# Patient Record
Sex: MALE | Race: WHITE | NOT HISPANIC OR LATINO | Employment: OTHER | URBAN - NONMETROPOLITAN AREA
[De-identification: names, ages, dates, MRNs, and addresses within clinical notes are randomized per-mention and may not be internally consistent; named-entity substitution may affect disease eponyms.]

---

## 2017-01-23 ENCOUNTER — OFFICE VISIT (OUTPATIENT)
Dept: NEUROLOGY | Facility: CLINIC | Age: 27
End: 2017-01-23

## 2017-01-23 VITALS
BODY MASS INDEX: 36.7 KG/M2 | WEIGHT: 286 LBS | HEART RATE: 74 BPM | DIASTOLIC BLOOD PRESSURE: 84 MMHG | HEIGHT: 74 IN | SYSTOLIC BLOOD PRESSURE: 130 MMHG

## 2017-01-23 DIAGNOSIS — R25.2 POST-TRAUMATIC SPASTICITY: ICD-10-CM

## 2017-01-23 DIAGNOSIS — Z98.2 S/P VP SHUNT: ICD-10-CM

## 2017-01-23 DIAGNOSIS — Z87.828 H/O TRAUMATIC SUBDURAL HEMATOMA: ICD-10-CM

## 2017-01-23 DIAGNOSIS — G40.419 OTHER GENERALIZED EPILEPSY, INTRACTABLE, WITHOUT STATUS EPILEPTICUS (HCC): ICD-10-CM

## 2017-01-23 DIAGNOSIS — G91.9 HYDROCEPHALUS (HCC): ICD-10-CM

## 2017-01-23 DIAGNOSIS — G47.33 OSA TREATED WITH BIPAP: Primary | ICD-10-CM

## 2017-01-23 DIAGNOSIS — Z97.8 PRESENCE OF INTRATHECAL BACLOFEN PUMP: ICD-10-CM

## 2017-01-23 DIAGNOSIS — S06.9X9S: ICD-10-CM

## 2017-01-23 PROBLEM — G40.919 INTRACTABLE EPILEPSY WITHOUT STATUS EPILEPTICUS: Status: ACTIVE | Noted: 2017-01-23

## 2017-01-23 PROBLEM — S06.9XAA CLOSED TBI (TRAUMATIC BRAIN INJURY): Status: ACTIVE | Noted: 2017-01-23

## 2017-01-23 PROCEDURE — 99213 OFFICE O/P EST LOW 20 MIN: CPT | Performed by: CLINICAL NURSE SPECIALIST

## 2017-01-23 RX ORDER — LEVOCETIRIZINE DIHYDROCHLORIDE 5 MG/1
5 TABLET, FILM COATED ORAL EVERY EVENING
COMMUNITY
End: 2021-01-28

## 2017-01-23 RX ORDER — BACLOFEN 20 MG/1
20 TABLET ORAL 4 TIMES DAILY
COMMUNITY
End: 2023-01-18

## 2017-01-23 RX ORDER — CHLORAL HYDRATE 500 MG
3000 CAPSULE ORAL
COMMUNITY
End: 2023-01-18 | Stop reason: DRUGHIGH

## 2017-01-23 RX ORDER — VITAMIN E 268 MG
400 CAPSULE ORAL 2 TIMES DAILY
COMMUNITY
End: 2023-01-18

## 2017-01-23 RX ORDER — AMANTADINE HYDROCHLORIDE 100 MG/1
100 CAPSULE, GELATIN COATED ORAL 2 TIMES DAILY
COMMUNITY

## 2017-01-23 RX ORDER — LIDOCAINE 50 MG/G
OINTMENT TOPICAL
COMMUNITY
End: 2018-02-09

## 2017-01-23 RX ORDER — OXYCODONE HYDROCHLORIDE AND ACETAMINOPHEN 5; 325 MG/1; MG/1
1 TABLET ORAL EVERY 6 HOURS PRN
COMMUNITY
End: 2023-01-18

## 2017-01-23 RX ORDER — DOCUSATE SODIUM 100 MG/1
100 CAPSULE, LIQUID FILLED ORAL 2 TIMES DAILY
COMMUNITY
End: 2023-01-18

## 2017-01-23 RX ORDER — CHLORPROMAZINE HYDROCHLORIDE 25 MG/1
25 TABLET, FILM COATED ORAL NIGHTLY
COMMUNITY
End: 2023-01-18

## 2017-01-23 RX ORDER — CITALOPRAM 40 MG/1
40 TABLET ORAL DAILY
COMMUNITY
End: 2023-01-18 | Stop reason: DRUGHIGH

## 2017-01-23 RX ORDER — PROPRANOLOL HYDROCHLORIDE 20 MG/1
20 TABLET ORAL 2 TIMES DAILY
COMMUNITY
End: 2018-02-09

## 2017-01-23 RX ORDER — ZOLPIDEM TARTRATE 10 MG/1
10 TABLET ORAL NIGHTLY PRN
COMMUNITY
End: 2018-02-09

## 2017-01-23 RX ORDER — GUAIFENESIN 400 MG/1
400 TABLET ORAL
COMMUNITY
End: 2017-08-09

## 2017-01-23 RX ORDER — MELOXICAM 7.5 MG/1
7.5 TABLET ORAL 2 TIMES DAILY
COMMUNITY
End: 2023-01-18

## 2017-01-23 RX ORDER — ACETAMINOPHEN 500 MG
500 TABLET ORAL EVERY 6 HOURS PRN
COMMUNITY
End: 2023-01-18

## 2017-01-23 RX ORDER — BUSPIRONE HYDROCHLORIDE 10 MG/1
10 TABLET ORAL 2 TIMES DAILY
COMMUNITY
End: 2023-01-18 | Stop reason: DRUGHIGH

## 2017-01-23 RX ORDER — DEXLANSOPRAZOLE 60 MG/1
60 CAPSULE, DELAYED RELEASE ORAL DAILY
COMMUNITY
End: 2023-01-18

## 2017-01-23 RX ORDER — GABAPENTIN 300 MG/1
300 CAPSULE ORAL 3 TIMES DAILY
COMMUNITY
End: 2017-08-09

## 2017-01-23 RX ORDER — DULOXETIN HYDROCHLORIDE 60 MG/1
60 CAPSULE, DELAYED RELEASE ORAL DAILY
COMMUNITY
End: 2023-01-18

## 2017-01-23 RX ORDER — RISPERIDONE 1 MG/1
1 TABLET ORAL 2 TIMES DAILY
COMMUNITY
End: 2018-02-09
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
## 2017-01-23 NOTE — PATIENT INSTRUCTIONS
Epilepsy  Epilepsy is a disorder in which a person has repeated seizures over time. A seizure is a release of abnormal electrical activity in the brain. Seizures can cause a change in attention, behavior, or the ability to remain awake and alert (altered mental status). Seizures often involve uncontrollable shaking (convulsions).   Most people with epilepsy lead normal lives. However, people with epilepsy are at an increased risk of falls, accidents, and injuries. Therefore, it is important to begin treatment right away.  CAUSES   Epilepsy has many possible causes. Anything that disturbs the normal pattern of brain cell activity can lead to seizures. This may include:   · Head injury.  · Birth trauma.  · High fever as a child.  · Stroke.  · Bleeding into or around the brain.  · Certain drugs.  · Prolonged low oxygen, such as what occurs after CPR efforts.  · Abnormal brain development.  · Certain illnesses, such as meningitis, encephalitis (brain infection), malaria, and other infections.  · An imbalance of nerve signaling chemicals (neurotransmitters).    SIGNS AND SYMPTOMS   The symptoms of a seizure can vary greatly from one person to another. Right before a seizure, you may have a warning (aura) that a seizure is about to occur. An aura may include the following symptoms:  · Fear or anxiety.  · Nausea.  · Feeling like the room is spinning (vertigo).  · Vision changes, such as seeing flashing lights or spots.  Common symptoms during a seizure include:  · Abnormal sensations, such as an abnormal smell or a bitter taste in the mouth.    · Sudden, general body stiffness.    · Convulsions that involve rhythmic jerking of the face, arm, or leg on one or both sides.    · Sudden change in consciousness.      Appearing to be awake but not responding.      Appearing to be asleep but cannot be awakened.    · Grimacing, chewing, lip smacking, drooling, tongue biting, or loss of bowel or bladder control.  After a seizure,  you may feel sleepy for a while.   DIAGNOSIS   Your health care provider will ask about your symptoms and take a medical history. Descriptions from any witnesses to your seizures will be very helpful in the diagnosis. A physical exam, including a detailed neurological exam, is necessary. Various tests may be done, such as:   · An electroencephalogram (EEG). This is a painless test of your brain waves. In this test, a diagram is created of your brain waves. These diagrams can be interpreted by a specialist.  · An MRI of the brain.    · A CT scan of the brain.    · A spinal tap (lumbar puncture, LP).  · Blood tests to check for signs of infection or abnormal blood chemistry.  TREATMENT   There is no cure for epilepsy, but it is generally treatable. Once epilepsy is diagnosed, it is important to begin treatment as soon as possible. For most people with epilepsy, seizures can be controlled with medicines. The following may also be used:  · A pacemaker for the brain (vagus nerve stimulator) can be used for people with seizures that are not well controlled by medicine.  · Surgery on the brain.  For some people, epilepsy eventually goes away.  HOME CARE INSTRUCTIONS   · Follow your health care provider's recommendations on driving and safety in normal activities.  · Get enough rest. Lack of sleep can cause seizures.  · Only take over-the-counter or prescription medicines as directed by your health care provider. Take any prescribed medicine exactly as directed.  · Avoid any known triggers of your seizures.  · Keep a seizure diary. Record what you recall about any seizure, especially any possible trigger.    · Make sure the people you live and work with know that you are prone to seizures. They should receive instructions on how to help you. In general, a witness to a seizure should:      Cushion your head and body.      Turn you on your side.      Avoid unnecessarily restraining you.      Not place anything inside your  mouth.      Call for emergency medical help if there is any question about what has occurred.    · Follow up with your health care provider as directed. You may need regular blood tests to monitor the levels of your medicine.    SEEK MEDICAL CARE IF:   · You develop signs of infection or other illness. This might increase the risk of a seizure.    · You seem to be having more frequent seizures.    · Your seizure pattern is changing.    SEEK IMMEDIATE MEDICAL CARE IF:   · You have a seizure that does not stop after a few moments.    · You have a seizure that causes any difficulty in breathing.    · You have a seizure that results in a very severe headache.    · You have a seizure that leaves you with the inability to speak or use a part of your body.       This information is not intended to replace advice given to you by your health care provider. Make sure you discuss any questions you have with your health care provider.     Document Released: 12/18/2006 Document Revised: 10/08/2014 Document Reviewed: 07/30/2014  "[employer] Interactive Patient Education ©2016 "[employer] Inc[employer]  Sleep Apnea  Sleep apnea is disorder that affects a person's sleep. A person with sleep apnea has abnormal pauses in their breathing when they sleep. It is hard for them to get a good sleep. This makes a person tired during the day. It also can lead to other physical problems. There are three types of sleep apnea. One type is when breathing stops for a short time because your airway is blocked (obstructive sleep apnea). Another type is when the brain sometimes fails to give the normal signal to breathe to the muscles that control your breathing (central sleep apnea). The third type is a combination of the other two types.  HOME CARE  · Do not sleep on your back. Try to sleep on your side.  · Take all medicine as told by your doctor.  · Avoid alcohol, calming medicines (sedatives), and depressant drugs.  · Try to lose weight if you are overweight.  Talk to your doctor about a healthy weight goal.  Your doctor may have you use a device that helps to open your airway. It can help you get the air that you need. It is called a positive airway pressure (PAP) device. There are three types of PAP devices:  · Continuous positive airway pressure (CPAP) device.  · Nasal expiratory positive airway pressure (EPAP) device.  · Bilevel positive airway pressure (BPAP) device.  MAKE SURE YOU:  · Understand these instructions.  · Will watch your condition.  · Will get help right away if you are not doing well or get worse.     This information is not intended to replace advice given to you by your health care provider. Make sure you discuss any questions you have with your health care provider.     Document Released: 09/26/2009 Document Revised: 01/08/2016 Document Reviewed: 04/20/2013  [employer] Interactive Patient Education ©2016 [employer]

## 2017-01-23 NOTE — MR AVS SNAPSHOT
Octavio Werner   1/23/2017 9:40 AM   Office Visit    Dept Phone:  [phone]   Encounter #:  [id number]    Provider:  GREG Bhatia   Department:  [facility] NEUROLOGY                Your Full Care Plan              Your Updated Medication List          This list is accurate as of: 1/23/17 10:40 AM.  Always use your most recent med list.                acetaminophen 500 MG tablet   Commonly known as:  TYLENOL       amantadine 100 MG capsule   Commonly known as:  SYMMETREL       AMBIEN 10 MG tablet   Generic drug:  zolpidem       ARTIFICIAL TEAR SOLUTION OP       baclofen 20 MG tablet   Commonly known as:  LIORESAL       busPIRone 10 MG tablet   Commonly known as:  BUSPAR       chlorproMAZINE 25 MG tablet   Commonly known as:  THORAZINE       citalopram 40 MG tablet   Commonly known as:  CeleXA       DEXILANT 60 MG capsule   Generic drug:  dexlansoprazole       docusate sodium 100 MG capsule   Commonly known as:  COLACE       DULoxetine 60 MG capsule   Commonly known as:  CYMBALTA       fish oil 1000 MG capsule capsule       gabapentin 300 MG capsule   Commonly known as:  NEURONTIN       levocetirizine 5 MG tablet   Commonly known as:  XYZAL       lidocaine 5 % ointment   Commonly known as:  XYLOCAINE       meloxicam 7.5 MG tablet   Commonly known as:  MOBIC       MUCUS RELIEF 400 MG tablet   Generic drug:  guaiFENesin       MULTI-VITAMIN PO       oxyCODONE-acetaminophen 5-325 MG per tablet   Commonly known as:  PERCOCET       propranolol 20 MG tablet   Commonly known as:  INDERAL       risperiDONE 1 MG tablet   Commonly known as:  risperDAL       vitamin E 400 UNIT capsule               You Were Diagnosed With        Codes Comments    AMARI treated with BiPAP    -  Primary ICD-10-CM: G47.33  ICD-9-CM: 327.23       Instructions    Epilepsy  Epilepsy is a disorder in which a person has repeated seizures over time. A seizure is a release of abnormal electrical activity  in the brain. Seizures can cause a change in attention, behavior, or the ability to remain awake and alert (altered mental status). Seizures often involve uncontrollable shaking (convulsions).   Most people with epilepsy lead normal lives. However, people with epilepsy are at an increased risk of falls, accidents, and injuries. Therefore, it is important to begin treatment right away.  CAUSES   Epilepsy has many possible causes. Anything that disturbs the normal pattern of brain cell activity can lead to seizures. This may include:   · Head injury.  · Birth trauma.  · High fever as a child.  · Stroke.  · Bleeding into or around the brain.  · Certain drugs.  · Prolonged low oxygen, such as what occurs after CPR efforts.  · Abnormal brain development.  · Certain illnesses, such as meningitis, encephalitis (brain infection), malaria, and other infections.  · An imbalance of nerve signaling chemicals (neurotransmitters).    SIGNS AND SYMPTOMS   The symptoms of a seizure can vary greatly from one person to another. Right before a seizure, you may have a warning (aura) that a seizure is about to occur. An aura may include the following symptoms:  · Fear or anxiety.  · Nausea.  · Feeling like the room is spinning (vertigo).  · Vision changes, such as seeing flashing lights or spots.  Common symptoms during a seizure include:  · Abnormal sensations, such as an abnormal smell or a bitter taste in the mouth.    · Sudden, general body stiffness.    · Convulsions that involve rhythmic jerking of the face, arm, or leg on one or both sides.    · Sudden change in consciousness.      Appearing to be awake but not responding.      Appearing to be asleep but cannot be awakened.    · Grimacing, chewing, lip smacking, drooling, tongue biting, or loss of bowel or bladder control.  After a seizure, you may feel sleepy for a while.   DIAGNOSIS   Your health care provider will ask about your symptoms and take a medical history.  Descriptions from any witnesses to your seizures will be very helpful in the diagnosis. A physical exam, including a detailed neurological exam, is necessary. Various tests may be done, such as:   · An electroencephalogram (EEG). This is a painless test of your brain waves. In this test, a diagram is created of your brain waves. These diagrams can be interpreted by a specialist.  · An MRI of the brain.    · A CT scan of the brain.    · A spinal tap (lumbar puncture, LP).  · Blood tests to check for signs of infection or abnormal blood chemistry.  TREATMENT   There is no cure for epilepsy, but it is generally treatable. Once epilepsy is diagnosed, it is important to begin treatment as soon as possible. For most people with epilepsy, seizures can be controlled with medicines. The following may also be used:  · A pacemaker for the brain (vagus nerve stimulator) can be used for people with seizures that are not well controlled by medicine.  · Surgery on the brain.  For some people, epilepsy eventually goes away.  HOME CARE INSTRUCTIONS   · Follow your health care provider's recommendations on driving and safety in normal activities.  · Get enough rest. Lack of sleep can cause seizures.  · Only take over-the-counter or prescription medicines as directed by your health care provider. Take any prescribed medicine exactly as directed.  · Avoid any known triggers of your seizures.  · Keep a seizure diary. Record what you recall about any seizure, especially any possible trigger.    · Make sure the people you live and work with know that you are prone to seizures. They should receive instructions on how to help you. In general, a witness to a seizure should:      Cushion your head and body.      Turn you on your side.      Avoid unnecessarily restraining you.      Not place anything inside your mouth.      Call for emergency medical help if there is any question about what has occurred.    · Follow up with your health care  provider as directed. You may need regular blood tests to monitor the levels of your medicine.    SEEK MEDICAL CARE IF:   · You develop signs of infection or other illness. This might increase the risk of a seizure.    · You seem to be having more frequent seizures.    · Your seizure pattern is changing.    SEEK IMMEDIATE MEDICAL CARE IF:   · You have a seizure that does not stop after a few moments.    · You have a seizure that causes any difficulty in breathing.    · You have a seizure that results in a very severe headache.    · You have a seizure that leaves you with the inability to speak or use a part of your body.       This information is not intended to replace advice given to you by your health care provider. Make sure you discuss any questions you have with your health care provider.     Document Released: 12/18/2006 Document Revised: 10/08/2014 Document Reviewed: 07/30/2014  [employer] Interactive Patient Education ©2016 [employer]  Sleep Apnea  Sleep apnea is disorder that affects a person's sleep. A person with sleep apnea has abnormal pauses in their breathing when they sleep. It is hard for them to get a good sleep. This makes a person tired during the day. It also can lead to other physical problems. There are three types of sleep apnea. One type is when breathing stops for a short time because your airway is blocked (obstructive sleep apnea). Another type is when the brain sometimes fails to give the normal signal to breathe to the muscles that control your breathing (central sleep apnea). The third type is a combination of the other two types.  HOME CARE  · Do not sleep on your back. Try to sleep on your side.  · Take all medicine as told by your doctor.  · Avoid alcohol, calming medicines (sedatives), and depressant drugs.  · Try to lose weight if you are overweight. Talk to your doctor about a healthy weight goal.  Your doctor may have you use a device that helps to open your airway. It can  help you get the air that you need. It is called a positive airway pressure (PAP) device. There are three types of PAP devices:  · Continuous positive airway pressure (CPAP) device.  · Nasal expiratory positive airway pressure (EPAP) device.  · Bilevel positive airway pressure (BPAP) device.  MAKE SURE YOU:  · Understand these instructions.  · Will watch your condition.  · Will get help right away if you are not doing well or get worse.     This information is not intended to replace advice given to you by your health care provider. Make sure you discuss any questions you have with your health care provider.     Document Released: 2009 Document Revised: 2016 Document Reviewed: 2013  [employer] Interactive Patient Education © [employer]       Patient Instructions History      Upcoming Appointments     Visit Type Date Time Department    FOLLOW UP 2017  9:40 AM [facility] NEUROLOGY SPE PAD    FOLLOW UP 3/8/2017 10:40 AM [facility] NEUROLOGY SPE PAD      MyChart Signup     [facility] [employer] allows you to send messages to your doctor, view your test results, renew your prescriptions, schedule appointments, and more. To sign up, go to [employer] and click on the Sign Up Now link in the New User? box. Enter your [employer] Activation Code exactly as it appears below along with the last four digits of your Social Security Number and your Date of Birth () to complete the sign-up process. If you do not sign up before the expiration date, you must request a new code.    [employer] Activation Code: [email or web address]  Expires: 2017 10:39 AM    If you have questions, you can email [email or web address] or call [phone] to talk to our [employer] staff. Remember, [employer] is NOT to be used for urgent needs. For medical emergencies, dial 911.               Other Info from Your Visit           Your Appointments     Mar 08, 2017 10:40 AM CST   Follow Up with GREG Bhatia   [place]  "[facility] MEDICAL [facility] NEUROLOGY (--)    [address]  [place] [address] [phone]           Arrive 15 minutes prior to appointment.              Other Notes About Your Plan     [facility]- [facility]        Allergies     Dilantin [Phenytoin]      Latex      Penicillins      Sulfa Antibiotics        Reason for Visit     Neurologic Problem Last sleep study 2003. Per .       Vital Signs     Blood Pressure Pulse Height Weight Body Mass Index Smoking Status    130/84 74 74\" (188 cm) 286 lb (130 kg) 36.72 kg/m2 Never Smoker      Problems and Diagnoses Noted     AMARI treated with BiPAP    -  Primary        "

## 2017-01-24 ENCOUNTER — TELEPHONE (OUTPATIENT)
Dept: NEUROLOGY | Facility: CLINIC | Age: 27
End: 2017-01-24

## 2017-01-24 NOTE — TELEPHONE ENCOUNTER
Please fax an order for the Overnight Oximetry for Pennyrile to complete the test. Fax [phone]

## 2017-01-25 ENCOUNTER — TELEPHONE (OUTPATIENT)
Dept: UROLOGY | Age: 27
End: 2017-01-25

## 2017-01-31 ENCOUNTER — TELEPHONE (OUTPATIENT)
Dept: NEUROLOGY | Facility: CLINIC | Age: 27
End: 2017-01-31

## 2017-01-31 ENCOUNTER — HOSPITAL ENCOUNTER (OUTPATIENT)
Dept: GENERAL RADIOLOGY | Age: 27
Discharge: HOME | End: 2017-01-31
Payer: COMMERCIAL

## 2017-01-31 ENCOUNTER — OFFICE VISIT (OUTPATIENT)
Dept: UROLOGY | Age: 27
End: 2017-01-31
Payer: COMMERCIAL

## 2017-01-31 VITALS
SYSTOLIC BLOOD PRESSURE: 128 MMHG | HEIGHT: 74 IN | OXYGEN SATURATION: 96 % | HEART RATE: 84 BPM | BODY MASS INDEX: 36.7 KG/M2 | DIASTOLIC BLOOD PRESSURE: 70 MMHG | TEMPERATURE: 96.7 F | WEIGHT: 286 LBS

## 2017-01-31 DIAGNOSIS — N20.0 BILATERAL KIDNEY STONES: Primary | ICD-10-CM

## 2017-01-31 DIAGNOSIS — M54.5 ACUTE BILATERAL LOW BACK PAIN, WITH SCIATICA PRESENCE UNSPECIFIED: ICD-10-CM

## 2017-01-31 DIAGNOSIS — N20.0 BILATERAL KIDNEY STONES: ICD-10-CM

## 2017-01-31 PROCEDURE — G8427 DOCREV CUR MEDS BY ELIG CLIN: HCPCS | Performed by: PHYSICIAN ASSISTANT

## 2017-01-31 PROCEDURE — 1036F TOBACCO NON-USER: CPT | Performed by: PHYSICIAN ASSISTANT

## 2017-01-31 PROCEDURE — G8484 FLU IMMUNIZE NO ADMIN: HCPCS | Performed by: PHYSICIAN ASSISTANT

## 2017-01-31 PROCEDURE — 74000 XR ABDOMEN LIMITED (KUB): CPT

## 2017-01-31 PROCEDURE — 99214 OFFICE O/P EST MOD 30 MIN: CPT | Performed by: PHYSICIAN ASSISTANT

## 2017-01-31 PROCEDURE — G8419 CALC BMI OUT NRM PARAM NOF/U: HCPCS | Performed by: PHYSICIAN ASSISTANT

## 2017-01-31 RX ORDER — POLYETHYLENE GLYCOL 3350 17 G/17G
17 POWDER, FOR SOLUTION ORAL 2 TIMES DAILY
COMMUNITY
Start: 2017-01-26 | End: 2022-09-29

## 2017-01-31 RX ORDER — PIMECROLIMUS 10 MG/G
CREAM TOPICAL 2 TIMES DAILY
COMMUNITY
End: 2018-09-24 | Stop reason: ALTCHOICE

## 2017-01-31 RX ORDER — OXYCODONE AND ACETAMINOPHEN 10; 325 MG/1; MG/1
TABLET ORAL
COMMUNITY
Start: 2016-12-30 | End: 2017-01-31 | Stop reason: ALTCHOICE

## 2017-01-31 RX ORDER — AMANTADINE HYDROCHLORIDE 100 MG/1
TABLET ORAL PRN
COMMUNITY
Start: 2017-01-20 | End: 2019-03-28

## 2017-01-31 ASSESSMENT — ENCOUNTER SYMPTOMS
ABDOMINAL PAIN: 1
SPUTUM PRODUCTION: 0
BACK PAIN: 1
PHOTOPHOBIA: 0
DOUBLE VISION: 0
ORTHOPNEA: 0
NAUSEA: 1
HEMOPTYSIS: 0

## 2017-01-31 NOTE — TELEPHONE ENCOUNTER
I did call and leave a voice mail for Lorie with [facility] with my name and telephone number.

## 2017-01-31 NOTE — TELEPHONE ENCOUNTER
----- Message from GREG Bhatia sent at 1/30/2017  8:30 AM CST -----  Pulse ox with PAP does not require oxygen and no further testing required at this time. Please let [facility] know. thanks

## 2017-02-02 LAB — URINE CULTURE, ROUTINE: NORMAL

## 2017-03-08 ENCOUNTER — OFFICE VISIT (OUTPATIENT)
Dept: NEUROLOGY | Facility: CLINIC | Age: 27
End: 2017-03-08

## 2017-03-08 VITALS
HEIGHT: 74 IN | SYSTOLIC BLOOD PRESSURE: 128 MMHG | HEART RATE: 76 BPM | BODY MASS INDEX: 36.7 KG/M2 | DIASTOLIC BLOOD PRESSURE: 88 MMHG | WEIGHT: 286 LBS

## 2017-03-08 DIAGNOSIS — Z98.2 S/P VP SHUNT: ICD-10-CM

## 2017-03-08 DIAGNOSIS — G40.419 OTHER GENERALIZED EPILEPSY, INTRACTABLE, WITHOUT STATUS EPILEPTICUS (HCC): ICD-10-CM

## 2017-03-08 DIAGNOSIS — S06.9X9S: ICD-10-CM

## 2017-03-08 DIAGNOSIS — Z97.8 PRESENCE OF INTRATHECAL BACLOFEN PUMP: ICD-10-CM

## 2017-03-08 DIAGNOSIS — G47.33 OSA TREATED WITH BIPAP: Primary | ICD-10-CM

## 2017-03-08 DIAGNOSIS — R25.2 POST-TRAUMATIC SPASTICITY: ICD-10-CM

## 2017-03-08 DIAGNOSIS — Z87.828 H/O TRAUMATIC SUBDURAL HEMATOMA: ICD-10-CM

## 2017-03-08 DIAGNOSIS — G91.9 HYDROCEPHALUS (HCC): ICD-10-CM

## 2017-03-08 PROCEDURE — 99213 OFFICE O/P EST LOW 20 MIN: CPT | Performed by: CLINICAL NURSE SPECIALIST

## 2017-03-08 NOTE — PROGRESS NOTES
Subjective     Chief Complaint   Patient presents with   • Sleep Apnea     Not using CPAP.        Octavio Werner is a 26 y.o. male right handed on disability and lives at [facility].  He is here today for follow up for AMARI.  He did have overnight pulse ox that didshow desaturation  Less than 4 minutes with 20 desaturation event. .the patient has not been wearing his CPAP stating the air is too forceful and keeps him from sleeping. As you recall he has history of TBI, hydrocephalus s/p  shunt and post TBI seizures. He denies seizures. He is accompanied by caregiver from the facility that confirms this.       HPI Comments: Last sleep study was 1/2014.    Does not notice leaks . Wears nose piece. Could not tolerate full face mask.        Sleep Apnea   This is a chronic problem. The current episode started more than 1 year ago. Associated symptoms include a fever, numbness and weakness. Pertinent negatives include no arthralgias, chest pain, headaches, sore throat or vomiting. Associated symptoms comments: Sob, hx sleep apnea, patient has had reported increased body weight since 2014. Not wearing PAP as patient states air is too forceful. Exacerbated by: TBI, left sided paralysis, hydrocephalus s/p  shunt.        Current Outpatient Prescriptions   Medication Sig Dispense Refill   • acetaminophen (TYLENOL) 500 MG tablet Take 500 mg by mouth Every 6 (Six) Hours As Needed for mild pain (1-3).     • amantadine (SYMMETREL) 100 MG capsule Take 100 mg by mouth 2 (Two) Times a Day.     • ARTIFICIAL TEAR SOLUTION OP Apply  to eye.     • baclofen (LIORESAL) 20 MG tablet Take 20 mg by mouth 4 (Four) Times a Day.     • busPIRone (BUSPAR) 10 MG tablet Take 10 mg by mouth 2 (Two) Times a Day.     • chlorproMAZINE (THORAZINE) 25 MG tablet Take 25 mg by mouth Every Night.     • citalopram (CeleXA) 40 MG tablet Take 40 mg by mouth Daily.     • dexlansoprazole (DEXILANT) 60 MG capsule Take 60 mg by mouth Daily.     • docusate  sodium (COLACE) 100 MG capsule Take 100 mg by mouth 2 (Two) Times a Day.     • DULoxetine (CYMBALTA) 60 MG capsule Take 60 mg by mouth Daily.     • gabapentin (NEURONTIN) 300 MG capsule Take 1,200 mg by mouth Daily. 300 MG BID, AND 3 CAPS AT BEDTIME     • guaiFENesin (MUCUS RELIEF) 400 MG tablet Take 400 mg by mouth Every 4 (Four) Hours.     • levocetirizine (XYZAL) 5 MG tablet Take 5 mg by mouth Every Evening.     • lidocaine (XYLOCAINE) 5 % ointment Apply  topically Every 2 (Two) Hours As Needed for mild pain (1-3).     • meloxicam (MOBIC) 7.5 MG tablet Take 7.5 mg by mouth 2 (Two) Times a Day.     • Multiple Vitamin (MULTI-VITAMIN PO) Take  by mouth.     • Omega-3 Fatty Acids (FISH OIL) 1000 MG capsule capsule Take 3,000 mg by mouth 3 (Three) Times a Day With Meals.     • oxyCODONE-acetaminophen (PERCOCET) 5-325 MG per tablet Take 1 tablet by mouth Every 6 (Six) Hours As Needed.     • propranolol (INDERAL) 20 MG tablet Take 20 mg by mouth 2 (Two) Times a Day.     • risperiDONE (risperDAL) 1 MG tablet Take 1 mg by mouth 2 (Two) Times a Day.     • vitamin E 400 UNIT capsule Take 400 Units by mouth 2 (Two) Times a Day.     • zolpidem (AMBIEN) 10 MG tablet Take 10 mg by mouth At Night As Needed for sleep.       No current facility-administered medications for this visit.        Past Medical History   Diagnosis Date   • CVA (cerebral vascular accident)    • Depression    • Hemiparesis    • Hydrocephalus    • Kidney stones    • Neurogenic bladder    • Neurogenic bowel    • Seizure    • Sinusitis    • Sleep apnea    • Subdural hematoma    • TBI (traumatic brain injury)    • UTI (urinary tract infection)        Past Surgical History   Procedure Laterality Date   • Craniotomy       FOR EVACUATION OF SUBDURAL HEMATOMA & VENTRICULOSTOMY   • Peg tube insertion       REMOVED   • Tracheostomy       REMOVED   • Chest tube insertion       REMOVED   • Hamstring release     •  shunt insertion     • Baclofen pump implantation    "  • Kidney stone surgery         family history includes No Known Problems in his father and mother.    Social History   Substance Use Topics   • Smoking status: Never Smoker   • Smokeless tobacco: Never Used   • Alcohol use No       Review of Systems   Constitutional: Positive for fever.   HENT: Negative.  Negative for hearing loss, sinus pressure, sore throat and trouble swallowing.    Eyes: Negative.  Negative for visual disturbance.   Respiratory: Positive for shortness of breath. Negative for chest tightness and stridor.    Cardiovascular: Negative.  Negative for chest pain and leg swelling.   Gastrointestinal: Negative.  Negative for constipation, diarrhea and vomiting.   Endocrine: Negative.    Genitourinary: Negative.  Negative for dysuria and frequency.   Musculoskeletal: Positive for gait problem (left sided paralysis). Negative for arthralgias and back pain.   Skin: Negative.    Allergic/Immunologic: Negative.    Neurological: Positive for speech difficulty, weakness and numbness. Negative for dizziness and headaches.        Left sided paralysis and LUE spasticity   Hematological: Negative.  Negative for adenopathy.   Psychiatric/Behavioral: Positive for behavioral problems (history of touching [facility] inappropriately). Negative for agitation and confusion.   All other systems reviewed and are negative.      Objective     Visit Vitals   • /88   • Pulse 76   • Ht 74\" (188 cm)   • Wt 286 lb (130 kg)   • BMI 36.72 kg/m2   , Body mass index is 36.72 kg/(m^2).    Physical Exam   Constitutional: Vital signs are normal. He appears well-developed and well-nourished.   HENT:   Head: Normocephalic and atraumatic.   Right Ear: Hearing and external ear normal.   Left Ear: Hearing and external ear normal.   Nose: Nose normal.   Mouth/Throat: Mucous membranes are normal.   Hx right craniotomy  Excessive cerumen bilateral   Eyes: EOM and lids are normal. Pupils are equal, round, and reactive to light. "   Neck: Trachea normal and normal range of motion. Neck supple. Carotid bruit is not present.   Cardiovascular: Normal rate, regular rhythm, S1 normal, S2 normal, normal heart sounds and normal pulses.    No murmur heard.  Pulmonary/Chest: Effort normal and breath sounds normal.   Abdominal: Soft. Bowel sounds are normal.   Musculoskeletal: Normal range of motion.   Neurological: He is alert. He has normal strength and normal reflexes. He is disoriented. He displays no tremor. A cranial nerve deficit (intermittent right eye deviation. EOM intact bilateral. visual fields intact) and sensory deficit (left sided) is present. Abnormal muscle tone: increase tone LUE/LLE. He displays a negative Romberg sign. Gait (no tested. in wheelchair and has gait/lift belt) abnormal. Coordination (mild pass pointing on right) normal.   Skin: Skin is warm and dry.   Psychiatric: He has a normal mood and affect. His speech is normal and behavior is normal. Cognition and memory are normal.   Nursing note and vitals reviewed.           ASSESSMENT/PLAN    Diagnoses and all orders for this visit:    AMARI treated with BiPAP  -     Polysomnography 4 or More Parameters With CPAP; Future    Closed TBI (traumatic brain injury), with loss of consciousness of unspecified duration, sequela  -     Polysomnography 4 or More Parameters With CPAP; Future    Hydrocephalus    Other generalized epilepsy, intractable, without status epilepticus    Post-traumatic spasticity    S/P  shunt  -     Polysomnography 4 or More Parameters With CPAP; Future    Presence of intrathecal baclofen pump    H/O traumatic subdural hematoma      MEDICAL DECISION MAKIN. Will order sleep study as patient did have 20 desat events on overnight pulse ox.  2. No reported seizures.  3. Seizure precautions were discussed to include no tub baths, no swimming, avoiding lack of sleep, and avoiding known triggers. Education given of things that may contribute to a seizure to  include, but not limited to: stressful situations, fever, fatigue, lack of sleep, low blood sugar, hyperventilation, flashing lights, and caffeine. Instructions given to take seizure medications as prescribed. Education given to family member on what to do during a seizure and care following the seizure. Education given to contact this office prior to stopping or changing any medications.  4.  shunt managed by Dr. Baltazar.  5. Patient has baclofen pump for spasticity and had received BOTOX in E in past by Dr. Adamson.     allergies and all known medications/prescriptions have been reviewed using resources available on this encounter.    Return in about 4 weeks (around 4/5/2017).        GREG Fishman

## 2017-03-08 NOTE — PATIENT INSTRUCTIONS
Epilepsy  Epilepsy is a disorder in which a person has repeated seizures over time. A seizure is a release of abnormal electrical activity in the brain. Seizures can cause a change in attention, behavior, or the ability to remain awake and alert (altered mental status). Seizures often involve uncontrollable shaking (convulsions).   Most people with epilepsy lead normal lives. However, people with epilepsy are at an increased risk of falls, accidents, and injuries. Therefore, it is important to begin treatment right away.  CAUSES   Epilepsy has many possible causes. Anything that disturbs the normal pattern of brain cell activity can lead to seizures. This may include:   · Head injury.  · Birth trauma.  · High fever as a child.  · Stroke.  · Bleeding into or around the brain.  · Certain drugs.  · Prolonged low oxygen, such as what occurs after CPR efforts.  · Abnormal brain development.  · Certain illnesses, such as meningitis, encephalitis (brain infection), malaria, and other infections.  · An imbalance of nerve signaling chemicals (neurotransmitters).    SIGNS AND SYMPTOMS   The symptoms of a seizure can vary greatly from one person to another. Right before a seizure, you may have a warning (aura) that a seizure is about to occur. An aura may include the following symptoms:  · Fear or anxiety.  · Nausea.  · Feeling like the room is spinning (vertigo).  · Vision changes, such as seeing flashing lights or spots.  Common symptoms during a seizure include:  · Abnormal sensations, such as an abnormal smell or a bitter taste in the mouth.    · Sudden, general body stiffness.    · Convulsions that involve rhythmic jerking of the face, arm, or leg on one or both sides.    · Sudden change in consciousness.      Appearing to be awake but not responding.      Appearing to be asleep but cannot be awakened.    · Grimacing, chewing, lip smacking, drooling, tongue biting, or loss of bowel or bladder control.  After a seizure,  you may feel sleepy for a while.   DIAGNOSIS   Your health care provider will ask about your symptoms and take a medical history. Descriptions from any witnesses to your seizures will be very helpful in the diagnosis. A physical exam, including a detailed neurological exam, is necessary. Various tests may be done, such as:   · An electroencephalogram (EEG). This is a painless test of your brain waves. In this test, a diagram is created of your brain waves. These diagrams can be interpreted by a specialist.  · An MRI of the brain.    · A CT scan of the brain.    · A spinal tap (lumbar puncture, LP).  · Blood tests to check for signs of infection or abnormal blood chemistry.  TREATMENT   There is no cure for epilepsy, but it is generally treatable. Once epilepsy is diagnosed, it is important to begin treatment as soon as possible. For most people with epilepsy, seizures can be controlled with medicines. The following may also be used:  · A pacemaker for the brain (vagus nerve stimulator) can be used for people with seizures that are not well controlled by medicine.  · Surgery on the brain.  For some people, epilepsy eventually goes away.  HOME CARE INSTRUCTIONS   · Follow your health care provider's recommendations on driving and safety in normal activities.  · Get enough rest. Lack of sleep can cause seizures.  · Only take over-the-counter or prescription medicines as directed by your health care provider. Take any prescribed medicine exactly as directed.  · Avoid any known triggers of your seizures.  · Keep a seizure diary. Record what you recall about any seizure, especially any possible trigger.    · Make sure the people you live and work with know that you are prone to seizures. They should receive instructions on how to help you. In general, a witness to a seizure should:      Cushion your head and body.      Turn you on your side.      Avoid unnecessarily restraining you.      Not place anything inside your  mouth.      Call for emergency medical help if there is any question about what has occurred.    · Follow up with your health care provider as directed. You may need regular blood tests to monitor the levels of your medicine.    SEEK MEDICAL CARE IF:   · You develop signs of infection or other illness. This might increase the risk of a seizure.    · You seem to be having more frequent seizures.    · Your seizure pattern is changing.    SEEK IMMEDIATE MEDICAL CARE IF:   · You have a seizure that does not stop after a few moments.    · You have a seizure that causes any difficulty in breathing.    · You have a seizure that results in a very severe headache.    · You have a seizure that leaves you with the inability to speak or use a part of your body.       This information is not intended to replace advice given to you by your health care provider. Make sure you discuss any questions you have with your health care provider.     Document Released: 12/18/2006 Document Revised: 10/08/2014 Document Reviewed: 07/30/2014  [employer] Patient Education ©2016 [employer]  Sleep Apnea  Sleep apnea is disorder that affects a person's sleep. A person with sleep apnea has abnormal pauses in their breathing when they sleep. It is hard for them to get a good sleep. This makes a person tired during the day. It also can lead to other physical problems. There are three types of sleep apnea. One type is when breathing stops for a short time because your airway is blocked (obstructive sleep apnea). Another type is when the brain sometimes fails to give the normal signal to breathe to the muscles that control your breathing (central sleep apnea). The third type is a combination of the other two types.  HOME CARE  · Do not sleep on your back. Try to sleep on your side.  · Take all medicine as told by your doctor.  · Avoid alcohol, calming medicines (sedatives), and depressant drugs.  · Try to lose weight if you are overweight.  Talk to your doctor about a healthy weight goal.  Your doctor may have you use a device that helps to open your airway. It can help you get the air that you need. It is called a positive airway pressure (PAP) device. There are three types of PAP devices:  · Continuous positive airway pressure (CPAP) device.  · Nasal expiratory positive airway pressure (EPAP) device.  · Bilevel positive airway pressure (BPAP) device.  MAKE SURE YOU:  · Understand these instructions.  · Will watch your condition.  · Will get help right away if you are not doing well or get worse.     This information is not intended to replace advice given to you by your health care provider. Make sure you discuss any questions you have with your health care provider.     Document Released: 09/26/2009 Document Revised: 01/08/2016 Document Reviewed: 09/26/2016  [employer] Interactive Patient Education ©2016 [employer]

## 2017-04-05 ENCOUNTER — HOSPITAL ENCOUNTER (OUTPATIENT)
Dept: PAIN MANAGEMENT | Age: 27
Discharge: HOME | End: 2017-04-05
Payer: COMMERCIAL

## 2017-04-05 DIAGNOSIS — R25.2 POST-TRAUMATIC SPASTICITY: ICD-10-CM

## 2017-04-05 PROCEDURE — 2500000003 HC RX 250 WO HCPCS

## 2017-04-05 PROCEDURE — 62369 ANAL SP INF PMP W/REPRG&FILL: CPT

## 2017-04-10 ENCOUNTER — HOSPITAL ENCOUNTER (OUTPATIENT)
Dept: SLEEP MEDICINE | Facility: HOSPITAL | Age: 27
Discharge: HOME | End: 2017-04-10
Admitting: CLINICAL NURSE SPECIALIST

## 2017-04-10 DIAGNOSIS — Z98.2 S/P VP SHUNT: ICD-10-CM

## 2017-04-10 DIAGNOSIS — G47.33 OSA TREATED WITH BIPAP: ICD-10-CM

## 2017-04-10 DIAGNOSIS — S06.9X9S: ICD-10-CM

## 2017-04-10 PROCEDURE — 95811 POLYSOM 6/>YRS CPAP 4/> PARM: CPT

## 2017-04-10 PROCEDURE — 95811 POLYSOM 6/>YRS CPAP 4/> PARM: CPT | Performed by: PSYCHIATRY & NEUROLOGY

## 2017-04-25 ENCOUNTER — OFFICE VISIT (OUTPATIENT)
Dept: NEUROLOGY | Facility: CLINIC | Age: 27
End: 2017-04-25

## 2017-04-25 VITALS
SYSTOLIC BLOOD PRESSURE: 126 MMHG | DIASTOLIC BLOOD PRESSURE: 78 MMHG | HEIGHT: 74 IN | WEIGHT: 286 LBS | BODY MASS INDEX: 36.7 KG/M2 | HEART RATE: 74 BPM

## 2017-04-25 DIAGNOSIS — Z99.89 OSA ON CPAP: ICD-10-CM

## 2017-04-25 DIAGNOSIS — Z98.2 S/P VP SHUNT: ICD-10-CM

## 2017-04-25 DIAGNOSIS — G40.419 OTHER GENERALIZED EPILEPSY, INTRACTABLE, WITHOUT STATUS EPILEPTICUS (HCC): Primary | ICD-10-CM

## 2017-04-25 DIAGNOSIS — Z97.8 PRESENCE OF INTRATHECAL BACLOFEN PUMP: ICD-10-CM

## 2017-04-25 DIAGNOSIS — S06.9X9S: ICD-10-CM

## 2017-04-25 DIAGNOSIS — G91.9 HYDROCEPHALUS (HCC): ICD-10-CM

## 2017-04-25 DIAGNOSIS — G47.33 OSA ON CPAP: ICD-10-CM

## 2017-04-25 DIAGNOSIS — Z87.828 H/O TRAUMATIC SUBDURAL HEMATOMA: ICD-10-CM

## 2017-04-25 PROCEDURE — 99213 OFFICE O/P EST LOW 20 MIN: CPT | Performed by: CLINICAL NURSE SPECIALIST

## 2017-04-25 NOTE — PROGRESS NOTES
Subjective     Chief Complaint   Patient presents with   • Sleep Apnea     No using CPAP         Octavio Werner is a 27 y.o. male right handed on disability and lives at [facility]. He is here today for follow up for AMARI. He did have overnight pulse ox that didshow desaturation Less than 4 minutes with 20 desaturation event. .He had a titration study 4/11/17 but has not had the CPAP set up since then and still is not using.  As you recall, the patient has not been wearing his CPAP stating the air is too forceful and keeps him from sleeping. As you recall he has history of TBI, hydrocephalus s/p  shunt and post TBI seizures. He denies seizures. He is accompanied by caregiver from the facility that confirms this.    He tells me his last seizure was many years ago. He has no complaints today.       Sleep titration study:FINDINGS ON STUDY:  Patient's total study time 417.3 minutes.  Total sleep time 374 minutes.  Patient titrated to CPAP of 10 cm water pressure with heated humidification.  AHI is 2.7.  PLM index 1.8.  Sleep efficiency 90%.  Low SpO2 is 82%.  Sleep latency is 0.3 minutes.  REM is 11.2%.  Stage I 0.8%.  Stage II 88%.  Latency to .5 minutes.  Patient spent 1.6 minutes during sleep of less than 90% oxygen saturation and 1.1 minute during sleep of less than 85% oxygen saturation.  Patient was supine the entire time.  Patient's mean pulse rate 71.2 bpm.  Highest pulse rate 100 bpm.     IMPRESSION:    Axis A: Obstructive sleep apnea G 47.33  Axis B: CPAP at 10 cm water pressure with heated humidification.  Axis C: underlying medical problems or medication effects could be contributory.  Close follow-up with patient's neurologist and family physician indicated with emphasis on safety.  Teofilo Alarcon MD  04/12/17       HPI Comments: Last sleep study was 1/2014.    Does not notice leaks . Wears nose piece. Could not tolerate full face mask.        Sleep Apnea   This is a chronic problem. The  current episode started more than 1 year ago. Associated symptoms include a fever, numbness and weakness. Pertinent negatives include no arthralgias, chest pain, headaches, sore throat or vomiting. Associated symptoms comments: Sob, hx sleep apnea, patient has had reported increased body weight since 2014. Not wearing PAP as patient states air is too forceful. Exacerbated by: TBI, left sided paralysis, hydrocephalus s/p  shunt.        Current Outpatient Prescriptions   Medication Sig Dispense Refill   • acetaminophen (TYLENOL) 500 MG tablet Take 500 mg by mouth Every 6 (Six) Hours As Needed for mild pain (1-3).     • amantadine (SYMMETREL) 100 MG capsule Take 100 mg by mouth 2 (Two) Times a Day.     • ARTIFICIAL TEAR SOLUTION OP Apply  to eye.     • baclofen (LIORESAL) 20 MG tablet Take 20 mg by mouth 4 (Four) Times a Day.     • busPIRone (BUSPAR) 10 MG tablet Take 10 mg by mouth 2 (Two) Times a Day.     • chlorproMAZINE (THORAZINE) 25 MG tablet Take 25 mg by mouth Every Night.     • citalopram (CeleXA) 40 MG tablet Take 40 mg by mouth Daily.     • dexlansoprazole (DEXILANT) 60 MG capsule Take 60 mg by mouth Daily.     • docusate sodium (COLACE) 100 MG capsule Take 100 mg by mouth 2 (Two) Times a Day.     • DULoxetine (CYMBALTA) 60 MG capsule Take 60 mg by mouth Daily.     • gabapentin (NEURONTIN) 300 MG capsule Take 300 mg by mouth 3 (Three) Times a Day. 300 MG TID     • guaiFENesin (MUCUS RELIEF) 400 MG tablet Take 400 mg by mouth Every 4 (Four) Hours.     • levocetirizine (XYZAL) 5 MG tablet Take 5 mg by mouth Every Evening.     • lidocaine (XYLOCAINE) 5 % ointment Apply  topically Every 2 (Two) Hours As Needed for mild pain (1-3).     • meloxicam (MOBIC) 7.5 MG tablet Take 7.5 mg by mouth 2 (Two) Times a Day.     • Multiple Vitamin (MULTI-VITAMIN PO) Take  by mouth.     • Omega-3 Fatty Acids (FISH OIL) 1000 MG capsule capsule Take 3,000 mg by mouth 3 (Three) Times a Day With Meals.     •  oxyCODONE-acetaminophen (PERCOCET) 5-325 MG per tablet Take 1 tablet by mouth Every 6 (Six) Hours As Needed.     • propranolol (INDERAL) 20 MG tablet Take 20 mg by mouth 2 (Two) Times a Day.     • risperiDONE (risperDAL) 1 MG tablet Take 1 mg by mouth 2 (Two) Times a Day.     • vitamin E 400 UNIT capsule Take 400 Units by mouth 2 (Two) Times a Day.     • zolpidem (AMBIEN) 10 MG tablet Take 10 mg by mouth At Night As Needed for sleep.       No current facility-administered medications for this visit.        Past Medical History:   Diagnosis Date   • CVA (cerebral vascular accident)    • Depression    • Hemiparesis    • Hydrocephalus    • Kidney stones    • Neurogenic bladder    • Neurogenic bowel    • Seizure    • Sinusitis    • Sleep apnea    • Subdural hematoma    • TBI (traumatic brain injury)    • UTI (urinary tract infection)        Past Surgical History:   Procedure Laterality Date   • BACLOFEN PUMP IMPLANTATION     • CHEST TUBE INSERTION      REMOVED   • CRANIOTOMY      FOR EVACUATION OF SUBDURAL HEMATOMA & VENTRICULOSTOMY   • HAMSTRING RELEASE     • KIDNEY STONE SURGERY     • PEG TUBE INSERTION      REMOVED   • TRACHEOSTOMY      REMOVED   •  SHUNT INSERTION         family history includes No Known Problems in his father and mother.    Social History   Substance Use Topics   • Smoking status: Never Smoker   • Smokeless tobacco: Never Used   • Alcohol use No       Review of Systems   Constitutional: Positive for fever.   HENT: Negative.  Negative for hearing loss, sinus pressure, sore throat and trouble swallowing.    Eyes: Negative.  Negative for visual disturbance.   Respiratory: Positive for shortness of breath. Negative for chest tightness and stridor.    Cardiovascular: Negative.  Negative for chest pain and leg swelling.   Gastrointestinal: Negative.  Negative for constipation, diarrhea and vomiting.   Endocrine: Negative.    Genitourinary: Negative.  Negative for dysuria and frequency.  "  Musculoskeletal: Positive for gait problem (left sided paralysis). Negative for arthralgias and back pain.   Skin: Negative.    Allergic/Immunologic: Negative.    Neurological: Positive for speech difficulty, weakness and numbness. Negative for dizziness and headaches.        Left sided paralysis and LUE spasticity   Hematological: Negative.  Negative for adenopathy.   Psychiatric/Behavioral: Positive for behavioral problems (history of touching [facility] inappropriately). Negative for agitation and confusion.   All other systems reviewed and are negative.      Objective     /78  Pulse 74  Ht 74\" (188 cm)  Wt 286 lb (130 kg)  BMI 36.72 kg/m2, Body mass index is 36.72 kg/(m^2).    Physical Exam   Constitutional: Vital signs are normal. He appears well-developed and well-nourished.   HENT:   Head: Normocephalic and atraumatic.   Right Ear: Hearing and external ear normal.   Left Ear: Hearing and external ear normal.   Nose: Nose normal.   Mouth/Throat: Mucous membranes are normal.   Hx right craniotomy  Excessive cerumen bilateral   Eyes: EOM and lids are normal. Pupils are equal, round, and reactive to light.   Neck: Trachea normal and normal range of motion. Neck supple. Carotid bruit is not present.   Cardiovascular: Normal rate, regular rhythm, S1 normal, S2 normal, normal heart sounds and normal pulses.    No murmur heard.  Pulmonary/Chest: Effort normal and breath sounds normal.   Abdominal: Soft. Bowel sounds are normal.   Musculoskeletal: Normal range of motion.   Neurological: He is alert. He has normal strength and normal reflexes. He is disoriented. He displays no tremor. A cranial nerve deficit (intermittent right eye deviation. EOM intact bilateral. visual fields intact) and sensory deficit (left sided) is present. Abnormal muscle tone: increase tone LUE/LLE. He displays a negative Romberg sign. Gait (no tested. in wheelchair and has gait/lift belt) abnormal. Coordination (mild pass " pointing on right) normal.   Skin: Skin is warm and dry.   Psychiatric: He has a normal mood and affect. His speech is normal and behavior is normal. Cognition and memory are normal.   Nursing note and vitals reviewed.           ASSESSMENT/PLAN    Diagnoses and all orders for this visit:    Other generalized epilepsy, intractable, without status epilepticus    Closed TBI (traumatic brain injury), with loss of consciousness of unspecified duration, sequela    S/P  shunt    H/O traumatic subdural hematoma    Presence of intrathecal baclofen pump    Hydrocephalus    AMARI on CPAP    MEDICAL DECISION MAKIN. Will arrange for CPAP to be set up with Penny[employer] equipment.   2. No reported seizures.  3. Seizure precautions were discussed to include no tub baths, no swimming, avoiding lack of sleep, and avoiding known triggers. Education given of things that may contribute to a seizure to include, but not limited to: stressful situations, fever, fatigue, lack of sleep, low blood sugar, hyperventilation, flashing lights, and caffeine. Instructions given to take seizure medications as prescribed. Education given to family member on what to do during a seizure and care following the seizure. Education given to contact this office prior to stopping or changing any medications.  4.  shunt managed by Dr. Baltazar.  5. Patient has baclofen pump for spasticity and had received BOTOX in E in past by Dr. Adamson.     allergies and all known medications/prescriptions have been reviewed using resources available on this encounter.    Return in about 6 weeks (around 2017).        GREG Fishman

## 2017-06-08 ENCOUNTER — OFFICE VISIT (OUTPATIENT)
Dept: NEUROLOGY | Facility: CLINIC | Age: 27
End: 2017-06-08

## 2017-06-08 VITALS
WEIGHT: 286 LBS | HEART RATE: 76 BPM | SYSTOLIC BLOOD PRESSURE: 130 MMHG | DIASTOLIC BLOOD PRESSURE: 70 MMHG | BODY MASS INDEX: 36.7 KG/M2 | HEIGHT: 74 IN

## 2017-06-08 DIAGNOSIS — Z97.8 PRESENCE OF INTRATHECAL BACLOFEN PUMP: ICD-10-CM

## 2017-06-08 DIAGNOSIS — Z99.89 OSA ON CPAP: Primary | ICD-10-CM

## 2017-06-08 DIAGNOSIS — G47.33 OSA ON CPAP: Primary | ICD-10-CM

## 2017-06-08 DIAGNOSIS — R25.2 POST-TRAUMATIC SPASTICITY: ICD-10-CM

## 2017-06-08 DIAGNOSIS — Z98.2 S/P VP SHUNT: ICD-10-CM

## 2017-06-08 DIAGNOSIS — Z87.828 H/O TRAUMATIC SUBDURAL HEMATOMA: ICD-10-CM

## 2017-06-08 DIAGNOSIS — G40.419 OTHER GENERALIZED EPILEPSY, INTRACTABLE, WITHOUT STATUS EPILEPTICUS (HCC): ICD-10-CM

## 2017-06-08 DIAGNOSIS — S06.9X9S: ICD-10-CM

## 2017-06-08 DIAGNOSIS — G91.9 HYDROCEPHALUS (HCC): ICD-10-CM

## 2017-06-08 PROCEDURE — 99213 OFFICE O/P EST LOW 20 MIN: CPT | Performed by: CLINICAL NURSE SPECIALIST

## 2017-06-08 NOTE — PROGRESS NOTES
Subjective     Chief Complaint   Patient presents with   • Sleep Apnea   • Seizures       Octavio Werner is a 27 y.o. male right handed on disability and lives at [facility]. Patient goes by Brody.  He is here today for follow up for AMARI. He did have overnight pulse ox that did show desaturation Less than 4 minutes with 20 desaturation event. .He had a titration study 4/11/17 but has not had the CPAP set up since then and still is not using.  As you recall, the patient has not been wearing his CPAP stating the air is too forceful and keeps him from sleeping. As you recall he has history of TBI, hydrocephalus s/p  shunt and post TBI seizures. He denies seizures. He is accompanied by caregiver from the facility that confirms this.     He tells me his last seizure was many years ago. He has no complaints today.     HPI Comments: Last sleep study was 1/2014.    Does not notice leaks . Wears nose piece. Could not tolerate full face mask.        Sleep Apnea   This is a chronic problem. The current episode started more than 1 year ago. Associated symptoms include a fever, numbness and weakness. Pertinent negatives include no arthralgias, chest pain, headaches, sore throat or vomiting. Associated symptoms comments: Sob, hx sleep apnea, patient has had reported increased body weight since 2014. Not wearing PAP as patient states air is too forceful. Exacerbated by: TBI, left sided paralysis, hydrocephalus s/p  shunt.        Current Outpatient Prescriptions   Medication Sig Dispense Refill   • acetaminophen (TYLENOL) 500 MG tablet Take 500 mg by mouth Every 6 (Six) Hours As Needed for mild pain (1-3).     • amantadine (SYMMETREL) 100 MG capsule Take 100 mg by mouth 2 (Two) Times a Day.     • ARTIFICIAL TEAR SOLUTION OP Apply  to eye.     • baclofen (LIORESAL) 20 MG tablet Take 20 mg by mouth 4 (Four) Times a Day.     • busPIRone (BUSPAR) 10 MG tablet Take 10 mg by mouth 2 (Two) Times a Day.     • chlorproMAZINE  (THORAZINE) 25 MG tablet Take 25 mg by mouth Every Night.     • citalopram (CeleXA) 40 MG tablet Take 40 mg by mouth Daily.     • dexlansoprazole (DEXILANT) 60 MG capsule Take 60 mg by mouth Daily.     • docusate sodium (COLACE) 100 MG capsule Take 100 mg by mouth 2 (Two) Times a Day.     • DULoxetine (CYMBALTA) 60 MG capsule Take 60 mg by mouth Daily.     • gabapentin (NEURONTIN) 300 MG capsule Take 300 mg by mouth 3 (Three) Times a Day. 300 MG TID     • guaiFENesin (MUCUS RELIEF) 400 MG tablet Take 400 mg by mouth Every 4 (Four) Hours.     • levocetirizine (XYZAL) 5 MG tablet Take 5 mg by mouth Every Evening.     • lidocaine (XYLOCAINE) 5 % ointment Apply  topically Every 2 (Two) Hours As Needed for mild pain (1-3).     • meloxicam (MOBIC) 7.5 MG tablet Take 7.5 mg by mouth 2 (Two) Times a Day.     • Multiple Vitamin (MULTI-VITAMIN PO) Take  by mouth.     • Omega-3 Fatty Acids (FISH OIL) 1000 MG capsule capsule Take 3,000 mg by mouth 3 (Three) Times a Day With Meals.     • oxyCODONE-acetaminophen (PERCOCET) 5-325 MG per tablet Take 1 tablet by mouth Every 6 (Six) Hours As Needed.     • propranolol (INDERAL) 20 MG tablet Take 20 mg by mouth 2 (Two) Times a Day.     • risperiDONE (risperDAL) 1 MG tablet Take 1 mg by mouth 2 (Two) Times a Day.     • vitamin E 400 UNIT capsule Take 400 Units by mouth 2 (Two) Times a Day.     • zolpidem (AMBIEN) 10 MG tablet Take 10 mg by mouth At Night As Needed for sleep.       No current facility-administered medications for this visit.        Past Medical History:   Diagnosis Date   • CVA (cerebral vascular accident)    • Depression    • Hemiparesis    • Hydrocephalus    • Kidney stones    • Neurogenic bladder    • Neurogenic bowel    • Seizure    • Sinusitis    • Sleep apnea    • Subdural hematoma    • TBI (traumatic brain injury)    • UTI (urinary tract infection)        Past Surgical History:   Procedure Laterality Date   • BACLOFEN PUMP IMPLANTATION     • CHEST TUBE INSERTION    "   REMOVED   • CRANIOTOMY      FOR EVACUATION OF SUBDURAL HEMATOMA & VENTRICULOSTOMY   • HAMSTRING RELEASE     • KIDNEY STONE SURGERY     • PEG TUBE INSERTION      REMOVED   • TRACHEOSTOMY      REMOVED   •  SHUNT INSERTION         family history includes No Known Problems in his father and mother.    Social History   Substance Use Topics   • Smoking status: Never Smoker   • Smokeless tobacco: Never Used   • Alcohol use No       Review of Systems   Constitutional: Positive for fever.   HENT: Negative.  Negative for hearing loss, sinus pressure, sore throat and trouble swallowing.    Eyes: Negative.  Negative for visual disturbance.   Respiratory: Positive for shortness of breath. Negative for chest tightness and stridor.    Cardiovascular: Negative.  Negative for chest pain and leg swelling.   Gastrointestinal: Negative.  Negative for constipation, diarrhea and vomiting.   Endocrine: Negative.    Genitourinary: Negative.  Negative for dysuria and frequency.   Musculoskeletal: Positive for gait problem (left sided paralysis). Negative for arthralgias and back pain.   Skin: Negative.    Allergic/Immunologic: Negative.    Neurological: Positive for speech difficulty, weakness and numbness. Negative for dizziness and headaches.        Left sided paralysis and LUE spasticity   Hematological: Negative.  Negative for adenopathy.   Psychiatric/Behavioral: Positive for behavioral problems (history of touching [facility] inappropriately). Negative for agitation and confusion.   All other systems reviewed and are negative.      Objective     /70  Pulse 76  Ht 74\" (188 cm)  Wt 286 lb (130 kg)  BMI 36.72 kg/m2, Body mass index is 36.72 kg/(m^2).    Physical Exam   Constitutional: Vital signs are normal. He appears well-developed and well-nourished.   HENT:   Head: Normocephalic and atraumatic.   Right Ear: Hearing and external ear normal.   Left Ear: Hearing and external ear normal.   Nose: Nose normal. "   Mouth/Throat: Mucous membranes are normal.   Hx right craniotomy  Excessive cerumen bilateral   Eyes: EOM and lids are normal. Pupils are equal, round, and reactive to light.   Neck: Trachea normal and normal range of motion. Neck supple. Carotid bruit is not present.   Cardiovascular: Normal rate, regular rhythm, S1 normal, S2 normal, normal heart sounds and normal pulses.    No murmur heard.  Pulmonary/Chest: Effort normal and breath sounds normal.   Abdominal: Soft. Bowel sounds are normal.   Musculoskeletal: Normal range of motion.   Neurological: He is alert. He has normal strength and normal reflexes. He is disoriented. He displays no tremor. A cranial nerve deficit (intermittent right eye deviation. EOM intact bilateral. visual fields intact) and sensory deficit (left sided) is present. Abnormal muscle tone: increase tone LUE/LLE. He displays a negative Romberg sign. Gait (no tested. in wheelchair and has gait/lift belt) abnormal. Coordination (mild pass pointing on right) normal.   Skin: Skin is warm and dry.   Psychiatric: He has a normal mood and affect. His speech is normal and behavior is normal. Cognition and memory are normal.   Nursing note and vitals reviewed.      Results for orders placed or performed during the hospital encounter of 05/19/16   Urinalysis   Result Value Ref Range    Color Yellow     Appearance, UA Clear Clear    pH, UA 8.0 5.0 - 8.0    Specific Gravity, UA 1.013 1.005 - 1.030    Glucose, UA Negative Negative mg/dL    Ketones, UA Negative Negative mg/dL    Bilirubin, UA Negative Negative    Blood, UA Negative Negative    Protein, UA Negative Negative mg/dL    Nitrite, UA Negative Negative    Leukocytes, UA Trace (A) Negative    Urobilinogen, UA 0.2 0.2,1.0 E.U./dL   Urinalysis, Microscopic only   Result Value Ref Range    WBC, UA 0-2 (A) None Seen /hpf    RBC, UA 0-2 (A) None Seen /hpf    Epithelial Cells, UA None Seen None Seen /hpf    Bacteria, UA Negative (A) None Seen /hpf     Casts None Seen /lpf   APTT   Result Value Ref Range    PTT 27.0 24.1 - 34.8 Seconds   Protime-INR   Result Value Ref Range    Protime 13.7 11.9 - 14.6 Seconds    INR 1.02 0.91 - 1.09   Comprehensive metabolic panel   Result Value Ref Range    Sodium 144 135 - 145 mmol/L    Potassium 4.4 3.5 - 5.3 mmol/L    Chloride 104 98 - 110 mmol/L    CO2 27 24 - 31 mmol/L    Glucose 86 70 - 100 mg/dL    BUN 13 5 - 21 mg/dL    Creatinine 0.70 0.5 - 1.4 mg/dL    Calcium 9.4 8.4 - 10.4 mg/dL    Total Protein 8.5 6.3 - 8.7 g/dL    Albumin 4.6 3.5 - 5.0 g/dL    Total Bilirubin 0.6 0.1 - 1.0 mg/dL    Alkaline Phosphatase 104 24 - 120 Units/L    AST (SGOT) 25 7 - 45 Units/L    ALT (SGPT) 47 0 - 54 Units/L    Anion Gap 13 4 - 13 mmol/L    Est GFR by Clearance 134 ml/min/1.732   Type and screen   Result Value Ref Range    ABORH A Rh Positive     Antibody Screen Negative     HBB Previous History Records Reviewed    CBC and Differential   Result Value Ref Range    WBC 5.53 4.80 - 10.80 K/mcL    RBC 4.95 4.80 - 5.90 M/mcL    Hemoglobin 14.9 14.0 - 18.0 g/dL    Hematocrit 42.9 40.0 - 52.0 %    MCV 86.7 82.0 - 95.0 fL    MCH 30.1 28.0 - 32.0 pg    MCHC 34.7 33.0 - 36.0 gm/dL    RDW-SD 42.0 40.0 - 54.0 fL    RDW-CV 13.5 12.0 - 15.0 %    RDW 13.5 12 - 15 %    Platelets 183 130 - 400 K/mcL    Neutrophil Rel % 54.30 39.00 - 78.00 %    Lymphocyte Rel % 32.50 15.00 - 45.00 %    Monocyte Rel % 9.60 4.00 - 12.00 %    Eosinophil Rel % 2.70 0.00 - 4.00 %    Basophil Rel % 0.70 0.00 - 2.00 %    Neutrophils Absolute 3.00 1.87 - 8.40 K/mcL    Lymphocytes Absolute 1.80 0.72 - 4.86 K/mcL    Monocytes Absolute 0.53 0.19 - 1.30 K/mcL    Eosinophils Absolute 0.15 0.00 - 0.70 K/mcL    Basophils Absolute 0.04 0.00 - 0.20 K/mcL    Differential Type AUTO     Platelet Morphology Normal         ASSESSMENT/PLAN    Diagnoses and all orders for this visit:    AMARI on CPAP    Closed TBI (traumatic brain injury), with loss of consciousness of unspecified duration,  sequela    Hydrocephalus    Other generalized epilepsy, intractable, without status epilepticus    Post-traumatic spasticity    S/P  shunt    Presence of intrathecal baclofen pump    H/O traumatic subdural hematoma     MEDICAL DECISION MAKIN. [facility] arrange for CPAP to be set up with Pennyrile equipment.   2. No reported seizures.  3. Seizure precautions were discussed to include no tub baths, no swimming, avoiding lack of sleep, and avoiding known triggers. Education given of things that may contribute to a seizure to include, but not limited to: stressful situations, fever, fatigue, lack of sleep, low blood sugar, hyperventilation, flashing lights, and caffeine. Instructions given to take seizure medications as prescribed. Education given to family member on what to do during a seizure and care following the seizure. Education given to contact this office prior to stopping or changing any medications.  4.  shunt managed by Dr. Baltazar.  5. Patient has baclofen pump for spasticity and had received BOTOX in E in past by Dr. Adamson.          allergies and all known medications/prescriptions have been reviewed using resources available on this encounter.    No Follow-up on file.        Stacy Perera, APRN

## 2017-06-12 ENCOUNTER — TELEPHONE (OUTPATIENT)
Dept: NEUROLOGY | Facility: CLINIC | Age: 27
End: 2017-06-12

## 2017-06-12 NOTE — TELEPHONE ENCOUNTER
I did attempt to call Nicole back regarding Mr Werner's CPAP. She is out of the office for the rest of the day and Brenda will have her call me back tomorrow.

## 2017-07-05 ENCOUNTER — HOSPITAL ENCOUNTER (OUTPATIENT)
Dept: PAIN MANAGEMENT | Age: 27
Discharge: HOME | End: 2017-07-05
Payer: COMMERCIAL

## 2017-07-05 DIAGNOSIS — R25.2 POST-TRAUMATIC SPASTICITY: ICD-10-CM

## 2017-07-05 PROCEDURE — 2500000003 HC RX 250 WO HCPCS

## 2017-07-05 PROCEDURE — 62369 ANAL SP INF PMP W/REPRG&FILL: CPT

## 2017-08-09 ENCOUNTER — OFFICE VISIT (OUTPATIENT)
Dept: NEUROLOGY | Facility: CLINIC | Age: 27
End: 2017-08-09

## 2017-08-09 VITALS
WEIGHT: 286 LBS | SYSTOLIC BLOOD PRESSURE: 132 MMHG | HEIGHT: 74 IN | BODY MASS INDEX: 36.7 KG/M2 | DIASTOLIC BLOOD PRESSURE: 82 MMHG | HEART RATE: 70 BPM

## 2017-08-09 DIAGNOSIS — Z87.828 H/O TRAUMATIC SUBDURAL HEMATOMA: ICD-10-CM

## 2017-08-09 DIAGNOSIS — R25.2 POST-TRAUMATIC SPASTICITY: ICD-10-CM

## 2017-08-09 DIAGNOSIS — Z99.89 OSA ON CPAP: ICD-10-CM

## 2017-08-09 DIAGNOSIS — S06.9X9S: Primary | ICD-10-CM

## 2017-08-09 DIAGNOSIS — G40.419 OTHER GENERALIZED EPILEPSY, INTRACTABLE, WITHOUT STATUS EPILEPTICUS (HCC): ICD-10-CM

## 2017-08-09 DIAGNOSIS — Z97.8 PRESENCE OF INTRATHECAL BACLOFEN PUMP: ICD-10-CM

## 2017-08-09 DIAGNOSIS — G91.9 HYDROCEPHALUS (HCC): ICD-10-CM

## 2017-08-09 DIAGNOSIS — G47.33 OSA ON CPAP: ICD-10-CM

## 2017-08-09 DIAGNOSIS — Z98.2 S/P VP SHUNT: ICD-10-CM

## 2017-08-09 PROCEDURE — 99213 OFFICE O/P EST LOW 20 MIN: CPT | Performed by: CLINICAL NURSE SPECIALIST

## 2017-08-09 RX ORDER — GABAPENTIN 300 MG/1
300 CAPSULE ORAL 3 TIMES DAILY
COMMUNITY
End: 2018-02-09

## 2017-08-09 RX ORDER — ASCORBIC ACID 500 MG
500 TABLET ORAL 2 TIMES DAILY
COMMUNITY

## 2017-08-09 RX ORDER — LISINOPRIL 10 MG/1
10 TABLET ORAL DAILY
COMMUNITY
Start: 2017-07-20

## 2017-08-09 NOTE — PATIENT INSTRUCTIONS
Sleep Apnea  Sleep apnea is a condition that affects breathing. People with sleep apnea have moments during sleep when their breathing pauses briefly or gets shallow. Sleep apnea can cause these symptoms:  · Trouble staying asleep.  · Sleepiness or tiredness during the day.  · Irritability.  · Loud snoring.  · Morning headaches.  · Trouble concentrating.  · Forgetting things.  · Less interest in sex.  · Being sleepy for no reason.  · Mood swings.  · Personality changes.  · Depression.  · Waking up a lot during the night to pee (urinate).  · Dry mouth.  · Sore throat.  HOME CARE  · Make any changes in your routine that your doctor recommends.  · Eat a healthy, well-balanced diet.  · Take over-the-counter and prescription medicines only as told by your doctor.  · Avoid using alcohol, calming medicines (sedatives), and narcotic medicines.  · Take steps to lose weight if you are overweight.  · If you were given a machine (device) to use while you sleep, use it only as told by your doctor.  · Do not use any tobacco products, such as cigarettes, chewing tobacco, and e-cigarettes. If you need help quitting, ask your doctor.  · Keep all follow-up visits as told by your doctor. This is important.  GET HELF IF:  · The machine that you were given to use during sleep is uncomfortable or does not seem to be working.  · Your symptoms do not get better.  · Your symptoms get worse.  GET HELP RIGHT AWAY IF:  · Your chest hurts.  · You have trouble breathing in enough air (shortness of breath).  · You have an uncomfortable feeling in your back, arms, or stomach.  · You have trouble talking.  · One side of your body feels weak.  · A part of your face is hanging down (drooping).  These symptoms may be an emergency. Do not wait to see if the symptoms will go away. Get medical help right away. Call your local emergency services (911 in the [place] Do not drive yourself to the hospital.     This information is not intended to replace  advice given to you by your health care provider. Make sure you discuss any questions you have with your health care provider.     Document Released: 09/26/2009 Document Revised: 04/10/2017 Document Reviewed: 09/26/2016  [employer] Patient Education ©2017 [employer]  BMI for Adults  Body mass index (BMI) is a number that is calculated from a person's weight and height. In most adults, the number is used to find how much of an adult's weight is made up of fat. BMI is not as accurate as a direct measure of body fat.  HOW IS BMI CALCULATED?  BMI is calculated by dividing weight in kilograms by height in meters squared. It can also be calculated by dividing weight in pounds by height in inches squared, then multiplying the resulting number by 703. Charts are available to help you find your BMI quickly and easily without doing this calculation.   HOW IS BMI INTERPRETED?  Health care professionals use BMI charts to identify whether an adult is underweight, at a normal weight, or overweight based on the following guidelines:  · Underweight: BMI less than 18.5.  · Normal weight: BMI between 18.5 and 24.9.  · Overweight: BMI between 25 and 29.9.  · Obese: BMI of 30 and above.  BMI is usually interpreted the same for males and females.  Weight includes both fat and muscle, so someone with a muscular build, such as an athlete, may have a BMI that is higher than 24.9. In cases like these, BMI may not accurately depict body fat. To determine if excess body fat is the cause of a BMI of 25 or higher, further assessments may need to be done by a health care provider.  WHY IS BMI A USEFUL TOOL?  BMI is used to identify a possible weight problem that may be related to a medical problem or may increase the risk for medical problems. BMI can also be used to promote changes to reach a healthy weight.     This information is not intended to replace advice given to you by your health care provider. Make sure you discuss any  questions you have with your health care provider.     Document Released: 08/29/2005 Document Revised: 01/08/2016 Document Reviewed: 05/15/2015  [employer] Interactive Patient Education ©2017 [employer]

## 2017-08-09 NOTE — PROGRESS NOTES
Subjective     Chief Complaint   Patient presents with   • Sleep Apnea     2 month f/u of AMARI and seizures.  Pt states that he is unable to sleep due to the CPAP machine.  He doesn't feel like he is sleeping at all.  Pt states that he hasn't had any seizures in years.   • Seizures         Octavio Werner is a 27 y.o. male right handed on disability and lives at [facility]. Patient goes by Brody.  He is here today for follow up for AMARI. He did have overnight pulse ox that did show desaturation Less than 4 minutes with 20 desaturation event. He was seen 6/8/17 but did not have PAP set up yet. He is now using. I do not have download to review compliance. He states he is using and is doing well. He states he has problems sleeping because of body pain and he does have pain/baclofen pump.      As you recall he has history of TBI, hydrocephalus s/p  shunt and post TBI seizures. He denies seizures. He is accompanied by caregiver from the facility that confirms this.     He tells me his last seizure was many years ago. He has no complaints today.     HPI Comments: Last sleep study was 1/2014.    Does not notice leaks . Wears nose piece. Could not tolerate full face mask.        Sleep Apnea   This is a chronic problem. The current episode started more than 1 year ago. Associated symptoms include a fever, numbness and weakness. Pertinent negatives include no arthralgias, chest pain, headaches, sore throat or vomiting. Associated symptoms comments: Sob, hx sleep apnea, patient has had reported increased body weight since 2014. Not wearing PAP as patient states air is too forceful. Exacerbated by: TBI, left sided paralysis, hydrocephalus s/p  shunt.        Current Outpatient Prescriptions   Medication Sig Dispense Refill   • acetaminophen (TYLENOL) 500 MG tablet Take 500 mg by mouth Every 6 (Six) Hours As Needed for mild pain (1-3).     • amantadine (SYMMETREL) 100 MG capsule Take 100 mg by mouth 2 (Two) Times a Day.      • ARTIFICIAL TEAR SOLUTION OP Apply  to eye.     • baclofen (LIORESAL) 20 MG tablet Take 20 mg by mouth 4 (Four) Times a Day.     • busPIRone (BUSPAR) 10 MG tablet Take 10 mg by mouth 2 (Two) Times a Day.     • chlorproMAZINE (THORAZINE) 25 MG tablet Take 25 mg by mouth Every Night.     • citalopram (CeleXA) 40 MG tablet Take 40 mg by mouth Daily.     • dexlansoprazole (DEXILANT) 60 MG capsule Take 60 mg by mouth Daily.     • docusate sodium (COLACE) 100 MG capsule Take 100 mg by mouth 2 (Two) Times a Day.     • DULoxetine (CYMBALTA) 60 MG capsule Take 60 mg by mouth Daily.     • gabapentin (NEURONTIN) 300 MG capsule Take 300 mg by mouth 3 (Three) Times a Day.     • levocetirizine (XYZAL) 5 MG tablet Take 5 mg by mouth Every Evening.     • lidocaine (XYLOCAINE) 5 % ointment Apply  topically Every 2 (Two) Hours As Needed for mild pain (1-3).     • lisinopril (PRINIVIL,ZESTRIL) 10 MG tablet      • meloxicam (MOBIC) 7.5 MG tablet Take 7.5 mg by mouth 2 (Two) Times a Day.     • Multiple Vitamin (MULTI-VITAMIN PO) Take  by mouth.     • Omega-3 Fatty Acids (FISH OIL) 1000 MG capsule capsule Take 3,000 mg by mouth 3 (Three) Times a Day With Meals.     • oxyCODONE-acetaminophen (PERCOCET) 5-325 MG per tablet Take 1 tablet by mouth Every 6 (Six) Hours As Needed.     • propranolol (INDERAL) 20 MG tablet Take 20 mg by mouth 2 (Two) Times a Day.     • risperiDONE (risperDAL) 1 MG tablet Take 1 mg by mouth 2 (Two) Times a Day.     • vitamin C (ASCORBIC ACID) 500 MG tablet Take 500 mg by mouth 2 (Two) Times a Day.     • vitamin E 400 UNIT capsule Take 400 Units by mouth 2 (Two) Times a Day.     • zolpidem (AMBIEN) 10 MG tablet Take 10 mg by mouth At Night As Needed for sleep.       No current facility-administered medications for this visit.        Past Medical History:   Diagnosis Date   • CVA (cerebral vascular accident)    • Depression    • Hemiparesis    • Hydrocephalus    • Kidney stones    • Neurogenic bladder    •  "Neurogenic bowel    • Seizure    • Sinusitis    • Sleep apnea    • Subdural hematoma    • TBI (traumatic brain injury)    • UTI (urinary tract infection)        Past Surgical History:   Procedure Laterality Date   • BACLOFEN PUMP IMPLANTATION     • CHEST TUBE INSERTION      REMOVED   • CRANIOTOMY      FOR EVACUATION OF SUBDURAL HEMATOMA & VENTRICULOSTOMY   • HAMSTRING RELEASE     • KIDNEY STONE SURGERY     • PEG TUBE INSERTION      REMOVED   • TRACHEOSTOMY      REMOVED   •  SHUNT INSERTION         family history includes No Known Problems in his father and mother.    Social History   Substance Use Topics   • Smoking status: Never Smoker   • Smokeless tobacco: Never Used   • Alcohol use No       Review of Systems   Constitutional: Positive for fever.   HENT: Negative.  Negative for hearing loss, sinus pressure, sore throat and trouble swallowing.    Eyes: Negative.  Negative for visual disturbance.   Respiratory: Positive for shortness of breath. Negative for chest tightness and stridor.    Cardiovascular: Negative.  Negative for chest pain and leg swelling.   Gastrointestinal: Negative.  Negative for constipation, diarrhea and vomiting.   Endocrine: Negative.    Genitourinary: Negative.  Negative for dysuria and frequency.   Musculoskeletal: Positive for gait problem (left sided paralysis). Negative for arthralgias and back pain.   Skin: Negative.    Allergic/Immunologic: Negative.    Neurological: Positive for speech difficulty, weakness and numbness. Negative for dizziness and headaches.        Left sided paralysis and LUE spasticity   Hematological: Negative.  Negative for adenopathy.   Psychiatric/Behavioral: Positive for behavioral problems (history of touching [facility] inappropriately). Negative for agitation and confusion.   All other systems reviewed and are negative.      Objective     /82  Pulse 70  Ht 74\" (188 cm)  Wt 286 lb (130 kg)  BMI 36.72 kg/m2, Body mass index is 36.72 " kg/(m^2).    Physical Exam   Constitutional: Vital signs are normal. He appears well-developed and well-nourished.   HENT:   Head: Normocephalic and atraumatic.   Right Ear: Hearing and external ear normal.   Left Ear: Hearing and external ear normal.   Nose: Nose normal.   Mouth/Throat: Oropharynx is clear and moist and mucous membranes are normal.   Hx right craniotomy  Excessive cerumen bilateral   Eyes: Conjunctivae, EOM and lids are normal. Pupils are equal, round, and reactive to light.   Neck: Trachea normal and normal range of motion. Neck supple. Carotid bruit is not present.   Cardiovascular: Normal rate, regular rhythm, S1 normal, S2 normal, normal heart sounds and normal pulses.    No murmur heard.  Pulmonary/Chest: Effort normal and breath sounds normal.   Abdominal: Soft. Bowel sounds are normal.   Musculoskeletal: Normal range of motion.   Neurological: He is alert. He has normal strength and normal reflexes. He is disoriented. He displays no tremor. A cranial nerve deficit (intermittent right eye deviation. EOM intact bilateral. visual fields intact) and sensory deficit (left sided) is present. Abnormal muscle tone: increase tone LUE/LLE. He displays a negative Romberg sign. Gait (no tested. in wheelchair and has gait/lift belt) abnormal. Coordination (mild pass pointing on right) normal.   Skin: Skin is warm and dry.   Psychiatric: He has a normal mood and affect. His speech is normal and behavior is normal. Cognition and memory are normal.   Nursing note and vitals reviewed.      Results for orders placed or performed during the hospital encounter of 05/19/16   Urinalysis   Result Value Ref Range    Color Yellow     Appearance, UA Clear Clear    pH, UA 8.0 5.0 - 8.0    Specific Gravity, UA 1.013 1.005 - 1.030    Glucose, UA Negative Negative mg/dL    Ketones, UA Negative Negative mg/dL    Bilirubin, UA Negative Negative    Blood, UA Negative Negative    Protein, UA Negative Negative mg/dL     Nitrite, UA Negative Negative    Leukocytes, UA Trace (A) Negative    Urobilinogen, UA 0.2 0.2,1.0 E.U./dL   Urinalysis, Microscopic only   Result Value Ref Range    WBC, UA 0-2 (A) None Seen /hpf    RBC, UA 0-2 (A) None Seen /hpf    Epithelial Cells, UA None Seen None Seen /hpf    Bacteria, UA Negative (A) None Seen /hpf    Casts None Seen /lpf   APTT   Result Value Ref Range    PTT 27.0 24.1 - 34.8 Seconds   Protime-INR   Result Value Ref Range    Protime 13.7 11.9 - 14.6 Seconds    INR 1.02 0.91 - 1.09   Comprehensive metabolic panel   Result Value Ref Range    Sodium 144 135 - 145 mmol/L    Potassium 4.4 3.5 - 5.3 mmol/L    Chloride 104 98 - 110 mmol/L    CO2 27 24 - 31 mmol/L    Glucose 86 70 - 100 mg/dL    BUN 13 5 - 21 mg/dL    Creatinine 0.70 0.5 - 1.4 mg/dL    Calcium 9.4 8.4 - 10.4 mg/dL    Total Protein 8.5 6.3 - 8.7 g/dL    Albumin 4.6 3.5 - 5.0 g/dL    Total Bilirubin 0.6 0.1 - 1.0 mg/dL    Alkaline Phosphatase 104 24 - 120 Units/L    AST (SGOT) 25 7 - 45 Units/L    ALT (SGPT) 47 0 - 54 Units/L    Anion Gap 13 4 - 13 mmol/L    Est GFR by Clearance 134 ml/min/1.732   Type and screen   Result Value Ref Range    ABORH A Rh Positive     Antibody Screen Negative     HBB Previous History Records Reviewed    CBC and Differential   Result Value Ref Range    WBC 5.53 4.80 - 10.80 K/mcL    RBC 4.95 4.80 - 5.90 M/mcL    Hemoglobin 14.9 14.0 - 18.0 g/dL    Hematocrit 42.9 40.0 - 52.0 %    MCV 86.7 82.0 - 95.0 fL    MCH 30.1 28.0 - 32.0 pg    MCHC 34.7 33.0 - 36.0 gm/dL    RDW-SD 42.0 40.0 - 54.0 fL    RDW-CV 13.5 12.0 - 15.0 %    RDW 13.5 12 - 15 %    Platelets 183 130 - 400 K/mcL    Neutrophil Rel % 54.30 39.00 - 78.00 %    Lymphocyte Rel % 32.50 15.00 - 45.00 %    Monocyte Rel % 9.60 4.00 - 12.00 %    Eosinophil Rel % 2.70 0.00 - 4.00 %    Basophil Rel % 0.70 0.00 - 2.00 %    Neutrophils Absolute 3.00 1.87 - 8.40 K/mcL    Lymphocytes Absolute 1.80 0.72 - 4.86 K/mcL    Monocytes Absolute 0.53 0.19 - 1.30 K/mcL     Eosinophils Absolute 0.15 0.00 - 0.70 K/mcL    Basophils Absolute 0.04 0.00 - 0.20 K/mcL    Differential Type AUTO     Platelet Morphology Normal         ASSESSMENT/PLAN    Diagnoses and all orders for this visit:    Closed TBI (traumatic brain injury), with loss of consciousness of unspecified duration, sequela    Hydrocephalus    Other generalized epilepsy, intractable, without status epilepticus    Post-traumatic spasticity    S/P  shunt    Presence of intrathecal baclofen pump    H/O traumatic subdural hematoma    AMARI on CPAP    Other orders  -     gabapentin (NEURONTIN) 300 MG capsule; Take 300 mg by mouth 3 (Three) Times a Day.  -     lisinopril (PRINIVIL,ZESTRIL) 10 MG tablet;   -     vitamin C (ASCORBIC ACID) 500 MG tablet; Take 500 mg by mouth 2 (Two) Times a Day.    MEDICAL DECISION MAKIN. Obtain compliance down load from [employer].   2. No reported seizures.  3. Seizure precautions were discussed to include no tub baths, no swimming, avoiding lack of sleep, and avoiding known triggers. Education given of things that may contribute to a seizure to include, but not limited to: stressful situations, fever, fatigue, lack of sleep, low blood sugar, hyperventilation, flashing lights, and caffeine. Instructions given to take seizure medications as prescribed. Education given to family member on what to do during a seizure and care following the seizure. Education given to contact this office prior to stopping or changing any medications.  4.  shunt managed by Dr. Baltazar.  5. Patient has baclofen pump for spasticity and had received BOTOX in [facility] in past by Dr. Adamson.        allergies and all known medications/prescriptions have been reviewed using resources available on this encounter.    Return in about 6 months (around 2018).        Stacy Perera, GREG

## 2017-10-03 ENCOUNTER — HOSPITAL ENCOUNTER (OUTPATIENT)
Dept: PAIN MANAGEMENT | Age: 27
Discharge: HOME | End: 2017-10-03
Payer: COMMERCIAL

## 2017-10-03 PROCEDURE — 62369 ANAL SP INF PMP W/REPRG&FILL: CPT

## 2017-10-03 PROCEDURE — 2500000003 HC RX 250 WO HCPCS

## 2017-10-03 NOTE — PROGRESS NOTES
[facility]  Intrathecal Pump Refill      Reason for visit today:  Pump Refill       Performed by: [x] RN   [] APRN   [] DOCTOR     B/P - 114/67       Temp - 97.1       Pulse - 79       Respirations - 18        SpO2 - 98% RA         Pupils - 3  mm     Oxygen Therapy: [] Yes  [x] No  Device/Liters -    EDER report reviewed:    [x] Appropriate  [] Inappropriate, reviewed with physician    Diagnosis: Lumbar DDD                                                                                  Pain Ratin/10    Last Oral Pain Medication taken: NA    Brief History & Current Issues: Patient  denies having any spasms lately. Patient  denies any falls, injuries or x-ray since last visit. Has been considering harming self to escape stress, pain, problems?   no       Has a sucide plan? no    Has attempted sucide in the past? no    Has a close friend or family member who committed sucide? no    Pump medication:   [x]  Baclofen: 1000 mcg/ml   []  Bupivacaine:    []  Clonidine:      []  Dilaudid:    []  Fentanyl:    []  Morphine:   []  Prialt:    []  Sufentanil:     Current medication daily rate:     [x]  Baclofen:237.3 mcg/day   []  Bupivacaine:    []  Clonidine:      []  Dilaudid:    []  Fentanyl:    []  Morphine:   []  Prialt:    []  Sufentanil:     Patient Therapy Manager (PTM) [] Yes [] No    Pump Refill Procedure Note:  Implanted pump interrogated and pump area prepped according to protocol. Template placed over pump. Using sterile technique, pump accessed with #22 gauge [x] 1.5 inch, [] 2 inch non-coring needle securely attached to extension tubing and an empty 20 ml syringe. Accessed X 1 stick/s. Needle inserted into pump at a 90 degree angle. Pump emptied between syringe changes. Pump filled with sterile solution using a 0.22 micron bacterial-retentive filter at rate of 1ml/3seconds. Small amount of solution withdrawn to confirm proper needle placement.  Tubing clamped and needle removed from

## 2017-10-03 NOTE — IP AVS SNAPSHOT
therapeutic multivitamin-minerals tablet   Take 1 tablet by mouth daily                                         tucks 50 % Pads   Apply 1 each topically as needed for Hemorrhoids                                         Vitamin C 500 MG Caps   Take 500 mg by mouth 2 times daily                                         vitamin E 400 UNIT capsule   Take 400 Units by mouth 2 times daily                                         zolpidem 10 MG tablet   Commonly known as:  AMBIEN   Take by mouth nightly as needed for Sleep                                                 Allergies as of 10/3/2017        Reactions    Latex     Dilantin [Phenytoin]     Pcn [Penicillins]     Sulfa Antibiotics       Immunizations as of 10/3/2017     No immunizations on file. After Visit Summary    This summary was created for you. Thank you for entrusting your care to us. The following information includes details about your hospital/visit stay along with steps you should take to help with your recovery once you leave the hospital.  In this packet, you will find information about the topics listed below:    · Instructions about your medications including a list of your home medications  · A summary of your hospital visit  · Follow-up appointments once you have left the hospital  · Your care plan at home      You may receive a survey regarding the care you received during your stay. Your input is valuable to us. We encourage you to complete and return your survey in the envelope provided. We hope you will choose us in the future for your healthcare needs. Patient Information     Patient Name LUBA Cabello 1990      Care Provided at:     Name Address Phone       [address] [place] [phone]            Your Visit    Here you will find information about your visit, including the reason for your visit.   Please take this sheet with you when you visit your doctor or other health care provider in the future. It will help determine the best possible medical care for you at that time. If you have any questions once you leave the hospital, please call the department phone number listed below. Why you were here     Your primary diagnosis was:  Not on File      Visit Information     Date & Time Department Dept. Phone    10/3/2017 Arlet Tijerina So. [facility]       Follow-up Appointments    Below is a list of your follow-up and future appointments. This may not be a complete list as you may have made appointments directly with providers that we are not aware of or your providers may have made some for you. Please call your providers to confirm appointments. It is important to keep your appointments. Please bring your current insurance card, photo ID, co-pay, and all medication bottles to your appointment. If self-pay, payment is expected at the time of service. Future Appointments     1/3/2018 11:00 AM     Appointment with [facility] at [facility] ([phone])   [facility] [address] [place] [address]        Date Due    HIV screening is recommended for all people regardless of risk factors  aged 15-65 years at least once (lifetime) who have never been HIV tested. 3/18/2003    Tetanus Combination Vaccine (1 - Tdap) 3/18/2007    Yearly Flu Vaccine (1) 9/1/2017                 Care Plan Once You Return Home    This section includes instructions you will need to follow once you leave the hospital.  Your care team will discuss these with you, so you and those caring for you know how to best care for your health needs at home. This section may also include educational information about certain health topics that may be of help to you.           Discharge Instructions       MEDICATION INFORMATION SHEET    Your Doctor Prescribed:     Dilaudid (Hydromorphone)   Morphine  Fentanyl  Sufentanil Possible Side Effects: (these may go away during treatment) dizziness, drowsiness, constipation, blurred vision, nausea and vomiting, fatigue, sweating. Signs and Symptoms of WITHDRAWAL:  Return of pain, nausea and vomiting, aches and flu like symptoms, abdominal pain. Signs and Symptoms of OVERDOSE:  Unusual drowsiness or dizziness, very slow or weak pulse, slow shallow breathing, muscle rigidity, loss of consciousness. Clonidine    Possible Side Effects: (these may go away during treatment) dry mouth, drowsiness or dizziness, nausea, confusion, general weakness, flushing, sleep problems. Signs and Symptoms of WITHDRAWAL: Severe high blood pressure, nervousness, agitation, headache, tremors. Signs and Symptoms of OVERDOSE: Lightheadedness, dizziness or fainting, drowsiness, weakness, slow shallow breathing, slow or irregular heart rate, pinpoint pupils, seizures or loss of consciousness. Prialt    Possible Side Effects  (need to be reported): Unusual thoughts or behavior, depression, new or worsening muscle pain or cramps, dizziness, confusion, sedation, memory impairment, nausea and vomiting, abnormal gait, headache, visual disturbances. Baclofen    Signs and Symptoms of WITHDRAWAL: Altered mental status, delirium, fever, itching without rash, exaggerated rebound spasticity and muscle rigidity. Signs and Symptoms of OVERDOSE: Drowsiness, lightheadedness, dizziness, loss of muscle tone, slow or shallow breathing, seizures, headache, blurred vision. Bupivacaine (Marcaine)    Possible Side Effects: (these may go away during treatment) Dizziness, drowsiness or excitement, tremors, restlessness, change in hearing, blurred vision, hives, rash or itching. Signs and Symptoms of OVERDOSE: seizures, shortness of breath, slow heart rate. Things to be aware of and notify your doctor if they occur:  1.  Pay special attention for signs and symptoms of OVERDOSE or lack of pain control for 1-3 days following a pump refill or adjustment. 2. Inspect pump site and catheter site (on patient's back) daily and report any redness,      swelling, warmth to touch or drainage to your nurse. 3. Call the office for a fever over 101.5 or a low grade fever (99.5-101.4) that lasts for      more than 3 days. 4. Call the office for any alarms heard. 5. Barometric pressure and heat (i.e. Hot tubs, sauna and fever) may affect the speed at      which your pump will deliver medication. 6. If you are going to have an MRI done, please notify the office as soon as it is      scheduled. You will need to follow up with the office before 3pm (Monday thru       Thursday) so your pump can be checked afterwards.  (if possible, don't schedule MRI      on Fridays as the pump room is closed). 7. If you should have a fall or traumatic injury to your back or neck, be aware of signs      and symptoms of overdose or lack of pain control. 8. If you have any type of cardioversion or have to have your heart shocked for any             reason involving electrical activity, you must have your pump checked to make sure it     is still working. Please have checked the day of procedure before 3 pm.  The Pump       Room is open Monday-Thursday, closed on Friday. I have been informed of the above possible complications from my pump refill and/or adjustment from today's visit. By signing my after visit summary, I confirm understanding and compliance of all of the above. I will notify the doctor/pump nurses by calling [phone] or [phone] for any changes. [employer] Signup     [employer] allows you to send messages to your doctor, view your test results, renew your prescriptions, schedule appointments, view visit notes, and more. How Do I Sign Up? 1. In your Internet browser, go to [email or web address] The information on all pages of the After Visit Summary has been reviewed with me, the patient and/or responsible adult, by my health care provider(s). I had the opportunity to ask questions regarding this information. I understand I should dispose of my armband safely at home to protect my health information. A complete copy of the After Visit Summary has been given to me, the patient and/or responsible adult.            Patient Signature/Responsible Adult:____________________    Clinician Signature:_____________________    Date:_____________________    Time:_____________________

## 2017-11-08 ENCOUNTER — OFFICE VISIT (OUTPATIENT)
Dept: UROLOGY | Age: 27
End: 2017-11-08
Payer: COMMERCIAL

## 2017-11-08 ENCOUNTER — HOSPITAL ENCOUNTER (OUTPATIENT)
Dept: GENERAL RADIOLOGY | Age: 27
Discharge: HOME | End: 2017-11-08
Payer: COMMERCIAL

## 2017-11-08 VITALS
BODY MASS INDEX: 44.94 KG/M2 | HEART RATE: 77 BPM | TEMPERATURE: 96.9 F | DIASTOLIC BLOOD PRESSURE: 80 MMHG | WEIGHT: 315 LBS | SYSTOLIC BLOOD PRESSURE: 120 MMHG

## 2017-11-08 DIAGNOSIS — M54.50 ACUTE RIGHT-SIDED LOW BACK PAIN WITHOUT SCIATICA: ICD-10-CM

## 2017-11-08 DIAGNOSIS — N20.0 KIDNEY STONES: Primary | ICD-10-CM

## 2017-11-08 DIAGNOSIS — N20.0 KIDNEY STONES: ICD-10-CM

## 2017-11-08 LAB
APPEARANCE FLUID: CLEAR
BILIRUBIN, POC: NORMAL
BLOOD URINE, POC: NORMAL
CLARITY, POC: NORMAL
COLOR, POC: NORMAL
GLUCOSE URINE, POC: NORMAL
KETONES, POC: NORMAL
LEUKOCYTE EST, POC: NORMAL
NITRITE, POC: NORMAL
PH, POC: 5
PROTEIN, POC: NORMAL
SPECIFIC GRAVITY, POC: 1.02
UROBILINOGEN, POC: 0.2

## 2017-11-08 PROCEDURE — 1036F TOBACCO NON-USER: CPT | Performed by: PHYSICIAN ASSISTANT

## 2017-11-08 PROCEDURE — G8427 DOCREV CUR MEDS BY ELIG CLIN: HCPCS | Performed by: PHYSICIAN ASSISTANT

## 2017-11-08 PROCEDURE — G8417 CALC BMI ABV UP PARAM F/U: HCPCS | Performed by: PHYSICIAN ASSISTANT

## 2017-11-08 PROCEDURE — G8484 FLU IMMUNIZE NO ADMIN: HCPCS | Performed by: PHYSICIAN ASSISTANT

## 2017-11-08 PROCEDURE — 99214 OFFICE O/P EST MOD 30 MIN: CPT | Performed by: PHYSICIAN ASSISTANT

## 2017-11-08 PROCEDURE — 51798 US URINE CAPACITY MEASURE: CPT | Performed by: PHYSICIAN ASSISTANT

## 2017-11-08 PROCEDURE — 81003 URINALYSIS AUTO W/O SCOPE: CPT | Performed by: PHYSICIAN ASSISTANT

## 2017-11-08 PROCEDURE — 74000 XR ABDOMEN LIMITED (KUB): CPT

## 2017-11-08 ASSESSMENT — ENCOUNTER SYMPTOMS
NAUSEA: 0
BACK PAIN: 1
EYE DISCHARGE: 0
HEARTBURN: 0
SHORTNESS OF BREATH: 0
WHEEZING: 0
EYE REDNESS: 0

## 2017-11-08 NOTE — PROGRESS NOTES
Allergen Reactions    Latex     Dilantin [Phenytoin]     Pcn [Penicillins]     Sulfa Antibiotics        Social History     Social History    Marital status: Single     Spouse name: N/A    Number of children: N/A    Years of education: N/A     Social History Main Topics    Smoking status: Never Smoker    Smokeless tobacco: Never Used    Alcohol use No    Drug use: No    Sexual activity: Not Asked     Other Topics Concern    None     Social History Narrative    None       History reviewed. No pertinent family history. REVIEW OF SYSTEMS:  Review of Systems   Constitutional: Negative for chills and fever. HENT: Negative for hearing loss. Eyes: Negative for discharge and redness. Respiratory: Negative for shortness of breath and wheezing. Cardiovascular: Negative for leg swelling and PND. Gastrointestinal: Negative for heartburn and nausea. Genitourinary: Negative for dysuria, flank pain, frequency, hematuria and urgency. Musculoskeletal: Positive for back pain. Negative for myalgias and neck pain. Skin: Negative for itching and rash. Neurological: Positive for seizures. Negative for loss of consciousness and headaches. Endo/Heme/Allergies: Negative for environmental allergies and polydipsia. Psychiatric/Behavioral: Negative for depression and suicidal ideas. PHYSICAL EXAM:  /80   Pulse 77   Temp 96.9 °F (36.1 °C) (Temporal)   Wt (!) 350 lb (158.8 kg)   BMI 44.94 kg/m²   Physical Exam   Constitutional: No distress. HENT:   Mouth/Throat: No oropharyngeal exudate. Eyes: Left eye exhibits no discharge. No scleral icterus. Neck: No JVD present. No tracheal deviation present. No thyromegaly present. Cardiovascular: Exam reveals no gallop and no friction rub. Pulmonary/Chest: No stridor. He has no rales. Abdominal: He exhibits no distension and no mass. There is no rebound and no guarding. Obese   Musculoskeletal: He exhibits no edema.    Neurological: No cranial nerve deficit. He exhibits normal muscle tone. Coordination normal.   Wheelchair bound. Partial quadraplegic. Skin: He is not diaphoretic. No pallor. DATA:  U/A:    Lab Results   Component Value Date    NITRITE neg 11/08/2017    COLORU DK YELLOW 12/28/2016    PROTEINU neg 11/08/2017    PROTEINU 30 12/28/2016    PHUR 5 11/08/2017    PHUR 7.0 12/28/2016    WBCUA 2-4 12/28/2016    MUCUS 2+ 12/28/2016    BACTERIA 1+ 12/28/2016    CLARITYU CLOUDY 12/28/2016    SPECGRAV 1.020 11/08/2017    SPECGRAV 1.024 12/28/2016    LEUKOCYTESUR neg 11/08/2017    LEUKOCYTESUR TRACE 12/28/2016    UROBILINOGEN 4.0 12/28/2016    BILIRUBINUR neg 11/08/2017    BLOODU neg 11/08/2017    BLOODU Negative 12/28/2016    GLUCOSEU neg 11/08/2017    GLUCOSEU Negative 12/28/2016    AMORPHOUS 1+ 12/28/2016          Imaging:  Bladder scan revealed 23 mL postvoid residual.    1. Kidney stones  Get KUB today he will terminate with CT scan. His urine is completely negative his vitals are excellent is been no fever or chills. His bladder was 23's emptying his bladder well. - XR ABDOMEN (KUB) (SINGLE AP VIEW); Future  - CT ABDOMEN PELVIS WO IV CONTRAST Additional Contrast? None; Future    2. Acute right-sided low back pain without sciatica  KUB today CT Monday rule out stone in ureter. This may be musculoskeletal pain but will rule out pain related to the stone.  - CT ABDOMEN PELVIS WO IV CONTRAST Additional Contrast? None; Future      Orders Placed This Encounter   Procedures    XR ABDOMEN (KUB) (SINGLE AP VIEW)     Standing Status:   Future     Standing Expiration Date:   11/8/2018     Order Specific Question:   Reason for exam:     Answer:   kidney stones and right lower back pain.     CT ABDOMEN PELVIS WO IV CONTRAST Additional Contrast? None     Standing Status:   Future     Standing Expiration Date:   11/8/2018     Order Specific Question:   Additional Contrast?     Answer:   None     Order Specific Question:   Reason for exam: Answer:   kidney stones low back pain.  POCT Urinalysis no Micro    WI MEASUREMENT,POST-VOID RESIDUAL VOLUME BY US,NON-IMAGING     Urine volume  23 ml     No orders of the defined types were placed in this encounter. Plan:  KUB today will get CT scan Monday and visit with Wanda Steele on Monday.

## 2017-11-13 ENCOUNTER — HOSPITAL ENCOUNTER (OUTPATIENT)
Dept: CT IMAGING | Age: 27
Discharge: HOME | End: 2017-11-13
Payer: COMMERCIAL

## 2017-11-13 ENCOUNTER — OFFICE VISIT (OUTPATIENT)
Dept: UROLOGY | Age: 27
End: 2017-11-13
Payer: COMMERCIAL

## 2017-11-13 VITALS — TEMPERATURE: 98.1 F

## 2017-11-13 DIAGNOSIS — N20.0 KIDNEY STONES: Primary | ICD-10-CM

## 2017-11-13 DIAGNOSIS — N20.0 KIDNEY STONES: ICD-10-CM

## 2017-11-13 DIAGNOSIS — N20.0 URIC ACID NEPHROLITHIASIS: ICD-10-CM

## 2017-11-13 DIAGNOSIS — M54.50 ACUTE RIGHT-SIDED LOW BACK PAIN WITHOUT SCIATICA: ICD-10-CM

## 2017-11-13 PROCEDURE — G8417 CALC BMI ABV UP PARAM F/U: HCPCS | Performed by: PHYSICIAN ASSISTANT

## 2017-11-13 PROCEDURE — 99213 OFFICE O/P EST LOW 20 MIN: CPT | Performed by: PHYSICIAN ASSISTANT

## 2017-11-13 PROCEDURE — 1036F TOBACCO NON-USER: CPT | Performed by: PHYSICIAN ASSISTANT

## 2017-11-13 PROCEDURE — 74176 CT ABD & PELVIS W/O CONTRAST: CPT

## 2017-11-13 PROCEDURE — G8427 DOCREV CUR MEDS BY ELIG CLIN: HCPCS | Performed by: PHYSICIAN ASSISTANT

## 2017-11-13 PROCEDURE — G8484 FLU IMMUNIZE NO ADMIN: HCPCS | Performed by: PHYSICIAN ASSISTANT

## 2017-11-13 ASSESSMENT — ENCOUNTER SYMPTOMS
EYE PAIN: 0
VOMITING: 0
SHORTNESS OF BREATH: 0
ABDOMINAL PAIN: 0
SINUS PAIN: 0
BACK PAIN: 0
BLURRED VISION: 0
WHEEZING: 0

## 2017-11-13 NOTE — PROGRESS NOTES
Marital status: Single     Spouse name: N/A    Number of children: N/A    Years of education: N/A     Social History Main Topics    Smoking status: Never Smoker    Smokeless tobacco: Never Used    Alcohol use No    Drug use: No    Sexual activity: Not Asked     Other Topics Concern    None     Social History Narrative    None       History reviewed. No pertinent family history. REVIEW OF SYSTEMS:  Review of Systems   Constitutional: Negative for malaise/fatigue and weight loss. HENT: Negative for nosebleeds and sinus pain. Eyes: Negative for blurred vision and pain. Respiratory: Negative for shortness of breath and wheezing. Cardiovascular: Negative for palpitations and leg swelling. Gastrointestinal: Negative for abdominal pain and vomiting. Genitourinary: Positive for flank pain. Negative for dysuria, frequency, hematuria and urgency. Musculoskeletal: Negative for back pain and myalgias. Skin: Negative for itching and rash. Neurological: Negative for tremors and sensory change. Endo/Heme/Allergies: Negative for environmental allergies and polydipsia. Psychiatric/Behavioral: Negative for hallucinations. The patient is not nervous/anxious. PHYSICAL EXAM:  Temp 98.1 °F (36.7 °C) (Temporal)   Physical Exam   Constitutional: No distress. HENT:   Mouth/Throat: No oropharyngeal exudate. Eyes: Left eye exhibits no discharge. No scleral icterus. Neck: No JVD present. No tracheal deviation present. No thyromegaly present. Cardiovascular: Exam reveals no gallop and no friction rub. Pulmonary/Chest: No stridor. He has no rales. Abdominal: He exhibits no distension and no mass. There is no rebound and no guarding. Musculoskeletal: He exhibits no edema. Patient is wheelchair-bound he is a partial quadriplegic   Neurological: No cranial nerve deficit. He exhibits normal muscle tone. Coordination normal.   Skin: He is not diaphoretic. No pallor.                  1. Kidney stones  Patient returns today after getting CT scan without contrast of the abdomen and pelvis. I saw him last week 11/08/17 KUB revealed bilateral stones but no obvious ureteral stone. I looked at his CT scan today there is no obstruction in either kidney there is more stone burden on the right and left the stones do show up on KUB it would be amenable to ESWL although the right side most likely would need per. He is reluctant to do anything at this time I would like him to follow up in 3 months with a KUB. - XR ABDOMEN (KUB) (SINGLE AP VIEW); Future      Orders Placed This Encounter   Procedures    XR ABDOMEN (KUB) (SINGLE AP VIEW)     Standing Status:   Future     Standing Expiration Date:   11/13/2018     Order Specific Question:   Reason for exam:     Answer:   bilateral stones     No orders of the defined types were placed in this encounter. Plan:  Patient will follow-up in 3 months with KUB.

## 2018-01-03 ENCOUNTER — HOSPITAL ENCOUNTER (OUTPATIENT)
Dept: PAIN MANAGEMENT | Age: 28
Discharge: HOME | End: 2018-01-03
Payer: COMMERCIAL

## 2018-01-03 PROCEDURE — 62369 ANAL SP INF PMP W/REPRG&FILL: CPT

## 2018-01-03 PROCEDURE — 2500000003 HC RX 250 WO HCPCS

## 2018-01-26 ENCOUNTER — HOSPITAL ENCOUNTER (EMERGENCY)
Age: 28
Discharge: HOME | End: 2018-01-26
Attending: EMERGENCY MEDICINE
Payer: COMMERCIAL

## 2018-01-26 VITALS
BODY MASS INDEX: 39.68 KG/M2 | RESPIRATION RATE: 20 BRPM | HEART RATE: 82 BPM | DIASTOLIC BLOOD PRESSURE: 77 MMHG | HEIGHT: 72 IN | OXYGEN SATURATION: 93 % | SYSTOLIC BLOOD PRESSURE: 113 MMHG | TEMPERATURE: 97.4 F | WEIGHT: 293 LBS

## 2018-01-26 DIAGNOSIS — I80.01 THROMBOPHLEBITIS OF SUPERFICIAL VEINS OF RIGHT LOWER EXTREMITY: Primary | ICD-10-CM

## 2018-01-26 PROCEDURE — 99283 EMERGENCY DEPT VISIT LOW MDM: CPT

## 2018-01-26 PROCEDURE — 93971 EXTREMITY STUDY: CPT

## 2018-01-26 PROCEDURE — 99284 EMERGENCY DEPT VISIT MOD MDM: CPT | Performed by: EMERGENCY MEDICINE

## 2018-01-26 RX ORDER — LISINOPRIL 10 MG/1
10 TABLET ORAL DAILY
COMMUNITY

## 2018-01-26 RX ORDER — BACITRACIN 500 [USP'U]/G
OINTMENT TOPICAL 2 TIMES DAILY
Status: ON HOLD | COMMUNITY
End: 2022-04-12 | Stop reason: HOSPADM

## 2018-01-26 RX ORDER — KETOCONAZOLE 20 MG/G
CREAM TOPICAL
Status: ON HOLD | COMMUNITY
End: 2022-04-12 | Stop reason: HOSPADM

## 2018-01-26 RX ORDER — OXYCODONE AND ACETAMINOPHEN 10; 325 MG/1; MG/1
1 TABLET ORAL EVERY 6 HOURS PRN
COMMUNITY
End: 2022-09-29 | Stop reason: ALTCHOICE

## 2018-01-26 RX ORDER — TIZANIDINE 2 MG/1
2 TABLET ORAL 2 TIMES DAILY
COMMUNITY

## 2018-01-26 RX ORDER — GABAPENTIN 400 MG/1
400 CAPSULE ORAL 3 TIMES DAILY
COMMUNITY

## 2018-01-26 ASSESSMENT — ENCOUNTER SYMPTOMS
SHORTNESS OF BREATH: 0
VOMITING: 0
ABDOMINAL PAIN: 0
DIARRHEA: 0
EYE PAIN: 0

## 2018-01-26 ASSESSMENT — PAIN SCALES - GENERAL: PAINLEVEL_OUTOF10: 6

## 2018-01-27 NOTE — ED PROVIDER NOTES
vein. I spoke with Dr. Pat Kilgore who said there is no indication for anticoagulation. Patient will be discharged and instructed to follow-up with Dr. Asim Foreman and to return to the ER for change or worsening symptoms or new concerns. CONSULTS:  None    PROCEDURES:  Unless otherwise noted below, none     Procedures    FINAL IMPRESSION      1.  Thrombophlebitis of superficial veins of right lower extremity          DISPOSITION/PLAN   DISPOSITION Decision To Discharge 01/26/2018 08:08:04 PM      PATIENT REFERRED TO:  [address] [place] [address] EMERGENCY DEPT  [facility]  [phone]    As needed, If symptoms worsen    Williams Whalen MD  [facility]  [phone]    Schedule an appointment as soon as possible for a visit         DISCHARGE MEDICATIONS:  New Prescriptions    No medications on file          (Please note that portions of this note were completed with a voice recognition program.  Efforts were made to edit the dictations but occasionally words are mis-transcribed.)    Reynaldo Zazueta MD (electronically signed)  Attending Emergency Physician        Reynaldo Zazueta MD  01/26/18 2010

## 2018-02-09 ENCOUNTER — OFFICE VISIT (OUTPATIENT)
Dept: NEUROLOGY | Facility: CLINIC | Age: 28
End: 2018-02-09

## 2018-02-09 VITALS
RESPIRATION RATE: 18 BRPM | DIASTOLIC BLOOD PRESSURE: 80 MMHG | BODY MASS INDEX: 44.94 KG/M2 | OXYGEN SATURATION: 98 % | HEART RATE: 76 BPM | WEIGHT: 315 LBS | SYSTOLIC BLOOD PRESSURE: 122 MMHG

## 2018-02-09 DIAGNOSIS — Z99.89 OSA ON CPAP: Primary | ICD-10-CM

## 2018-02-09 DIAGNOSIS — G40.419 OTHER GENERALIZED EPILEPSY, INTRACTABLE, WITHOUT STATUS EPILEPTICUS (HCC): ICD-10-CM

## 2018-02-09 DIAGNOSIS — G47.33 OSA ON CPAP: Primary | ICD-10-CM

## 2018-02-09 DIAGNOSIS — Z98.2 S/P VP SHUNT: ICD-10-CM

## 2018-02-09 DIAGNOSIS — S06.9X9S CLOSED TRAUMATIC BRAIN INJURY, WITH LOSS OF CONSCIOUSNESS OF UNSPECIFIED DURATION, SEQUELA: ICD-10-CM

## 2018-02-09 DIAGNOSIS — G91.9 HYDROCEPHALUS (HCC): ICD-10-CM

## 2018-02-09 PROCEDURE — 99213 OFFICE O/P EST LOW 20 MIN: CPT | Performed by: CLINICAL NURSE SPECIALIST

## 2018-02-09 RX ORDER — POLYETHYLENE GLYCOL 3350 17 G/17G
POWDER, FOR SOLUTION ORAL
COMMUNITY
Start: 2018-01-22 | End: 2023-01-18

## 2018-02-09 RX ORDER — GABAPENTIN 400 MG/1
CAPSULE ORAL
COMMUNITY
Start: 2018-01-22 | End: 2023-01-18 | Stop reason: DRUGHIGH

## 2018-02-09 RX ORDER — AMANTADINE HYDROCHLORIDE 100 MG/1
TABLET ORAL
COMMUNITY
Start: 2018-01-22 | End: 2018-02-09

## 2018-02-09 RX ORDER — KETOCONAZOLE 20 MG/G
CREAM TOPICAL
COMMUNITY
Start: 2018-01-31 | End: 2023-01-18

## 2018-02-09 RX ORDER — KETOCONAZOLE 20 MG/ML
SHAMPOO TOPICAL
COMMUNITY
Start: 2018-01-17 | End: 2023-01-18

## 2018-02-09 RX ORDER — NYSTATIN 100000 U/G
CREAM TOPICAL
COMMUNITY
Start: 2017-12-04 | End: 2023-01-18

## 2018-02-09 RX ORDER — TIZANIDINE 4 MG/1
TABLET ORAL
COMMUNITY
Start: 2018-01-22 | End: 2018-02-09

## 2018-02-09 NOTE — PROGRESS NOTES
Subjective     Chief Complaint   Patient presents with   • Seizures     patient states of having no activity of another seizure    • Sleeping Problem     .  Octavio Werner is a 27 y.o. male right handed on disability [facility] Patient goes by Bro[facility].  He is here today for follow up for AMARI. He did have overnight pulse ox that did show desaturation Less than 4 minutes with 20 desaturation event. He was seen 8/8/17. He does have CPAP but cannot tolerate. The caregiver with him tells me he has tried to have Brody use CPAP but patient refuses.  He states he is using and is doing well. He states he has problems sleeping because of body pain and he does have pain/baclofen pump.       As you recall he has history of TBI, hydrocephalus s/p  shunt and post TBI seizures. He denies seizures. He is accompanied by caregiver from the facility that confirms this.     He tells me his last seizure was many years ago. He has no complaints today    HPI Comments: Last sleep study was 1/2014.    Does not notice leaks . Wears nose piece. Could not tolerate full face mask.        Sleep Apnea   This is a chronic problem. The current episode started more than 1 year ago. Associated symptoms include a fever, numbness and weakness. Pertinent negatives include no arthralgias, chest pain, headaches, sore throat or vomiting. Associated symptoms comments: Sob, hx sleep apnea, patient has had reported increased body weight since 2014. Not wearing PAP as patient states air is too forceful. Exacerbated by: TBI, left sided paralysis, hydrocephalus s/p  shunt.        Current Outpatient Prescriptions   Medication Sig Dispense Refill   • acetaminophen (TYLENOL) 500 MG tablet Take 500 mg by mouth Every 6 (Six) Hours As Needed for mild pain (1-3).     • amantadine (SYMMETREL) 100 MG capsule Take 100 mg by mouth 2 (Two) Times a Day.     • ARTIFICIAL TEAR SOLUTION OP Apply  to eye.     • baclofen (LIORESAL) 20 MG tablet Take 20 mg by  mouth 4 (Four) Times a Day.     • busPIRone (BUSPAR) 10 MG tablet Take 10 mg by mouth 2 (Two) Times a Day.     • chlorproMAZINE (THORAZINE) 25 MG tablet Take 25 mg by mouth Every Night.     • citalopram (CeleXA) 40 MG tablet Take 40 mg by mouth Daily.     • dexlansoprazole (DEXILANT) 60 MG capsule Take 60 mg by mouth Daily.     • docusate sodium (COLACE) 100 MG capsule Take 100 mg by mouth 2 (Two) Times a Day.     • DULoxetine (CYMBALTA) 60 MG capsule Take 60 mg by mouth Daily.     • gabapentin (NEURONTIN) 400 MG capsule      • ketoconazole (NIZORAL) 2 % cream      • ketoconazole (NIZORAL) 2 % shampoo      • levocetirizine (XYZAL) 5 MG tablet Take 5 mg by mouth Every Evening.     • lisinopril (PRINIVIL,ZESTRIL) 10 MG tablet      • meloxicam (MOBIC) 7.5 MG tablet Take 7.5 mg by mouth 2 (Two) Times a Day.     • Multiple Vitamin (MULTI-VITAMIN PO) Take  by mouth.     • nystatin (MYCOSTATIN) 093941 UNIT/GM cream      • Omega-3 Fatty Acids (FISH OIL) 1000 MG capsule capsule Take 3,000 mg by mouth 3 (Three) Times a Day With Meals.     • oxyCODONE-acetaminophen (PERCOCET) 5-325 MG per tablet Take 1 tablet by mouth Every 6 (Six) Hours As Needed.     • polyethylene glycol (MIRALAX) powder      • vitamin C (ASCORBIC ACID) 500 MG tablet Take 500 mg by mouth 2 (Two) Times a Day.     • vitamin E 400 UNIT capsule Take 400 Units by mouth 2 (Two) Times a Day.       No current facility-administered medications for this visit.        Past Medical History:   Diagnosis Date   • CVA (cerebral vascular accident)    • Depression    • Hemiparesis    • Hydrocephalus    • Kidney stones    • Neurogenic bladder    • Neurogenic bowel    • Seizure    • Sinusitis    • Sleep apnea    • Subdural hematoma    • TBI (traumatic brain injury)    • UTI (urinary tract infection)        Past Surgical History:   Procedure Laterality Date   • BACLOFEN PUMP IMPLANTATION     • CHEST TUBE INSERTION      REMOVED   • CRANIOTOMY      FOR EVACUATION OF SUBDURAL  HEMATOMA & VENTRICULOSTOMY   • HAMSTRING RELEASE     • KIDNEY STONE SURGERY     • PEG TUBE INSERTION      REMOVED   • TRACHEOSTOMY      REMOVED   •  SHUNT INSERTION         family history includes No Known Problems in his father and mother.    Social History   Substance Use Topics   • Smoking status: Never Smoker   • Smokeless tobacco: Never Used   • Alcohol use No       Review of Systems   Constitutional: Positive for fever.   HENT: Negative.  Negative for hearing loss, sinus pressure, sore throat and trouble swallowing.    Eyes: Negative.  Negative for visual disturbance.   Respiratory: Positive for shortness of breath. Negative for chest tightness and stridor.    Cardiovascular: Negative.  Negative for chest pain and leg swelling.   Gastrointestinal: Negative.  Negative for constipation, diarrhea and vomiting.   Endocrine: Negative.    Genitourinary: Negative.  Negative for dysuria and frequency.   Musculoskeletal: Positive for gait problem (left sided paralysis). Negative for arthralgias and back pain.   Skin: Negative.    Allergic/Immunologic: Negative.    Neurological: Positive for speech difficulty, weakness and numbness. Negative for dizziness and headaches.        Left sided paralysis and LUE spasticity   Hematological: Negative.  Negative for adenopathy.   Psychiatric/Behavioral: Positive for behavioral problems (history of touching neurorestorative inappropriately). Negative for agitation and confusion.   All other systems reviewed and are negative.      Objective     /80 (BP Location: Right arm, Patient Position: Sitting, Cuff Size: Adult)  Pulse 76  Resp 18  Wt (!) 159 kg (350 lb)  SpO2 98%  BMI 44.94 kg/m2, Body mass index is 44.94 kg/(m^2).    Physical Exam   Constitutional: Vital signs are normal. He appears well-developed and well-nourished.   HENT:   Head: Normocephalic and atraumatic.   Right Ear: Hearing and external ear normal.   Left Ear: Hearing and external ear normal.   Nose:  Nose normal.   Mouth/Throat: Oropharynx is clear and moist and mucous membranes are normal.   Hx right craniotomy  Excessive cerumen bilateral   Eyes: Conjunctivae, EOM and lids are normal. Pupils are equal, round, and reactive to light.   Neck: Trachea normal and normal range of motion. Neck supple. Carotid bruit is not present.   Cardiovascular: Normal rate, regular rhythm, S1 normal, S2 normal, normal heart sounds and normal pulses.    No murmur heard.  Pulmonary/Chest: Effort normal and breath sounds normal.   Abdominal: Soft. Bowel sounds are normal.   Musculoskeletal: Normal range of motion.   Neurological: He is alert. He has normal strength. He is not disoriented. He displays no tremor. A cranial nerve deficit (intermittent right eye deviation. EOM intact bilateral. visual fields intact) and sensory deficit (left sided) is present. He exhibits abnormal muscle tone (increase tone LUE/LLE). He displays a negative Romberg sign. Gait (no tested for safety reasons. in wheelchair and has gait/lift belt) abnormal. Coordination (mild pass pointing on right) normal.   Reflex Scores:       Tricep reflexes are 1+ on the right side and 0 on the left side.       Bicep reflexes are 1+ on the right side and 0 on the left side.       Brachioradialis reflexes are 1+ on the right side and 0 on the left side.       Patellar reflexes are 1+ on the right side and 0 on the left side.       Achilles reflexes are 1+ on the right side and 0 on the left side.  Awake, alert, dysarthria, no aphasia    CN II:  Visual fields full.  Pupils equally reactive to light. dysonjugate gaze  CN III, IV, VI:  Extraocular Muscles full with no signs of nystagmus  CN V:  Facial sensory is symmetric with no asymetries.  CN VII:  Facial motor symmetric  CN VIII:  Gross hearing intact bilaterally  CN IX:  Palate elevates symmetrically  CN X:  Palate elevates symmetrically  CN XI:  Shoulder shrug symmetric  CN XII:  Tongue is midline on  protrusion    RUE/RLE strength 4/5 proximal and distal  LUE with spasticity, LLE strength 3/5/ proximal and distal   Skin: Skin is warm and dry.   Psychiatric: He has a normal mood and affect. His speech is normal and behavior is normal. Cognition and memory are normal.   Nursing note and vitals reviewed.           ASSESSMENT/PLAN    Diagnoses and all orders for this visit:    AMARI on CPAP    Closed traumatic brain injury, with loss of consciousness of unspecified duration, sequela    Hydrocephalus    Other generalized epilepsy, intractable, without status epilepticus    S/P  shunt    Other orders  -     Discontinue: amantadine (SYMMETREL) 100 MG tablet;   -     gabapentin (NEURONTIN) 400 MG capsule;   -     ketoconazole (NIZORAL) 2 % cream;   -     ketoconazole (NIZORAL) 2 % shampoo;   -     nystatin (MYCOSTATIN) 852140 UNIT/GM cream;   -     polyethylene glycol (MIRALAX) powder;   -     Discontinue: tiZANidine (ZANAFLEX) 4 MG tablet;     MEDICAL DECISION MAKIN. Patient not using CPAP as states cannot tolerate and does not wish to continue CPAP.  2. No reported seizures.  3. Seizure precautions were discussed to include no tub baths, no swimming, avoiding lack of sleep, and avoiding known triggers. Education given of things that may contribute to a seizure to include, but not limited to: stressful situations, fever, fatigue, lack of sleep, low blood sugar, hyperventilation, flashing lights, and caffeine. Instructions given to take seizure medications as prescribed. Education given to family member on what to do during a seizure and care following the seizure. Education given to contact this office prior to stopping or changing any medications.  4.  shunt managed by Dr. Baltazar.  5. Patient has baclofen pump for spasticity and had received BOTOX in LUE in past by Dr. Adamson.  6. Patient's BMI is above normal parameters. Follow-up plan includes:  no follow-up required.    allergies and all known  medications/prescriptions have been reviewed using resources available on this encounter.    Return if symptoms worsen or fail to improve.        Stacy Perera, GREG

## 2018-02-14 ENCOUNTER — OFFICE VISIT (OUTPATIENT)
Dept: UROLOGY | Age: 28
End: 2018-02-14
Payer: COMMERCIAL

## 2018-02-14 ENCOUNTER — HOSPITAL ENCOUNTER (OUTPATIENT)
Dept: GENERAL RADIOLOGY | Age: 28
Discharge: HOME | End: 2018-02-14
Payer: COMMERCIAL

## 2018-02-14 VITALS
TEMPERATURE: 96.9 F | HEART RATE: 82 BPM | BODY MASS INDEX: 42.66 KG/M2 | DIASTOLIC BLOOD PRESSURE: 80 MMHG | HEIGHT: 72 IN | WEIGHT: 315 LBS | SYSTOLIC BLOOD PRESSURE: 132 MMHG

## 2018-02-14 DIAGNOSIS — N20.0 BILATERAL KIDNEY STONES: Primary | ICD-10-CM

## 2018-02-14 DIAGNOSIS — N20.0 KIDNEY STONES: ICD-10-CM

## 2018-02-14 PROCEDURE — G8484 FLU IMMUNIZE NO ADMIN: HCPCS | Performed by: PHYSICIAN ASSISTANT

## 2018-02-14 PROCEDURE — G8417 CALC BMI ABV UP PARAM F/U: HCPCS | Performed by: PHYSICIAN ASSISTANT

## 2018-02-14 PROCEDURE — G8427 DOCREV CUR MEDS BY ELIG CLIN: HCPCS | Performed by: PHYSICIAN ASSISTANT

## 2018-02-14 PROCEDURE — 99213 OFFICE O/P EST LOW 20 MIN: CPT | Performed by: PHYSICIAN ASSISTANT

## 2018-02-14 PROCEDURE — 74018 RADEX ABDOMEN 1 VIEW: CPT

## 2018-02-14 PROCEDURE — 1036F TOBACCO NON-USER: CPT | Performed by: PHYSICIAN ASSISTANT

## 2018-02-14 ASSESSMENT — ENCOUNTER SYMPTOMS
HEARTBURN: 0
WHEEZING: 0
SHORTNESS OF BREATH: 0
NAUSEA: 0
EYE REDNESS: 0
EYE DISCHARGE: 0

## 2018-02-14 NOTE — PROGRESS NOTES
education: N/A     Social History Main Topics    Smoking status: Never Smoker    Smokeless tobacco: Never Used    Alcohol use No    Drug use: No    Sexual activity: Not Asked     Other Topics Concern    None     Social History Narrative    None       History reviewed. No pertinent family history. REVIEW OF SYSTEMS:  Review of Systems   Constitutional: Negative for chills and fever. HENT: Negative for hearing loss. Eyes: Negative for discharge and redness. Respiratory: Negative for shortness of breath and wheezing. Cardiovascular: Negative for leg swelling and PND. Gastrointestinal: Negative for heartburn and nausea. Genitourinary: Negative for dysuria, flank pain, frequency, hematuria and urgency. Musculoskeletal: Negative for myalgias and neck pain. Skin: Negative for itching and rash. Neurological: Negative for seizures, loss of consciousness and headaches. Endo/Heme/Allergies: Negative for environmental allergies and polydipsia. Psychiatric/Behavioral: Negative for depression and suicidal ideas. PHYSICAL EXAM:  /80   Pulse 82   Temp 96.9 °F (36.1 °C) (Temporal)   Ht 6' (1.829 m)   Wt (!) 315 lb (142.9 kg)   BMI 42.72 kg/m²   Physical Exam   Constitutional: No distress. HENT:   Mouth/Throat: No oropharyngeal exudate. Eyes: Left eye exhibits no discharge. No scleral icterus. Neck: No JVD present. No tracheal deviation present. No thyromegaly present. Cardiovascular: Exam reveals no gallop and no friction rub. Pulmonary/Chest: No stridor. He has no rales. Abdominal: He exhibits no distension and no mass. There is no rebound and no guarding. Musculoskeletal: He exhibits no edema. Neurological: No cranial nerve deficit. He exhibits normal muscle tone. Coordination normal.   Skin: He is not diaphoretic. No pallor.            DATA:  U/A:    Lab Results   Component Value Date    NITRITE neg 11/08/2017    COLORU DK YELLOW 12/28/2016    PROTEINU neg

## 2018-04-11 ENCOUNTER — HOSPITAL ENCOUNTER (OUTPATIENT)
Dept: PAIN MANAGEMENT | Age: 28
Discharge: HOME | End: 2018-04-11
Payer: COMMERCIAL

## 2018-04-11 PROCEDURE — 62369 ANAL SP INF PMP W/REPRG&FILL: CPT

## 2018-04-11 PROCEDURE — 2500000003 HC RX 250 WO HCPCS

## 2018-08-13 ENCOUNTER — HOSPITAL ENCOUNTER (OUTPATIENT)
Dept: WOUND CARE | Age: 28
Discharge: HOME | End: 2018-08-13
Payer: COMMERCIAL

## 2018-08-13 VITALS
TEMPERATURE: 97.5 F | HEART RATE: 78 BPM | RESPIRATION RATE: 18 BRPM | WEIGHT: 260 LBS | DIASTOLIC BLOOD PRESSURE: 78 MMHG | BODY MASS INDEX: 35.21 KG/M2 | HEIGHT: 72 IN | SYSTOLIC BLOOD PRESSURE: 131 MMHG

## 2018-08-13 DIAGNOSIS — I69.30 HISTORY OF CEREBROVASCULAR ACCIDENT (CVA) WITH RESIDUAL DEFICIT: ICD-10-CM

## 2018-08-13 DIAGNOSIS — L30.8 DERMATITIS ASSOCIATED WITH MOISTURE: ICD-10-CM

## 2018-08-13 DIAGNOSIS — S31.809A OPEN WOUND OF BUTTOCK, UNSPECIFIED LATERALITY, INITIAL ENCOUNTER: ICD-10-CM

## 2018-08-13 PROCEDURE — 99214 OFFICE O/P EST MOD 30 MIN: CPT | Performed by: NURSE PRACTITIONER

## 2018-08-13 PROCEDURE — 99214 OFFICE O/P EST MOD 30 MIN: CPT

## 2018-08-13 RX ORDER — CHOLECALCIFEROL (VITAMIN D3) 125 MCG
5 CAPSULE ORAL NIGHTLY
Status: ON HOLD | COMMUNITY
End: 2022-10-04 | Stop reason: HOSPADM

## 2018-08-13 NOTE — PROGRESS NOTES
Patient Care Team:  La Benjamin MD as PCP - General (Family Medicine)    TODAY'S DATE:  8/13/2018     HISTORY of PRESENT ILLNESS HPI   Mai Cutler is a 25 y.o. male who presents today for wound evaluation. Patient is currently residing at [facility] in [facility]. He has a history of CVA and residual motor deficits. He does consistent for his own treatment and is alert to person place and time. He is assisted today by two aides from [facility], they state patient constantly picks and digs at buttock area. He complains of itching and dryness to buttock area, but states he is continent of stool. He needs assist with transfer and spends a large amount of time in his wheelchair. Wound Type:pressure  Wound Location:buttock   Modifying factors:chronic pressure and scratching    Patient Active Problem List   Diagnosis Code    Muscle spasticity M62.838    Post-traumatic spasticity R25.2    Post-traumatic spasticity R25.2    Open wound of buttock S31.809A    Dermatitis associated with moisture L30.8    History of cerebrovascular accident (CVA) with residual deficit I69.30     He believes this is not healing. He has been applying nothing. He has not had  fever or chills. He has a history of CVA. Mai Cutler is a 25 y.o. male with the following history reviewed and recorded in [facility]:  Patient Active Problem List    Diagnosis Date Noted    Open wound of buttock 08/13/2018    Dermatitis associated with moisture 08/13/2018    History of cerebrovascular accident (CVA) with residual deficit 08/13/2018    Post-traumatic spasticity 03/23/2016    Post-traumatic spasticity 03/23/2016    Muscle spasticity      Current Outpatient Prescriptions   Medication Sig Dispense Refill    melatonin 5 MG TABS tablet Take 5 mg by mouth daily      gabapentin (NEURONTIN) 400 MG capsule Take 400 mg by mouth 3 times daily.       ketoconazole (NIZORAL) 2 % cream Apply topically 3 times daily Apply  baclofen (LIORESAL) 20 MG tablet Take 20 mg by mouth 4 times daily       ketoconazole (NIZORAL) 2 % shampoo Apply topically daily as needed for Itching Apply topically daily as needed.  acetaminophen (TYLENOL) 500 MG tablet Take 1,000 mg by mouth every 6 hours as needed for Pain or Fever      chlorproMAZINE (THORAZINE) 25 MG tablet Take 25 mg by mouth nightly as needed (for mood)       No current facility-administered medications for this encounter. Allergies: Latex; Dilantin [phenytoin]; Pcn [penicillins]; and Sulfa antibiotics    Past Medical History:   Diagnosis Date    Arthritis     Cerebral artery occlusion with cerebral infarction ([facility] [address]     Depression     Difficult intubation     Hemorrhoids     History of blood transfusion     Hypertension     Muscle spasticity     Prolonged emergence from general anesthesia     Retention of urine      Past Surgical History:   Procedure Laterality Date    BACK SURGERY      BACLOFEN PUMP IMPLANTATION      BRAIN SURGERY      KIDNEY STONE SURGERY       History reviewed. No pertinent family history. Social History   Substance Use Topics    Smoking status: Never Smoker    Smokeless tobacco: Never Used    Alcohol use No       Review of Systems    Constitutional - no significant activity change, appetite change, or unexpected weight change. No fever or chills. HENT - no significant rhinorrhea or epistaxis. No tinnitus or significant hearing loss. Eyes - no sudden vision change or amaurosis. Respiratory - no significant shortness of breath, wheezing, or stridor. Cardiovascular - no chest pain, syncope, or significant dizziness. Gastrointestinal - no abdominal swelling or pain. No severe constipation or diarrhea  Genitourinary - No difficulty urinating, dysuria, frequency, or urgency. Musculoskeletal - no back pain,  Skin - no color change, rash, pallor, buttock  wound. Neurologic - No seizures.     Hematologic - no easy bruising or

## 2018-08-14 ENCOUNTER — HOSPITAL ENCOUNTER (OUTPATIENT)
Dept: GENERAL RADIOLOGY | Age: 28
Discharge: HOME | End: 2018-08-14
Payer: COMMERCIAL

## 2018-08-14 ENCOUNTER — OFFICE VISIT (OUTPATIENT)
Dept: UROLOGY | Age: 28
End: 2018-08-14
Payer: COMMERCIAL

## 2018-08-14 ENCOUNTER — PREP FOR PROCEDURE (OUTPATIENT)
Dept: UROLOGY | Age: 28
End: 2018-08-14

## 2018-08-14 VITALS
HEIGHT: 68 IN | HEART RATE: 74 BPM | WEIGHT: 260 LBS | BODY MASS INDEX: 39.4 KG/M2 | TEMPERATURE: 96.9 F | SYSTOLIC BLOOD PRESSURE: 126 MMHG | DIASTOLIC BLOOD PRESSURE: 86 MMHG | OXYGEN SATURATION: 99 %

## 2018-08-14 DIAGNOSIS — N20.0 BILATERAL KIDNEY STONES: ICD-10-CM

## 2018-08-14 DIAGNOSIS — N20.0 BILATERAL KIDNEY STONES: Primary | ICD-10-CM

## 2018-08-14 LAB
BILIRUBIN, POC: NORMAL
BLOOD URINE, POC: NORMAL
CLARITY, POC: NORMAL
COLOR, POC: NORMAL
GLUCOSE URINE, POC: NORMAL
KETONES, POC: NORMAL
LEUKOCYTE EST, POC: NORMAL
NITRITE, POC: NORMAL
PH, POC: 7
PROTEIN, POC: NORMAL
SPECIFIC GRAVITY, POC: 1.01
UROBILINOGEN, POC: 1

## 2018-08-14 PROCEDURE — G8417 CALC BMI ABV UP PARAM F/U: HCPCS | Performed by: PHYSICIAN ASSISTANT

## 2018-08-14 PROCEDURE — 81003 URINALYSIS AUTO W/O SCOPE: CPT | Performed by: PHYSICIAN ASSISTANT

## 2018-08-14 PROCEDURE — 99214 OFFICE O/P EST MOD 30 MIN: CPT | Performed by: PHYSICIAN ASSISTANT

## 2018-08-14 PROCEDURE — 74018 RADEX ABDOMEN 1 VIEW: CPT

## 2018-08-14 PROCEDURE — G8427 DOCREV CUR MEDS BY ELIG CLIN: HCPCS | Performed by: PHYSICIAN ASSISTANT

## 2018-08-14 PROCEDURE — 1036F TOBACCO NON-USER: CPT | Performed by: PHYSICIAN ASSISTANT

## 2018-08-14 RX ORDER — SODIUM CHLORIDE 0.9 % (FLUSH) 0.9 %
10 SYRINGE (ML) INJECTION EVERY 12 HOURS SCHEDULED
Status: CANCELLED | OUTPATIENT
Start: 2018-08-14 | End: 2019-08-14

## 2018-08-14 RX ORDER — CIPROFLOXACIN 2 MG/ML
400 INJECTION, SOLUTION INTRAVENOUS
Status: CANCELLED | OUTPATIENT
Start: 2018-08-14 | End: 2018-08-14

## 2018-08-14 RX ORDER — SODIUM CHLORIDE 0.9 % (FLUSH) 0.9 %
10 SYRINGE (ML) INJECTION PRN
Status: CANCELLED | OUTPATIENT
Start: 2018-08-14 | End: 2019-08-14

## 2018-08-14 ASSESSMENT — ENCOUNTER SYMPTOMS
EYE REDNESS: 0
NAUSEA: 0
HEARTBURN: 0
WHEEZING: 0
SHORTNESS OF BREATH: 0
EYE DISCHARGE: 0

## 2018-08-14 NOTE — PROGRESS NOTES
Effie Coto is a 25 y.o. male who presents today   Chief Complaint   Patient presents with    Follow-up     I am here today for follow up on bilateral kidney stones.  Nephrolithiasis     Follow-up bilateral kidney stones:  Patient is a 68-year-old gentleman with long history of kidney stones. He does have history of previous ureteroscopy. He has known bilateral stones per previous [facility]. He has back pain but this has been a chronic complaint his urine is clear his never seen any gross hematuria denies any fever chills nausea vomiting or flank pain. Past Medical History:   Diagnosis Date    Arthritis     Cerebral artery occlusion with cerebral infarction ([address]     Depression     Difficult intubation     Hemorrhoids     History of blood transfusion     Hypertension     Muscle spasticity     Prolonged emergence from general anesthesia     Retention of urine        Past Surgical History:   Procedure Laterality Date    BACK SURGERY      BACLOFEN PUMP IMPLANTATION      BRAIN SURGERY      KIDNEY STONE SURGERY         Current Outpatient Prescriptions   Medication Sig Dispense Refill    melatonin 5 MG TABS tablet Take 5 mg by mouth daily      gabapentin (NEURONTIN) 400 MG capsule Take 400 mg by mouth 3 times daily.  ketoconazole (NIZORAL) 2 % cream Apply topically 3 times daily Apply topically daily.  lisinopril (PRINIVIL;ZESTRIL) 10 MG tablet Take 10 mg by mouth daily      oxyCODONE-acetaminophen (PERCOCET)  MG per tablet Take 1 tablet by mouth every 6 hours as needed for Pain.  tiZANidine (ZANAFLEX) 4 MG tablet Take 4 mg by mouth 2 times daily      zinc oxide 20 % ointment Apply topically 2 times daily Apply topically as needed.  Amantadine (SYMMETREL) 100 MG TABS tablet       polyethylene glycol (GLYCOLAX) powder       pimecrolimus (ELIDEL) 1 % cream Apply topically 2 times daily Apply topically 2 times daily.       zolpidem (AMBIEN) 10 MG tablet Take by mouth nightly

## 2018-08-14 NOTE — H&P
hours as needed for Pain or Fever        chlorproMAZINE (THORAZINE) 25 MG tablet Take 25 mg by mouth nightly as needed (for mood)          No current facility-administered medications for this visit.                  Allergies   Allergen Reactions    Latex      Dilantin [Phenytoin]      Pcn [Penicillins]      Sulfa Antibiotics           Social History   Social History            Social History    Marital status: Single       Spouse name: N/A    Number of children: N/A    Years of education: N/A           Social History Main Topics    Smoking status: Never Smoker    Smokeless tobacco: Never Used    Alcohol use No    Drug use: No    Sexual activity: Not Asked           Other Topics Concern    None          Social History Narrative    None            Family History   History reviewed. No pertinent family history.        REVIEW OF SYSTEMS:  Review of Systems   Constitutional: Negative for chills and fever. HENT: Negative for hearing loss. Eyes: Negative for discharge and redness. Respiratory: Negative for shortness of breath and wheezing. Cardiovascular: Negative for leg swelling and PND. Gastrointestinal: Negative for heartburn and nausea. Genitourinary: Negative for dysuria, flank pain, frequency, hematuria and urgency. Musculoskeletal: Negative for myalgias and neck pain. Skin: Negative for itching and rash. Neurological: Negative for seizures, loss of consciousness and headaches. Endo/Heme/Allergies: Negative for environmental allergies and polydipsia. Psychiatric/Behavioral: Negative for depression and suicidal ideas.         PHYSICAL EXAM:  /86 (Site: Left Arm, Position: Sitting, Cuff Size: Medium Adult)   Pulse 74   Temp 96.9 °F (36.1 °C) (Temporal)   Ht 5' 8\" (1.727 m)   Wt 260 lb (117.9 kg)   SpO2 99%   BMI 39.53 kg/m²   Physical Exam   Constitutional: No distress. HENT:   Mouth/Throat: No oropharyngeal exudate. Eyes: Left eye exhibits no discharge.  No

## 2018-08-20 ENCOUNTER — HOSPITAL ENCOUNTER (OUTPATIENT)
Dept: WOUND CARE | Age: 28
Discharge: HOME | End: 2018-08-20
Payer: COMMERCIAL

## 2018-08-20 VITALS
SYSTOLIC BLOOD PRESSURE: 124 MMHG | WEIGHT: 260 LBS | DIASTOLIC BLOOD PRESSURE: 80 MMHG | BODY MASS INDEX: 39.4 KG/M2 | HEART RATE: 72 BPM | TEMPERATURE: 97 F | HEIGHT: 68 IN | RESPIRATION RATE: 18 BRPM

## 2018-08-20 DIAGNOSIS — L30.8 DERMATITIS ASSOCIATED WITH MOISTURE: ICD-10-CM

## 2018-08-20 DIAGNOSIS — S31.809A OPEN WOUND OF BUTTOCK, UNSPECIFIED LATERALITY, INITIAL ENCOUNTER: ICD-10-CM

## 2018-08-20 DIAGNOSIS — I69.30 HISTORY OF CEREBROVASCULAR ACCIDENT (CVA) WITH RESIDUAL DEFICIT: ICD-10-CM

## 2018-08-20 PROCEDURE — 99213 OFFICE O/P EST LOW 20 MIN: CPT | Performed by: SURGERY

## 2018-08-20 PROCEDURE — 99212 OFFICE O/P EST SF 10 MIN: CPT

## 2018-08-20 ASSESSMENT — PAIN DESCRIPTION - PAIN TYPE: TYPE: ACUTE PAIN

## 2018-08-20 NOTE — PROGRESS NOTES
Wound Care Center  Progress Note       Minh Izquierdo  AGE: 25 y.o. GENDER: male  : 1990  TODAY'S DATE:  2018    Subjective:        HISTORY of PRESENT ILLNESS HPI   Minh Izquierdo is a 25 y.o. male who presents today for wound evaluation. Wound Type:pressure  Wound Location:both buttocks  Modifying factors:chronic pressure, shear force and obesity    Patient Active Problem List   Diagnosis Code    Muscle spasticity M62.838    Post-traumatic spasticity R25.2    Post-traumatic spasticity R25.2    Open wound of buttock S31.809A    Dermatitis associated with moisture L30.8    History of cerebrovascular accident (CVA) with residual deficit I69.30    Bilateral kidney stones N20.0       Mr. Po Rosen has a past medical history of Arthritis; Cerebral artery occlusion with cerebral infarction [facility]); Depression; Difficult intubation; Hemorrhoids; History of blood transfusion; Hypertension; Muscle spasticity; Prolonged emergence from general anesthesia; and Retention of urine. He has a past surgical history that includes brain surgery; back surgery; baclofen pump implantation; and Kidney stone surgery. His family history is not on file. Mr. Po Rosen reports that he has never smoked. He has never used smokeless tobacco. He reports that he does not drink alcohol or use drugs. His current medication list consists of     Current Outpatient Prescriptions on File Prior to Encounter   Medication Sig Dispense Refill    melatonin 5 MG TABS tablet Take 5 mg by mouth daily      gabapentin (NEURONTIN) 400 MG capsule Take 400 mg by mouth 3 times daily.  ketoconazole (NIZORAL) 2 % cream Apply topically 3 times daily Apply topically daily.  lisinopril (PRINIVIL;ZESTRIL) 10 MG tablet Take 10 mg by mouth daily      oxyCODONE-acetaminophen (PERCOCET)  MG per tablet Take 1 tablet by mouth every 6 hours as needed for Pain.       tiZANidine (ZANAFLEX) 4 MG tablet Take 4 mg by mouth 2 times daily mood)      guaiFENesin 400 MG tablet Take 400 mg by mouth 3 times daily as needed for Cough 2 Tablets TID PO PRN       No current facility-administered medications on file prior to encounter. ALLERGIES    Latex; Dilantin [phenytoin]; Pcn [penicillins]; and Sulfa antibiotics        Objective:         Vitals:    08/20/18 1125   BP: 124/80   Pulse: 72   Resp: 18   Temp: 97 °F (36.1 °C)        /80   Pulse 72   Temp 97 °F (36.1 °C) (Temporal)   Resp 18   Ht 5' 8\" (1.727 m)   Wt 260 lb (117.9 kg)   BMI 39.53 kg/m²     ROS:  Constitutional - Alert and oriented x 3, no complaints, pleasant co-operative  Integumentary - edema improved, no erythema, no cellulitis, no new wounds   The patients pain is  Pain Type: Acute pain.     Wound Measurements and Assessment:  Wound 08/13/18 Other (Comment) Buttocks Posterior Wound 2 - Buttock - Pressure Stage 2  (Active)   Wound Image   8/20/2018 11:33 AM   Wound Type Wound 8/20/2018 11:33 AM   Wound Pressure Stage  2 8/20/2018 11:33 AM   Wound Cleansed Rinsed/Irrigated with saline 8/20/2018 11:33 AM   Wound Length (cm) 0.1 cm 8/20/2018 11:33 AM   Wound Width (cm) 0.1 cm 8/20/2018 11:33 AM   Wound Depth (cm)  0.1 8/20/2018 11:33 AM   Calculated Wound Size (cm^2) (l*w) 0.01 cm^2 8/20/2018 11:33 AM   Change in Wound Size % (l*w) 99 8/20/2018 11:33 AM   Distance Tunneling (cm) 0 cm 8/20/2018 11:33 AM   Tunneling Position ___ O'Clock 0 8/20/2018 11:33 AM   Undermining Starts ___ O'Clock 0 8/20/2018 11:33 AM   Undermining Ends___ O'Clock 0 8/20/2018 11:33 AM   Undermining Maxium Distance (cm) 0 8/20/2018 11:33 AM   Wound Assessment Pink 8/20/2018 11:33 AM   Drainage Amount Small 8/20/2018 11:33 AM   Drainage Description Serous 8/20/2018 11:33 AM   Odor None 8/20/2018 11:33 AM   Rachana-wound Assessment Pink 8/20/2018 11:33 AM   Non-staged Wound Description Full thickness 8/20/2018 11:33 AM   [place] Extension%Wound Bed 80 8/20/2018 11:33 AM   Red%Wound Bed 20 8/20/2018 11:33 AM Yellow%Wound Bed 0 8/20/2018 11:33 AM   Black%Wound Bed 0 8/20/2018 11:33 AM   Purple%Wound Bed 0 8/20/2018 11:33 AM   Other%Wound Bed 0 8/13/2018 11:57 AM   Culture Taken No 8/13/2018 11:57 AM   Debridement per physician None 8/20/2018 11:45 AM   Time out N/A 8/20/2018 11:45 AM   Number of days: 6        Wound is has significantly improved. Please refer to nursing documentation for wound measurements. Assessment:      Patient Active Problem List   Diagnosis    Muscle spasticity    Post-traumatic spasticity    Post-traumatic spasticity    Open wound of buttock    Dermatitis associated with moisture    History of cerebrovascular accident (CVA) with residual deficit    Bilateral kidney stones      Plan:     Compliance issues: No    Plan for wound - Dress per physician order  Treatment:     Compression : No   Offloading : Yes   Dressing : See AVS   Additional Therapy : 0     1. Discussed appropriate home care of this wound. Wound redressed. 2. Patient instructions were given. 3. Follow up: 1 week(s). Discharge Instructions       Visit Discharge/Physician Orders    Discharge condition: Stable    Discharge to: Home    Left via:Private automobile     [facility]F/[facility]:  [facility]      Dressing Orders:  Buttock Wound:  Wash with soap and water. Apply JENNY cream to excoriated area. Cover with mepilex foam gauze. Change every 2-3 days. [address] [place] [address],[address] followup visit ____________1 week_________________  (Please note your next appointment above and if you are unable to keep, kindly give a 24 hour notice. Thank you.)    If you experience any of the following, please call the [address] [place] [address] Road during business hours:    * Increase in Pain  * Temperature over 101  * Increase in drainage from your wound  * Drainage with a foul odor  * Bleeding  * Increase in swelling  * Need for compression bandage changes due to slippage, breakthrough drainage.     If you need medical attention outside of the business hours of the Wound

## 2018-08-27 ENCOUNTER — HOSPITAL ENCOUNTER (OUTPATIENT)
Dept: WOUND CARE | Age: 28
Discharge: HOME | End: 2018-08-27
Payer: COMMERCIAL

## 2018-08-27 VITALS
BODY MASS INDEX: 39.4 KG/M2 | HEIGHT: 68 IN | RESPIRATION RATE: 20 BRPM | HEART RATE: 89 BPM | SYSTOLIC BLOOD PRESSURE: 125 MMHG | WEIGHT: 260 LBS | DIASTOLIC BLOOD PRESSURE: 80 MMHG | TEMPERATURE: 97.6 F

## 2018-08-27 DIAGNOSIS — I69.30 HISTORY OF CEREBROVASCULAR ACCIDENT (CVA) WITH RESIDUAL DEFICIT: ICD-10-CM

## 2018-08-27 DIAGNOSIS — L30.8 DERMATITIS ASSOCIATED WITH MOISTURE: ICD-10-CM

## 2018-08-27 DIAGNOSIS — S31.809A OPEN WOUND OF BUTTOCK, UNSPECIFIED LATERALITY, INITIAL ENCOUNTER: ICD-10-CM

## 2018-08-27 PROCEDURE — 99213 OFFICE O/P EST LOW 20 MIN: CPT | Performed by: SURGERY

## 2018-08-27 PROCEDURE — 99212 OFFICE O/P EST SF 10 MIN: CPT

## 2018-08-27 ASSESSMENT — PAIN SCALES - GENERAL: PAINLEVEL_OUTOF10: 0

## 2018-08-27 ASSESSMENT — PAIN DESCRIPTION - ONSET: ONSET: ON-GOING

## 2018-08-27 ASSESSMENT — PAIN DESCRIPTION - FREQUENCY: FREQUENCY: CONTINUOUS

## 2018-08-27 ASSESSMENT — PAIN DESCRIPTION - PROGRESSION: CLINICAL_PROGRESSION: NOT CHANGED

## 2018-08-27 ASSESSMENT — PAIN DESCRIPTION - LOCATION: LOCATION: SACRUM

## 2018-08-27 NOTE — PROGRESS NOTES
Wound Care Center  Progress Note       Lc Lobato  AGE: 25 y.o. GENDER: male  : 1990  TODAY'S DATE:  2018    Subjective:        HISTORY of PRESENT ILLNESS HPI   Lc Lobato is a 25 y.o. male who presents today for wound evaluation. Wound Type:pressure  Wound Location:right buttocks  Modifying factors:chronic pressure    Patient Active Problem List   Diagnosis Code    Muscle spasticity M62.838    Post-traumatic spasticity R25.2    Post-traumatic spasticity R25.2    Open wound of buttock S35.200A    Dermatitis associated with moisture L30.8    History of cerebrovascular accident (CVA) with residual deficit I69.30    Bilateral kidney stones N20.0       Mr. Vanesa Ambriz has a past medical history of Arthritis; Cerebral artery occlusion with cerebral infarction [facility]); Depression; Difficult intubation; Hemorrhoids; History of blood transfusion; Hypertension; Muscle spasticity; Prolonged emergence from general anesthesia; and Retention of urine. He has a past surgical history that includes brain surgery; back surgery; baclofen pump implantation; and Kidney stone surgery. His family history is not on file. Mr. Vanesa Ambriz reports that he has never smoked. He has never used smokeless tobacco. He reports that he does not drink alcohol or use drugs. His current medication list consists of     Current Outpatient Prescriptions on File Prior to Encounter   Medication Sig Dispense Refill    melatonin 5 MG TABS tablet Take 5 mg by mouth daily      gabapentin (NEURONTIN) 400 MG capsule Take 400 mg by mouth 3 times daily.  ketoconazole (NIZORAL) 2 % cream Apply topically 3 times daily Apply topically daily.  lisinopril (PRINIVIL;ZESTRIL) 10 MG tablet Take 10 mg by mouth daily      oxyCODONE-acetaminophen (PERCOCET)  MG per tablet Take 1 tablet by mouth every 6 hours as needed for Pain.       tiZANidine (ZANAFLEX) 4 MG tablet Take 4 mg by mouth 2 times daily      zinc oxide 20 % ointment Apply topically 2 times daily Apply topically as needed.  Amantadine (SYMMETREL) 100 MG TABS tablet       polyethylene glycol (GLYCOLAX) powder       pimecrolimus (ELIDEL) 1 % cream Apply topically 2 times daily Apply topically 2 times daily.  zolpidem (AMBIEN) 10 MG tablet Take by mouth nightly as needed for Sleep      dexlansoprazole (DEXILANT) 60 MG CPDR delayed release capsule Take 60 mg by mouth daily      guaiFENesin 400 MG tablet Take 400 mg by mouth 3 times daily as needed for Cough 2 Tablets TID PO PRN      nystatin (MYCOSTATIN) 095269 UNIT/GM cream Apply 1 Dose topically as needed for Dry Skin Apply topically 2 times daily.  citalopram (CELEXA) 40 MG tablet Take 40 mg by mouth daily      DULoxetine (CYMBALTA) 60 MG capsule Take 60 mg by mouth daily      levocetirizine (XYZAL) 5 MG tablet Take 5 mg by mouth daily      Multiple Vitamins-Minerals (THERAPEUTIC MULTIVITAMIN-MINERALS) tablet Take 1 tablet by mouth daily      busPIRone (BUSPAR) 10 MG tablet Take 10 mg by mouth 2 times daily      Calcium Carbonate-Vitamin D (CALCIUM-VITAMIN D) 500-200 MG-UNIT per tablet Take 1 tablet by mouth 2 times daily (with meals)      docusate sodium (COLACE) 100 MG capsule Take 200 mg by mouth 2 times daily      Omega-3 Fatty Acids (FISH OIL) 1000 MG CAPS Take 3,000 mg by mouth 2 times daily      meloxicam (MOBIC) 7.5 MG tablet Take 7.5 mg by mouth 2 times daily      risperiDONE (RISPERDAL) 1 MG tablet Take 1 mg by mouth 2 times daily      Ascorbic Acid (VITAMIN C) 500 MG CAPS Take 500 mg by mouth 2 times daily      propranolol (INDERAL) 20 MG tablet Take 20 mg by mouth 2 times daily       baclofen (LIORESAL) 20 MG tablet Take 20 mg by mouth 4 times daily       ketoconazole (NIZORAL) 2 % shampoo Apply topically daily as needed for Itching Apply topically daily as needed.       acetaminophen (TYLENOL) 500 MG tablet Take 1,000 mg by mouth every 6 hours as needed for Pain or Fever

## 2018-09-10 ENCOUNTER — HOSPITAL ENCOUNTER (OUTPATIENT)
Dept: WOUND CARE | Age: 28
Discharge: HOME | End: 2018-09-10
Payer: COMMERCIAL

## 2018-09-10 VITALS
DIASTOLIC BLOOD PRESSURE: 84 MMHG | RESPIRATION RATE: 20 BRPM | BODY MASS INDEX: 39.4 KG/M2 | SYSTOLIC BLOOD PRESSURE: 122 MMHG | HEIGHT: 68 IN | TEMPERATURE: 97.2 F | WEIGHT: 260 LBS | HEART RATE: 82 BPM

## 2018-09-10 DIAGNOSIS — S31.809A OPEN WOUND OF BUTTOCK, UNSPECIFIED LATERALITY, INITIAL ENCOUNTER: ICD-10-CM

## 2018-09-10 DIAGNOSIS — L30.8 DERMATITIS ASSOCIATED WITH MOISTURE: ICD-10-CM

## 2018-09-10 DIAGNOSIS — I69.30 HISTORY OF CEREBROVASCULAR ACCIDENT (CVA) WITH RESIDUAL DEFICIT: ICD-10-CM

## 2018-09-10 PROCEDURE — 97597 DBRDMT OPN WND 1ST 20 CM/<: CPT

## 2018-09-10 PROCEDURE — 97597 DBRDMT OPN WND 1ST 20 CM/<: CPT | Performed by: SURGERY

## 2018-09-10 ASSESSMENT — PAIN DESCRIPTION - PROGRESSION: CLINICAL_PROGRESSION: NOT CHANGED

## 2018-09-10 ASSESSMENT — PAIN DESCRIPTION - FREQUENCY: FREQUENCY: CONTINUOUS

## 2018-09-10 ASSESSMENT — PAIN DESCRIPTION - ONSET: ONSET: ON-GOING

## 2018-09-10 ASSESSMENT — PAIN DESCRIPTION - PAIN TYPE: TYPE: ACUTE PAIN

## 2018-09-10 ASSESSMENT — PAIN DESCRIPTION - LOCATION: LOCATION: SACRUM

## 2018-09-10 NOTE — PLAN OF CARE
Problem: Wound:  Goal: Will show signs of wound healing; wound closure and no evidence of infection  Will show signs of wound healing; wound closure and no evidence of infection   Outcome: Ongoing      Problem: Pressure Ulcer:  Goal: Signs of wound healing will improve  Signs of wound healing will improve   Outcome: Ongoing    Goal: Absence of new pressure ulcer  Absence of new pressure ulcer   Outcome: Ongoing    Goal: Will show no infection signs and symptoms  Will show no infection signs and symptoms   Outcome: Ongoing      Problem: Falls - Risk of:  Goal: Will remain free from falls  Will remain free from falls   Outcome: Ongoing

## 2018-09-10 NOTE — PROGRESS NOTES
topically daily.  lisinopril (PRINIVIL;ZESTRIL) 10 MG tablet Take 10 mg by mouth daily      oxyCODONE-acetaminophen (PERCOCET)  MG per tablet Take 1 tablet by mouth every 6 hours as needed for Pain.  tiZANidine (ZANAFLEX) 4 MG tablet Take 4 mg by mouth 2 times daily      zinc oxide 20 % ointment Apply topically 2 times daily Apply topically as needed.  Amantadine (SYMMETREL) 100 MG TABS tablet       polyethylene glycol (GLYCOLAX) powder       pimecrolimus (ELIDEL) 1 % cream Apply topically 2 times daily Apply topically 2 times daily.  zolpidem (AMBIEN) 10 MG tablet Take by mouth nightly as needed for Sleep      dexlansoprazole (DEXILANT) 60 MG CPDR delayed release capsule Take 60 mg by mouth daily      guaiFENesin 400 MG tablet Take 400 mg by mouth 3 times daily as needed for Cough 2 Tablets TID PO PRN      nystatin (MYCOSTATIN) 985034 UNIT/GM cream Apply 1 Dose topically as needed for Dry Skin Apply topically 2 times daily.       citalopram (CELEXA) 40 MG tablet Take 40 mg by mouth daily      DULoxetine (CYMBALTA) 60 MG capsule Take 60 mg by mouth daily      levocetirizine (XYZAL) 5 MG tablet Take 5 mg by mouth daily      Multiple Vitamins-Minerals (THERAPEUTIC MULTIVITAMIN-MINERALS) tablet Take 1 tablet by mouth daily      busPIRone (BUSPAR) 10 MG tablet Take 10 mg by mouth 2 times daily      Calcium Carbonate-Vitamin D (CALCIUM-VITAMIN D) 500-200 MG-UNIT per tablet Take 1 tablet by mouth 2 times daily (with meals)      docusate sodium (COLACE) 100 MG capsule Take 200 mg by mouth 2 times daily      Omega-3 Fatty Acids (FISH OIL) 1000 MG CAPS Take 3,000 mg by mouth 2 times daily      meloxicam (MOBIC) 7.5 MG tablet Take 7.5 mg by mouth 2 times daily      risperiDONE (RISPERDAL) 1 MG tablet Take 1 mg by mouth 2 times daily      Ascorbic Acid (VITAMIN C) 500 MG CAPS Take 500 mg by mouth 2 times daily      propranolol (INDERAL) 20 MG tablet Take 20 mg by mouth 2 times daily deficit, gait, coordination and speech normal      Assessment:      Patient Active Problem List   Diagnosis Code    Muscle spasticity M62.838    Post-traumatic spasticity R25.2    Post-traumatic spasticity R25.2    Open wound of buttock S31.809A    Dermatitis associated with moisture L30.8    History of cerebrovascular accident (CVA) with residual deficit I69.30    Bilateral kidney stones N20.0        Procedure Note  Indications:  Based on my examination of this patient's wound(s)/ulcer(s) today, debridement is required to promote healing and evaluate the wound base. Performed by: Maria Alejandra Saldana MD    Consent obtained:  Yes    Time out taken:  Yes    Pain Control:         Debridement:Non-excisional Debridement    Using curette the wound(s)/ulcer(s) was/were sharply debrided down through and including the removal of epidermis.         Devitalized Tissue Debrided:  fibrin, biofilm, slough and exudate      Pre Debridement Measurements:  Are located in the [facility]  Documentation Flow Sheet    Wound/Ulcer #: 2    Percent of Wound(s)/Ulcer(s) Debrided: 100%    Total Surface Area Debrided:  0.04 sq cm       Diabetic/Pressure/Non Pressure Ulcers only:  Ulcer: Pressure ulcer, Stage 3         Post Debridement Measurements:    Wound/Ulcer Descriptions are Pre Debridement --EXCEPT MEASUREMENTS    Wound 08/13/18 Other (Comment) Buttocks Posterior Wound 2 - Buttock - Pressure Stage 2  (Active)   Wound Image   9/10/2018 11:57 AM   Wound Type Wound 9/10/2018 11:57 AM   Wound Pressure Stage  2 9/10/2018 11:57 AM   Dressing Changed Changed/New 8/27/2018 11:59 AM   Wound Cleansed Rinsed/Irrigated with saline 9/10/2018 11:57 AM   Wound Length (cm) 0.2 cm 9/10/2018 12:38 PM   Wound Width (cm) 0.2 cm 9/10/2018 12:38 PM   Wound Depth (cm)  .1 9/10/2018 12:38 PM   Calculated Wound Size (cm^2) (l*w) 0.04 cm^2 9/10/2018 12:38 PM   Change in Wound Size % (l*w) 96 9/10/2018 12:38 PM   Distance Tunneling (cm) 0 cm 9/10/2018 11:57

## 2018-09-24 ENCOUNTER — HOSPITAL ENCOUNTER (OUTPATIENT)
Dept: WOUND CARE | Age: 28
Discharge: HOME | End: 2018-09-24
Payer: COMMERCIAL

## 2018-09-24 ENCOUNTER — APPOINTMENT (OUTPATIENT)
Dept: CT IMAGING | Facility: HOSPITAL | Age: 28
End: 2018-09-24

## 2018-09-24 ENCOUNTER — HOSPITAL ENCOUNTER (EMERGENCY)
Facility: HOSPITAL | Age: 28
Discharge: HOME | End: 2018-09-25
Attending: EMERGENCY MEDICINE | Admitting: EMERGENCY MEDICINE

## 2018-09-24 VITALS
HEIGHT: 68 IN | RESPIRATION RATE: 24 BRPM | SYSTOLIC BLOOD PRESSURE: 127 MMHG | TEMPERATURE: 98.1 F | WEIGHT: 303 LBS | DIASTOLIC BLOOD PRESSURE: 71 MMHG | HEART RATE: 80 BPM | BODY MASS INDEX: 45.92 KG/M2

## 2018-09-24 DIAGNOSIS — L30.8 DERMATITIS ASSOCIATED WITH MOISTURE: ICD-10-CM

## 2018-09-24 DIAGNOSIS — S02.2XXA CLOSED FRACTURE OF NASAL BONE, INITIAL ENCOUNTER: ICD-10-CM

## 2018-09-24 DIAGNOSIS — S00.93XA CONTUSION OF HEAD, UNSPECIFIED PART OF HEAD, INITIAL ENCOUNTER: ICD-10-CM

## 2018-09-24 DIAGNOSIS — I69.30 HISTORY OF CEREBROVASCULAR ACCIDENT (CVA) WITH RESIDUAL DEFICIT: ICD-10-CM
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
## 2018-09-24 DIAGNOSIS — S31.809A OPEN WOUND OF BUTTOCK, UNSPECIFIED LATERALITY, INITIAL ENCOUNTER: ICD-10-CM

## 2018-09-24 PROCEDURE — 70450 CT HEAD/BRAIN W/O DYE: CPT

## 2018-09-24 PROCEDURE — 99213 OFFICE O/P EST LOW 20 MIN: CPT

## 2018-09-24 PROCEDURE — 70486 CT MAXILLOFACIAL W/O DYE: CPT

## 2018-09-24 PROCEDURE — 99213 OFFICE O/P EST LOW 20 MIN: CPT | Performed by: SURGERY

## 2018-09-24 PROCEDURE — 99283 EMERGENCY DEPT VISIT LOW MDM: CPT

## 2018-09-24 RX ORDER — RISPERIDONE 1 MG/1
1 TABLET ORAL 2 TIMES DAILY
COMMUNITY
End: 2023-01-18 | Stop reason: DRUGHIGH

## 2018-09-24 RX ORDER — LORATADINE 10 MG/1
10 CAPSULE, LIQUID FILLED ORAL DAILY
Status: ON HOLD | COMMUNITY
End: 2022-10-04 | Stop reason: HOSPADM

## 2018-09-24 RX ORDER — FLUTICASONE PROPIONATE 50 MCG
1 SPRAY, SUSPENSION (ML) NASAL 2 TIMES DAILY
Status: ON HOLD | COMMUNITY
End: 2022-10-04 | Stop reason: HOSPADM

## 2018-09-24 RX ORDER — PHENOL 1.4 %
600 AEROSOL, SPRAY (ML) MUCOUS MEMBRANE DAILY
COMMUNITY
End: 2023-01-18

## 2018-09-24 ASSESSMENT — PAIN DESCRIPTION - LOCATION: LOCATION: BUTTOCKS

## 2018-09-24 ASSESSMENT — PAIN DESCRIPTION - DESCRIPTORS: DESCRIPTORS: SHARP

## 2018-09-24 ASSESSMENT — PAIN DESCRIPTION - PROGRESSION: CLINICAL_PROGRESSION: NOT CHANGED

## 2018-09-24 ASSESSMENT — PAIN DESCRIPTION - PAIN TYPE: TYPE: ACUTE PAIN

## 2018-09-24 ASSESSMENT — PAIN SCALES - GENERAL: PAINLEVEL_OUTOF10: 8

## 2018-09-24 ASSESSMENT — PAIN DESCRIPTION - FREQUENCY: FREQUENCY: CONTINUOUS

## 2018-09-24 ASSESSMENT — PAIN DESCRIPTION - ONSET: ONSET: ON-GOING

## 2018-09-25 VITALS
WEIGHT: 303 LBS | TEMPERATURE: 98.1 F | HEIGHT: 72 IN | HEART RATE: 78 BPM | SYSTOLIC BLOOD PRESSURE: 122 MMHG | RESPIRATION RATE: 18 BRPM | DIASTOLIC BLOOD PRESSURE: 62 MMHG | BODY MASS INDEX: 41.04 KG/M2 | OXYGEN SATURATION: 98 %

## 2018-09-25 NOTE — DISCHARGE INSTRUCTIONS
Nasal Fracture  A nasal fracture is a break or crack in the bones or cartilage of the nose. Minor breaks do not require treatment. These breaks usually heal on their own after about one month. Serious breaks may require surgery.  What are the causes?  This injury is usually caused by a blunt injury to the nose. This type of injury often occurs from:  · Contact sports.  · Car accidents.  · Falls.  · Getting punched.    What are the signs or symptoms?  Symptoms of this injury include:  · Pain.  · Swelling of the nose.  · Bleeding from the nose.  · Bruising around the nose or eyes. This may include having black eyes.  · Crooked appearance of the nose.    How is this diagnosed?  This injury may be diagnosed with a physical exam. The health care provider will gently feel the nose for signs of broken bones. He or she will look inside the nostrils to make sure that there is not a blood-filled swelling on the dividing wall between the nostrils (septal hematoma). X-rays of the nose may not show a nasal fracture even when one is present. In some cases, X-rays or a CT scan may be done 1-5 days after the injury. Sometimes, the health care provider will want to wait until the swelling has gone down.  How is this treated?  Often, minor fractures that have caused no deformity do not require treatment. More serious fractures in which bones have moved out of position may require surgery, which will take place after the swelling is gone. Surgery will stabilize and align the fracture. In some cases, a health care provider may be able to reposition the bones without surgery. This may be done in the health care provider's office after medicine is given to numb the area (local anesthetic).  Follow these instructions at home:  · If directed, apply ice to the injured area:  ? Put ice in a plastic bag.  ? Place a towel between your skin and the bag.  ? Leave the ice on for 20 minutes, 2-3 times per day.  · Take over-the-counter and  prescription medicines only as told by your health care provider.  · If your nose starts to bleed, sit in an upright position while you squeeze the soft parts of your nose against the dividing wall between your nostrils (septum) for 10 minutes.  · Try to avoid blowing your nose.  · Return to your normal activities as told by your health care provider. Ask your health care provider what activities are safe for you.  · Avoid contact sports for 3-4 weeks or as told by your health care provider.  · Keep all follow-up visits as told by your health care provider. This is important.  Contact a health care provider if:  · Your pain increases or becomes severe.  · You continue to have nosebleeds.  · The shape of your nose does not return to normal within 5 days.  · You have pus draining out of your nose.  Get help right away if:  · You have bleeding from your nose that does not stop after you pinch your nostrils closed for 20 minutes and keep ice on your nose.  · You have clear fluid draining out of your nose.  · You notice a grape-like swelling on the septum. This swelling is a collection of blood (hematoma) that must be drained to help prevent infection.  · You have difficulty moving your eyes.  · You have repeated vomiting.  This information is not intended to replace advice given to you by your health care provider. Make sure you discuss any questions you have with your health care provider.  Document Released: 12/15/2001 Document Revised: 05/25/2017 Document Reviewed: 01/25/2016  [employer] Patient Education © 2018 [employer]
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
## 2018-09-28 ENCOUNTER — HOSPITAL ENCOUNTER (OUTPATIENT)
Dept: PREADMISSION TESTING | Age: 28
Discharge: HOME | End: 2018-10-02
Payer: COMMERCIAL

## 2018-09-28 VITALS — WEIGHT: 303 LBS | HEIGHT: 72 IN | BODY MASS INDEX: 41.04 KG/M2

## 2018-09-28 LAB
ANION GAP SERPL CALCULATED.3IONS-SCNC: 15 MMOL/L (ref 7–19)
APTT: 30 SEC (ref 26–36.2)
BASOPHILS ABSOLUTE: 0.1 K/UL (ref 0–0.2)
BASOPHILS RELATIVE PERCENT: 1.2 % (ref 0–1)
BUN BLDV-MCNC: 15 MG/DL (ref 6–20)
CALCIUM SERPL-MCNC: 9.6 MG/DL (ref 8.6–10)
CHLORIDE BLD-SCNC: 101 MMOL/L (ref 98–111)
CO2: 25 MMOL/L (ref 22–29)
COLLAGEN EPINEPHRINE TIME: 135 SEC (ref 84–176)
CREAT SERPL-MCNC: 0.7 MG/DL (ref 0.5–1.2)
EKG P AXIS: 54 DEGREES
EKG P-R INTERVAL: 138 MS
EKG Q-T INTERVAL: 336 MS
EKG QRS DURATION: 100 MS
EKG QTC CALCULATION (BAZETT): 407 MS
EKG T AXIS: 61 DEGREES
EOSINOPHILS ABSOLUTE: 0.1 K/UL (ref 0–0.6)
EOSINOPHILS RELATIVE PERCENT: 2.5 % (ref 0–5)
GFR NON-AFRICAN AMERICAN: >60
GLUCOSE BLD-MCNC: 110 MG/DL (ref 74–109)
HCT VFR BLD CALC: 41 % (ref 42–52)
HEMOGLOBIN: 12.8 G/DL (ref 14–18)
INR BLD: 1.01 (ref 0.88–1.18)
LYMPHOCYTES ABSOLUTE: 1.8 K/UL (ref 1.1–4.5)
LYMPHOCYTES RELATIVE PERCENT: 30.6 % (ref 20–40)
MCH RBC QN AUTO: 28.4 PG (ref 27–31)
MCHC RBC AUTO-ENTMCNC: 31.2 G/DL (ref 33–37)
MCV RBC AUTO: 90.9 FL (ref 80–94)
MONOCYTES ABSOLUTE: 0.7 K/UL (ref 0–0.9)
MONOCYTES RELATIVE PERCENT: 12.3 % (ref 0–10)
NEUTROPHILS ABSOLUTE: 3 K/UL (ref 1.5–7.5)
NEUTROPHILS RELATIVE PERCENT: 52.2 % (ref 50–65)
PDW BLD-RTO: 13.4 % (ref 11.5–14.5)
PLATELET # BLD: 273 K/UL (ref 130–400)
PLATELET FUNCTION INTERPRETATION: NORMAL
PMV BLD AUTO: 10.5 FL (ref 9.4–12.4)
POTASSIUM SERPL-SCNC: 4.1 MMOL/L (ref 3.5–5)
PROTHROMBIN TIME: 13.2 SEC (ref 12–14.6)
RBC # BLD: 4.51 M/UL (ref 4.7–6.1)
SODIUM BLD-SCNC: 141 MMOL/L (ref 136–145)
WBC # BLD: 5.7 K/UL (ref 4.8–10.8)

## 2018-09-28 PROCEDURE — 85610 PROTHROMBIN TIME: CPT

## 2018-09-28 PROCEDURE — 80048 BASIC METABOLIC PNL TOTAL CA: CPT

## 2018-09-28 PROCEDURE — 93005 ELECTROCARDIOGRAM TRACING: CPT

## 2018-09-28 PROCEDURE — 85576 BLOOD PLATELET AGGREGATION: CPT

## 2018-09-28 PROCEDURE — 85730 THROMBOPLASTIN TIME PARTIAL: CPT

## 2018-09-28 PROCEDURE — 85025 COMPLETE CBC W/AUTO DIFF WBC: CPT

## 2018-09-28 RX ORDER — CIPROFLOXACIN 2 MG/ML
400 INJECTION, SOLUTION INTRAVENOUS ONCE
Status: CANCELLED | OUTPATIENT
Start: 2018-10-02

## 2018-10-01 ENCOUNTER — HOSPITAL ENCOUNTER (OUTPATIENT)
Dept: WOUND CARE | Age: 28
Discharge: HOME | End: 2018-10-01
Payer: COMMERCIAL

## 2018-10-01 DIAGNOSIS — S31.809D OPEN WOUND OF BUTTOCK, UNSPECIFIED LATERALITY, SUBSEQUENT ENCOUNTER: ICD-10-CM

## 2018-10-01 DIAGNOSIS — L30.8 DERMATITIS ASSOCIATED WITH MOISTURE: ICD-10-CM

## 2018-10-01 DIAGNOSIS — I69.30 HISTORY OF CEREBROVASCULAR ACCIDENT (CVA) WITH RESIDUAL DEFICIT: ICD-10-CM

## 2018-10-01 PROCEDURE — 99213 OFFICE O/P EST LOW 20 MIN: CPT | Performed by: SURGERY

## 2018-10-01 PROCEDURE — 99213 OFFICE O/P EST LOW 20 MIN: CPT

## 2018-10-01 NOTE — PROGRESS NOTES
[facility]  Progress Note       Tata Durand  AGE: 25 y.o. GENDER: male  : 1990  TODAY'S DATE:  10/1/2018    Subjective:        HISTORY of PRESENT ILLNESS HPI   Tata Durand is a 25 y.o. male who presents today for wound evaluation. Wound Type:pressure  Wound Location:both buttocks  Modifying factors:chronic pressure    Patient Active Problem List   Diagnosis Code    Muscle spasticity M62.838    Post-traumatic spasticity R25.2    Post-traumatic spasticity R25.2    Open wound of buttock S35.200A    Dermatitis associated with moisture L30.8    History of cerebrovascular accident (CVA) with residual deficit I69.30    Bilateral kidney stones N20.0       Mr. Geovanna Navas has a past medical history of Arthritis; Cerebral artery occlusion with cerebral infarction [facility]); Depression; Difficult intubation; Hemorrhoids; History of blood transfusion; Hypertension; Kidney stone; Muscle spasticity; Prolonged emergence from general anesthesia; and Retention of urine. He has a past surgical history that includes brain surgery; back surgery; baclofen pump implantation; and Kidney stone surgery. His family history is not on file. Mr. Geovanna Navas reports that he has never smoked. He has never used smokeless tobacco. He reports that he does not drink alcohol or use drugs. His current medication list consists of     Current Outpatient Prescriptions on File Prior to Encounter   Medication Sig Dispense Refill    loratadine (CLARITIN) 10 MG capsule Take 10 mg by mouth daily      fluticasone (FLONASE) 50 MCG/ACT nasal spray 1 spray by Each Nare route 2 times daily      melatonin 5 MG TABS tablet Take 5 mg by mouth nightly       gabapentin (NEURONTIN) 400 MG capsule Take 400 mg by mouth 3 times daily.  ketoconazole (NIZORAL) 2 % cream Apply topically 3 times daily Apply topically daily.       lisinopril (PRINIVIL;ZESTRIL) 10 MG tablet Take 10 mg by mouth daily      oxyCODONE-acetaminophen topically daily as needed.  acetaminophen (TYLENOL) 500 MG tablet Take 1,000 mg by mouth every 6 hours as needed for Pain or Fever      chlorproMAZINE (THORAZINE) 25 MG tablet Take 25 mg by mouth nightly        No current facility-administered medications on file prior to encounter. ALLERGIES    Latex; Dilantin [phenytoin]; Pcn [penicillins]; and Sulfa antibiotics        Objective: There were no vitals filed for this visit. There were no vitals taken for this visit. ROS:  Constitutional - Alert and oriented x 3, no complaints, pleasant co-operative  Integumentary - edema improved, no erythema, no cellulitis, no new wounds   The patients pain isPain Level: 7 Pain Type: Acute pain. Wound Measurements and Assessment:  Wound 08/13/18 Other (Comment) Buttocks Posterior Wound 2 - Buttock - Pressure Stage 2  (Active)   Wound Image   10/1/2018  2:05 PM   Wound Type Wound 10/1/2018  2:05 PM   Wound Pressure Stage  2 10/1/2018  2:05 PM   Dressing Status Changed; Intact;Dry;Clean 9/24/2018 11:40 AM   Dressing Changed Changed/New 9/24/2018 11:40 AM   Wound Cleansed Rinsed/Irrigated with saline 10/1/2018  2:05 PM   Wound Length (cm) 0.1 cm 10/1/2018  2:05 PM   Wound Width (cm) 0.1 cm 10/1/2018  2:05 PM   Wound Depth (cm)  0.1 10/1/2018  2:05 PM   Calculated Wound Size (cm^2) (l*w) 0.01 cm^2 10/1/2018  2:05 PM   Change in Wound Size % (l*w) 99 10/1/2018  2:05 PM   Distance Tunneling (cm) 0 cm 10/1/2018  2:05 PM   Tunneling Position ___ O'Clock 0 10/1/2018  2:05 PM   Undermining Starts ___ O'Clock 0 10/1/2018  2:05 PM   Undermining Ends___ O'Clock 0 10/1/2018  2:05 PM   Undermining Maxium Distance (cm) 0 10/1/2018  2:05 PM   Wound Assessment Pink 10/1/2018  2:05 PM   Drainage Amount None 10/1/2018  2:05 PM   Drainage Description Serous 9/10/2018 11:57 AM   Odor None 10/1/2018  2:05 PM   Rachana-wound Assessment Pink 10/1/2018  2:05 PM   Non-staged Wound Description Not applicable 61/1/4614  9:18 PM Bleeding  * Increase in swelling  * Need for compression bandage changes due to slippage, breakthrough drainage. If you need medical attention outside of the business hours of the [address] [place] [address] Road please contact your PCP or go to the nearest emergency room.         Electronically signed by Antonella Sy MD on 10/1/2018 at 2:52 PM

## 2018-10-02 ENCOUNTER — ANESTHESIA EVENT (OUTPATIENT)
Dept: OPERATING ROOM | Age: 28
End: 2018-10-02
Payer: COMMERCIAL

## 2018-10-02 ENCOUNTER — APPOINTMENT (OUTPATIENT)
Dept: GENERAL RADIOLOGY | Age: 28
End: 2018-10-02
Attending: UROLOGY
Payer: COMMERCIAL

## 2018-10-02 ENCOUNTER — HOSPITAL ENCOUNTER (OUTPATIENT)
Age: 28
Setting detail: OUTPATIENT SURGERY
Discharge: NURSING, REHAB OR LONG-TERM CARE | End: 2018-10-02
Attending: UROLOGY | Admitting: UROLOGY
Payer: COMMERCIAL

## 2018-10-02 ENCOUNTER — ANESTHESIA (OUTPATIENT)
Dept: OPERATING ROOM | Age: 28
End: 2018-10-02
Payer: COMMERCIAL

## 2018-10-02 VITALS
OXYGEN SATURATION: 94 % | SYSTOLIC BLOOD PRESSURE: 140 MMHG | BODY MASS INDEX: 41.04 KG/M2 | DIASTOLIC BLOOD PRESSURE: 84 MMHG | WEIGHT: 303 LBS | TEMPERATURE: 97.8 F | HEIGHT: 72 IN | HEART RATE: 92 BPM | RESPIRATION RATE: 14 BRPM

## 2018-10-02 VITALS
OXYGEN SATURATION: 100 % | RESPIRATION RATE: 5 BRPM | SYSTOLIC BLOOD PRESSURE: 94 MMHG | DIASTOLIC BLOOD PRESSURE: 53 MMHG

## 2018-10-02 DIAGNOSIS — G89.18 POSTOPERATIVE PAIN: ICD-10-CM

## 2018-10-02 DIAGNOSIS — N20.0 RENAL CALCULUS, RIGHT: Primary | ICD-10-CM

## 2018-10-02 LAB
HCT VFR BLD CALC: 39.3 % (ref 42–52)
HEMOGLOBIN: 12.4 G/DL (ref 14–18)

## 2018-10-02 PROCEDURE — 2500000003 HC RX 250 WO HCPCS: Performed by: NURSE ANESTHETIST, CERTIFIED REGISTERED

## 2018-10-02 PROCEDURE — 6360000002 HC RX W HCPCS: Performed by: NURSE ANESTHETIST, CERTIFIED REGISTERED

## 2018-10-02 PROCEDURE — 7100000000 HC PACU RECOVERY - FIRST 15 MIN: Performed by: UROLOGY

## 2018-10-02 PROCEDURE — 74018 RADEX ABDOMEN 1 VIEW: CPT

## 2018-10-02 PROCEDURE — 50590 FRAGMENTING OF KIDNEY STONE: CPT | Performed by: UROLOGY

## 2018-10-02 PROCEDURE — C1769 GUIDE WIRE: HCPCS | Performed by: UROLOGY

## 2018-10-02 PROCEDURE — 3700000000 HC ANESTHESIA ATTENDED CARE: Performed by: UROLOGY

## 2018-10-02 PROCEDURE — 3700000001 HC ADD 15 MINUTES (ANESTHESIA): Performed by: UROLOGY

## 2018-10-02 PROCEDURE — 36415 COLL VENOUS BLD VENIPUNCTURE: CPT

## 2018-10-02 PROCEDURE — 6370000000 HC RX 637 (ALT 250 FOR IP): Performed by: UROLOGY

## 2018-10-02 PROCEDURE — 85018 HEMOGLOBIN: CPT

## 2018-10-02 PROCEDURE — 2709999900 HC NON-CHARGEABLE SUPPLY: Performed by: UROLOGY

## 2018-10-02 PROCEDURE — 7100000010 HC PHASE II RECOVERY - FIRST 15 MIN: Performed by: UROLOGY

## 2018-10-02 PROCEDURE — 7100000011 HC PHASE II RECOVERY - ADDTL 15 MIN: Performed by: UROLOGY

## 2018-10-02 PROCEDURE — 6360000002 HC RX W HCPCS: Performed by: PHYSICIAN ASSISTANT

## 2018-10-02 PROCEDURE — 7100000001 HC PACU RECOVERY - ADDTL 15 MIN: Performed by: UROLOGY

## 2018-10-02 PROCEDURE — C1758 CATHETER, URETERAL: HCPCS | Performed by: UROLOGY

## 2018-10-02 PROCEDURE — 85014 HEMATOCRIT: CPT

## 2018-10-02 PROCEDURE — 2580000003 HC RX 258: Performed by: NURSE ANESTHETIST, CERTIFIED REGISTERED

## 2018-10-02 RX ORDER — ALFUZOSIN HYDROCHLORIDE 10 MG/1
10 TABLET, EXTENDED RELEASE ORAL DAILY
Qty: 14 TABLET | Refills: 1 | Status: SHIPPED | OUTPATIENT
Start: 2018-10-02 | End: 2019-03-28

## 2018-10-02 RX ORDER — FENTANYL CITRATE 50 UG/ML
INJECTION, SOLUTION INTRAMUSCULAR; INTRAVENOUS PRN
Status: DISCONTINUED | OUTPATIENT
Start: 2018-10-02 | End: 2018-10-02 | Stop reason: SDUPTHER

## 2018-10-02 RX ORDER — HYDROCODONE BITARTRATE AND ACETAMINOPHEN 5; 325 MG/1; MG/1
1 TABLET ORAL EVERY 6 HOURS PRN
Qty: 20 TABLET | Refills: 0 | Status: SHIPPED | OUTPATIENT
Start: 2018-10-02 | End: 2018-10-09

## 2018-10-02 RX ORDER — SODIUM CHLORIDE, SODIUM LACTATE, POTASSIUM CHLORIDE, CALCIUM CHLORIDE 600; 310; 30; 20 MG/100ML; MG/100ML; MG/100ML; MG/100ML
INJECTION, SOLUTION INTRAVENOUS CONTINUOUS PRN
Status: DISCONTINUED | OUTPATIENT
Start: 2018-10-02 | End: 2018-10-02 | Stop reason: SDUPTHER

## 2018-10-02 RX ORDER — SODIUM CHLORIDE 0.9 % (FLUSH) 0.9 %
10 SYRINGE (ML) INJECTION PRN
Status: DISCONTINUED | OUTPATIENT
Start: 2018-10-02 | End: 2018-10-02 | Stop reason: HOSPADM

## 2018-10-02 RX ORDER — LABETALOL HYDROCHLORIDE 5 MG/ML
5 INJECTION, SOLUTION INTRAVENOUS EVERY 10 MIN PRN
Status: DISCONTINUED | OUTPATIENT
Start: 2018-10-02 | End: 2018-10-02 | Stop reason: HOSPADM

## 2018-10-02 RX ORDER — SUCCINYLCHOLINE CHLORIDE 20 MG/ML
INJECTION INTRAMUSCULAR; INTRAVENOUS PRN
Status: DISCONTINUED | OUTPATIENT
Start: 2018-10-02 | End: 2018-10-02 | Stop reason: SDUPTHER

## 2018-10-02 RX ORDER — PROPOFOL 10 MG/ML
INJECTION, EMULSION INTRAVENOUS PRN
Status: DISCONTINUED | OUTPATIENT
Start: 2018-10-02 | End: 2018-10-02 | Stop reason: SDUPTHER

## 2018-10-02 RX ORDER — MEPERIDINE HYDROCHLORIDE 50 MG/ML
12.5 INJECTION INTRAMUSCULAR; INTRAVENOUS; SUBCUTANEOUS EVERY 5 MIN PRN
Status: DISCONTINUED | OUTPATIENT
Start: 2018-10-02 | End: 2018-10-02 | Stop reason: HOSPADM

## 2018-10-02 RX ORDER — CIPROFLOXACIN 2 MG/ML
400 INJECTION, SOLUTION INTRAVENOUS ONCE
Status: DISCONTINUED | OUTPATIENT
Start: 2018-10-02 | End: 2018-10-02

## 2018-10-02 RX ORDER — ONDANSETRON 2 MG/ML
INJECTION INTRAMUSCULAR; INTRAVENOUS PRN
Status: DISCONTINUED | OUTPATIENT
Start: 2018-10-02 | End: 2018-10-02 | Stop reason: SDUPTHER

## 2018-10-02 RX ORDER — MORPHINE SULFATE/0.9% NACL/PF 1 MG/ML
4 SYRINGE (ML) INJECTION EVERY 5 MIN PRN
Status: DISCONTINUED | OUTPATIENT
Start: 2018-10-02 | End: 2018-10-02 | Stop reason: HOSPADM

## 2018-10-02 RX ORDER — DIPHENHYDRAMINE HYDROCHLORIDE 50 MG/ML
12.5 INJECTION INTRAMUSCULAR; INTRAVENOUS
Status: DISCONTINUED | OUTPATIENT
Start: 2018-10-02 | End: 2018-10-02 | Stop reason: HOSPADM

## 2018-10-02 RX ORDER — METOCLOPRAMIDE HYDROCHLORIDE 5 MG/ML
10 INJECTION INTRAMUSCULAR; INTRAVENOUS
Status: DISCONTINUED | OUTPATIENT
Start: 2018-10-02 | End: 2018-10-02 | Stop reason: HOSPADM

## 2018-10-02 RX ORDER — ROCURONIUM BROMIDE 10 MG/ML
INJECTION, SOLUTION INTRAVENOUS PRN
Status: DISCONTINUED | OUTPATIENT
Start: 2018-10-02 | End: 2018-10-02 | Stop reason: SDUPTHER

## 2018-10-02 RX ORDER — MIDAZOLAM HYDROCHLORIDE 1 MG/ML
INJECTION INTRAMUSCULAR; INTRAVENOUS PRN
Status: DISCONTINUED | OUTPATIENT
Start: 2018-10-02 | End: 2018-10-02 | Stop reason: SDUPTHER

## 2018-10-02 RX ORDER — ALFUZOSIN HYDROCHLORIDE 10 MG/1
10 TABLET, EXTENDED RELEASE ORAL ONCE
Status: COMPLETED | OUTPATIENT
Start: 2018-10-02 | End: 2018-10-02

## 2018-10-02 RX ORDER — MORPHINE SULFATE/0.9% NACL/PF 1 MG/ML
2 SYRINGE (ML) INJECTION EVERY 5 MIN PRN
Status: DISCONTINUED | OUTPATIENT
Start: 2018-10-02 | End: 2018-10-02 | Stop reason: HOSPADM

## 2018-10-02 RX ORDER — MELOXICAM 7.5 MG/1
7.5 TABLET ORAL 2 TIMES DAILY
Qty: 30 TABLET | Refills: 3 | Status: ON HOLD
Start: 2018-10-07 | End: 2022-04-12 | Stop reason: HOSPADM

## 2018-10-02 RX ORDER — LIDOCAINE HYDROCHLORIDE 10 MG/ML
INJECTION, SOLUTION EPIDURAL; INFILTRATION; INTRACAUDAL; PERINEURAL PRN
Status: DISCONTINUED | OUTPATIENT
Start: 2018-10-02 | End: 2018-10-02 | Stop reason: SDUPTHER

## 2018-10-02 RX ORDER — ENALAPRILAT 2.5 MG/2ML
1.25 INJECTION INTRAVENOUS
Status: DISCONTINUED | OUTPATIENT
Start: 2018-10-02 | End: 2018-10-02 | Stop reason: HOSPADM

## 2018-10-02 RX ORDER — OXYCODONE HYDROCHLORIDE AND ACETAMINOPHEN 5; 325 MG/1; MG/1
2 TABLET ORAL EVERY 4 HOURS PRN
Status: DISCONTINUED | OUTPATIENT
Start: 2018-10-02 | End: 2018-10-02 | Stop reason: HOSPADM

## 2018-10-02 RX ORDER — CIPROFLOXACIN 2 MG/ML
400 INJECTION, SOLUTION INTRAVENOUS
Status: COMPLETED | OUTPATIENT
Start: 2018-10-02 | End: 2018-10-02

## 2018-10-02 RX ORDER — SODIUM CHLORIDE 0.9 % (FLUSH) 0.9 %
10 SYRINGE (ML) INJECTION EVERY 12 HOURS SCHEDULED
Status: DISCONTINUED | OUTPATIENT
Start: 2018-10-02 | End: 2018-10-02 | Stop reason: HOSPADM

## 2018-10-02 RX ORDER — SODIUM CHLORIDE 9 MG/ML
INJECTION, SOLUTION INTRAVENOUS CONTINUOUS
Status: DISCONTINUED | OUTPATIENT
Start: 2018-10-02 | End: 2018-10-02 | Stop reason: HOSPADM

## 2018-10-02 RX ORDER — HYDRALAZINE HYDROCHLORIDE 20 MG/ML
5 INJECTION INTRAMUSCULAR; INTRAVENOUS EVERY 10 MIN PRN
Status: DISCONTINUED | OUTPATIENT
Start: 2018-10-02 | End: 2018-10-02 | Stop reason: HOSPADM

## 2018-10-02 RX ORDER — ONDANSETRON 2 MG/ML
4 INJECTION INTRAMUSCULAR; INTRAVENOUS EVERY 4 HOURS PRN
Status: DISCONTINUED | OUTPATIENT
Start: 2018-10-02 | End: 2018-10-02 | Stop reason: HOSPADM

## 2018-10-02 RX ORDER — PROMETHAZINE HYDROCHLORIDE 25 MG/ML
6.25 INJECTION, SOLUTION INTRAMUSCULAR; INTRAVENOUS
Status: COMPLETED | OUTPATIENT
Start: 2018-10-02 | End: 2018-10-02

## 2018-10-02 RX ADMIN — PROMETHAZINE HYDROCHLORIDE 6.25 MG: 25 INJECTION, SOLUTION INTRAMUSCULAR; INTRAVENOUS at 13:13

## 2018-10-02 RX ADMIN — FENTANYL CITRATE 75 MCG: 50 INJECTION INTRAMUSCULAR; INTRAVENOUS at 11:42

## 2018-10-02 RX ADMIN — MIDAZOLAM 2 MG: 1 INJECTION INTRAMUSCULAR; INTRAVENOUS at 10:53

## 2018-10-02 RX ADMIN — LIDOCAINE HYDROCHLORIDE 50 MG: 10 INJECTION, SOLUTION EPIDURAL; INFILTRATION; INTRACAUDAL; PERINEURAL at 11:02

## 2018-10-02 RX ADMIN — Medication 0.5 MG: at 12:55

## 2018-10-02 RX ADMIN — PROPOFOL 200 MG: 10 INJECTION, EMULSION INTRAVENOUS at 11:02

## 2018-10-02 RX ADMIN — SUCCINYLCHOLINE CHLORIDE 200 MG: 20 INJECTION, SOLUTION INTRAMUSCULAR; INTRAVENOUS; PARENTERAL at 11:02

## 2018-10-02 RX ADMIN — ONDANSETRON HYDROCHLORIDE 4 MG: 2 INJECTION, SOLUTION INTRAMUSCULAR; INTRAVENOUS at 12:11

## 2018-10-02 RX ADMIN — SODIUM CHLORIDE, SODIUM LACTATE, POTASSIUM CHLORIDE, AND CALCIUM CHLORIDE: 600; 310; 30; 20 INJECTION, SOLUTION INTRAVENOUS at 10:56

## 2018-10-02 RX ADMIN — Medication 0.5 MG: at 12:50

## 2018-10-02 RX ADMIN — ALFUZOSIN HYDROCHLORIDE 10 MG: 10 TABLET ORAL at 12:51

## 2018-10-02 RX ADMIN — FENTANYL CITRATE 50 MCG: 50 INJECTION INTRAMUSCULAR; INTRAVENOUS at 11:02

## 2018-10-02 RX ADMIN — ROCURONIUM BROMIDE 5 MG: 10 INJECTION INTRAVENOUS at 10:58

## 2018-10-02 RX ADMIN — FENTANYL CITRATE 25 MCG: 50 INJECTION INTRAMUSCULAR; INTRAVENOUS at 11:32

## 2018-10-02 RX ADMIN — ROCURONIUM BROMIDE 45 MG: 10 INJECTION INTRAVENOUS at 11:10

## 2018-10-02 RX ADMIN — SODIUM CHLORIDE, SODIUM LACTATE, POTASSIUM CHLORIDE, AND CALCIUM CHLORIDE: 600; 310; 30; 20 INJECTION, SOLUTION INTRAVENOUS at 11:51

## 2018-10-02 RX ADMIN — CIPROFLOXACIN 400 MG: 2 INJECTION, SOLUTION INTRAVENOUS at 11:05

## 2018-10-02 RX ADMIN — SUGAMMADEX 500 MG: 100 INJECTION, SOLUTION INTRAVENOUS at 12:13

## 2018-10-02 ASSESSMENT — PAIN SCALES - GENERAL
PAINLEVEL_OUTOF10: 7
PAINLEVEL_OUTOF10: 10
PAINLEVEL_OUTOF10: 0
PAINLEVEL_OUTOF10: 0

## 2018-10-02 ASSESSMENT — LIFESTYLE VARIABLES: SMOKING_STATUS: 0

## 2018-10-02 ASSESSMENT — PAIN - FUNCTIONAL ASSESSMENT: PAIN_FUNCTIONAL_ASSESSMENT: 0-10

## 2018-10-02 NOTE — ANESTHESIA PRE PROCEDURE
Readings from Last 3 Encounters:   10/02/18 (!) 142/71   09/24/18 127/71   09/10/18 122/84       NPO Status: Time of last liquid consumption: 2200                        Time of last solid consumption: 2200                        Date of last liquid consumption: 10/01/18                        Date of last solid food consumption: 10/01/18    BMI:   Wt Readings from Last 3 Encounters:   10/02/18 (!) 303 lb (137.4 kg)   09/28/18 (!) 303 lb (137.4 kg)   09/24/18 (!) 303 lb (137.4 kg)     Body mass index is 41.09 kg/m². CBC:   Lab Results   Component Value Date    WBC 5.7 09/28/2018    RBC 4.51 09/28/2018    HGB 12.8 09/28/2018    HCT 41.0 09/28/2018    MCV 90.9 09/28/2018    RDW 13.4 09/28/2018     09/28/2018       CMP:   Lab Results   Component Value Date     09/28/2018    K 4.1 09/28/2018     09/28/2018    CO2 25 09/28/2018    BUN 15 09/28/2018    CREATININE 0.7 09/28/2018    LABGLOM >60 09/28/2018    GLUCOSE 110 09/28/2018    PROT 7.2 12/28/2016    PROT 8.5 12/17/2012    CALCIUM 9.6 09/28/2018    BILITOT 0.9 12/28/2016    ALKPHOS 130 12/28/2016     12/28/2016     12/28/2016       POC Tests: No results for input(s): POCGLU, POCNA, POCK, POCCL, POCBUN, POCHEMO, POCHCT in the last 72 hours.     Coags:   Lab Results   Component Value Date    PROTIME 13.2 09/28/2018    INR 1.01 09/28/2018    APTT 30.0 09/28/2018       HCG (If Applicable): No results found for: PREGTESTUR, PREGSERUM, HCG, HCGQUANT     ABGs: No results found for: PHART, PO2ART, PTM7DSN, STH9ZKF, BEART, Z4LQRUQD     Type & Screen (If Applicable):  No results found for: LABABO, [address]    Anesthesia Evaluation  Patient summary reviewed and Nursing notes reviewed no history of anesthetic complications:   Airway: Mallampati: II  TM distance: >3 FB   Neck ROM: full  Mouth opening: > = 3 FB Dental:          Pulmonary:normal exam        (-) not a current smoker                           Cardiovascular:    (+) hypertension:,

## 2018-10-02 NOTE — H&P
(THORAZINE) 25 MG tablet Take 25 mg by mouth nightly as needed (for mood)          No current facility-administered medications for this visit.                     Allergies   Allergen Reactions    Latex      Dilantin [Phenytoin]      Pcn [Penicillins]      Sulfa Antibiotics           Social History   Social History                Social History    Marital status: Single       Spouse name: N/A    Number of children: N/A    Years of education: N/A              Social History Main Topics    Smoking status: Never Smoker    Smokeless tobacco: Never Used    Alcohol use No    Drug use: No    Sexual activity: Not Asked              Other Topics Concern    None            Social History Narrative    None            Family History   History reviewed. No pertinent family history.         REVIEW OF SYSTEMS:  Review of Systems   Constitutional: Negative for chills and fever. HENT: Negative for hearing loss.    Eyes: Negative for discharge and redness. Respiratory: Negative for shortness of breath and wheezing.    Cardiovascular: Negative for leg swelling and PND. Gastrointestinal: Negative for heartburn and nausea. Genitourinary: Negative for dysuria, flank pain, frequency, hematuria and urgency. Musculoskeletal: Negative for myalgias and neck pain. Skin: Negative for itching and rash. Neurological: Negative for seizures, loss of consciousness and headaches. Endo/Heme/Allergies: Negative for environmental allergies and polydipsia. Psychiatric/Behavioral: Negative for depression and suicidal ideas.         PHYSICAL EXAM:  /86 (Site: Left Arm, Position: Sitting, Cuff Size: Medium Adult)   Pulse 74   Temp 96.9 °F (36.1 °C) (Temporal)   Ht 5' 8\" (1.727 m)   Wt 260 lb (117.9 kg)   SpO2 99%   BMI 39.53 kg/m²   Physical Exam   Constitutional: No distress. HENT:   Mouth/Throat: No oropharyngeal exudate. Eyes: Left eye exhibits no discharge. No scleral icterus. Neck: No JVD present.  No

## 2018-10-15 ENCOUNTER — TELEPHONE (OUTPATIENT)
Dept: UROLOGY | Age: 28
End: 2018-10-15

## 2018-10-22 ENCOUNTER — HOSPITAL ENCOUNTER (OUTPATIENT)
Dept: WOUND CARE | Age: 28
Discharge: HOME | End: 2018-10-22
Payer: COMMERCIAL

## 2018-10-22 VITALS
RESPIRATION RATE: 16 BRPM | HEIGHT: 72 IN | SYSTOLIC BLOOD PRESSURE: 120 MMHG | TEMPERATURE: 97.7 F | BODY MASS INDEX: 41.04 KG/M2 | DIASTOLIC BLOOD PRESSURE: 84 MMHG | HEART RATE: 94 BPM | WEIGHT: 303 LBS

## 2018-10-22 DIAGNOSIS — S31.809D OPEN WOUND OF BUTTOCK, UNSPECIFIED LATERALITY, SUBSEQUENT ENCOUNTER: ICD-10-CM

## 2018-10-22 DIAGNOSIS — L30.8 DERMATITIS ASSOCIATED WITH MOISTURE: ICD-10-CM

## 2018-10-22 DIAGNOSIS — I69.30 HISTORY OF CEREBROVASCULAR ACCIDENT (CVA) WITH RESIDUAL DEFICIT: ICD-10-CM

## 2018-10-22 PROCEDURE — 97597 DBRDMT OPN WND 1ST 20 CM/<: CPT

## 2018-10-22 PROCEDURE — 97597 DBRDMT OPN WND 1ST 20 CM/<: CPT | Performed by: SURGERY

## 2018-10-22 NOTE — PROGRESS NOTES
daily 30 tablet 3    alfuzosin (UROXATRAL) 10 MG extended release tablet Take 1 tablet by mouth daily 14 tablet 1    loratadine (CLARITIN) 10 MG capsule Take 10 mg by mouth daily      fluticasone (FLONASE) 50 MCG/ACT nasal spray 1 spray by Each Nare route 2 times daily      melatonin 5 MG TABS tablet Take 5 mg by mouth nightly       gabapentin (NEURONTIN) 400 MG capsule Take 400 mg by mouth 3 times daily.  ketoconazole (NIZORAL) 2 % cream Apply topically 3 times daily Apply topically daily.  lisinopril (PRINIVIL;ZESTRIL) 10 MG tablet Take 10 mg by mouth daily      oxyCODONE-acetaminophen (PERCOCET)  MG per tablet Take 1 tablet by mouth every 6 hours as needed for Pain.  tiZANidine (ZANAFLEX) 4 MG tablet Take 2 mg by mouth 2 times daily       zinc oxide 20 % ointment Apply topically 2 times daily Apply topically as needed.  Amantadine (SYMMETREL) 100 MG TABS tablet as needed Take 2 tablets initially and then 1 tablet after each diarrhea stool      polyethylene glycol (GLYCOLAX) powder Take 17 g by mouth 2 times daily       zolpidem (AMBIEN) 10 MG tablet Take by mouth nightly as needed for Sleep      dexlansoprazole (DEXILANT) 60 MG CPDR delayed release capsule Take 60 mg by mouth daily      guaiFENesin 400 MG tablet Take 800 mg by mouth 3 times daily as needed for Cough 2 Tablets TID PO PRN       nystatin (MYCOSTATIN) 027871 UNIT/GM cream Apply 1 Dose topically as needed for Dry Skin Apply topically 2 times daily.       citalopram (CELEXA) 40 MG tablet Take 40 mg by mouth daily      DULoxetine (CYMBALTA) 60 MG capsule Take 60 mg by mouth daily      Multiple Vitamins-Minerals (THERAPEUTIC MULTIVITAMIN-MINERALS) tablet Take 1 tablet by mouth daily      busPIRone (BUSPAR) 10 MG tablet Take 10 mg by mouth 2 times daily      Calcium Carbonate-Vitamin D (CALCIUM-VITAMIN D) 500-200 MG-UNIT per tablet Take 1 tablet by mouth 2 times daily (with meals)      docusate sodium (COLACE)

## 2018-11-01 ENCOUNTER — HOSPITAL ENCOUNTER (OUTPATIENT)
Dept: WOUND CARE | Age: 28
Discharge: HOME | End: 2018-11-01
Payer: COMMERCIAL

## 2018-11-01 VITALS
BODY MASS INDEX: 41.04 KG/M2 | WEIGHT: 303 LBS | HEART RATE: 90 BPM | HEIGHT: 72 IN | TEMPERATURE: 97.8 F | RESPIRATION RATE: 18 BRPM

## 2018-11-01 DIAGNOSIS — I69.30 HISTORY OF CEREBROVASCULAR ACCIDENT (CVA) WITH RESIDUAL DEFICIT: ICD-10-CM

## 2018-11-01 DIAGNOSIS — L30.8 DERMATITIS ASSOCIATED WITH MOISTURE: ICD-10-CM

## 2018-11-01 DIAGNOSIS — S31.809A OPEN WOUND OF BUTTOCK, UNSPECIFIED LATERALITY, INITIAL ENCOUNTER: ICD-10-CM

## 2018-11-01 PROCEDURE — 99213 OFFICE O/P EST LOW 20 MIN: CPT | Performed by: SURGERY

## 2018-11-01 PROCEDURE — 99213 OFFICE O/P EST LOW 20 MIN: CPT

## 2018-11-01 ASSESSMENT — PAIN DESCRIPTION - PAIN TYPE: TYPE: ACUTE PAIN

## 2018-11-01 ASSESSMENT — PAIN DESCRIPTION - PROGRESSION: CLINICAL_PROGRESSION: NOT CHANGED

## 2018-11-01 ASSESSMENT — PAIN DESCRIPTION - LOCATION: LOCATION: SACRUM;BUTTOCKS

## 2018-11-01 ASSESSMENT — PAIN DESCRIPTION - FREQUENCY: FREQUENCY: CONTINUOUS

## 2018-11-01 ASSESSMENT — PAIN DESCRIPTION - ONSET: ONSET: ON-GOING

## 2018-11-01 ASSESSMENT — PAIN SCALES - GENERAL: PAINLEVEL_OUTOF10: 0

## 2018-11-01 NOTE — PROGRESS NOTES
by mouth 2 times daily       baclofen (LIORESAL) 20 MG tablet Take 20 mg by mouth 4 times daily       ketoconazole (NIZORAL) 2 % shampoo Apply topically daily as needed for Itching Apply topically daily as needed.  acetaminophen (TYLENOL) 500 MG tablet Take 1,000 mg by mouth every 6 hours as needed for Pain or Fever      chlorproMAZINE (THORAZINE) 25 MG tablet Take 25 mg by mouth nightly        No current facility-administered medications on file prior to encounter. ALLERGIES    Latex; Dilantin [phenytoin]; Pcn [penicillins]; and Sulfa antibiotics        Objective:         Vitals:    11/01/18 1104   Pulse: 90   Resp: 18   Temp: 97.8 °F (36.6 °C)        Pulse 90   Temp 97.8 °F (36.6 °C) (Temporal)   Resp 18   Ht 6' (1.829 m)   Wt (!) 303 lb (137.4 kg)   BMI 41.09 kg/m²     ROS:  Constitutional - Alert and oriented x 3, no complaints, pleasant co-operative  Integumentary - edema improved, no erythema, no cellulitis, no new wounds   The patients pain isPain Level: 0 Pain Type: Acute pain. Wound Measurements and Assessment:  Wound 08/13/18 Other (Comment) Buttocks Posterior Wound 2 - Buttock - Pressure Stage 2  (Active)   Wound Image   11/1/2018 11:13 AM   Wound Type Wound 11/1/2018 11:13 AM   Wound Pressure Stage  2 11/1/2018 11:13 AM   Dressing Status Changed; Intact;Dry;Clean 9/24/2018 11:40 AM   Dressing Changed Changed/New 9/24/2018 11:40 AM   Wound Cleansed Not Cleansed 11/1/2018 11:13 AM   Wound Length (cm) 0 cm 11/1/2018 11:13 AM   Wound Width (cm) 0 cm 11/1/2018 11:13 AM   Wound Depth (cm)  0 11/1/2018 11:13 AM   Calculated Wound Size (cm^2) (l*w) 0 cm^2 11/1/2018 11:13 AM   Change in Wound Size % (l*w) 100 11/1/2018 11:13 AM   Distance Tunneling (cm) 0 cm 11/1/2018 11:13 AM   Tunneling Position ___ O'Clock 0 11/1/2018 11:13 AM   Undermining Starts ___ O'Clock 0 11/1/2018 11:13 AM   Undermining Ends___ O'Clock 0 11/1/2018 11:13 AM   Undermining Maxium Distance (cm) 0 11/1/2018 11:13 AM

## 2019-02-19 ENCOUNTER — OFFICE VISIT (OUTPATIENT)
Dept: URGENT CARE | Age: 29
End: 2019-02-19
Payer: COMMERCIAL

## 2019-02-19 VITALS
SYSTOLIC BLOOD PRESSURE: 119 MMHG | BODY MASS INDEX: 42.66 KG/M2 | WEIGHT: 315 LBS | RESPIRATION RATE: 18 BRPM | HEIGHT: 72 IN | DIASTOLIC BLOOD PRESSURE: 79 MMHG | HEART RATE: 83 BPM | TEMPERATURE: 98.4 F | OXYGEN SATURATION: 99 %

## 2019-02-19 DIAGNOSIS — R50.9 FEVER, UNSPECIFIED FEVER CAUSE: ICD-10-CM

## 2019-02-19 DIAGNOSIS — J10.1 INFLUENZA A: Primary | ICD-10-CM

## 2019-02-19 DIAGNOSIS — J02.9 PHARYNGITIS, UNSPECIFIED ETIOLOGY: ICD-10-CM

## 2019-02-19 LAB
INFLUENZA A ANTIBODY: POSITIVE
INFLUENZA B ANTIBODY: NEGATIVE
S PYO AG THROAT QL: NORMAL

## 2019-02-19 PROCEDURE — 1036F TOBACCO NON-USER: CPT | Performed by: SPECIALIST

## 2019-02-19 PROCEDURE — 94640 AIRWAY INHALATION TREATMENT: CPT | Performed by: SPECIALIST

## 2019-02-19 PROCEDURE — G8417 CALC BMI ABV UP PARAM F/U: HCPCS | Performed by: SPECIALIST

## 2019-02-19 PROCEDURE — 99202 OFFICE O/P NEW SF 15 MIN: CPT | Performed by: SPECIALIST

## 2019-02-19 PROCEDURE — G8427 DOCREV CUR MEDS BY ELIG CLIN: HCPCS | Performed by: SPECIALIST

## 2019-02-19 PROCEDURE — 87804 INFLUENZA ASSAY W/OPTIC: CPT | Performed by: SPECIALIST

## 2019-02-19 PROCEDURE — 87880 STREP A ASSAY W/OPTIC: CPT | Performed by: SPECIALIST

## 2019-02-19 PROCEDURE — G8484 FLU IMMUNIZE NO ADMIN: HCPCS | Performed by: SPECIALIST

## 2019-02-19 RX ORDER — IPRATROPIUM BROMIDE AND ALBUTEROL SULFATE 2.5; .5 MG/3ML; MG/3ML
1 SOLUTION RESPIRATORY (INHALATION) ONCE
Status: COMPLETED | OUTPATIENT
Start: 2019-02-19 | End: 2019-02-19

## 2019-02-19 RX ORDER — VITAMIN E 268 MG
400 CAPSULE ORAL 2 TIMES DAILY
Status: ON HOLD | COMMUNITY
End: 2022-04-12 | Stop reason: HOSPADM

## 2019-02-19 RX ORDER — CLINDAMYCIN HYDROCHLORIDE 300 MG/1
300 CAPSULE ORAL 3 TIMES DAILY
Qty: 30 CAPSULE | Refills: 0 | Status: SHIPPED | OUTPATIENT
Start: 2019-02-19 | End: 2019-03-01

## 2019-02-19 RX ORDER — OSELTAMIVIR PHOSPHATE 75 MG/1
75 CAPSULE ORAL DAILY
Qty: 5 CAPSULE | Refills: 0 | Status: SHIPPED | OUTPATIENT
Start: 2019-02-19 | End: 2019-02-24

## 2019-02-19 RX ADMIN — IPRATROPIUM BROMIDE AND ALBUTEROL SULFATE 1 AMPULE: 2.5; .5 SOLUTION RESPIRATORY (INHALATION) at 10:06

## 2019-02-19 ASSESSMENT — ENCOUNTER SYMPTOMS
VOICE CHANGE: 1
COUGH: 1
NAUSEA: 1
SORE THROAT: 1

## 2019-03-28 ENCOUNTER — HOSPITAL ENCOUNTER (OUTPATIENT)
Dept: WOUND CARE | Age: 29
Discharge: HOME | End: 2019-03-28
Payer: COMMERCIAL

## 2019-03-28 VITALS
WEIGHT: 303 LBS | BODY MASS INDEX: 41.04 KG/M2 | SYSTOLIC BLOOD PRESSURE: 132 MMHG | HEIGHT: 72 IN | TEMPERATURE: 96.9 F | HEART RATE: 74 BPM | DIASTOLIC BLOOD PRESSURE: 78 MMHG | RESPIRATION RATE: 20 BRPM

## 2019-03-28 DIAGNOSIS — S31.809A OPEN WOUND OF BUTTOCK, UNSPECIFIED LATERALITY, INITIAL ENCOUNTER: ICD-10-CM

## 2019-03-28 DIAGNOSIS — I69.30 HISTORY OF CEREBROVASCULAR ACCIDENT (CVA) WITH RESIDUAL DEFICIT: ICD-10-CM

## 2019-03-28 DIAGNOSIS — L30.8 DERMATITIS ASSOCIATED WITH MOISTURE: ICD-10-CM

## 2019-03-28 PROCEDURE — 99214 OFFICE O/P EST MOD 30 MIN: CPT | Performed by: NURSE PRACTITIONER

## 2019-03-28 PROCEDURE — 99214 OFFICE O/P EST MOD 30 MIN: CPT

## 2019-03-28 RX ORDER — TACROLIMUS 0.3 MG/G
OINTMENT TOPICAL 2 TIMES DAILY
COMMUNITY
End: 2022-01-11

## 2019-03-28 RX ORDER — LOPERAMIDE HYDROCHLORIDE 2 MG/1
2 CAPSULE ORAL 4 TIMES DAILY PRN
Status: ON HOLD | COMMUNITY
End: 2022-04-12 | Stop reason: HOSPADM

## 2019-03-28 ASSESSMENT — PAIN DESCRIPTION - DESCRIPTORS: DESCRIPTORS: SHARP;SORE

## 2019-03-28 ASSESSMENT — PAIN DESCRIPTION - ORIENTATION: ORIENTATION: LEFT

## 2019-03-28 ASSESSMENT — PAIN DESCRIPTION - PROGRESSION: CLINICAL_PROGRESSION: NOT CHANGED

## 2019-03-28 ASSESSMENT — PAIN SCALES - GENERAL: PAINLEVEL_OUTOF10: 8

## 2019-03-28 ASSESSMENT — PAIN DESCRIPTION - FREQUENCY: FREQUENCY: CONTINUOUS

## 2019-03-28 ASSESSMENT — PAIN DESCRIPTION - LOCATION: LOCATION: BUTTOCKS

## 2019-03-28 ASSESSMENT — PAIN DESCRIPTION - PAIN TYPE: TYPE: ACUTE PAIN

## 2019-03-28 ASSESSMENT — PAIN DESCRIPTION - ONSET: ONSET: ON-GOING

## 2019-04-08 ENCOUNTER — HOSPITAL ENCOUNTER (OUTPATIENT)
Dept: WOUND CARE | Age: 29
Discharge: HOME | End: 2019-04-08
Payer: COMMERCIAL

## 2019-04-08 VITALS
BODY MASS INDEX: 41.04 KG/M2 | HEIGHT: 72 IN | SYSTOLIC BLOOD PRESSURE: 106 MMHG | RESPIRATION RATE: 18 BRPM | HEART RATE: 87 BPM | DIASTOLIC BLOOD PRESSURE: 87 MMHG | WEIGHT: 303 LBS | TEMPERATURE: 98 F

## 2019-04-08 DIAGNOSIS — L30.8 DERMATITIS ASSOCIATED WITH MOISTURE: ICD-10-CM

## 2019-04-08 DIAGNOSIS — I69.30 HISTORY OF CEREBROVASCULAR ACCIDENT (CVA) WITH RESIDUAL DEFICIT: ICD-10-CM

## 2019-04-08 DIAGNOSIS — S31.809D OPEN WOUND OF BUTTOCK, UNSPECIFIED LATERALITY, SUBSEQUENT ENCOUNTER: Primary | Chronic | ICD-10-CM

## 2019-04-08 PROCEDURE — 97597 DBRDMT OPN WND 1ST 20 CM/<: CPT | Performed by: SURGERY

## 2019-04-08 PROCEDURE — 97597 DBRDMT OPN WND 1ST 20 CM/<: CPT

## 2019-04-08 ASSESSMENT — PAIN DESCRIPTION - ORIENTATION: ORIENTATION: LEFT

## 2019-04-08 ASSESSMENT — PAIN DESCRIPTION - DESCRIPTORS: DESCRIPTORS: SORE

## 2019-04-08 ASSESSMENT — PAIN SCALES - GENERAL: PAINLEVEL_OUTOF10: 7

## 2019-04-08 ASSESSMENT — PAIN DESCRIPTION - FREQUENCY: FREQUENCY: CONTINUOUS

## 2019-04-08 ASSESSMENT — PAIN DESCRIPTION - ONSET: ONSET: ON-GOING

## 2019-04-08 ASSESSMENT — PAIN DESCRIPTION - PAIN TYPE: TYPE: ACUTE PAIN

## 2019-04-08 ASSESSMENT — PAIN DESCRIPTION - PROGRESSION: CLINICAL_PROGRESSION: NOT CHANGED

## 2019-04-08 ASSESSMENT — PAIN DESCRIPTION - LOCATION: LOCATION: BUTTOCKS

## 2019-04-08 NOTE — PROGRESS NOTES
[address]   Progress Note and Procedure Note      [address] RECORD NUMBER:  [id number]  AGE: 30 y.o. GENDER: male  : 1990  EPISODE DATE:  2019    Subjective:     Chief Complaint   Patient presents with    Wound Check     buttocks wound, no new complaints         HISTORY of PRESENT ILLNESS HPI     Melisa Jasso is a 30 y.o. male who presents today for wound/ulcer evaluation. History of Wound Context: Pt with L buttock wound here for eval/treat  Wound/Ulcer Pain Timing/Severity: none  Quality of pain: N/A  Severity:  0 / 10   Modifying Factors: None  Associated Signs/Symptoms: none    Ulcer Identification:  Ulcer Type: pressure  Contributing Factors: chronic pressure, decreased mobility and shear force    Wound: pressure        PAST MEDICAL HISTORY        Diagnosis Date    Arthritis     Cerebral artery occlusion with cerebral infarction ([facility] [address]     Depression     Difficult intubation     Hemorrhoids     History of blood transfusion     Hypertension     Kidney stone     Muscle spasticity     Prolonged emergence from general anesthesia     Retention of urine        PAST SURGICAL HISTORY    Past Surgical History:   Procedure Laterality Date    BACK SURGERY      BACLOFEN PUMP IMPLANTATION      BRAIN SURGERY      KIDNEY STONE SURGERY      [place] FRAGMENT KIDNEY STONE/ ESWL Right 10/2/2018    ESWL EXTRACORPEAL SHOCK WAVE LITHOTRIPSY performed by Ivonne Newton MD at [facility] [address]    History reviewed. No pertinent family history.     SOCIAL HISTORY    Social History     Tobacco Use    Smoking status: Never Smoker    Smokeless tobacco: Never Used   Substance Use Topics    Alcohol use: No    Drug use: No       ALLERGIES    Allergies   Allergen Reactions    Latex     Dilantin [Phenytoin]     Pcn [Penicillins]     Sulfa Antibiotics        MEDICATIONS    Current Outpatient Medications on File Prior to Encounter   Medication Sig Dispense Refill    loperamide (IMODIUM) 2 MG capsule Take 2 mg by mouth 4 times daily as needed for Diarrhea      hydrocortisone 2.5 % cream Apply topically 2 times daily Apply topically 2 times daily.  tacrolimus (PROTOPIC) 0.03 % ointment Apply topically 2 times daily Apply topically 2 times daily.  vitamin E 400 UNIT capsule Take 400 Units by mouth      meloxicam (MOBIC) 7.5 MG tablet Take 1 tablet by mouth 2 times daily 30 tablet 3    loratadine (CLARITIN) 10 MG capsule Take 10 mg by mouth daily      fluticasone (FLONASE) 50 MCG/ACT nasal spray 1 spray by Each Nare route 2 times daily      melatonin 5 MG TABS tablet Take 5 mg by mouth nightly       gabapentin (NEURONTIN) 400 MG capsule Take 400 mg by mouth 3 times daily.  ketoconazole (NIZORAL) 2 % cream Apply topically 3 times daily Apply topically daily.  lisinopril (PRINIVIL;ZESTRIL) 10 MG tablet Take 10 mg by mouth daily      oxyCODONE-acetaminophen (PERCOCET)  MG per tablet Take 1 tablet by mouth every 6 hours as needed for Pain.  tiZANidine (ZANAFLEX) 4 MG tablet Take 2 mg by mouth 2 times daily       zinc oxide 20 % ointment Apply topically 2 times daily Apply topically as needed.  polyethylene glycol (GLYCOLAX) powder Take 17 g by mouth 2 times daily       zolpidem (AMBIEN) 10 MG tablet Take by mouth nightly as needed for Sleep      dexlansoprazole (DEXILANT) 60 MG CPDR delayed release capsule Take 60 mg by mouth daily      guaiFENesin 400 MG tablet Take 800 mg by mouth 3 times daily as needed for Cough 2 Tablets TID PO PRN       nystatin (MYCOSTATIN) 980331 UNIT/GM cream Apply 1 Dose topically as needed for Dry Skin Apply topically 2 times daily.       citalopram (CELEXA) 40 MG tablet Take 40 mg by mouth daily      DULoxetine (CYMBALTA) 60 MG capsule Take 60 mg by mouth daily      Multiple Vitamins-Minerals (THERAPEUTIC MULTIVITAMIN-MINERALS) tablet Take 1 tablet by mouth daily      Calcium Carbonate-Vitamin D (CALCIUM-VITAMIN D) 500-200 MG-UNIT per tablet Take 1 tablet by mouth 2 times daily (with meals)      docusate sodium (COLACE) 100 MG capsule Take 200 mg by mouth 2 times daily      Omega-3 Fatty Acids (FISH OIL) 1000 MG CAPS Take 3,000 mg by mouth 2 times daily      risperiDONE (RISPERDAL) 1 MG tablet Take 1 mg by mouth 2 times daily      Ascorbic Acid (VITAMIN C) 500 MG CAPS Take 500 mg by mouth 2 times daily      propranolol (INDERAL) 20 MG tablet Take 20 mg by mouth 2 times daily       ketoconazole (NIZORAL) 2 % shampoo Apply topically daily as needed for Itching Apply topically daily as needed.  acetaminophen (TYLENOL) 500 MG tablet Take 1,000 mg by mouth every 6 hours as needed for Pain or Fever       No current facility-administered medications on file prior to encounter. REVIEW OF SYSTEMS    Pertinent items are noted in HPI.     Objective:      /87   Pulse 87   Temp 98 °F (36.7 °C) (Temporal)   Resp 18   Ht 6' (1.829 m)   Wt (!) 303 lb (137.4 kg)   BMI 41.09 kg/m²     Wt Readings from Last 3 Encounters:   04/08/19 (!) 303 lb (137.4 kg)   03/28/19 (!) 303 lb (137.4 kg)   02/19/19 (!) 350 lb (158.8 kg)       PHYSICAL EXAM    General Appearance: alert and oriented to person, place and time, well developed and well- nourished, in no acute distress  Skin: warm and dry, no rash or erythema  Head: normocephalic and atraumatic  Eyes: pupils equal, round, and reactive to light, extraocular eye movements intact, conjunctivae normal  ENT: tympanic membrane, external ear and ear canal normal bilaterally, nose without deformity, nasal mucosa and turbinates normal without polyps  Neck: supple and non-tender without mass, no thyromegaly or thyroid nodules, no cervical lymphadenopathy  Pulmonary/Chest: clear to auscultation bilaterally- no wheezes, rales or rhonchi, normal air movement, no respiratory distress  Cardiovascular: normal rate, regular rhythm, normal S1 and S2, no murmurs, rubs, AM   Wound Surface Area (cm^2) 0.15 cm^2 4/8/2019 11:22 AM   Change in Wound Size % (l*w) 75 4/8/2019 11:22 AM   Wound Volume (cm^3) 0.02 cm^3 4/8/2019 11:22 AM   Wound Healing % 67 4/8/2019 11:22 AM   Post-Procedure Length (cm) 0.5 cm 4/8/2019 11:29 AM   Post-Procedure Width (cm) 0.3 cm 4/8/2019 11:29 AM   Post-Procedure Depth (cm) 0.1 cm 4/8/2019 11:29 AM   Post-Procedure Surface Area (cm^2) 0.15 cm^2 4/8/2019 11:29 AM   Post-Procedure Volume (cm^3) 0.02 cm^3 4/8/2019 11:29 AM   Wound Assessment Pink;Red;Slough; Yellow 4/8/2019 11:22 AM   Drainage Amount Moderate 4/8/2019 11:22 AM   Drainage Description Serosanguinous 4/8/2019 11:22 AM   Odor None 4/8/2019 11:22 AM   Margins Attached edges 4/8/2019 11:22 AM   Rachana-wound Assessment Dry; Intact 4/8/2019 11:22 AM   Non-staged Wound Description Not applicable 9/4/0894 86:27 AM   [place]%Wound Bed 75 4/8/2019 11:22 AM   Red%Wound Bed 20 4/8/2019 11:22 AM   Yellow%Wound Bed 5 4/8/2019 11:22 AM   Number of days: 11         Estimated Blood Loss:  Minimal    Hemostasis Achieved:  by pressure    Procedural Pain:  0  / 10     Post Procedural Pain:  0 / 10     Response to treatment:  Well tolerated by patient. Plan:     Problem List Items Addressed This Visit     * (Principal) Open wound of buttock - Primary (Chronic)    Dermatitis associated with moisture    History of cerebrovascular accident (CVA) with residual deficit          Treatment Note please see attached Discharge Instructions    In my professional opinion this patient would benefit from HBO Therapy: No    Written patient dismissal instructions given to patient and signed by patient or POA. Discharge Instructions       Visit Discharge/Physician Orders    Discharge condition: Stable    Discharge to: Home    Left via:Private automobile    Accompanied by:  Neuro Caregivers      [facility]:  [facility]     Dressing Orders: Buttock Wound:  Wash with soap and water. Apply xeroform to wound bed.     Cover with mepilex bordered gauze. Change every 2-3 days. Treatment Orders:  Keep hands from scratching wound area   Apply barrier cream three time a day and as needed for soiling     Barrier cream over the counter such as GABRIEL cream   Gabriel contains zinc oxide and miconazole 2% antifungal.     Avoid Pressure to wound site. Turn frequently (turn at least every two hours when in bed)  While in chair reposition every 30 minutes  Patient advised to sit up 2 hours at a time.     Continue Protein rich diet (unless restricted by your physician)  Take Multivitamin daily     Please use Roho cushion in chair    [facility] followup visit ______________1 week_______________  (Please note your next appointment above and if you are unable to keep, kindly give a 24 hour notice. Thank you.)    If you experience any of the following, please call the [address] [place] [address] during business hours:    * Increase in Pain  * Temperature over 101  * Increase in drainage from your wound  * Drainage with a foul odor  * Bleeding  * Increase in swelling  * Need for compression bandage changes due to slippage, breakthrough drainage. If you need medical attention outside of the business hours of the [address] [place] [address] please contact your PCP or go to the nearest emergency room.         Electronically signed by Goldie Boast, MD on 4/8/2019 at 11:36 AM

## 2019-04-22 ENCOUNTER — APPOINTMENT (OUTPATIENT)
Dept: GENERAL RADIOLOGY | Facility: HOSPITAL | Age: 29
End: 2019-04-22

## 2019-04-22 ENCOUNTER — HOSPITAL ENCOUNTER (EMERGENCY)
Facility: HOSPITAL | Age: 29
Discharge: HOME | End: 2019-04-22
Attending: EMERGENCY MEDICINE | Admitting: EMERGENCY MEDICINE

## 2019-04-22 ENCOUNTER — HOSPITAL ENCOUNTER (OUTPATIENT)
Dept: WOUND CARE | Age: 29
Discharge: HOME | End: 2019-04-22
Payer: COMMERCIAL

## 2019-04-22 VITALS
DIASTOLIC BLOOD PRESSURE: 81 MMHG | TEMPERATURE: 97.9 F | OXYGEN SATURATION: 96 % | WEIGHT: 303 LBS | HEART RATE: 82 BPM | BODY MASS INDEX: 41.04 KG/M2 | SYSTOLIC BLOOD PRESSURE: 139 MMHG | RESPIRATION RATE: 20 BRPM | HEIGHT: 72 IN

## 2019-04-22 VITALS
RESPIRATION RATE: 18 BRPM | SYSTOLIC BLOOD PRESSURE: 123 MMHG | TEMPERATURE: 97.5 F | HEART RATE: 88 BPM | BODY MASS INDEX: 41.04 KG/M2 | DIASTOLIC BLOOD PRESSURE: 80 MMHG | WEIGHT: 303 LBS | HEIGHT: 72 IN

## 2019-04-22 DIAGNOSIS — I69.30 HISTORY OF CEREBROVASCULAR ACCIDENT (CVA) WITH RESIDUAL DEFICIT: ICD-10-CM

## 2019-04-22 DIAGNOSIS — R07.89 CHEST WALL PAIN: Primary | ICD-10-CM

## 2019-04-22 DIAGNOSIS — L30.8 DERMATITIS ASSOCIATED WITH MOISTURE: ICD-10-CM

## 2019-04-22 LAB
ALBUMIN SERPL-MCNC: 4.2 G/DL (ref 3.5–5)
ALBUMIN/GLOB SERPL: 1.2 G/DL (ref 1.1–2.5)
ALP SERPL-CCNC: 98 U/L (ref 24–120)
ALT SERPL W P-5'-P-CCNC: 29 U/L (ref 0–54)
ANION GAP SERPL CALCULATED.3IONS-SCNC: 10 MMOL/L (ref 4–13)
ANISOCYTOSIS BLD QL: ABNORMAL
AST SERPL-CCNC: 23 U/L (ref 7–45)
BASOPHILS # BLD MANUAL: 0.05 10*3/MM3 (ref 0–0.2)
BASOPHILS NFR BLD AUTO: 1 % (ref 0–2)
BILIRUB SERPL-MCNC: 0.3 MG/DL (ref 0.1–1)
BUN BLD-MCNC: 15 MG/DL (ref 5–21)
BUN/CREAT SERPL: 19.7 (ref 7–25)
CALCIUM SPEC-SCNC: 9.1 MG/DL (ref 8.4–10.4)
CHLORIDE SERPL-SCNC: 103 MMOL/L (ref 98–110)
CO2 SERPL-SCNC: 30 MMOL/L (ref 24–31)
CREAT BLD-MCNC: 0.76 MG/DL (ref 0.5–1.4)
D DIMER PPP FEU-MCNC: 0.44 MG/L (FEU) (ref 0–0.5)
DEPRECATED RDW RBC AUTO: 44.3 FL (ref 40–54)
EOSINOPHIL # BLD MANUAL: 0.2 10*3/MM3 (ref 0–0.7)
EOSINOPHIL NFR BLD MANUAL: 4 % (ref 0–4)
ERYTHROCYTE [DISTWIDTH] IN BLOOD BY AUTOMATED COUNT: 13.9 % (ref 12–15)
GFR SERPL CREATININE-BSD FRML MDRD: 120 ML/MIN/1.73
GIANT PLATELETS: ABNORMAL
GLOBULIN UR ELPH-MCNC: 3.5 GM/DL
GLUCOSE BLD-MCNC: 99 MG/DL (ref 70–100)
HCT VFR BLD AUTO: 37.4 % (ref 40–52)
HGB BLD-MCNC: 12.4 G/DL (ref 14–18)
HOLD SPECIMEN: NORMAL
HOLD SPECIMEN: NORMAL
LYMPHOCYTES # BLD MANUAL: 2.16 10*3/MM3 (ref 0.72–4.86)
LYMPHOCYTES NFR BLD MANUAL: 4 % (ref 4–12)
LYMPHOCYTES NFR BLD MANUAL: 43 % (ref 15–45)
MCH RBC QN AUTO: 29 PG (ref 28–32)
MCHC RBC AUTO-ENTMCNC: 33.2 G/DL (ref 33–36)
MCV RBC AUTO: 87.4 FL (ref 82–95)
MICROCYTES BLD QL: ABNORMAL
MONOCYTES # BLD AUTO: 0.2 10*3/MM3 (ref 0.19–1.3)
NEUTROPHILS # BLD AUTO: 2.16 10*3/MM3 (ref 1.87–8.4)
NEUTROPHILS NFR BLD MANUAL: 43 % (ref 39–78)
PLASMA CELL PREC NFR BLD MANUAL: 1 % (ref 0–0)
PLATELET # BLD AUTO: 216 10*3/MM3 (ref 130–400)
PMV BLD AUTO: 11.2 FL (ref 6–12)
POTASSIUM BLD-SCNC: 4 MMOL/L (ref 3.5–5.3)
PROT SERPL-MCNC: 7.7 G/DL (ref 6.3–8.7)
RBC # BLD AUTO: 4.28 10*6/MM3 (ref 4.8–5.9)
SODIUM BLD-SCNC: 143 MMOL/L (ref 135–145)
TROPONIN I SERPL-MCNC: <0.012 NG/ML (ref 0–0.03)
VARIANT LYMPHS NFR BLD MANUAL: 4 % (ref 0–5)
WBC MORPH BLD: NORMAL
WBC NRBC COR # BLD: 5.02 10*3/MM3 (ref 4.8–10.8)
WHOLE BLOOD HOLD SPECIMEN: NORMAL
WHOLE BLOOD HOLD SPECIMEN: NORMAL

## 2019-04-22 PROCEDURE — 97597 DBRDMT OPN WND 1ST 20 CM/<: CPT | Performed by: SURGERY

## 2019-04-22 PROCEDURE — 93010 ELECTROCARDIOGRAM REPORT: CPT | Performed by: INTERNAL MEDICINE

## 2019-04-22 PROCEDURE — 99284 EMERGENCY DEPT VISIT MOD MDM: CPT

## 2019-04-22 PROCEDURE — 96372 THER/PROPH/DIAG INJ SC/IM: CPT

## 2019-04-22 PROCEDURE — 71045 X-RAY EXAM CHEST 1 VIEW: CPT

## 2019-04-22 PROCEDURE — 93005 ELECTROCARDIOGRAM TRACING: CPT | Performed by: EMERGENCY MEDICINE

## 2019-04-22 PROCEDURE — 97597 DBRDMT OPN WND 1ST 20 CM/<: CPT

## 2019-04-22 PROCEDURE — 25010000002 METHYLPREDNISOLONE PER 125 MG: Performed by: EMERGENCY MEDICINE

## 2019-04-22 PROCEDURE — 85379 FIBRIN DEGRADATION QUANT: CPT | Performed by: EMERGENCY MEDICINE

## 2019-04-22 PROCEDURE — 36415 COLL VENOUS BLD VENIPUNCTURE: CPT

## 2019-04-22 PROCEDURE — 85025 COMPLETE CBC W/AUTO DIFF WBC: CPT | Performed by: EMERGENCY MEDICINE

## 2019-04-22 PROCEDURE — 85007 BL SMEAR W/DIFF WBC COUNT: CPT | Performed by: EMERGENCY MEDICINE

## 2019-04-22 PROCEDURE — 80053 COMPREHEN METABOLIC PANEL: CPT | Performed by: EMERGENCY MEDICINE

## 2019-04-22 PROCEDURE — 84484 ASSAY OF TROPONIN QUANT: CPT | Performed by: EMERGENCY MEDICINE

## 2019-04-22 PROCEDURE — 93005 ELECTROCARDIOGRAM TRACING: CPT

## 2019-04-22 RX ORDER — METHYLPREDNISOLONE SODIUM SUCCINATE 125 MG/2ML
125 INJECTION, POWDER, LYOPHILIZED, FOR SOLUTION INTRAMUSCULAR; INTRAVENOUS ONCE
Status: DISCONTINUED | OUTPATIENT
Start: 2019-04-22 | End: 2019-04-22

## 2019-04-22 RX ORDER — SODIUM CHLORIDE 0.9 % (FLUSH) 0.9 %
10 SYRINGE (ML) INJECTION AS NEEDED
Status: DISCONTINUED | OUTPATIENT
Start: 2019-04-22 | End: 2019-04-23 | Stop reason: HOSPADM

## 2019-04-22 RX ORDER — METHYLPREDNISOLONE SODIUM SUCCINATE 125 MG/2ML
80 INJECTION, POWDER, LYOPHILIZED, FOR SOLUTION INTRAMUSCULAR; INTRAVENOUS ONCE
Status: COMPLETED | OUTPATIENT
Start: 2019-04-22 | End: 2019-04-22

## 2019-04-22 RX ADMIN — METHYLPREDNISOLONE SODIUM SUCCINATE 80 MG: 125 INJECTION, POWDER, FOR SOLUTION INTRAMUSCULAR; INTRAVENOUS at 22:28

## 2019-04-22 ASSESSMENT — PAIN SCALES - GENERAL: PAINLEVEL_OUTOF10: 7

## 2019-04-22 NOTE — PROGRESS NOTES
[address]   Progress Note and Procedure Note      [address] RECORD NUMBER:  [id number]  AGE: 30 y.o. GENDER: male  : 1990  EPISODE DATE:  2019    Subjective:     Chief Complaint   Patient presents with    Wound Check     Left buttock Wound; Patient denies increased pain at wound site         HISTORY of PRESENT ILLNESS HPI     Milagro Landis is a 30 y.o. male who presents today for wound/ulcer evaluation. History of Wound Context: Pt with Wound of buttocks here for eval/treat  Wound/Ulcer Pain Timing/Severity: none  Quality of pain: N/A  Severity:  0 / 10   Modifying Factors: None  Associated Signs/Symptoms: edema    Ulcer Identification:  Ulcer Type: pressure  Contributing Factors: chronic pressure, decreased mobility and shear force    Wound: pressure        PAST MEDICAL HISTORY        Diagnosis Date    Arthritis     Cerebral artery occlusion with cerebral infarction ([address]     Depression     Difficult intubation     Hemorrhoids     History of blood transfusion     Hypertension     Kidney stone     Muscle spasticity     Prolonged emergence from general anesthesia     Retention of urine        PAST SURGICAL HISTORY    Past Surgical History:   Procedure Laterality Date    BACK SURGERY      BACLOFEN PUMP IMPLANTATION      BRAIN SURGERY      KIDNEY STONE SURGERY      [place] FRAGMENT KIDNEY STONE/ ESWL Right 10/2/2018    ESWL EXTRACORPEAL SHOCK WAVE LITHOTRIPSY performed by Samuel Shea MD at [facility] [address]    History reviewed. No pertinent family history.     SOCIAL HISTORY    Social History     Tobacco Use    Smoking status: Never Smoker    Smokeless tobacco: Never Used   Substance Use Topics    Alcohol use: No    Drug use: No       ALLERGIES    Allergies   Allergen Reactions    Latex     Dilantin [Phenytoin]     Pcn [Penicillins]     Sulfa Antibiotics        MEDICATIONS    Current Outpatient Medications on File Prior to Encounter   Medication Sig Dispense Refill    loperamide (IMODIUM) 2 MG capsule Take 2 mg by mouth 4 times daily as needed for Diarrhea      hydrocortisone 2.5 % cream Apply topically 2 times daily Apply topically 2 times daily.  tacrolimus (PROTOPIC) 0.03 % ointment Apply topically 2 times daily Apply topically 2 times daily.  vitamin E 400 UNIT capsule Take 400 Units by mouth      meloxicam (MOBIC) 7.5 MG tablet Take 1 tablet by mouth 2 times daily 30 tablet 3    loratadine (CLARITIN) 10 MG capsule Take 10 mg by mouth daily      fluticasone (FLONASE) 50 MCG/ACT nasal spray 1 spray by Each Nare route 2 times daily      melatonin 5 MG TABS tablet Take 5 mg by mouth nightly       gabapentin (NEURONTIN) 400 MG capsule Take 400 mg by mouth 3 times daily.  ketoconazole (NIZORAL) 2 % cream Apply topically 3 times daily Apply topically daily.  lisinopril (PRINIVIL;ZESTRIL) 10 MG tablet Take 10 mg by mouth daily      oxyCODONE-acetaminophen (PERCOCET)  MG per tablet Take 1 tablet by mouth every 6 hours as needed for Pain.  tiZANidine (ZANAFLEX) 4 MG tablet Take 2 mg by mouth 2 times daily       zinc oxide 20 % ointment Apply topically 2 times daily Apply topically as needed.  polyethylene glycol (GLYCOLAX) powder Take 17 g by mouth 2 times daily       zolpidem (AMBIEN) 10 MG tablet Take by mouth nightly as needed for Sleep      dexlansoprazole (DEXILANT) 60 MG CPDR delayed release capsule Take 60 mg by mouth daily      guaiFENesin 400 MG tablet Take 800 mg by mouth 3 times daily as needed for Cough 2 Tablets TID PO PRN       nystatin (MYCOSTATIN) 686539 UNIT/GM cream Apply 1 Dose topically as needed for Dry Skin Apply topically 2 times daily.       citalopram (CELEXA) 40 MG tablet Take 40 mg by mouth daily      DULoxetine (CYMBALTA) 60 MG capsule Take 60 mg by mouth daily      Multiple Vitamins-Minerals (THERAPEUTIC MULTIVITAMIN-MINERALS) tablet Take 1 tablet by mouth daily      Calcium Carbonate-Vitamin D (CALCIUM-VITAMIN D) 500-200 MG-UNIT per tablet Take 1 tablet by mouth 2 times daily (with meals)      docusate sodium (COLACE) 100 MG capsule Take 200 mg by mouth 2 times daily      Omega-3 Fatty Acids (FISH OIL) 1000 MG CAPS Take 3,000 mg by mouth 2 times daily      risperiDONE (RISPERDAL) 1 MG tablet Take 1 mg by mouth 2 times daily      Ascorbic Acid (VITAMIN C) 500 MG CAPS Take 500 mg by mouth 2 times daily      propranolol (INDERAL) 20 MG tablet Take 20 mg by mouth 2 times daily       ketoconazole (NIZORAL) 2 % shampoo Apply topically daily as needed for Itching Apply topically daily as needed.  acetaminophen (TYLENOL) 500 MG tablet Take 1,000 mg by mouth every 6 hours as needed for Pain or Fever       No current facility-administered medications on file prior to encounter. REVIEW OF SYSTEMS    Pertinent items are noted in HPI.     Objective:      /80   Pulse 88   Temp 97.5 °F (36.4 °C) (Temporal)   Resp 18   Ht 6' (1.829 m)   Wt (!) 303 lb (137.4 kg)   BMI 41.09 kg/m²     Wt Readings from Last 3 Encounters:   04/22/19 (!) 303 lb (137.4 kg)   04/08/19 (!) 303 lb (137.4 kg)   03/28/19 (!) 303 lb (137.4 kg)       PHYSICAL EXAM    General Appearance: alert and oriented to person, place and time, well developed and well- nourished, in no acute distress  Skin: warm and dry, no rash or erythema  Head: normocephalic and atraumatic  Eyes: pupils equal, round, and reactive to light, extraocular eye movements intact, conjunctivae normal  ENT: tympanic membrane, external ear and ear canal normal bilaterally, nose without deformity, nasal mucosa and turbinates normal without polyps  Neck: supple and non-tender without mass, no thyromegaly or thyroid nodules, no cervical lymphadenopathy  Pulmonary/Chest: clear to auscultation bilaterally- no wheezes, rales or rhonchi, normal air movement, no respiratory distress  Cardiovascular: normal rate, regular rhythm, normal S1 and S2, no murmurs, rubs, clicks, or gallops, distal pulses intact, no carotid bruits  Abdomen: soft, non-tender, non-distended, normal bowel sounds, no masses or organomegaly  Extremities: no cyanosis, clubbing or edema  Musculoskeletal: normal range of motion, no joint swelling, deformity or tenderness  Neurologic: reflexes normal and symmetric, no cranial nerve deficit, gait, coordination and speech normal      Assessment:      Problem List Items Addressed This Visit     Dermatitis associated with moisture    History of cerebrovascular accident (CVA) with residual deficit           Procedure Note  Indications:  Based on my examination of this patient's wound(s)/ulcer(s) today, debridement is required to promote healing and evaluate the wound base. Performed by: Caroline Adam MD    Consent obtained:  Yes    Time out taken:  Yes    Pain Control:         Debridement:Non-excisional Debridement    Using curette the wound(s)/ulcer(s) was/were sharply debrided down through and including the removal of epidermis and dermis. Devitalized Tissue Debrided:  fibrin, biofilm, slough and exudate      Pre Debridement Measurements:  Are located in the Wound/Ulcer Documentation Flow Sheet    Wound/Ulcer #: 3    Percent of Wound(s)/Ulcer(s) Debrided: 100%    Total Surface Area Debrided:  1.5 sq cm       Diabetic/Pressure/Non Pressure Ulcers only:  Ulcer: Pressure ulcer, Stage 3         Post Debridement Measurements:    Wound/Ulcer Descriptions are Pre Debridement --EXCEPT MEASUREMENTS    Wound 03/28/19 Buttocks Left WOUND 3 LEFT BUTTOCKS PRESSURE STAGE 3 (Active)   Wound Image    4/22/2019 10:42 AM   Wound Pressure Stage  3 4/22/2019 10:42 AM   Dressing Status Old drainage; Intact 4/22/2019 10:42 AM   Wound Cleansed Rinsed/Irrigated with saline 4/22/2019 10:42 AM   Wound Length (cm) 0.5 cm 4/22/2019 10:42 AM   Wound Width (cm) 0.3 cm 4/22/2019 10:42 AM   Wound Depth (cm) 0.1 cm 4/22/2019 10:42 AM Wound Surface Area (cm^2) 0.15 cm^2 4/22/2019 10:42 AM   Change in Wound Size % (l*w) 75 4/22/2019 10:42 AM   Wound Volume (cm^3) 0.02 cm^3 4/22/2019 10:42 AM   Wound Healing % 67 4/22/2019 10:42 AM   Post-Procedure Length (cm) 0.5 cm 4/22/2019 11:03 AM   Post-Procedure Width (cm) 0.3 cm 4/22/2019 11:03 AM   Post-Procedure Depth (cm) 0.1 cm 4/22/2019 11:03 AM   Post-Procedure Surface Area (cm^2) 0.15 cm^2 4/22/2019 11:03 AM   Post-Procedure Volume (cm^3) 0.02 cm^3 4/22/2019 11:03 AM   Wound Assessment Granulation tissue;Pink;Red;Slough; Yellow 4/22/2019 10:42 AM   Drainage Amount Moderate 4/22/2019 10:42 AM   Drainage Description Serosanguinous 4/22/2019 10:42 AM   Odor None 4/22/2019 10:42 AM   Margins Attached edges 4/22/2019 10:42 AM   Rachana-wound Assessment Dry; Intact 4/22/2019 10:42 AM   Non-staged Wound Description Not applicable 7/95/8356 76:72 AM   [place]%Wound Bed 45 4/22/2019 10:42 AM   Red%Wound Bed 45 4/22/2019 10:42 AM   Yellow%Wound Bed 10 4/22/2019 10:42 AM   Number of days: 25         Estimated Blood Loss:  Minimal    Hemostasis Achieved:  by pressure    Procedural Pain:  0  / 10     Post Procedural Pain:  0 / 10     Response to treatment:  Well tolerated by patient. Plan:     Problem List Items Addressed This Visit     Dermatitis associated with moisture    History of cerebrovascular accident (CVA) with residual deficit          Treatment Note please see attached Discharge Instructions    In my professional opinion this patient would benefit from HBO Therapy: No    Written patient dismissal instructions given to patient and signed by patient or POA. Discharge Instructions       Visit Discharge/Physician Orders    Discharge condition: Stable    Discharge to: Home    Left via:Private automobile    Accompanied by:  Neuro Caregivers      [facility]F/[facility]:  [facility]     Dressing Orders: Buttock Wound:  Wash with soap and water.  Apply xeroform to wound bed.    Cover with mepilex bordered gauze. Change every 2-3 days. Treatment Orders:  Keep hands from scratching wound area   Apply barrier cream three time a day and as needed for soiling     Barrier cream over the counter such as GABRIEL cream   Gabriel contains zinc oxide and miconazole 2% antifungal.     Avoid Pressure to wound site. Turn frequently (turn at least every two hours when in bed)  While in chair reposition every 30 minutes  Patient advised to sit up 2 hours at a time.     Continue Protein rich diet (unless restricted by your physician)  Take Multivitamin daily     Please use Roho cushion in chair    [facility] followup visit ______________2 weeks_______________  (Please note your next appointment above and if you are unable to keep, kindly give a 24 hour notice. Thank you.)    If you experience any of the following, please call the [address] [place] [address] [employer] during business hours:    * Increase in Pain  * Temperature over 101  * Increase in drainage from your wound  * Drainage with a foul odor  * Bleeding  * Increase in swelling  * Need for compression bandage changes due to slippage, breakthrough drainage. If you need medical attention outside of the business hours of the [facility] [employer] please contact your PCP or go to the nearest emergency room.         Electronically signed by Nita Ivey MD on 4/22/2019 at 11:06 AM

## 2019-05-01 ENCOUNTER — OFFICE VISIT (OUTPATIENT)
Dept: URGENT CARE | Age: 29
End: 2019-05-01
Payer: COMMERCIAL

## 2019-05-01 VITALS
TEMPERATURE: 98.1 F | SYSTOLIC BLOOD PRESSURE: 101 MMHG | WEIGHT: 303 LBS | HEART RATE: 79 BPM | RESPIRATION RATE: 22 BRPM | OXYGEN SATURATION: 98 % | BODY MASS INDEX: 41.09 KG/M2 | DIASTOLIC BLOOD PRESSURE: 71 MMHG

## 2019-05-01 DIAGNOSIS — K52.9 GASTROENTERITIS: Primary | ICD-10-CM

## 2019-05-01 PROCEDURE — G8427 DOCREV CUR MEDS BY ELIG CLIN: HCPCS | Performed by: NURSE PRACTITIONER

## 2019-05-01 PROCEDURE — 99213 OFFICE O/P EST LOW 20 MIN: CPT | Performed by: NURSE PRACTITIONER

## 2019-05-01 PROCEDURE — 1036F TOBACCO NON-USER: CPT | Performed by: NURSE PRACTITIONER

## 2019-05-01 PROCEDURE — G8417 CALC BMI ABV UP PARAM F/U: HCPCS | Performed by: NURSE PRACTITIONER

## 2019-05-01 RX ORDER — ONDANSETRON 4 MG/1
4 TABLET, FILM COATED ORAL EVERY 8 HOURS PRN
Qty: 30 TABLET | Refills: 0 | Status: SHIPPED | OUTPATIENT
Start: 2019-05-01 | End: 2019-06-03 | Stop reason: ALTCHOICE

## 2019-05-01 ASSESSMENT — ENCOUNTER SYMPTOMS
VOMITING: 1
ABDOMINAL PAIN: 1
DIARRHEA: 1
COUGH: 0

## 2019-05-01 NOTE — PATIENT INSTRUCTIONS
clean. Wash your hands, cutting boards, and countertops with hot soapy water frequently. · Cook meat until it is well done. · Do not eat raw eggs or uncooked sauces made with raw eggs. · Do not take chances. If food looks or tastes spoiled, throw it out. When should you call for help? Call 911 anytime you think you may need emergency care. For example, call if:    · You vomit blood or what looks like coffee grounds.     · You passed out (lost consciousness).     · You pass maroon or very bloody stools.    Call your doctor now or seek immediate medical care if:    · You have severe belly pain.     · You have signs of needing more fluids. You have sunken eyes, a dry mouth, and pass only a little dark urine.     · You feel like you are going to faint.     · You have increased belly pain that does not go away in 1 to 2 days.     · You have new or increased nausea, or you are vomiting.     · You have a new or higher fever.     · Your stools are black and tarlike or have streaks of blood.    Watch closely for changes in your health, and be sure to contact your doctor if:    · You are dizzy or lightheaded.     · You urinate less than usual, or your urine is dark yellow or brown.     · You do not feel better with each day that goes by. Where can you learn more? Go to [email or web address] and sign in to your [employer] account. Enter N142 in the [facility] box to learn more about \"Gastroenteritis: Care Instructions. \"     If you do not have an account, please click on the \"Sign Up Now\" link. Current as of: July 30, 2018  Content Version: 11.9  © 0931-0820 [employer]. Care instructions adapted under license by [facility] ([facility]). If you have questions about a medical condition or this instruction, always ask your healthcare professional. [address] any warranty or liability for your use of this information. 1. Take zofran as needed for vomiting  2. Take clear liquids until no more vomiting for 4-6 hours  3. Advance to BRAT (bananas, rice, applesauce and toast) as tolerated. 4. Hold miralax and colace until diarrhea has resolved  5. Hold off on using imodium  6.  If patient is not improving or developing any new/worsening symptoms then return to clinic as needed or follow up with PCP

## 2019-05-01 NOTE — PROGRESS NOTES
[address] [facility] E CARE  [address] [facility] [place] [address]  Dept: [phone]  Loc: [phone]    Roni Chen is a 30 y.o. male who presents today for his medical conditions/complaintsas noted below. Roni Chen is c/o of Abdominal Pain (states had explosive diarrhea last night several times- patient is on 2 different medications daily for constipation)        HPI:     Diarrhea    This is a new problem. Episode onset: last night. The problem occurs 2 to 4 times per day. The problem has been unchanged. The stool consistency is described as watery. Associated symptoms include abdominal pain and vomiting. Pertinent negatives include no chills, coughing, fever or sweats. Nothing aggravates the symptoms. There are no known risk factors. He has tried nothing for the symptoms. Patient has not been on antibiotics. He takes colace and miralax daily for constipation issues. He was having usual bowel movements up until last night when he had the diarrhea. There are no other ill contacts at [facility]. Past Medical History:   Diagnosis Date    Arthritis     Cerebral artery occlusion with cerebral infarction ([address]     Depression     Difficult intubation     Hemorrhoids     History of blood transfusion     Hypertension     Kidney stone     Muscle spasticity     Prolonged emergence from general anesthesia     Retention of urine      Past Surgical History:   Procedure Laterality Date    BACK SURGERY      BACLOFEN PUMP IMPLANTATION      BRAIN SURGERY      KIDNEY STONE SURGERY      DE FRAGMENT KIDNEY STONE/ ESWL Right 10/2/2018    ESWL EXTRACORPEAL SHOCK WAVE LITHOTRIPSY performed by Kristopher Lo MD at [facility]       No family history on file.     Social History     Tobacco Use    Smoking status: Never Smoker    Smokeless tobacco: Never Used   Substance Use Topics    Alcohol use: No      Current Outpatient Medications   Medication Sig Multiple Vitamins-Minerals (THERAPEUTIC MULTIVITAMIN-MINERALS) tablet Take 1 tablet by mouth daily      Calcium Carbonate-Vitamin D (CALCIUM-VITAMIN D) 500-200 MG-UNIT per tablet Take 1 tablet by mouth 2 times daily (with meals)      docusate sodium (COLACE) 100 MG capsule Take 200 mg by mouth 2 times daily      Omega-3 Fatty Acids (FISH OIL) 1000 MG CAPS Take 3,000 mg by mouth 2 times daily      risperiDONE (RISPERDAL) 1 MG tablet Take 1 mg by mouth 2 times daily      Ascorbic Acid (VITAMIN C) 500 MG CAPS Take 500 mg by mouth 2 times daily      propranolol (INDERAL) 20 MG tablet Take 20 mg by mouth 2 times daily       ketoconazole (NIZORAL) 2 % shampoo Apply topically daily as needed for Itching Apply topically daily as needed.  acetaminophen (TYLENOL) 500 MG tablet Take 1,000 mg by mouth every 6 hours as needed for Pain or Fever       No current facility-administered medications for this visit. Allergies   Allergen Reactions    Latex     Dilantin [Phenytoin]     Pcn [Penicillins]     Sulfa Antibiotics        Health Maintenance   Topic Date Due    Varicella Vaccine (1 of 2 - 13+ 2-dose series) 03/18/2001    HIV screen  03/18/2003    DTaP/Tdap/Td vaccine (1 - Tdap) 03/18/2007    Flu vaccine (Season Ended) 09/01/2019    Potassium monitoring  09/28/2019    Creatinine monitoring  09/28/2019    Pneumococcal 0-64 years Vaccine  Aged Out       Subjective:     Review of Systems   Constitutional: Negative for chills and fever. Respiratory: Negative for cough. Gastrointestinal: Positive for abdominal pain, diarrhea and vomiting. All other systems reviewed and are negative.      :Objective      Physical Exam   Constitutional: He is oriented to person, place, and time. He appears well-developed and well-nourished. No distress. HENT:   Head: Normocephalic and atraumatic.    Right Ear: Hearing and external ear normal.   Left Ear: Hearing and external ear normal.   Nose: Nose normal.   Eyes: Pupils are equal, round, and reactive to light. Cardiovascular: Normal rate, regular rhythm and normal heart sounds. No murmur heard. Pulmonary/Chest: Effort normal and breath sounds normal. No respiratory distress. He has no wheezes. Abdominal: Soft. Normal appearance and bowel sounds are normal. There is no tenderness. Neurological: He is alert and oriented to person, place, and time. Skin: Skin is warm and dry. No rash noted. He is not diaphoretic. Psychiatric: He has a normal mood and affect. His behavior is normal.   Nursing note and vitals reviewed. /71   Pulse 79   Temp 98.1 °F (36.7 °C) (Temporal)   Resp 22   Wt (!) 303 lb (137.4 kg)   SpO2 98%   BMI 41.09 kg/m²     :Assessment       Diagnosis Orders   1. Gastroenteritis  ondansetron (ZOFRAN) 4 MG tablet       :Plan   1. Take zofran as needed for vomiting  2. Take clear liquids until no more vomiting for 4-6 hours  3. Advance to BRAT (bananas, rice, applesauce and toast) as tolerated. 4. Hold miralax and colace until diarrhea has resolved  5. Hold off on using imodium  6. If patient is not improving or developing any new/worsening symptoms then return to clinic as needed or follow up with PCP     No orders of the defined types were placed in this encounter. Return if symptoms worsen or fail to improve. Orders Placed This Encounter   Medications    ondansetron (ZOFRAN) 4 MG tablet     Sig: Take 1 tablet by mouth every 8 hours as needed for Nausea or Vomiting     Dispense:  30 tablet     Refill:  0       Patient given educational materials- see patient instructions. Discussed use, benefit, and side effects of prescribedmedications. All patient questions answered. Pt voiced understanding. Patient Instructions       Patient Education        Gastroenteritis: Care Instructions  Your Care Instructions    Gastroenteritis is an illness that may cause nausea, vomiting, and diarrhea. It is sometimes called \"stomach flu. \" It can be caused by bacteria or a virus. You will probably begin to feel better in 1 to 2 days. In the meantime, get plenty of rest and make sure you do not become dehydrated. Dehydration occurs when your body loses too much fluid. Follow-up care is a key part of your treatment and safety. Be sure to make and go to all appointments, and call your doctor if you are having problems. It's also a good idea to know your test results and keep a list of the medicines you take. How can you care for yourself at home? · If your doctor prescribed antibiotics, take them as directed. Do not stop taking them just because you feel better. You need to take the full course of antibiotics. · Drink plenty of fluids to prevent dehydration, enough so that your urine is light yellow or clear like water. Choose water and other caffeine-free clear liquids until you feel better. If you have kidney, heart, or liver disease and have to limit fluids, talk with your doctor before you increase your fluid intake. · Drink fluids slowly, in frequent, small amounts, because drinking too much too fast can cause vomiting. · Begin eating mild foods, such as dry toast, yogurt, applesauce, bananas, and rice. Avoid spicy, hot, or high-fat foods, and do not drink alcohol or caffeine for a day or two. Do not drink milk or eat ice cream until you are feeling better. How to prevent gastroenteritis  · Keep hot foods hot and cold foods cold. · Do not eat meats, dressings, salads, or other foods that have been kept at room temperature for more than 2 hours. · Use a thermometer to check your refrigerator. It should be between 34°F and 40°F.  · Defrost meats in the refrigerator or microwave, not on the kitchen counter. · Keep your hands and your kitchen clean. Wash your hands, cutting boards, and countertops with hot soapy water frequently. · Cook meat until it is well done. · Do not eat raw eggs or uncooked sauces made with raw eggs.   · Do not take chances. If food looks or tastes spoiled, throw it out. When should you call for help? Call 911 anytime you think you may need emergency care. For example, call if:    · You vomit blood or what looks like coffee grounds.     · You passed out (lost consciousness).     · You pass maroon or very bloody stools.    Call your doctor now or seek immediate medical care if:    · You have severe belly pain.     · You have signs of needing more fluids. You have sunken eyes, a dry mouth, and pass only a little dark urine.     · You feel like you are going to faint.     · You have increased belly pain that does not go away in 1 to 2 days.     · You have new or increased nausea, or you are vomiting.     · You have a new or higher fever.     · Your stools are black and tarlike or have streaks of blood.    Watch closely for changes in your health, and be sure to contact your doctor if:    · You are dizzy or lightheaded.     · You urinate less than usual, or your urine is dark yellow or brown.     · You do not feel better with each day that goes by. Where can you learn more? Go to [email or web address]://[email or web address]ense.[email or web address] org and sign in to your [facility] account. Enter N142 in the [employer] box to learn more about \"Gastroenteritis: Care Instructions. \"     If you do not have an account, please click on the \"Sign Up Now\" link. Current as of: July 30, 2018  Content Version: 11.9  © 9541-8011 [email or web address], [employer]. Care instructions adapted under license by [facility] [place] ([facility]). If you have questions about a medical condition or this instruction, always ask your healthcare professional. [place] [address] any warranty or liability for your use of this information. 1. Take zofran as needed for vomiting  2. Take clear liquids until no more vomiting for 4-6 hours  3. Advance to BRAT (bananas, rice, applesauce and toast) as tolerated. 4. Hold miralax and colace until diarrhea has resolved  5.  Hold off on using imodium  6.  If patient is not improving or developing any new/worsening symptoms then return to clinic as needed or follow up with PCP            Electronically signed by ANGELA Kumar on 5/1/2019 at 11:45 AM

## 2019-05-13 ENCOUNTER — HOSPITAL ENCOUNTER (OUTPATIENT)
Dept: WOUND CARE | Age: 29
Discharge: HOME | End: 2019-05-13
Payer: COMMERCIAL

## 2019-05-13 VITALS
WEIGHT: 303 LBS | RESPIRATION RATE: 18 BRPM | HEART RATE: 84 BPM | TEMPERATURE: 98.1 F | SYSTOLIC BLOOD PRESSURE: 110 MMHG | HEIGHT: 72 IN | DIASTOLIC BLOOD PRESSURE: 75 MMHG | BODY MASS INDEX: 41.04 KG/M2

## 2019-05-13 DIAGNOSIS — L89.323 PRESSURE INJURY OF LEFT BUTTOCK, STAGE 3 (HCC): Chronic | ICD-10-CM

## 2019-05-13 DIAGNOSIS — L30.8 DERMATITIS ASSOCIATED WITH MOISTURE: ICD-10-CM

## 2019-05-13 DIAGNOSIS — I69.30 HISTORY OF CEREBROVASCULAR ACCIDENT (CVA) WITH RESIDUAL DEFICIT: ICD-10-CM

## 2019-05-13 DIAGNOSIS — S31.829A OPEN WOUND OF LEFT BUTTOCK, INITIAL ENCOUNTER: ICD-10-CM

## 2019-05-13 PROCEDURE — 97597 DBRDMT OPN WND 1ST 20 CM/<: CPT | Performed by: SURGERY

## 2019-05-13 PROCEDURE — 97597 DBRDMT OPN WND 1ST 20 CM/<: CPT

## 2019-05-13 ASSESSMENT — PAIN DESCRIPTION - PROGRESSION: CLINICAL_PROGRESSION: NOT CHANGED

## 2019-05-13 ASSESSMENT — PAIN DESCRIPTION - PAIN TYPE: TYPE: ACUTE PAIN

## 2019-05-13 ASSESSMENT — PAIN DESCRIPTION - ONSET: ONSET: ON-GOING

## 2019-05-13 ASSESSMENT — PAIN DESCRIPTION - LOCATION: LOCATION: BUTTOCKS

## 2019-05-13 ASSESSMENT — PAIN DESCRIPTION - ORIENTATION: ORIENTATION: LEFT

## 2019-05-13 ASSESSMENT — PAIN DESCRIPTION - FREQUENCY: FREQUENCY: CONTINUOUS

## 2019-05-13 NOTE — PROGRESS NOTES
[address]   Progress Note and Procedure Note      [address] RECORD NUMBER:  [id number]  AGE: 30 y.o. GENDER: male  : 1990  EPISODE DATE:  2019    Subjective:     Chief Complaint   Patient presents with    Wound Check     Left Buttocks wound; Patient denies increased pain at wound site         HISTORY of PRESENT ILLNESS HPI     Valentin Horner is a 30 y.o. male who presents today for wound/ulcer evaluation. History of Wound Context: Pt with buttocks wound here for eval/treat  Wound/Ulcer Pain Timing/Severity: none  Quality of pain: N/A  Severity:  0 / 10   Modifying Factors: None  Associated Signs/Symptoms: none    Ulcer Identification:  Ulcer Type: pressure  Contributing Factors: chronic pressure and shear force    Wound: pressure        PAST MEDICAL HISTORY        Diagnosis Date    Arthritis     Cerebral artery occlusion with cerebral infarction ([facility] [address]     Depression     Difficult intubation     Hemorrhoids     History of blood transfusion     Hypertension     Kidney stone     Muscle spasticity     Prolonged emergence from general anesthesia     Retention of urine        PAST SURGICAL HISTORY    Past Surgical History:   Procedure Laterality Date    BACK SURGERY      BACLOFEN PUMP IMPLANTATION      BRAIN SURGERY      KIDNEY STONE SURGERY      [place] FRAGMENT KIDNEY STONE/ ESWL Right 10/2/2018    ESWL EXTRACORPEAL SHOCK WAVE LITHOTRIPSY performed by Dub Cabot, MD at [address]    History reviewed. No pertinent family history.     SOCIAL HISTORY    Social History     Tobacco Use    Smoking status: Never Smoker    Smokeless tobacco: Never Used   Substance Use Topics    Alcohol use: No    Drug use: No       ALLERGIES    Allergies   Allergen Reactions    Latex     Dilantin [Phenytoin]     Pcn [Penicillins]     Sulfa Antibiotics        MEDICATIONS    Current Outpatient Medications on File Prior to Encounter   Medication Sig Dispense Refill    ondansetron (ZOFRAN) 4 MG tablet Take 1 tablet by mouth every 8 hours as needed for Nausea or Vomiting 30 tablet 0    loperamide (IMODIUM) 2 MG capsule Take 2 mg by mouth 4 times daily as needed for Diarrhea      hydrocortisone 2.5 % cream Apply topically 2 times daily Apply topically 2 times daily.  tacrolimus (PROTOPIC) 0.03 % ointment Apply topically 2 times daily Apply topically 2 times daily.  vitamin E 400 UNIT capsule Take 400 Units by mouth      meloxicam (MOBIC) 7.5 MG tablet Take 1 tablet by mouth 2 times daily 30 tablet 3    loratadine (CLARITIN) 10 MG capsule Take 10 mg by mouth daily      fluticasone (FLONASE) 50 MCG/ACT nasal spray 1 spray by Each Nare route 2 times daily      melatonin 5 MG TABS tablet Take 5 mg by mouth nightly       gabapentin (NEURONTIN) 400 MG capsule Take 400 mg by mouth 3 times daily.  ketoconazole (NIZORAL) 2 % cream Apply topically 3 times daily Apply topically daily.  lisinopril (PRINIVIL;ZESTRIL) 10 MG tablet Take 10 mg by mouth daily      oxyCODONE-acetaminophen (PERCOCET)  MG per tablet Take 1 tablet by mouth every 6 hours as needed for Pain.  tiZANidine (ZANAFLEX) 4 MG tablet Take 2 mg by mouth 2 times daily       zinc oxide 20 % ointment Apply topically 2 times daily Apply topically as needed.  polyethylene glycol (GLYCOLAX) powder Take 17 g by mouth 2 times daily       zolpidem (AMBIEN) 10 MG tablet Take by mouth nightly as needed for Sleep      dexlansoprazole (DEXILANT) 60 MG CPDR delayed release capsule Take 60 mg by mouth daily      guaiFENesin 400 MG tablet Take 800 mg by mouth 3 times daily as needed for Cough 2 Tablets TID PO PRN       nystatin (MYCOSTATIN) 614690 UNIT/GM cream Apply 1 Dose topically as needed for Dry Skin Apply topically 2 times daily.       citalopram (CELEXA) 40 MG tablet Take 40 mg by mouth daily      DULoxetine (CYMBALTA) 60 MG capsule Take 60 mg by mouth daily      Multiple Vitamins-Minerals (THERAPEUTIC MULTIVITAMIN-MINERALS) tablet Take 1 tablet by mouth daily      Calcium Carbonate-Vitamin D (CALCIUM-VITAMIN D) 500-200 MG-UNIT per tablet Take 1 tablet by mouth 2 times daily (with meals)      docusate sodium (COLACE) 100 MG capsule Take 200 mg by mouth 2 times daily      Omega-3 Fatty Acids (FISH OIL) 1000 MG CAPS Take 3,000 mg by mouth 2 times daily      risperiDONE (RISPERDAL) 1 MG tablet Take 1 mg by mouth 2 times daily      Ascorbic Acid (VITAMIN C) 500 MG CAPS Take 500 mg by mouth 2 times daily      propranolol (INDERAL) 20 MG tablet Take 20 mg by mouth 2 times daily       ketoconazole (NIZORAL) 2 % shampoo Apply topically daily as needed for Itching Apply topically daily as needed.  acetaminophen (TYLENOL) 500 MG tablet Take 1,000 mg by mouth every 6 hours as needed for Pain or Fever       No current facility-administered medications on file prior to encounter. REVIEW OF SYSTEMS    Pertinent items are noted in HPI.     Objective:      /75   Pulse 84   Temp 98.1 °F (36.7 °C) (Temporal)   Resp 18   Ht 6' (1.829 m)   Wt (!) 303 lb (137.4 kg)   BMI 41.09 kg/m²     Wt Readings from Last 3 Encounters:   05/13/19 (!) 303 lb (137.4 kg)   05/01/19 (!) 303 lb (137.4 kg)   04/22/19 (!) 303 lb (137.4 kg)       PHYSICAL EXAM    General Appearance: alert and oriented to person, place and time, well developed and well- nourished, in no acute distress  Skin: warm and dry, no rash or erythema  Head: normocephalic and atraumatic  Eyes: pupils equal, round, and reactive to light, extraocular eye movements intact, conjunctivae normal  ENT: tympanic membrane, external ear and ear canal normal bilaterally, nose without deformity, nasal mucosa and turbinates normal without polyps  Neck: supple and non-tender without mass, no thyromegaly or thyroid nodules, no cervical lymphadenopathy  Pulmonary/Chest: clear to auscultation bilaterally- no wheezes, rales or rhonchi, normal air movement, no respiratory distress  Cardiovascular: normal rate, regular rhythm, normal S1 and S2, no murmurs, rubs, clicks, or gallops, distal pulses intact, no carotid bruits  Abdomen: soft, non-tender, non-distended, normal bowel sounds, no masses or organomegaly  Extremities: no cyanosis, clubbing or edema      Assessment:      Problem List Items Addressed This Visit     Open wound of buttock (Chronic)    * (Principal) Pressure injury of left buttock, stage 3 (HCC) (Chronic)    Dermatitis associated with moisture    History of cerebrovascular accident (CVA) with residual deficit           Procedure Note  Indications:  Based on my examination of this patient's wound(s)/ulcer(s) today, debridement is required to promote healing and evaluate the wound base. Performed by: Van Castellanos MD    Consent obtained:  Yes    Time out taken:  Yes    Pain Control:         Debridement:Non-excisional Debridement    Using curette the wound(s)/ulcer(s) was/were sharply debrided down through and including the removal of epidermis and dermis. Devitalized Tissue Debrided:  fibrin, biofilm, slough and exudate      Pre Debridement Measurements:  Are located in the Wound/Ulcer Documentation Flow Sheet    Wound/Ulcer #: 3    Percent of Wound(s)/Ulcer(s) Debrided: 100%    Total Surface Area Debrided:  0.8 sq cm       Diabetic/Pressure/Non Pressure Ulcers only:  Ulcer: Pressure ulcer, Stage 3         Post Debridement Measurements:    Wound/Ulcer Descriptions are Pre Debridement --EXCEPT MEASUREMENTS    Wound 03/28/19 Buttocks Left WOUND 3 LEFT BUTTOCKS PRESSURE STAGE 3 (Active)   Wound Image    4/22/2019 10:42 AM   Wound Pressure Stage  3 5/13/2019  1:54 PM   Dressing Status Old drainage; Intact 5/13/2019  1:54 PM   Wound Cleansed Rinsed/Irrigated with saline 5/13/2019  1:54 PM   Wound Length (cm) 1 cm 5/13/2019  1:54 PM   Wound Width (cm) 0.8 cm 5/13/2019  1:54 PM   Wound Depth (cm) 0.1 cm 5/13/2019  1:54 PM   Wound Surface Area (cm^2) 0.8 cm^2 5/13/2019  1:54 PM   Change in Wound Size % (l*w) -33.33 5/13/2019  1:54 PM   Wound Volume (cm^3) 0.08 cm^3 5/13/2019  1:54 PM   Wound Healing % -33 5/13/2019  1:54 PM   Post-Procedure Length (cm) 1 cm 5/13/2019  2:04 PM   Post-Procedure Width (cm) 0.8 cm 5/13/2019  2:04 PM   Post-Procedure Depth (cm) 0.1 cm 5/13/2019  2:04 PM   Post-Procedure Surface Area (cm^2) 0.8 cm^2 5/13/2019  2:04 PM   Post-Procedure Volume (cm^3) 0.08 cm^3 5/13/2019  2:04 PM   Wound Assessment Granulation tissue;Pink;Red;Slough; Yellow 5/13/2019  1:54 PM   Drainage Amount Moderate 5/13/2019  1:54 PM   Drainage Description Serosanguinous 5/13/2019  1:54 PM   Odor None 5/13/2019  1:54 PM   Margins Attached edges 5/13/2019  1:54 PM   Rachana-wound Assessment Dry; Intact 5/13/2019  1:54 PM   Non-staged Wound Description Not applicable 6/51/3605  2:53 PM   Foyil%Wound Bed 45 5/13/2019  1:54 PM   Red%Wound Bed 45 5/13/2019  1:54 PM   Yellow%Wound Bed 10 5/13/2019  1:54 PM   Number of days: 46         Estimated Blood Loss:  Minimal    Hemostasis Achieved:  by pressure    Procedural Pain:  0  / 10     Post Procedural Pain:  0 / 10     Response to treatment:  Well tolerated by patient. Plan:     Problem List Items Addressed This Visit     Open wound of buttock (Chronic)    * (Principal) Pressure injury of left buttock, stage 3 (HCC) (Chronic)    Dermatitis associated with moisture    History of cerebrovascular accident (CVA) with residual deficit          Treatment Note please see attached Discharge Instructions    In my professional opinion this patient would benefit from HBO Therapy: No    Written patient dismissal instructions given to patient and signed by patient or POA.          Discharge Instructions       Visit Discharge/Physician Orders    Discharge condition: Stable    Discharge to: Home    Left via:Private automobile    Accompanied by: [facility] Staff     ECF/HHA: [facility]

## 2019-06-03 ENCOUNTER — HOSPITAL ENCOUNTER (OUTPATIENT)
Dept: WOUND CARE | Age: 29
Discharge: HOME | End: 2019-06-03
Payer: COMMERCIAL

## 2019-06-03 VITALS — BODY MASS INDEX: 41.04 KG/M2 | HEART RATE: 85 BPM | TEMPERATURE: 98.4 F | HEIGHT: 72 IN | WEIGHT: 303 LBS

## 2019-06-03 DIAGNOSIS — L89.323 PRESSURE INJURY OF LEFT BUTTOCK, STAGE 3 (HCC): ICD-10-CM

## 2019-06-03 DIAGNOSIS — L30.8 DERMATITIS ASSOCIATED WITH MOISTURE: ICD-10-CM

## 2019-06-03 DIAGNOSIS — I69.30 HISTORY OF CEREBROVASCULAR ACCIDENT (CVA) WITH RESIDUAL DEFICIT: ICD-10-CM

## 2019-06-03 DIAGNOSIS — S31.829A OPEN WOUND OF LEFT BUTTOCK, INITIAL ENCOUNTER: ICD-10-CM

## 2019-06-03 PROCEDURE — 97597 DBRDMT OPN WND 1ST 20 CM/<: CPT

## 2019-06-03 PROCEDURE — 97597 DBRDMT OPN WND 1ST 20 CM/<: CPT | Performed by: SURGERY

## 2019-06-03 RX ORDER — BUSPIRONE HYDROCHLORIDE 10 MG/1
10 TABLET ORAL 3 TIMES DAILY
COMMUNITY

## 2019-06-03 RX ORDER — AZELASTINE HCL 205.5 UG/1
1 SPRAY NASAL 2 TIMES DAILY
Status: ON HOLD | COMMUNITY
End: 2022-10-04 | Stop reason: HOSPADM

## 2019-06-03 RX ORDER — CHLORPROMAZINE HYDROCHLORIDE 25 MG/1
25 TABLET, FILM COATED ORAL NIGHTLY
Status: ON HOLD | COMMUNITY
End: 2022-04-12 | Stop reason: HOSPADM

## 2019-06-03 RX ORDER — BACLOFEN 20 MG/1
10 TABLET ORAL 3 TIMES DAILY PRN
COMMUNITY

## 2019-06-03 ASSESSMENT — PAIN SCALES - GENERAL: PAINLEVEL_OUTOF10: 6

## 2019-06-03 ASSESSMENT — PAIN DESCRIPTION - ORIENTATION: ORIENTATION: RIGHT

## 2019-06-03 ASSESSMENT — PAIN DESCRIPTION - ONSET: ONSET: ON-GOING

## 2019-06-03 ASSESSMENT — PAIN DESCRIPTION - PAIN TYPE: TYPE: ACUTE PAIN

## 2019-06-03 ASSESSMENT — PAIN DESCRIPTION - LOCATION: LOCATION: BUTTOCKS

## 2019-06-03 ASSESSMENT — PAIN DESCRIPTION - DESCRIPTORS: DESCRIPTORS: BURNING

## 2019-06-03 ASSESSMENT — PAIN DESCRIPTION - FREQUENCY: FREQUENCY: CONTINUOUS

## 2019-06-03 NOTE — PROGRESS NOTES
[address]   Progress Note and Procedure Note      [address] RECORD NUMBER:  [id number]  AGE: 30 y.o. GENDER: male  : 1990  EPISODE DATE:  6/3/2019    Subjective:     Chief Complaint   Patient presents with    Wound Check     Pt states wound has been draining         HISTORY of PRESENT ILLNESS HPI     Awa Garcia is a 30 y.o. male who presents today for wound/ulcer evaluation. History of Wound Context: Pt with L and R buttocks wounds here for eval/treat  Wound/Ulcer Pain Timing/Severity: itching  Quality of pain: N/A  Severity:  0 / 10   Modifying Factors: None  Associated Signs/Symptoms: edema    Ulcer Identification:  Ulcer Type: pressure  Contributing Factors: decreased mobility, shear force and obesity    Wound: pressure        PAST MEDICAL HISTORY        Diagnosis Date    Arthritis     Cerebral artery occlusion with cerebral infarction ([facility] [address]     Depression     Difficult intubation     Hemorrhoids     History of blood transfusion     Hypertension     Kidney stone     Muscle spasticity     Prolonged emergence from general anesthesia     Retention of urine        PAST SURGICAL HISTORY    Past Surgical History:   Procedure Laterality Date    BACK SURGERY      BACLOFEN PUMP IMPLANTATION      BRAIN SURGERY      KIDNEY STONE SURGERY      [place] FRAGMENT KIDNEY STONE/ ESWL Right 10/2/2018    ESWL EXTRACORPEAL SHOCK WAVE LITHOTRIPSY performed by Debbi Amaya MD at [address]    No family history on file.     SOCIAL HISTORY    Social History     Tobacco Use    Smoking status: Never Smoker    Smokeless tobacco: Never Used   Substance Use Topics    Alcohol use: No    Drug use: No       ALLERGIES    Allergies   Allergen Reactions    Latex     Dilantin [Phenytoin]     Pcn [Penicillins]     Sulfa Antibiotics        MEDICATIONS    Current Outpatient Medications on File Prior to Encounter   Medication Sig Dispense Refill    azelastine HCl 0.15 % SOLN by Nasal route 2 times daily as needed      baclofen (LIORESAL) 20 MG tablet Take 20 mg by mouth 4 times daily      busPIRone (BUSPAR) 10 MG tablet Take 10 mg by mouth daily      chlorproMAZINE (THORAZINE) 25 MG tablet Take 25 mg by mouth nightly      hydrocortisone 2.5 % cream Apply topically 2 times daily Apply topically 2 times daily.  tacrolimus (PROTOPIC) 0.03 % ointment Apply topically 2 times daily Apply topically 2 times daily.  vitamin E 400 UNIT capsule Take 400 Units by mouth      meloxicam (MOBIC) 7.5 MG tablet Take 1 tablet by mouth 2 times daily 30 tablet 3    loratadine (CLARITIN) 10 MG capsule Take 10 mg by mouth daily      fluticasone (FLONASE) 50 MCG/ACT nasal spray 1 spray by Each Nare route 2 times daily      melatonin 5 MG TABS tablet Take 5 mg by mouth nightly       gabapentin (NEURONTIN) 400 MG capsule Take 400 mg by mouth 3 times daily.  lisinopril (PRINIVIL;ZESTRIL) 10 MG tablet Take 10 mg by mouth daily      tiZANidine (ZANAFLEX) 4 MG tablet Take 2 mg by mouth 2 times daily       polyethylene glycol (GLYCOLAX) powder Take 17 g by mouth 2 times daily       zolpidem (AMBIEN) 10 MG tablet Take by mouth nightly as needed for Sleep      dexlansoprazole (DEXILANT) 60 MG CPDR delayed release capsule Take 60 mg by mouth daily      nystatin (MYCOSTATIN) 983326 UNIT/GM cream Apply 1 Dose topically as needed for Dry Skin Apply topically 2 times daily.       citalopram (CELEXA) 40 MG tablet Take 40 mg by mouth daily      DULoxetine (CYMBALTA) 60 MG capsule Take 60 mg by mouth daily      Multiple Vitamins-Minerals (THERAPEUTIC MULTIVITAMIN-MINERALS) tablet Take 1 tablet by mouth daily      Calcium Carbonate-Vitamin D (CALCIUM-VITAMIN D) 500-200 MG-UNIT per tablet Take 1 tablet by mouth 2 times daily (with meals)      docusate sodium (COLACE) 100 MG capsule Take 200 mg by mouth 2 times daily      Omega-3 Fatty Acids (FISH OIL) 1000 MG CAPS Take 3,000 mg by mouth 2 times daily      risperiDONE (RISPERDAL) 1 MG tablet Take 1 mg by mouth 2 times daily      Ascorbic Acid (VITAMIN C) 500 MG CAPS Take 500 mg by mouth 2 times daily      propranolol (INDERAL) 20 MG tablet Take 20 mg by mouth 2 times daily       loperamide (IMODIUM) 2 MG capsule Take 2 mg by mouth 4 times daily as needed for Diarrhea      ketoconazole (NIZORAL) 2 % cream Apply topically 3 times daily Apply topically daily.  oxyCODONE-acetaminophen (PERCOCET)  MG per tablet Take 1 tablet by mouth every 6 hours as needed for Pain.  zinc oxide 20 % ointment Apply topically 2 times daily Apply topically as needed.  guaiFENesin 400 MG tablet Take 800 mg by mouth 3 times daily as needed for Cough 2 Tablets TID PO PRN       ketoconazole (NIZORAL) 2 % shampoo Apply topically daily as needed for Itching Apply topically daily as needed.  acetaminophen (TYLENOL) 500 MG tablet Take 1,000 mg by mouth every 6 hours as needed for Pain or Fever       No current facility-administered medications on file prior to encounter. REVIEW OF SYSTEMS    Pertinent items are noted in HPI.     Objective:      Pulse 85   Temp 98.4 °F (36.9 °C) (Temporal)   Ht 6' (1.829 m)   Wt (!) 303 lb (137.4 kg)   BMI 41.09 kg/m²     Wt Readings from Last 3 Encounters:   06/03/19 (!) 303 lb (137.4 kg)   05/13/19 (!) 303 lb (137.4 kg)   05/01/19 (!) 303 lb (137.4 kg)       PHYSICAL EXAM    General Appearance: alert and oriented to person, place and time, well developed and well- nourished, in no acute distress  Skin: warm and dry, no rash or erythema  Head: normocephalic and atraumatic  Eyes: pupils equal, round, and reactive to light, extraocular eye movements intact, conjunctivae normal  ENT: tympanic membrane, external ear and ear canal normal bilaterally, nose without deformity, nasal mucosa and turbinates normal without polyps  Neck: supple and non-tender without mass, no thyromegaly or thyroid nodules, no cervical lymphadenopathy  Pulmonary/Chest: clear to auscultation bilaterally- no wheezes, rales or rhonchi, normal air movement, no respiratory distress  Cardiovascular: normal rate, regular rhythm, normal S1 and S2, no murmurs, rubs, clicks, or gallops, distal pulses intact, no carotid bruits  Abdomen: soft, non-tender, non-distended, normal bowel sounds, no masses or organomegaly  Extremities: no cyanosis, clubbing or edema  Musculoskeletal: normal range of motion, no joint swelling, deformity or tenderness  Neurologic: reflexes normal and symmetric, no cranial nerve deficit, gait, coordination and speech normal      Assessment:      Problem List Items Addressed This Visit     Open wound of buttock (Chronic)    * (Principal) Pressure injury of left buttock, stage 3 (HCC) (Chronic)    Dermatitis associated with moisture    History of cerebrovascular accident (CVA) with residual deficit           Procedure Note  Indications:  Based on my examination of this patient's wound(s)/ulcer(s) today, debridement is required to promote healing and evaluate the wound base. Performed by: Nettie Griffith MD    Consent obtained:  Yes    Time out taken:  Yes    Pain Control:         Debridement:Non-excisional Debridement    Using curette the wound(s)/ulcer(s) was/were sharply debrided down through and including the removal of epidermis and dermis.         Devitalized Tissue Debrided:  fibrin, biofilm, slough and exudate      Pre Debridement Measurements:  Are located in the Wound/Ulcer Documentation Flow Sheet    Wound/Ulcer #: 3 and 4    Percent of Wound(s)/Ulcer(s) Debrided: 100%    Total Surface Area Debrided:  2.4 sq cm       Diabetic/Pressure/Non Pressure Ulcers only:  Ulcer: N/A         Post Debridement Measurements:    Wound/Ulcer Descriptions are Pre Debridement --EXCEPT MEASUREMENTS    Wound 03/28/19 Buttocks Left WOUND 3 LEFT BUTTOCKS PRESSURE STAGE 3 (Active)   Wound Image    6/3/2019  2:33 PM   Wound Pressure Stage  3 6/3/2019  2:33 PM   Dressing Status Changed; Intact; Clean;Dry 5/13/2019  2:30 PM   Dressing Changed Changed/New 5/13/2019  2:30 PM   Dressing/Treatment Xeroform 5/13/2019  2:30 PM   Wound Cleansed Rinsed/Irrigated with saline 5/13/2019  1:54 PM   Wound Length (cm) 0.5 cm 6/3/2019  2:33 PM   Wound Width (cm) 1 cm 6/3/2019  2:33 PM   Wound Depth (cm) 0.1 cm 6/3/2019  2:33 PM   Wound Surface Area (cm^2) 0.5 cm^2 6/3/2019  2:33 PM   Change in Wound Size % (l*w) 16.67 6/3/2019  2:33 PM   Wound Volume (cm^3) 0.05 cm^3 6/3/2019  2:33 PM   Wound Healing % 17 6/3/2019  2:33 PM   Post-Procedure Length (cm) 0.5 cm 6/3/2019  2:48 PM   Post-Procedure Width (cm) 1 cm 6/3/2019  2:48 PM   Post-Procedure Depth (cm) 0.1 cm 6/3/2019  2:48 PM   Post-Procedure Surface Area (cm^2) 0.5 cm^2 6/3/2019  2:48 PM   Post-Procedure Volume (cm^3) 0.05 cm^3 6/3/2019  2:48 PM   Distance Tunneling (cm) 0 cm 6/3/2019  2:33 PM   Tunneling Position ___ O'Clock 0 6/3/2019  2:33 PM   Undermining Starts ___ O'Clock 0 6/3/2019  2:33 PM   Undermining Ends___ O'Clock 0 6/3/2019  2:33 PM   Undermining Maxium Distance (cm) 0 6/3/2019  2:33 PM   Wound Assessment Red;Drainage;Granulation tissue;Slough; Yellow 6/3/2019  2:33 PM   Drainage Amount Small 6/3/2019  2:33 PM   Drainage Description Serosanguinous 6/3/2019  2:33 PM   Odor None 6/3/2019  2:33 PM   Margins Attached edges 6/3/2019  2:33 PM   Rachana-wound Assessment Red;Dry 6/3/2019  2:33 PM   Non-staged Wound Description Not applicable 6/1/5015  5:37 PM   [place]%Wound Bed 0 6/3/2019  2:33 PM   Red%Wound Bed 95 6/3/2019  2:33 PM   Yellow%Wound Bed 5 6/3/2019  2:33 PM   Black%Wound Bed 0 6/3/2019  2:33 PM   Purple%Wound Bed 0 6/3/2019  2:33 PM   Other%Wound Bed 0 6/3/2019  2:33 PM   Number of days: 67       Wound 06/03/19 Buttocks Right Wound 4, Stage 3, R.  Buttock (Active)   Wound Image   6/3/2019  2:33 PM   Wound Pressure Stage  3 6/3/2019  2:33 PM   Dressing Status Old drainage 6/3/2019  2:33 PM   Wound Cleansed Not Cleansed 6/3/2019  2:33 PM   Wound Length (cm) 5 cm 6/3/2019  2:33 PM   Wound Width (cm) 0.4 cm 6/3/2019  2:33 PM   Wound Depth (cm) 0.1 cm 6/3/2019  2:33 PM   Wound Surface Area (cm^2) 2 cm^2 6/3/2019  2:33 PM   Wound Volume (cm^3) 0.2 cm^3 6/3/2019  2:33 PM   Post-Procedure Length (cm) 5 cm 6/3/2019  2:48 PM   Post-Procedure Width (cm) 0.4 cm 6/3/2019  2:48 PM   Post-Procedure Depth (cm) 0.1 cm 6/3/2019  2:48 PM   Post-Procedure Surface Area (cm^2) 2 cm^2 6/3/2019  2:48 PM   Post-Procedure Volume (cm^3) 0.2 cm^3 6/3/2019  2:48 PM   Wound Assessment Pink;Painful;Drainage;Yellow 6/3/2019  2:33 PM   Drainage Amount Small 6/3/2019  2:33 PM   Drainage Description Yellow 6/3/2019  2:33 PM   Odor None 6/3/2019  2:33 PM   Margins Attached edges 6/3/2019  2:33 PM   Rachana-wound Assessment Dry;Painful;Pink 6/3/2019  2:33 PM   Non-staged Wound Description Not applicable 2/5/0099  8:85 PM   [place]%Wound Bed 95 6/3/2019  2:33 PM   Red%Wound Bed 0 6/3/2019  2:33 PM   Yellow%Wound Bed 5 6/3/2019  2:33 PM   Black%Wound Bed 0 6/3/2019  2:33 PM   Purple%Wound Bed 0 6/3/2019  2:33 PM   Other%Wound Bed 0 6/3/2019  2:33 PM   Number of days: 0         Estimated Blood Loss:  Minimal    Hemostasis Achieved:  by pressure and by silver nitrate stick    Procedural Pain:  0  / 10     Post Procedural Pain:  0 / 10     Response to treatment:  Well tolerated by patient.          Plan:     Problem List Items Addressed This Visit     Open wound of buttock (Chronic)    * (Principal) Pressure injury of left buttock, stage 3 (HCC) (Chronic)    Dermatitis associated with moisture    History of cerebrovascular accident (CVA) with residual deficit          Pt to see Dr. Lilia Zhao in two weeks for possible surgical excision of wound    Treatment Note please see attached Discharge Instructions    In my professional opinion this patient would benefit from HBO Therapy: No    Written patient dismissal instructions given to patient and signed by patient or POA. Discharge Instructions       Visit Discharge/Physician Orders    Discharge condition: Stable    Discharge to: Home    Left via:Private automobile    Accompanied by: [facility]     [facility]:  [facility]     Dressing Orders: Buttock Wound:  Wash with soap and water. Apply xeroform to wound bed.    Cover with mepilex bordered gauze. Change every 2-3 days. Treatment Orders:  Keep hands from scratching wound area   Apply barrier cream three time a day and as needed for soiling     Barrier cream over the counter such as GABRIEL cream   Gabriel contains zinc oxide and miconazole 2% antifungal.     Avoid Pressure to wound site. Turn frequently (turn at least every two hours when in bed)  While in chair reposition every 30 minutes  Patient advised to sit up 2 hours at a time.     Continue Protein rich diet (unless restricted by your physician)  Take Multivitamin daily     Please use Roho cushion in chair    [facility] followup visit ____________2 weeks with Dr Blank_________________  (Please note your next appointment above and if you are unable to keep, kindly give a 24 hour notice. Thank you.)    If you experience any of the following, please call the [facility] during business hours:    * Increase in Pain  * Temperature over 101  * Increase in drainage from your wound  * Drainage with a foul odor  * Bleeding  * Increase in swelling  * Need for compression bandage changes due to slippage, breakthrough drainage. If you need medical attention outside of the business hours of the [facility] please contact your PCP or go to the nearest emergency room.         Electronically signed by Caroline Adam MD on 6/3/2019 at 3:01 PM

## 2019-06-03 NOTE — PLAN OF CARE
Problem: Wound:  Goal: Will show signs of wound healing; wound closure and no evidence of infection  Description  Will show signs of wound healing; wound closure and no evidence of infection  Outcome: Ongoing     Problem: Pressure Ulcer:  Goal: Signs of wound healing will improve  Description  Signs of wound healing will improve  Outcome: Ongoing  Goal: Absence of new pressure ulcer  Description  Absence of new pressure ulcer  Outcome: Ongoing  Goal: Will show no infection signs and symptoms  Description  Will show no infection signs and symptoms  Outcome: Ongoing     Problem: Falls - Risk of:  Goal: Will remain free from falls  Description  Will remain free from falls  Outcome: Ongoing

## 2019-06-18 ENCOUNTER — HOSPITAL ENCOUNTER (OUTPATIENT)
Dept: WOUND CARE | Age: 29
Discharge: HOME | End: 2019-06-18
Payer: COMMERCIAL

## 2019-06-18 VITALS
BODY MASS INDEX: 41.04 KG/M2 | RESPIRATION RATE: 18 BRPM | SYSTOLIC BLOOD PRESSURE: 116 MMHG | HEIGHT: 72 IN | DIASTOLIC BLOOD PRESSURE: 72 MMHG | TEMPERATURE: 98 F | WEIGHT: 303 LBS | HEART RATE: 80 BPM

## 2019-06-18 DIAGNOSIS — L89.323 PRESSURE INJURY OF LEFT BUTTOCK, STAGE 3 (HCC): ICD-10-CM

## 2019-06-18 DIAGNOSIS — I69.30 HISTORY OF CEREBROVASCULAR ACCIDENT (CVA) WITH RESIDUAL DEFICIT: ICD-10-CM

## 2019-06-18 DIAGNOSIS — L89.43 PRESSURE INJURY OF CONTIGUOUS REGION INVOLVING BACK AND RIGHT BUTTOCK, STAGE 3 (HCC): Chronic | ICD-10-CM

## 2019-06-18 DIAGNOSIS — L30.8 DERMATITIS ASSOCIATED WITH MOISTURE: ICD-10-CM

## 2019-06-18 PROCEDURE — 99214 OFFICE O/P EST MOD 30 MIN: CPT

## 2019-06-18 PROCEDURE — 99212 OFFICE O/P EST SF 10 MIN: CPT | Performed by: SURGERY

## 2019-06-18 RX ORDER — HYDROXYZINE HYDROCHLORIDE 25 MG/1
25 TABLET, FILM COATED ORAL NIGHTLY
Qty: 30 TABLET | Refills: 2 | Status: SHIPPED | OUTPATIENT
Start: 2019-06-18 | End: 2019-07-18

## 2019-06-18 ASSESSMENT — PAIN DESCRIPTION - FREQUENCY: FREQUENCY: CONTINUOUS

## 2019-06-18 ASSESSMENT — PAIN DESCRIPTION - DESCRIPTORS: DESCRIPTORS: ACHING;BURNING;TENDER

## 2019-06-18 ASSESSMENT — PAIN DESCRIPTION - ORIENTATION: ORIENTATION: RIGHT

## 2019-06-18 ASSESSMENT — PAIN DESCRIPTION - PAIN TYPE: TYPE: ACUTE PAIN

## 2019-06-18 ASSESSMENT — PAIN DESCRIPTION - PROGRESSION: CLINICAL_PROGRESSION: NOT CHANGED

## 2019-06-18 ASSESSMENT — PAIN DESCRIPTION - ONSET: ONSET: ON-GOING

## 2019-06-18 ASSESSMENT — PAIN DESCRIPTION - LOCATION: LOCATION: SACRUM

## 2019-06-18 ASSESSMENT — PAIN SCALES - GENERAL: PAINLEVEL_OUTOF10: 6

## 2019-06-18 NOTE — PROGRESS NOTES
[facility]   Progress Note and Procedure Note      Roni Chen  AGE: 30 y.o. GENDER: male  : 1990  TODAY'S DATE:  2019    Subjective:        HISTORY of PRESENT ILLNESS HPI   Roni Chen is a 30 y.o. male who presents today for wound/ulcer evaluation. Ulcer Type:pressure  Ulcer/Wound Location:bilateral buttock  Contributing factors:chronic pressure, decreased mobility, shear force, obesity and non-adherence    Patient Active Problem List   Diagnosis Code    Muscle spasticity M62.838    Post-traumatic spasticity R25.2    Post-traumatic spasticity R25.2    Open wound of buttock S31.809A    Dermatitis associated with moisture L30.8    History of cerebrovascular accident (CVA) with residual deficit I69.30    Renal calculus, right N20.0    Pressure injury of left buttock, stage 3 (HCC) Z62.889       ALLERGIES    Allergies   Allergen Reactions    Latex     Dilantin [Phenytoin]     Pcn [Penicillins]     Sulfa Antibiotics            Objective:      /72   Pulse 80   Temp 98 °F (36.7 °C) (Temporal)   Resp 18   Ht 6' (1.829 m)   Wt (!) 303 lb (137.4 kg)   BMI 41.09 kg/m²         Assessment:      Problem List Items Addressed This Visit     Pressure injury of left buttock, stage 3 (HCC) (Chronic)    Dermatitis associated with moisture    History of cerebrovascular accident (CVA) with residual deficit          The patients pain isPain Level: 6 Pain Type: Acute pain. Wound(s) is unchanged. Please refer to nursing measurements and assessment regarding wound pre and post debridement. Wound 19 Buttocks Left WOUND 3 LEFT BUTTOCKS PRESSURE STAGE 3 (Active)   Wound Image   2019  1:46 PM   Wound Pressure Stage  3 2019  1:46 PM   Dressing Status Changed; Intact; Clean;Dry 2019  2:30 PM   Dressing Changed Changed/New 6/3/2019  3:16 PM   Dressing/Treatment Xeroform 2019  2:30 PM   Wound Cleansed Rinsed/Irrigated with saline 2019  1:46 PM   Wound Length PM   Wound Surface Area (cm^2) 2.25 cm^2 6/18/2019  1:46 PM   Change in Wound Size % (l*w) -12.5 6/18/2019  1:46 PM   Wound Volume (cm^3) 0.22 cm^3 6/18/2019  1:46 PM   Wound Healing % -10 6/18/2019  1:46 PM   Post-Procedure Length (cm) 5 cm 6/3/2019  2:48 PM   Post-Procedure Width (cm) 0.4 cm 6/3/2019  2:48 PM   Post-Procedure Depth (cm) 0.1 cm 6/3/2019  2:48 PM   Post-Procedure Surface Area (cm^2) 2 cm^2 6/3/2019  2:48 PM   Post-Procedure Volume (cm^3) 0.2 cm^3 6/3/2019  2:48 PM   Distance Tunneling (cm) 0 cm 6/18/2019  1:46 PM   Tunneling Position ___ O'Clock 0 6/18/2019  1:46 PM   Undermining Starts ___ O'Clock 0 6/18/2019  1:46 PM   Undermining Ends___ O'Clock 0 6/18/2019  1:46 PM   Undermining Maxium Distance (cm) 0 6/18/2019  1:46 PM   Wound Assessment Pink;Painful;Drainage;Yellow 6/18/2019  1:46 PM   Drainage Amount Small 6/18/2019  1:46 PM   Drainage Description Yellow 6/18/2019  1:46 PM   Odor None 6/18/2019  1:46 PM   Margins Attached edges 6/18/2019  1:46 PM   Rachana-wound Assessment Dry;Painful;Pink 6/18/2019  1:46 PM   Non-staged Wound Description Not applicable 5/30/3320  7:39 PM   [place]%Wound Bed 95 6/18/2019  1:46 PM   Red%Wound Bed 0 6/18/2019  1:46 PM   Yellow%Wound Bed 5 6/18/2019  1:46 PM   Black%Wound Bed 0 6/18/2019  1:46 PM   Purple%Wound Bed 0 6/18/2019  1:46 PM   Other%Wound Bed 0 6/3/2019  2:33 PM   Number of days: 14         Plan:          Plan for wound - Dress per physician order  Treatment:     Compression : No   Offloading : Yes   Dressing : see AVS   Additional Therapy : none     1. Discussed appropriate home care of this wound. Wound redressed. 2. Written patient dismissal instructions given to patient and signed by patient or POA. 3. Follow up: 2 week(s). Mr. Shamar Heller is supposed to be getting a roho cushion delivered this week. I discussed his care with the worker who came from [facility]. He states that the patient just scratches and claws at this area on his bottom. Gave instructions for offloading, protein supplementation, and trimming his nails. Suggested possibility of trying atarax to help with itching. Will change dressing to promogran, dry guaze, and hytape. Follow up in 2 weeks.      Electronically signed by Rei Haney MD on 6/18/2019 at 2:36 PM

## 2019-06-19 NOTE — PLAN OF CARE
06/18/19 1346   Peripheral Vascular   Edema None   Wound 06/03/19 Buttocks Right Wound 4, Stage 3, R. Buttock   Date First Assessed/Time First Assessed: 06/03/19 1443   Present on Hospital Admission: Yes  Wound Approximate Age at First Assessment (Weeks): 1 weeks  Primary Wound Type: Pressure Injury  Location: Buttocks  Wound Location Orientation: Right  Wound . .. Wound Image    Wound Pressure Stage  3   Dressing Status Old drainage   Wound Cleansed Rinsed/Irrigated with saline   Wound Length (cm) 4.5 cm   Wound Width (cm) 0.5 cm   Wound Depth (cm) 0.1 cm   Wound Surface Area (cm^2) 2.25 cm^2   Change in Wound Size % (l*w) -12.5   Wound Volume (cm^3) 0.22 cm^3   Wound Healing % -10   Distance Tunneling (cm) 0 cm   Tunneling Position ___ O'Clock 0   Undermining Starts ___ O'Clock 0   Undermining Ends___ O'Clock 0   Undermining Maxium Distance (cm) 0   Wound Assessment Pink;Painful;Drainage;Yellow   Drainage Amount Moderate   Drainage Description Yellow   Odor None   Margins Attached edges   Rachana-wound Assessment Dry;Painful;Pink   Non-staged Wound Description Not applicable   Pink%Wound Bed 95   Red%Wound Bed 0   Yellow%Wound Bed 5   Black%Wound Bed 0   Purple%Wound Bed 0   Wound 03/28/19 Buttocks Left WOUND 3 LEFT BUTTOCKS PRESSURE STAGE 3   Date First Assessed/Time First Assessed: 03/28/19 1028   Present on Hospital Admission: Yes  Wound Approximate Age at First Assessment (Weeks): 2 weeks  Primary Wound Type: Pressure Injury  Location: Buttocks  Wound Location Orientation: Left  Wound D...    Wound Image    Wound Pressure Stage  3   Wound Cleansed Rinsed/Irrigated with saline   Wound Length (cm) 0.5 cm   Wound Width (cm) 1 cm   Wound Depth (cm) 0.1 cm   Wound Surface Area (cm^2) 0.5 cm^2   Change in Wound Size % (l*w) 16.67   Wound Volume (cm^3) 0.05 cm^3   Wound Healing % 17   Distance Tunneling (cm) 0 cm   Tunneling Position ___ O'Clock 0   Undermining Starts ___ O'Clock 0   Undermining Ends___ O'Clock 0

## 2019-06-21 ENCOUNTER — HOSPITAL ENCOUNTER (EMERGENCY)
Facility: HOSPITAL | Age: 29
Discharge: HOME | End: 2019-06-22
Admitting: EMERGENCY MEDICINE

## 2019-06-21 DIAGNOSIS — S39.012A STRAIN OF LUMBAR REGION, INITIAL ENCOUNTER: Primary | ICD-10-CM

## 2019-06-21 PROCEDURE — 99283 EMERGENCY DEPT VISIT LOW MDM: CPT

## 2019-06-22 ENCOUNTER — APPOINTMENT (OUTPATIENT)
Dept: CT IMAGING | Facility: HOSPITAL | Age: 29
End: 2019-06-22

## 2019-06-22 VITALS
SYSTOLIC BLOOD PRESSURE: 138 MMHG | DIASTOLIC BLOOD PRESSURE: 78 MMHG | TEMPERATURE: 97.6 F | WEIGHT: 303 LBS | RESPIRATION RATE: 16 BRPM | HEART RATE: 77 BPM | HEIGHT: 72 IN | BODY MASS INDEX: 41.04 KG/M2 | OXYGEN SATURATION: 98 %

## 2019-06-22 PROCEDURE — 72128 CT CHEST SPINE W/O DYE: CPT

## 2019-06-22 PROCEDURE — 72131 CT LUMBAR SPINE W/O DYE: CPT

## 2019-07-02 ENCOUNTER — HOSPITAL ENCOUNTER (OUTPATIENT)
Dept: WOUND CARE | Age: 29
Discharge: HOME | End: 2019-07-02

## 2019-07-25 PROBLEM — L89.43 PRESSURE INJURY OF CONTIGUOUS REGION INVOLVING BACK AND RIGHT BUTTOCK, STAGE 3 (HCC): Chronic | Status: ACTIVE | Noted: 2019-07-25

## 2019-07-30 ENCOUNTER — HOSPITAL ENCOUNTER (OUTPATIENT)
Dept: WOUND CARE | Age: 29
Discharge: HOME | End: 2019-07-30
Payer: COMMERCIAL

## 2019-07-30 VITALS
HEART RATE: 80 BPM | TEMPERATURE: 97.3 F | WEIGHT: 303 LBS | HEIGHT: 72 IN | BODY MASS INDEX: 41.04 KG/M2 | RESPIRATION RATE: 16 BRPM | DIASTOLIC BLOOD PRESSURE: 76 MMHG | SYSTOLIC BLOOD PRESSURE: 130 MMHG

## 2019-07-30 DIAGNOSIS — L30.8 DERMATITIS ASSOCIATED WITH MOISTURE: ICD-10-CM

## 2019-07-30 DIAGNOSIS — I69.30 HISTORY OF CEREBROVASCULAR ACCIDENT (CVA) WITH RESIDUAL DEFICIT: ICD-10-CM

## 2019-07-30 DIAGNOSIS — L89.323 PRESSURE INJURY OF LEFT BUTTOCK, STAGE 3 (HCC): ICD-10-CM

## 2019-07-30 DIAGNOSIS — E66.01 CLASS 3 SEVERE OBESITY DUE TO EXCESS CALORIES WITHOUT SERIOUS COMORBIDITY WITH BODY MASS INDEX (BMI) OF 40.0 TO 44.9 IN ADULT (HCC): Chronic | ICD-10-CM

## 2019-07-30 PROCEDURE — 99212 OFFICE O/P EST SF 10 MIN: CPT | Performed by: SURGERY

## 2019-07-30 PROCEDURE — 99213 OFFICE O/P EST LOW 20 MIN: CPT

## 2019-07-30 NOTE — PROGRESS NOTES
Dressing Status Changed;Clean;Dry; Intact 6/18/2019  3:05 PM   Dressing Changed Changed/New 6/18/2019  3:05 PM   Dressing/Treatment 4x4 6/18/2019  3:05 PM   Wound Cleansed Rinsed/Irrigated with saline 7/30/2019  1:56 PM   Wound Length (cm) 4 cm 7/30/2019  1:56 PM   Wound Width (cm) 0.2 cm 7/30/2019  1:56 PM   Wound Depth (cm) 0.1 cm 7/30/2019  1:56 PM   Wound Surface Area (cm^2) 0.8 cm^2 7/30/2019  1:56 PM   Change in Wound Size % (l*w) 60 7/30/2019  1:56 PM   Wound Volume (cm^3) 0.08 cm^3 7/30/2019  1:56 PM   Wound Healing % 60 7/30/2019  1:56 PM   Post-Procedure Length (cm) 5 cm 6/3/2019  2:48 PM   Post-Procedure Width (cm) 0.4 cm 6/3/2019  2:48 PM   Post-Procedure Depth (cm) 0.1 cm 6/3/2019  2:48 PM   Post-Procedure Surface Area (cm^2) 2 cm^2 6/3/2019  2:48 PM   Post-Procedure Volume (cm^3) 0.2 cm^3 6/3/2019  2:48 PM   Distance Tunneling (cm) 0 cm 6/18/2019  1:46 PM   Tunneling Position ___ O'Clock 0 6/18/2019  1:46 PM   Undermining Starts ___ O'Clock 0 6/18/2019  1:46 PM   Undermining Ends___ O'Clock 0 6/18/2019  1:46 PM   Undermining Maxium Distance (cm) 0 6/18/2019  1:46 PM   Wound Assessment Pink;Painful;Drainage;Yellow 7/30/2019  1:56 PM   Drainage Amount Moderate 7/30/2019  1:56 PM   Drainage Description Yellow 7/30/2019  1:56 PM   Odor None 7/30/2019  1:56 PM   Margins Attached edges 7/30/2019  1:56 PM   Rachana-wound Assessment Dry;Painful;Pink 7/30/2019  1:56 PM   Non-staged Wound Description Not applicable 7/76/0554  7:80 PM   [place]%Wound Bed 90 7/30/2019  1:56 PM   Red%Wound Bed 0 7/30/2019  1:56 PM   Yellow%Wound Bed 10 7/30/2019  1:56 PM   Black%Wound Bed 0 7/30/2019  1:56 PM   Purple%Wound Bed 0 7/30/2019  1:56 PM   Other%Wound Bed 0 7/30/2019  1:56 PM   Number of days: 56               Plan:          Plan for wound - Dress per physician order  Treatment:     Compression : No   Offloading : Yes   Dressing : see AVS   Additional Therapy : none     1. Discussed appropriate home care of this wound.  Wound

## 2019-08-06 NOTE — PLAN OF CARE
Problem: Wound:  Goal: Will show signs of wound healing; wound closure and no evidence of infection  Description  Will show signs of wound healing; wound closure and no evidence of infection  Outcome: Ongoing     Problem: Pressure Ulcer:  Goal: Signs of wound healing will improve  Description  Signs of wound healing will improve  Outcome: Ongoing  Goal: Absence of new pressure ulcer  Description  Absence of new pressure ulcer  Outcome: Ongoing  Goal: Will show no infection signs and symptoms  Description  Will show no infection signs and symptoms  Outcome: Ongoing     Problem: Falls - Risk of:  Goal: Will remain free from falls  Description  Will remain free from falls  Outcome: Ongoing
0-10   Pain Level 0

## 2019-08-20 ENCOUNTER — HOSPITAL ENCOUNTER (OUTPATIENT)
Dept: WOUND CARE | Age: 29
Discharge: HOME | End: 2019-08-20
Payer: COMMERCIAL

## 2019-08-20 VITALS
BODY MASS INDEX: 41.04 KG/M2 | WEIGHT: 303 LBS | HEART RATE: 83 BPM | TEMPERATURE: 98.9 F | SYSTOLIC BLOOD PRESSURE: 114 MMHG | HEIGHT: 72 IN | RESPIRATION RATE: 18 BRPM | DIASTOLIC BLOOD PRESSURE: 71 MMHG

## 2019-08-20 DIAGNOSIS — I69.30 HISTORY OF CEREBROVASCULAR ACCIDENT (CVA) WITH RESIDUAL DEFICIT: ICD-10-CM

## 2019-08-20 DIAGNOSIS — S31.809A OPEN WOUND OF BUTTOCK, UNSPECIFIED LATERALITY, INITIAL ENCOUNTER: ICD-10-CM

## 2019-08-20 DIAGNOSIS — L30.8 DERMATITIS ASSOCIATED WITH MOISTURE: ICD-10-CM

## 2019-08-20 DIAGNOSIS — S91.105A OPEN WOUND OF THIRD TOE OF LEFT FOOT, INITIAL ENCOUNTER: Chronic | ICD-10-CM

## 2019-08-20 DIAGNOSIS — L89.323 PRESSURE INJURY OF LEFT BUTTOCK, STAGE 3 (HCC): ICD-10-CM

## 2019-08-20 PROCEDURE — 11042 DBRDMT SUBQ TIS 1ST 20SQCM/<: CPT

## 2019-08-20 PROCEDURE — 11042 DBRDMT SUBQ TIS 1ST 20SQCM/<: CPT | Performed by: SURGERY

## 2019-08-20 PROCEDURE — 99212 OFFICE O/P EST SF 10 MIN: CPT | Performed by: SURGERY

## 2019-08-20 RX ORDER — ZOLPIDEM TARTRATE 10 MG/1
TABLET ORAL NIGHTLY
COMMUNITY
End: 2022-01-11

## 2019-08-20 ASSESSMENT — PAIN DESCRIPTION - LOCATION: LOCATION: TOE (COMMENT WHICH ONE)

## 2019-08-20 ASSESSMENT — PAIN DESCRIPTION - DESCRIPTORS: DESCRIPTORS: SORE

## 2019-08-20 ASSESSMENT — PAIN DESCRIPTION - PAIN TYPE: TYPE: ACUTE PAIN

## 2019-08-20 ASSESSMENT — PAIN DESCRIPTION - ORIENTATION: ORIENTATION: LEFT

## 2019-08-20 ASSESSMENT — PAIN SCALES - GENERAL: PAINLEVEL_OUTOF10: 3

## 2019-08-20 ASSESSMENT — PAIN DESCRIPTION - FREQUENCY: FREQUENCY: CONTINUOUS

## 2019-08-20 ASSESSMENT — PAIN DESCRIPTION - ONSET: ONSET: ON-GOING

## 2019-08-20 NOTE — PROGRESS NOTES
Wound 4, Stage 3, R. Buttock (Active)   Wound Image   8/20/2019  2:26 PM   Wound Pressure Stage  3 8/20/2019  2:26 PM   Dressing Status Changed;Clean;Dry; Intact 7/30/2019  2:23 PM   Dressing Changed Changed/New 7/30/2019  2:23 PM   Dressing/Treatment 4x4 7/30/2019  2:23 PM   Wound Cleansed Not Cleansed 8/20/2019  2:26 PM   Wound Length (cm) 0 cm 8/20/2019  2:26 PM   Wound Width (cm) 0 cm 8/20/2019  2:26 PM   Wound Depth (cm) 0 cm 8/20/2019  2:26 PM   Wound Surface Area (cm^2) 0 cm^2 8/20/2019  2:26 PM   Change in Wound Size % (l*w) 100 8/20/2019  2:26 PM   Wound Volume (cm^3) 0 cm^3 8/20/2019  2:26 PM   Wound Healing % 100 8/20/2019  2:26 PM   Post-Procedure Length (cm) 5 cm 6/3/2019  2:48 PM   Post-Procedure Width (cm) 0.4 cm 6/3/2019  2:48 PM   Post-Procedure Depth (cm) 0.1 cm 6/3/2019  2:48 PM   Post-Procedure Surface Area (cm^2) 2 cm^2 6/3/2019  2:48 PM   Post-Procedure Volume (cm^3) 0.2 cm^3 6/3/2019  2:48 PM   Distance Tunneling (cm) 0 cm 8/20/2019  2:26 PM   Tunneling Position ___ O'Clock 0 8/20/2019  2:26 PM   Undermining Starts ___ O'Clock 0 8/20/2019  2:26 PM   Undermining Ends___ O'Clock 0 8/20/2019  2:26 PM   Undermining Maxium Distance (cm) 0 8/20/2019  2:26 PM   Wound Assessment Epithelialization 8/20/2019  2:26 PM   Drainage Amount None 8/20/2019  2:26 PM   Drainage Description Yellow 7/30/2019  1:56 PM   Odor None 8/20/2019  2:26 PM   Margins Attached edges 7/30/2019  1:56 PM   Rachana-wound Assessment Dry; Intact 8/20/2019  2:26 PM   Non-staged Wound Description Not applicable 2/12/4349  4:31 PM   [place]%Wound Bed 0 8/20/2019  2:26 PM   Red%Wound Bed 0 8/20/2019  2:26 PM   Yellow%Wound Bed 0 8/20/2019  2:26 PM   Black%Wound Bed 0 8/20/2019  2:26 PM   Purple%Wound Bed 0 8/20/2019  2:26 PM   Other%Wound Bed 0 8/20/2019  2:26 PM   Number of days: 78       Wound 08/20/19 Toe (Comment  which one) Left; Anterior Wound 5, Neuropathic, L. 3rd Anterior Toe (Active)   Wound Image   8/20/2019  2:26 PM   Dressing was/were sharply debrided down through and including the removal of epidermis, dermis and subcutaneous tissue. Devitalized Tissue Debrided:  fibrin, biofilm and slough    Pre Debridement Measurements:  Are located in the Wound/Ulcer Documentation Flow Sheet    Wound/Ulcer #: 3 and 5    Post Debridement Measurements:  Wound/Ulcer Descriptions are Pre Debridement except measurements:          Percent of Wound(s)/Ulcer(s) Debrided: 100%    Total Surface Area Debrided:  1.35 sq cm     Diabetic/Pressure/Non Pressure Ulcers only:  Ulcer: Pressure ulcer, Stage 3     Estimated Blood Loss:  Minimal    Hemostasis Achieved:  not needed    Response to treatment:  Well tolerated by patient. Plan:          Plan for wound - Dress per physician order  Treatment:     Compression : No   Offloading : Yes   Dressing : see AVS   Additional Therapy : none     1. Discussed appropriate home care of this wound. Wound redressed. 2. Written patient dismissal instructions given to patient and signed by patient or POA. 3. Follow up: 3 week(s). Mr. Melina Gonzalez buttock wounds are improving, the right is healed and the left is improving. The skin is also appearing more supple and healthy. Encouraged them to continue keeping his nails trimmed and moisturizing the surrounding skin. He is unsure when the new toe wound happened. According to the therapist that came with him, he did have a fall last week which may contribute, and he also wore a new pair of shoes. He is going to check those shoes as soon as they get back to the facility. Follow up in 3 weeks.      Electronically signed by Mee Flores MD on 8/20/2019 at 2:53 PM

## 2019-09-10 ENCOUNTER — HOSPITAL ENCOUNTER (OUTPATIENT)
Dept: WOUND CARE | Age: 29
Discharge: HOME | End: 2019-09-10
Payer: COMMERCIAL

## 2019-09-10 VITALS
SYSTOLIC BLOOD PRESSURE: 105 MMHG | TEMPERATURE: 97 F | DIASTOLIC BLOOD PRESSURE: 70 MMHG | BODY MASS INDEX: 41.04 KG/M2 | WEIGHT: 303 LBS | HEART RATE: 92 BPM | HEIGHT: 72 IN | RESPIRATION RATE: 16 BRPM

## 2019-09-10 DIAGNOSIS — S31.809D OPEN WOUND OF BUTTOCK, UNSPECIFIED LATERALITY, SUBSEQUENT ENCOUNTER: ICD-10-CM

## 2019-09-10 DIAGNOSIS — I69.30 HISTORY OF CEREBROVASCULAR ACCIDENT (CVA) WITH RESIDUAL DEFICIT: ICD-10-CM

## 2019-09-10 DIAGNOSIS — L89.323 PRESSURE INJURY OF LEFT BUTTOCK, STAGE 3 (HCC): ICD-10-CM

## 2019-09-10 DIAGNOSIS — L30.8 DERMATITIS ASSOCIATED WITH MOISTURE: ICD-10-CM

## 2019-09-10 PROCEDURE — 11042 DBRDMT SUBQ TIS 1ST 20SQCM/<: CPT

## 2019-09-10 PROCEDURE — 11042 DBRDMT SUBQ TIS 1ST 20SQCM/<: CPT | Performed by: SURGERY

## 2019-09-10 ASSESSMENT — PAIN DESCRIPTION - DESCRIPTORS: DESCRIPTORS: SORE

## 2019-09-10 ASSESSMENT — PAIN DESCRIPTION - ORIENTATION: ORIENTATION: LEFT

## 2019-09-10 ASSESSMENT — PAIN DESCRIPTION - PAIN TYPE: TYPE: ACUTE PAIN

## 2019-09-10 ASSESSMENT — PAIN DESCRIPTION - ONSET: ONSET: ON-GOING

## 2019-09-10 ASSESSMENT — PAIN DESCRIPTION - FREQUENCY: FREQUENCY: CONTINUOUS

## 2019-09-10 ASSESSMENT — PAIN SCALES - GENERAL: PAINLEVEL_OUTOF10: 8

## 2019-09-10 ASSESSMENT — PAIN DESCRIPTION - LOCATION: LOCATION: BUTTOCKS

## 2019-09-10 NOTE — PROGRESS NOTES
11:08 AM   Wound Pressure Stage  3 9/10/2019 11:08 AM   Dressing Status Old drainage 9/10/2019 11:08 AM   Dressing Changed Changed/New 7/30/2019  2:23 PM   Dressing/Treatment 4x4 7/30/2019  2:23 PM   Wound Cleansed Rinsed/Irrigated with saline 9/10/2019 11:08 AM   Wound Length (cm) 0.7 cm 9/10/2019 11:08 AM   Wound Width (cm) 0.7 cm 9/10/2019 11:08 AM   Wound Depth (cm) 0.1 cm 9/10/2019 11:08 AM   Wound Surface Area (cm^2) 0.49 cm^2 9/10/2019 11:08 AM   Change in Wound Size % (l*w) 18.33 9/10/2019 11:08 AM   Wound Volume (cm^3) 0.05 cm^3 9/10/2019 11:08 AM   Wound Healing % 17 9/10/2019 11:08 AM   Post-Procedure Length (cm) 0.9 cm 8/20/2019  2:50 PM   Post-Procedure Width (cm) 0.8 cm 8/20/2019  2:50 PM   Post-Procedure Depth (cm) 0.1 cm 8/20/2019  2:50 PM   Post-Procedure Surface Area (cm^2) 0.72 cm^2 8/20/2019  2:50 PM   Post-Procedure Volume (cm^3) 0.07 cm^3 8/20/2019  2:50 PM   Distance Tunneling (cm) 0 cm 9/10/2019 11:08 AM   Tunneling Position ___ O'Clock 0 9/10/2019 11:08 AM   Undermining Starts ___ O'Clock 0 9/10/2019 11:08 AM   Undermining Ends___ O'Clock 0 9/10/2019 11:08 AM   Undermining Maxium Distance (cm) 0 9/10/2019 11:08 AM   Wound Assessment Red 9/10/2019 11:08 AM   Drainage Amount Moderate 9/10/2019 11:08 AM   Drainage Description Serosanguinous 9/10/2019 11:08 AM   Odor None 9/10/2019 11:08 AM   Margins Attached edges 9/10/2019 11:08 AM   Rachana-wound Assessment Dry; Intact 9/10/2019 11:08 AM   Non-staged Wound Description Not applicable 9/41/0189 71:14 AM   Drain%Wound Bed 90 9/10/2019 11:08 AM   Red%Wound Bed 0 9/10/2019 11:08 AM   Yellow%Wound Bed 10 9/10/2019 11:08 AM   Black%Wound Bed 0 9/10/2019 11:08 AM   Purple%Wound Bed 0 9/10/2019 11:08 AM   Other%Wound Bed 0 8/20/2019  2:26 PM   Number of days: 166       Wound 08/20/19 Toe (Comment  which one) Left; Anterior Wound 5, Neuropathic, L. 3rd Anterior Toe (Active)   Wound Image   9/10/2019 11:08 AM   Wound Other 9/10/2019 11:08 AM   Dressing Status

## 2019-10-15 ENCOUNTER — HOSPITAL ENCOUNTER (OUTPATIENT)
Dept: WOUND CARE | Age: 29
Discharge: HOME | End: 2019-10-15
Payer: COMMERCIAL

## 2019-10-15 VITALS
TEMPERATURE: 97 F | DIASTOLIC BLOOD PRESSURE: 72 MMHG | SYSTOLIC BLOOD PRESSURE: 112 MMHG | RESPIRATION RATE: 16 BRPM | HEIGHT: 72 IN | WEIGHT: 303 LBS | BODY MASS INDEX: 41.04 KG/M2 | HEART RATE: 90 BPM

## 2019-10-15 DIAGNOSIS — I69.30 HISTORY OF CEREBROVASCULAR ACCIDENT (CVA) WITH RESIDUAL DEFICIT: ICD-10-CM

## 2019-10-15 DIAGNOSIS — L89.323 PRESSURE INJURY OF LEFT BUTTOCK, STAGE 3 (HCC): ICD-10-CM

## 2019-10-15 DIAGNOSIS — L30.8 DERMATITIS ASSOCIATED WITH MOISTURE: ICD-10-CM

## 2019-10-15 PROCEDURE — 11042 DBRDMT SUBQ TIS 1ST 20SQCM/<: CPT | Performed by: SURGERY

## 2019-10-15 PROCEDURE — 11042 DBRDMT SUBQ TIS 1ST 20SQCM/<: CPT

## 2019-10-15 ASSESSMENT — PAIN SCALES - GENERAL: PAINLEVEL_OUTOF10: 6

## 2019-10-15 ASSESSMENT — PAIN DESCRIPTION - ONSET: ONSET: ON-GOING

## 2019-10-15 ASSESSMENT — PAIN DESCRIPTION - LOCATION: LOCATION: BUTTOCKS

## 2019-10-15 ASSESSMENT — PAIN DESCRIPTION - ORIENTATION: ORIENTATION: LEFT

## 2019-10-15 ASSESSMENT — PAIN DESCRIPTION - DESCRIPTORS: DESCRIPTORS: SORE;TENDER

## 2019-10-15 ASSESSMENT — PAIN DESCRIPTION - PROGRESSION: CLINICAL_PROGRESSION: NOT CHANGED

## 2019-10-15 ASSESSMENT — PAIN DESCRIPTION - FREQUENCY: FREQUENCY: CONTINUOUS

## 2019-10-15 ASSESSMENT — PAIN DESCRIPTION - PAIN TYPE: TYPE: ACUTE PAIN

## 2019-11-05 ENCOUNTER — HOSPITAL ENCOUNTER (OUTPATIENT)
Dept: WOUND CARE | Age: 29
Discharge: HOME | End: 2019-11-05
Payer: COMMERCIAL

## 2019-11-05 VITALS
WEIGHT: 303 LBS | DIASTOLIC BLOOD PRESSURE: 61 MMHG | HEIGHT: 72 IN | SYSTOLIC BLOOD PRESSURE: 100 MMHG | HEART RATE: 74 BPM | BODY MASS INDEX: 41.04 KG/M2 | TEMPERATURE: 97.3 F | RESPIRATION RATE: 16 BRPM

## 2019-11-05 DIAGNOSIS — S91.105A OPEN WOUND OF THIRD TOE OF LEFT FOOT, INITIAL ENCOUNTER: Primary | Chronic | ICD-10-CM

## 2019-11-05 DIAGNOSIS — L89.323 PRESSURE INJURY OF LEFT BUTTOCK, STAGE 3 (HCC): ICD-10-CM

## 2019-11-05 DIAGNOSIS — S31.829D OPEN WOUND OF LEFT BUTTOCK, SUBSEQUENT ENCOUNTER: ICD-10-CM

## 2019-11-05 DIAGNOSIS — I70.245 ATHSCL NATIVE ARTERIES OF LEFT LEG W ULCERATION OTH PRT FOOT (HCC): Chronic | ICD-10-CM

## 2019-11-05 DIAGNOSIS — L30.8 DERMATITIS ASSOCIATED WITH MOISTURE: ICD-10-CM

## 2019-11-05 DIAGNOSIS — I69.30 HISTORY OF CEREBROVASCULAR ACCIDENT (CVA) WITH RESIDUAL DEFICIT: ICD-10-CM

## 2019-11-05 PROCEDURE — 11042 DBRDMT SUBQ TIS 1ST 20SQCM/<: CPT

## 2019-11-05 PROCEDURE — 11042 DBRDMT SUBQ TIS 1ST 20SQCM/<: CPT | Performed by: SURGERY

## 2019-11-05 ASSESSMENT — PAIN DESCRIPTION - FREQUENCY: FREQUENCY: CONTINUOUS

## 2019-11-05 ASSESSMENT — PAIN DESCRIPTION - LOCATION: LOCATION: BUTTOCKS

## 2019-11-05 ASSESSMENT — PAIN DESCRIPTION - ORIENTATION: ORIENTATION: LEFT

## 2019-11-05 ASSESSMENT — PAIN DESCRIPTION - ONSET: ONSET: ON-GOING

## 2019-11-05 ASSESSMENT — PAIN DESCRIPTION - PAIN TYPE: TYPE: ACUTE PAIN

## 2019-11-05 ASSESSMENT — PAIN DESCRIPTION - PROGRESSION: CLINICAL_PROGRESSION: NOT CHANGED

## 2019-11-05 ASSESSMENT — PAIN DESCRIPTION - DESCRIPTORS: DESCRIPTORS: TENDER

## 2019-11-05 ASSESSMENT — PAIN SCALES - GENERAL: PAINLEVEL_OUTOF10: 5

## 2019-11-19 ENCOUNTER — HOSPITAL ENCOUNTER (OUTPATIENT)
Dept: WOUND CARE | Age: 29
Discharge: HOME | End: 2019-11-19

## 2019-11-19 ENCOUNTER — HOSPITAL ENCOUNTER (OUTPATIENT)
Dept: GENERAL RADIOLOGY | Age: 29
Discharge: HOME | End: 2019-11-19
Payer: COMMERCIAL

## 2019-11-19 ENCOUNTER — HOSPITAL ENCOUNTER (OUTPATIENT)
Dept: NON INVASIVE DIAGNOSTICS | Age: 29
Discharge: HOME | End: 2019-11-19
Payer: COMMERCIAL

## 2019-11-19 DIAGNOSIS — L97.522 ULCER OF LEFT FOOT, WITH FAT LAYER EXPOSED (HCC): ICD-10-CM

## 2019-11-19 DIAGNOSIS — L97.522 ULCER OF LEFT FOOT, WITH FAT LAYER EXPOSED (HCC): Primary | ICD-10-CM

## 2019-11-19 DIAGNOSIS — I70.245 ATHEROSCLEROSIS OF NATIVE ARTERY OF LEFT LOWER EXTREMITY WITH ULCERATION OF OTHER PART OF FOOT (HCC): ICD-10-CM

## 2019-11-19 DIAGNOSIS — S91.105A OPEN WOUND OF THIRD TOE OF LEFT FOOT, INITIAL ENCOUNTER: Chronic | ICD-10-CM

## 2019-11-19 PROCEDURE — 73660 X-RAY EXAM OF TOE(S): CPT

## 2019-11-19 PROCEDURE — 93923 UPR/LXTR ART STDY 3+ LVLS: CPT

## 2019-12-03 ENCOUNTER — HOSPITAL ENCOUNTER (OUTPATIENT)
Dept: WOUND CARE | Age: 29
Discharge: HOME | End: 2019-12-03
Payer: COMMERCIAL

## 2019-12-03 VITALS
HEART RATE: 78 BPM | SYSTOLIC BLOOD PRESSURE: 137 MMHG | HEIGHT: 72 IN | TEMPERATURE: 98.6 F | DIASTOLIC BLOOD PRESSURE: 89 MMHG | WEIGHT: 303 LBS | RESPIRATION RATE: 16 BRPM | BODY MASS INDEX: 41.04 KG/M2

## 2019-12-03 DIAGNOSIS — I69.30 HISTORY OF CEREBROVASCULAR ACCIDENT (CVA) WITH RESIDUAL DEFICIT: ICD-10-CM

## 2019-12-03 DIAGNOSIS — L89.323 PRESSURE INJURY OF LEFT BUTTOCK, STAGE 3 (HCC): ICD-10-CM

## 2019-12-03 DIAGNOSIS — L30.8 DERMATITIS ASSOCIATED WITH MOISTURE: ICD-10-CM

## 2019-12-03 PROCEDURE — 97597 DBRDMT OPN WND 1ST 20 CM/<: CPT

## 2019-12-03 PROCEDURE — 97597 DBRDMT OPN WND 1ST 20 CM/<: CPT | Performed by: SURGERY

## 2019-12-03 ASSESSMENT — PAIN SCALES - GENERAL: PAINLEVEL_OUTOF10: 7

## 2019-12-03 ASSESSMENT — PAIN DESCRIPTION - PROGRESSION: CLINICAL_PROGRESSION: NOT CHANGED

## 2019-12-03 ASSESSMENT — PAIN DESCRIPTION - DESCRIPTORS: DESCRIPTORS: TENDER

## 2019-12-03 ASSESSMENT — PAIN DESCRIPTION - LOCATION: LOCATION: BUTTOCKS

## 2019-12-03 ASSESSMENT — PAIN DESCRIPTION - ORIENTATION: ORIENTATION: LEFT

## 2019-12-03 ASSESSMENT — PAIN DESCRIPTION - FREQUENCY: FREQUENCY: CONTINUOUS

## 2019-12-03 ASSESSMENT — PAIN DESCRIPTION - PAIN TYPE: TYPE: ACUTE PAIN

## 2019-12-03 ASSESSMENT — PAIN DESCRIPTION - ONSET: ONSET: ON-GOING

## 2019-12-17 ENCOUNTER — HOSPITAL ENCOUNTER (OUTPATIENT)
Dept: WOUND CARE | Age: 29
Discharge: HOME | End: 2019-12-17
Payer: COMMERCIAL

## 2019-12-17 VITALS
SYSTOLIC BLOOD PRESSURE: 105 MMHG | HEART RATE: 74 BPM | HEIGHT: 72 IN | DIASTOLIC BLOOD PRESSURE: 65 MMHG | WEIGHT: 308 LBS | BODY MASS INDEX: 41.72 KG/M2 | RESPIRATION RATE: 18 BRPM | TEMPERATURE: 97.2 F

## 2019-12-17 DIAGNOSIS — L30.8 DERMATITIS ASSOCIATED WITH MOISTURE: ICD-10-CM

## 2019-12-17 DIAGNOSIS — L89.323 PRESSURE INJURY OF LEFT BUTTOCK, STAGE 3 (HCC): ICD-10-CM

## 2019-12-17 DIAGNOSIS — I69.30 HISTORY OF CEREBROVASCULAR ACCIDENT (CVA) WITH RESIDUAL DEFICIT: ICD-10-CM

## 2019-12-17 PROBLEM — L89.43 PRESSURE INJURY OF CONTIGUOUS REGION INVOLVING BACK AND RIGHT BUTTOCK, STAGE 3 (HCC): Chronic | Status: RESOLVED | Noted: 2019-07-25 | Resolved: 2019-12-17

## 2019-12-17 PROBLEM — L97.522 ULCER OF LEFT FOOT, WITH FAT LAYER EXPOSED (HCC): Chronic | Status: RESOLVED | Noted: 2019-11-19 | Resolved: 2019-12-17

## 2019-12-17 PROBLEM — I70.245 ATHSCL NATIVE ARTERIES OF LEFT LEG W ULCERATION OTH PRT FOOT (HCC): Chronic | Status: RESOLVED | Noted: 2019-11-19 | Resolved: 2019-12-17

## 2019-12-17 PROCEDURE — 99214 OFFICE O/P EST MOD 30 MIN: CPT

## 2019-12-17 PROCEDURE — 99212 OFFICE O/P EST SF 10 MIN: CPT | Performed by: SURGERY

## 2019-12-17 RX ORDER — HYDROXYZINE 50 MG/1
50 TABLET, FILM COATED ORAL NIGHTLY
Status: ON HOLD | COMMUNITY
End: 2022-04-12 | Stop reason: HOSPADM

## 2019-12-17 RX ORDER — AMANTADINE HYDROCHLORIDE 100 MG/1
100 TABLET ORAL 2 TIMES DAILY
COMMUNITY

## 2019-12-17 ASSESSMENT — PAIN DESCRIPTION - LOCATION: LOCATION: BUTTOCKS

## 2019-12-17 ASSESSMENT — PAIN SCALES - GENERAL: PAINLEVEL_OUTOF10: 7

## 2019-12-17 ASSESSMENT — PAIN DESCRIPTION - PAIN TYPE: TYPE: ACUTE PAIN

## 2019-12-17 ASSESSMENT — PAIN DESCRIPTION - ORIENTATION: ORIENTATION: LEFT

## 2019-12-26 ENCOUNTER — TELEPHONE (OUTPATIENT)
Dept: VASCULAR SURGERY | Facility: CLINIC | Age: 29
End: 2019-12-26

## 2019-12-31 ENCOUNTER — HOSPITAL ENCOUNTER (OUTPATIENT)
Dept: WOUND CARE | Age: 29
Discharge: HOME | End: 2019-12-31
Payer: COMMERCIAL

## 2019-12-31 VITALS
RESPIRATION RATE: 20 BRPM | DIASTOLIC BLOOD PRESSURE: 73 MMHG | TEMPERATURE: 97.7 F | HEART RATE: 71 BPM | SYSTOLIC BLOOD PRESSURE: 137 MMHG

## 2019-12-31 PROCEDURE — 11042 DBRDMT SUBQ TIS 1ST 20SQCM/<: CPT | Performed by: SURGERY

## 2019-12-31 PROCEDURE — 11042 DBRDMT SUBQ TIS 1ST 20SQCM/<: CPT

## 2019-12-31 RX ORDER — TIZANIDINE 2 MG/1
2 TABLET ORAL 2 TIMES DAILY
Status: ON HOLD | COMMUNITY
End: 2022-04-12 | Stop reason: HOSPADM

## 2019-12-31 ASSESSMENT — PAIN DESCRIPTION - FREQUENCY: FREQUENCY: CONTINUOUS

## 2019-12-31 ASSESSMENT — PAIN DESCRIPTION - PAIN TYPE: TYPE: ACUTE PAIN

## 2019-12-31 ASSESSMENT — PAIN DESCRIPTION - LOCATION: LOCATION: FOOT

## 2019-12-31 ASSESSMENT — PAIN DESCRIPTION - PROGRESSION: CLINICAL_PROGRESSION: NOT CHANGED

## 2019-12-31 ASSESSMENT — PAIN DESCRIPTION - ONSET: ONSET: ON-GOING

## 2019-12-31 ASSESSMENT — PAIN DESCRIPTION - ORIENTATION: ORIENTATION: LEFT

## 2019-12-31 ASSESSMENT — PAIN SCALES - GENERAL: PAINLEVEL_OUTOF10: 3

## 2019-12-31 NOTE — PROGRESS NOTES
[facility]  Progress Note and Procedure Note      Francesca Richardson  AGE: 30 y.o. GENDER: male  : 1990  TODAY'S DATE:  2019    Subjective:     CHIEF COMPLAINT buttock wound     HISTORY of PRESENT ILLNESS HPI   Francesca Richardson is a 30 y.o. male who presents today for wound/ulcer evaluation. Ulcer Type:pressure  Ulcer/Wound Location:right buttock  Contributing factors:chronic pressure, decreased mobility, shear force, obesity, poor hygiene and non-adherence    Patient Active Problem List   Diagnosis Code    Muscle spasticity M62.838    Post-traumatic spasticity R25.2    Post-traumatic spasticity R25.2    Open wound of buttock S35.200A    Dermatitis associated with moisture L30.8    History of cerebrovascular accident (CVA) with residual deficit I69.30    Renal calculus, right N20.0    Pressure injury of left buttock, stage 3 (HCC) L89.323    Class 3 severe obesity due to excess calories without serious comorbidity in [facility]) E66.01    Open wound of third toe of left foot S91.105A       ALLERGIES    Allergies   Allergen Reactions    Latex     Dilantin [Phenytoin]     Pcn [Penicillins]     Sulfa Antibiotics            Objective:      /73   Pulse 71   Temp 97.7 °F (36.5 °C) (Temporal)   Resp 20         Assessment:      Problem List Items Addressed This Visit     Open wound of buttock (Chronic)    Pressure injury of left buttock, stage 3 (HCC) (Chronic)    Dermatitis associated with moisture    History of cerebrovascular accident (CVA) with residual deficit          The patients pain isPain Level: 3 Pain Type: Acute pain. Wound(s) has worsened slightly. Please refer to nursing measurements and assessment regarding wound pre and post debridement.   Wound 19 Buttocks Left WOUND 3 LEFT BUTTOCKS PRESSURE STAGE 3 (Active)   Wound Image   2019  2:50 PM   Wound Pressure Stage  3 2019  2:50 PM   Dressing Status Old drainage 2019 needed    Response to treatment:  Well tolerated by patient. Plan:          Plan for wound - Dress per physician order  Treatment:     Compression : No   Offloading : Yes   Dressing : see AVS   Additional Therapy : none     1. Discussed appropriate home care of this wound. Wound redressed. 2. Written patient dismissal instructions given to patient and signed by patient or POA. 3. Follow up: 3 week(s). Patient's skin on his buttocks has been looking better. Still apparently sitting too much, and even when in bed at too much of an angle. Continue trying to offload bottom, prevent shearing, and offload his toe.       Electronically signed by Jeremiah Barr MD on 12/31/2019 at 3:25 PM

## 2020-02-11 ENCOUNTER — HOSPITAL ENCOUNTER (OUTPATIENT)
Dept: WOUND CARE | Age: 30
Discharge: HOME | End: 2020-02-11
Payer: COMMERCIAL

## 2020-02-11 VITALS
TEMPERATURE: 97.9 F | RESPIRATION RATE: 16 BRPM | HEIGHT: 72 IN | DIASTOLIC BLOOD PRESSURE: 96 MMHG | HEART RATE: 70 BPM | BODY MASS INDEX: 41.72 KG/M2 | WEIGHT: 308 LBS | SYSTOLIC BLOOD PRESSURE: 135 MMHG

## 2020-02-11 PROCEDURE — 99214 OFFICE O/P EST MOD 30 MIN: CPT

## 2020-02-11 PROCEDURE — 99213 OFFICE O/P EST LOW 20 MIN: CPT | Performed by: SURGERY

## 2020-02-11 ASSESSMENT — PAIN DESCRIPTION - LOCATION: LOCATION: COCCYX

## 2020-02-11 ASSESSMENT — PAIN SCALES - GENERAL: PAINLEVEL_OUTOF10: 7

## 2020-02-11 ASSESSMENT — PAIN DESCRIPTION - PAIN TYPE: TYPE: ACUTE PAIN

## 2020-02-11 ASSESSMENT — PAIN DESCRIPTION - ORIENTATION: ORIENTATION: MID

## 2020-02-11 NOTE — PROGRESS NOTES
Prior to Encounter   Medication Sig Dispense Refill    tiZANidine (ZANAFLEX) 2 MG tablet Take 2 mg by mouth 2 times daily At 2 pm and Bedtime      Cholecalciferol (VITAMIN D3 PO) Take 2,000 Units by mouth daily      Amantadine (SYMMETREL) 100 MG TABS tablet Take 100 mg by mouth 2 times daily      hydrOXYzine (ATARAX) 50 MG tablet Take 50 mg by mouth nightly      zolpidem (AMBIEN) 10 MG tablet Take by mouth nightly.  Suvorexant (BELSOMRA) 20 MG TABS Take 20 mg by mouth nightly.  azelastine HCl 0.15 % SOLN by Nasal route 2 times daily as needed      baclofen (LIORESAL) 20 MG tablet Take 20 mg by mouth 4 times daily      busPIRone (BUSPAR) 10 MG tablet Take 10 mg by mouth daily      chlorproMAZINE (THORAZINE) 25 MG tablet Take 25 mg by mouth nightly      loperamide (IMODIUM) 2 MG capsule Take 2 mg by mouth 4 times daily as needed for Diarrhea      hydrocortisone 2.5 % cream Apply topically 2 times daily Apply topically 2 times daily.  tacrolimus (PROTOPIC) 0.03 % ointment Apply topically 2 times daily Apply topically 2 times daily.  vitamin E 400 UNIT capsule Take 400 Units by mouth 2 times daily       meloxicam (MOBIC) 7.5 MG tablet Take 1 tablet by mouth 2 times daily 30 tablet 3    loratadine (CLARITIN) 10 MG capsule Take 10 mg by mouth daily      fluticasone (FLONASE) 50 MCG/ACT nasal spray 1 spray by Each Nare route 2 times daily      melatonin 5 MG TABS tablet Take 5 mg by mouth nightly       gabapentin (NEURONTIN) 400 MG capsule Take 400 mg by mouth 3 times daily.  ketoconazole (NIZORAL) 2 % cream Apply topically 3 times daily Apply topically daily.  lisinopril (PRINIVIL;ZESTRIL) 10 MG tablet Take 10 mg by mouth daily      oxyCODONE-acetaminophen (PERCOCET)  MG per tablet Take 1 tablet by mouth every 6 hours as needed for Pain.       tiZANidine (ZANAFLEX) 4 MG tablet Take 2 mg by mouth 2 times daily       zinc oxide 20 % ointment Apply topically 2 times daily time, well developed and well- nourished, in no acute distress  Skin: warm and dry, no rash or erythema  Head: normocephalic and atraumatic  Eyes: pupils equal, round, and reactive to light, extraocular eye movements intact, conjunctivae normal  ENT: tympanic membrane, external ear and ear canal normal bilaterally, nose without deformity, nasal mucosa and turbinates normal without polyps, lips teeth and gums normal  Neck: supple and non-tender without mass, no thyromegaly or thyroid nodules, no cervical lymphadenopathy  Pulmonary/Chest: clear to auscultation bilaterally- no wheezes, rales or rhonchi, normal air movement, no respiratory distress  Cardiovascular: normal rate, regular rhythm, normal S1 and S2, no murmurs, rubs, clicks, or gallops, distal pulses intact, no carotid bruits  Abdomen: soft, non-tender, non-distended, normal bowel sounds, no masses or organomegaly  Extremities: no cyanosis, clubbing or edema  Musculoskeletal: normal range of motion, no joint swelling, deformity or tenderness  Neurologic: reflexes normal and symmetric, no cranial nerve deficit, gait, coordination and speech normal, sensation of skin normal      Assessment:      Patient Active Problem List   Diagnosis Code    Muscle spasticity M62.838    Post-traumatic spasticity R25.2    Post-traumatic spasticity R25.2    Open wound of buttock S31.809A    Dermatitis associated with moisture L30.8    History of cerebrovascular accident (CVA) with residual deficit I69.30    Renal calculus, right N20.0    Pressure injury of left buttock, stage 3 (HCC) L89.323    Class 3 severe obesity due to excess calories without serious comorbidity in adult [facility]) E66.01    Open wound of third toe of left foot S91.105A             Wound 03/28/19 Buttocks Left WOUND 3 LEFT BUTTOCKS PRESSURE STAGE 3 (Active)   Wound Image   2/11/2020  1:15 PM   Wound Pressure Stage  3 2/11/2020  1:15 PM   Dressing Status Old drainage 2/11/2020  1:15 PM   Dressing Changed Changed/New 12/17/2019 12:07 PM   Wound Cleansed Soap and water 2/11/2020  1:15 PM   Wound Length (cm) 1.2 cm 2/11/2020  1:15 PM   Wound Width (cm) 0.6 cm 2/11/2020  1:15 PM   Wound Depth (cm) 0.2 cm 2/11/2020  1:15 PM   Wound Surface Area (cm^2) 0.72 cm^2 2/11/2020  1:15 PM   Change in Wound Size % (l*w) -20 2/11/2020  1:15 PM   Wound Volume (cm^3) 0.14 cm^3 2/11/2020  1:15 PM   Wound Healing % -133 2/11/2020  1:15 PM   Post-Procedure Length (cm) 0 cm 12/31/2019  3:23 PM   Post-Procedure Width (cm) 0 cm 12/31/2019  3:23 PM   Post-Procedure Depth (cm) 0 cm 12/31/2019  3:23 PM   Post-Procedure Surface Area (cm^2) 0 cm^2 12/31/2019  3:23 PM   Post-Procedure Volume (cm^3) 0 cm^3 12/31/2019  3:23 PM   Distance Tunneling (cm) 0 cm 2/11/2020  1:15 PM   Tunneling Position ___ O'Clock 0 2/11/2020  1:15 PM   Undermining Starts ___ O'Clock 0 2/11/2020  1:15 PM   Undermining Ends___ O'Clock 0 2/11/2020  1:15 PM   Undermining Maxium Distance (cm) 0 2/11/2020  1:15 PM   Wound Assessment Red;Drainage;Slough; Yellow 2/11/2020  1:15 PM   Drainage Amount Small 2/11/2020  1:15 PM   Drainage Description Serosanguinous 2/11/2020  1:15 PM   Odor None 2/11/2020  1:15 PM   Margins Attached edges 2/11/2020  1:15 PM   Rachana-wound Assessment Dry 2/11/2020  1:15 PM   Non-staged Wound Description Not applicable 6/39/8423  9:63 PM   [place]%Wound Bed 0 2/11/2020  1:15 PM   Red%Wound Bed 95 2/11/2020  1:15 PM   Yellow%Wound Bed 5 2/11/2020  1:15 PM   Black%Wound Bed 0 12/31/2019  2:50 PM   Purple%Wound Bed 0 2/11/2020  1:15 PM   Other%Wound Bed 0 2/11/2020  1:15 PM   Number of days: 320       Wound 12/31/19 Buttocks Right Wound 7, Pressure, Stage 3, R.  Buttock (Active)   Wound Image   2/11/2020  1:15 PM   Wound Pressure Stage  3 12/31/2019  2:50 PM   Dressing Status Old drainage 12/31/2019  2:50 PM   Wound Cleansed Rinsed/Irrigated with saline 12/31/2019  2:50 PM   Wound Length (cm) 0 cm 2/11/2020  1:15 PM   Wound Width (cm) 0 cm 2/11/2020

## 2020-02-25 ENCOUNTER — HOSPITAL ENCOUNTER (OUTPATIENT)
Dept: WOUND CARE | Age: 30
Discharge: HOME | End: 2020-02-25
Payer: COMMERCIAL

## 2020-02-25 VITALS
WEIGHT: 308 LBS | DIASTOLIC BLOOD PRESSURE: 82 MMHG | HEART RATE: 84 BPM | TEMPERATURE: 98 F | HEIGHT: 72 IN | RESPIRATION RATE: 18 BRPM | BODY MASS INDEX: 41.72 KG/M2 | SYSTOLIC BLOOD PRESSURE: 124 MMHG

## 2020-02-25 PROCEDURE — 97597 DBRDMT OPN WND 1ST 20 CM/<: CPT

## 2020-02-25 PROCEDURE — 97597 DBRDMT OPN WND 1ST 20 CM/<: CPT | Performed by: SURGERY

## 2020-02-25 ASSESSMENT — PAIN DESCRIPTION - PROGRESSION: CLINICAL_PROGRESSION: NOT CHANGED

## 2020-02-25 ASSESSMENT — PAIN DESCRIPTION - PAIN TYPE: TYPE: ACUTE PAIN

## 2020-02-25 ASSESSMENT — PAIN DESCRIPTION - ONSET: ONSET: ON-GOING

## 2020-02-25 ASSESSMENT — PAIN DESCRIPTION - LOCATION: LOCATION: COCCYX

## 2020-02-25 ASSESSMENT — PAIN SCALES - GENERAL: PAINLEVEL_OUTOF10: 6

## 2020-02-25 ASSESSMENT — PAIN DESCRIPTION - ORIENTATION: ORIENTATION: MID

## 2020-02-25 ASSESSMENT — PAIN DESCRIPTION - FREQUENCY: FREQUENCY: CONTINUOUS

## 2020-02-26 NOTE — PROGRESS NOTES
[address]   Progress Note and Procedure Note      [address] RECORD NUMBER:  [id number]  AGE: 30 y.o. GENDER: male  : 1990  EPISODE DATE:  2020    Subjective:     Chief Complaint   Patient presents with    Wound Check     follow up         HISTORY of PRESENT ILLNESS HPI     Franki Nash is a 30 y.o. male who presents today for wound/ulcer evaluation. Wound Context: Pt with pressure wound of buttocks here for eval/treat  Wound/Ulcer Pain Timing/Severity: none  Quality of pain: N/A  Severity:  0 / 10   Modifying Factors: None  Associated Signs/Symptoms: none    Ulcer Identification:  Ulcer Type: pressure  Contributing Factors: chronic pressure, decreased mobility, shear force and obesity    Wound: pressure        PAST MEDICAL HISTORY        Diagnosis Date    Arthritis     Cerebral artery occlusion with cerebral infarction ([facility] [address]     Depression     Difficult intubation     Hemorrhoids     History of blood transfusion     Hypertension     Kidney stone     Muscle spasticity     Prolonged emergence from general anesthesia     Retention of urine        PAST SURGICAL HISTORY    Past Surgical History:   Procedure Laterality Date    BACK SURGERY      BACLOFEN PUMP IMPLANTATION      BRAIN SURGERY      KIDNEY STONE SURGERY      IL FRAGMENT KIDNEY STONE/ ESWL Right 10/2/2018    ESWL EXTRACORPEAL SHOCK WAVE LITHOTRIPSY performed by Shawnee Serrano MD at [address]    History reviewed. No pertinent family history.     SOCIAL HISTORY    Social History     Tobacco Use    Smoking status: Never Smoker    Smokeless tobacco: Never Used   Substance Use Topics    Alcohol use: No    Drug use: No       ALLERGIES    Allergies   Allergen Reactions    Latex     Dilantin [Phenytoin]     Pcn [Penicillins]     Sulfa Antibiotics        MEDICATIONS    Current Outpatient Medications on File Prior to Encounter   Medication Sig Dispense Refill    (GLYCOLAX) powder Take 17 g by mouth 2 times daily       dexlansoprazole (DEXILANT) 60 MG CPDR delayed release capsule Take 60 mg by mouth daily      guaiFENesin 400 MG tablet Take 800 mg by mouth three times daily 2 Tablets TID PO PRN       nystatin (MYCOSTATIN) 854532 UNIT/GM cream Apply 1 Dose topically as needed for Dry Skin Apply topically 2 times daily.  citalopram (CELEXA) 40 MG tablet Take 40 mg by mouth daily      DULoxetine (CYMBALTA) 60 MG capsule Take 60 mg by mouth daily      Multiple Vitamins-Minerals (THERAPEUTIC MULTIVITAMIN-MINERALS) tablet Take 1 tablet by mouth daily      Calcium Carbonate-Vitamin D (CALCIUM-VITAMIN D) 500-200 MG-UNIT per tablet Take 1 tablet by mouth 2 times daily (with meals)      docusate sodium (COLACE) 100 MG capsule Take 200 mg by mouth 2 times daily      Omega-3 Fatty Acids (FISH OIL) 1000 MG CAPS Take 3,000 mg by mouth 2 times daily      risperiDONE (RISPERDAL) 1 MG tablet Take 1 mg by mouth 2 times daily      Ascorbic Acid (VITAMIN C) 500 MG CAPS Take 500 mg by mouth 2 times daily      propranolol (INDERAL) 20 MG tablet Take 20 mg by mouth 2 times daily       ketoconazole (NIZORAL) 2 % shampoo Apply topically daily as needed for Itching Apply topically daily as needed.  acetaminophen (TYLENOL) 500 MG tablet Take 1,000 mg by mouth every 6 hours as needed for Pain or Fever       No current facility-administered medications on file prior to encounter. REVIEW OF SYSTEMS    A comprehensive review of systems was negative.     Objective:      /82   Pulse 84   Temp 98 °F (36.7 °C) (Temporal)   Resp 18   Ht 6' (1.829 m)   Wt (!) 308 lb (139.7 kg)   BMI 41.77 kg/m²     Wt Readings from Last 3 Encounters:   02/25/20 (!) 308 lb (139.7 kg)   02/11/20 (!) 308 lb (139.7 kg)   12/17/19 (!) 308 lb (139.7 kg)       PHYSICAL EXAM    General Appearance: alert and oriented to person, place and time, well developed and well- nourished, in no acute PM   Non-staged Wound Description Not applicable 2/64/1828  9:86 PM   [place]%Wound Bed 0 2/25/2020  1:54 PM   Red%Wound Bed 95 2/25/2020  1:54 PM   Yellow%Wound Bed 5 2/25/2020  1:54 PM   Black%Wound Bed 0 2/25/2020  1:54 PM   Purple%Wound Bed 0 2/11/2020  1:15 PM   Other%Wound Bed 0 2/11/2020  1:15 PM   Number of days: 334             Estimated Blood Loss:  Minimal    Hemostasis Achieved:  by pressure    Procedural Pain:  0  / 10     Post Procedural Pain:  0 / 10     Response to treatment:  Well tolerated by patient. 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 He picks at his wound constantly. Treatment Note please see attached Discharge Instructions    In my professional opinion this patient would benefit from HBO Therapy: No    Written patient dismissal instructions given to patient and signed by patient or POA. Discharge Instructions       Visit Discharge/Physician Orders    Discharge condition: Stable    Discharge to: Home    Left via:Private automobile    Accompanied by:  self    [facility]: [facility]    Dressing Orders: Buttock Wound:  Apply skin barrier ointment to area several times daily. Apply and change depends when wet. Treatment Orders:  Keep hands from scratching wound area   Apply barrier cream three time a day and as needed for soiling     Barrier cream over the counter such as GABRIEL cream   Gabriel contains zinc oxide and miconazole 2% antifungal.     Avoid Pressure to wound site.   Turn frequently (turn at least every two hours when in bed)  While in chair reposition every 30 minutes  Patient advised to sit up 1 hour at a time.     Continue Protein rich diet (unless restricted by your physician)  Add Protein supplement and protein shakes daily  Take Multivitamin daily     Please use Roho cushion in chair    Please keep nails trimmed regularly. [facility] followup visit ______2 weeks_______________________  (Please note your next appointment above and if you are unable to keep, kindly give a 24 hour notice. Thank you.)          If you experience any of the following, please call the [facility] during business hours:    * Increase in Pain  * Temperature over 101  * Increase in drainage from your wound  * Drainage with a foul odor  * Bleeding  * Increase in swelling  * Need for compression bandage changes due to slippage, breakthrough drainage. If you need medical attention outside of the business hours of the [facility] please contact your PCP or go to the nearest emergency room.           Electronically signed by Gen Kenney MD on 2/26/2020 at 5:43 AM

## 2020-03-10 ENCOUNTER — HOSPITAL ENCOUNTER (OUTPATIENT)
Dept: WOUND CARE | Age: 30
Discharge: HOME | End: 2020-03-10
Payer: COMMERCIAL

## 2020-03-10 VITALS
SYSTOLIC BLOOD PRESSURE: 122 MMHG | TEMPERATURE: 98.2 F | HEIGHT: 72 IN | RESPIRATION RATE: 18 BRPM | BODY MASS INDEX: 41.72 KG/M2 | HEART RATE: 81 BPM | WEIGHT: 308 LBS | DIASTOLIC BLOOD PRESSURE: 78 MMHG

## 2020-03-10 PROCEDURE — 97597 DBRDMT OPN WND 1ST 20 CM/<: CPT

## 2020-03-10 PROCEDURE — 97597 DBRDMT OPN WND 1ST 20 CM/<: CPT | Performed by: SURGERY

## 2020-03-10 ASSESSMENT — PAIN DESCRIPTION - LOCATION: LOCATION: COCCYX

## 2020-03-10 ASSESSMENT — PAIN DESCRIPTION - ONSET: ONSET: ON-GOING

## 2020-03-10 ASSESSMENT — PAIN DESCRIPTION - PROGRESSION: CLINICAL_PROGRESSION: NOT CHANGED

## 2020-03-10 ASSESSMENT — PAIN DESCRIPTION - FREQUENCY: FREQUENCY: CONTINUOUS

## 2020-03-10 ASSESSMENT — PAIN DESCRIPTION - PAIN TYPE: TYPE: ACUTE PAIN

## 2020-03-10 ASSESSMENT — PAIN SCALES - GENERAL: PAINLEVEL_OUTOF10: 5

## 2020-03-10 NOTE — PLAN OF CARE
Problem: Pain:  Description: Pain management should include both nonpharmacologic and pharmacologic interventions.   Goal: Pain level will decrease  Description: Pain level will decrease  Outcome: Ongoing  Goal: Control of acute pain  Description: Control of acute pain  Outcome: Ongoing  Goal: Control of chronic pain  Description: Control of chronic pain  Outcome: Ongoing     Problem: Wound:  Goal: Will show signs of wound healing; wound closure and no evidence of infection  Description: Will show signs of wound healing; wound closure and no evidence of infection  Outcome: Ongoing     Problem: Pressure Ulcer:  Goal: Signs of wound healing will improve  Description: Signs of wound healing will improve  Outcome: Ongoing  Goal: Absence of new pressure ulcer  Description: Absence of new pressure ulcer  Outcome: Ongoing  Goal: Will show no infection signs and symptoms  Description: Will show no infection signs and symptoms  Outcome: Ongoing     Problem: Falls - Risk of:  Goal: Will remain free from falls  Description: Will remain free from falls  Outcome: Ongoing

## 2020-03-10 NOTE — PROGRESS NOTES
delayed release capsule Take 60 mg by mouth daily      guaiFENesin 400 MG tablet Take 800 mg by mouth three times daily 2 Tablets TID PO PRN       nystatin (MYCOSTATIN) 571744 UNIT/GM cream Apply 1 Dose topically as needed for Dry Skin Apply topically 2 times daily.  citalopram (CELEXA) 40 MG tablet Take 40 mg by mouth daily      DULoxetine (CYMBALTA) 60 MG capsule Take 60 mg by mouth daily      Multiple Vitamins-Minerals (THERAPEUTIC MULTIVITAMIN-MINERALS) tablet Take 1 tablet by mouth daily      Calcium Carbonate-Vitamin D (CALCIUM-VITAMIN D) 500-200 MG-UNIT per tablet Take 1 tablet by mouth 2 times daily (with meals)      docusate sodium (COLACE) 100 MG capsule Take 200 mg by mouth 2 times daily      Omega-3 Fatty Acids (FISH OIL) 1000 MG CAPS Take 3,000 mg by mouth 2 times daily      risperiDONE (RISPERDAL) 1 MG tablet Take 1 mg by mouth 2 times daily      Ascorbic Acid (VITAMIN C) 500 MG CAPS Take 500 mg by mouth 2 times daily      propranolol (INDERAL) 20 MG tablet Take 20 mg by mouth 2 times daily       ketoconazole (NIZORAL) 2 % shampoo Apply topically daily as needed for Itching Apply topically daily as needed.  acetaminophen (TYLENOL) 500 MG tablet Take 1,000 mg by mouth every 6 hours as needed for Pain or Fever      loperamide (IMODIUM) 2 MG capsule Take 2 mg by mouth 4 times daily as needed for Diarrhea       No current facility-administered medications on file prior to encounter. REVIEW OF SYSTEMS    A comprehensive review of systems was negative.     Objective:      /78   Pulse 81   Temp 98.2 °F (36.8 °C) (Temporal)   Resp 18   Ht 6' (1.829 m)   Wt (!) 308 lb (139.7 kg)   BMI 41.77 kg/m²     Wt Readings from Last 3 Encounters:   03/10/20 (!) 308 lb (139.7 kg)   02/25/20 (!) 308 lb (139.7 kg)   02/11/20 (!) 308 lb (139.7 kg)       PHYSICAL EXAM    General Appearance: alert and oriented to person, place and time, well developed and well- nourished, in no acute distress  Skin: warm and dry, no rash or erythema  Head: normocephalic and atraumatic  Eyes: pupils equal, round, and reactive to light, extraocular eye movements intact, conjunctivae normal  ENT: tympanic membrane, external ear and ear canal normal bilaterally, nose without deformity, nasal mucosa and turbinates normal without polyps, lips teeth and gums normal  Neck: supple and non-tender without mass, no thyromegaly or thyroid nodules, no cervical lymphadenopathy  Pulmonary/Chest: clear to auscultation bilaterally- no wheezes, rales or rhonchi, normal air movement, no respiratory distress  Cardiovascular: normal rate, regular rhythm, normal S1 and S2, no murmurs, rubs, clicks, or gallops, distal pulses intact, no carotid bruits  Abdomen: soft, non-tender, non-distended, normal bowel sounds, no masses or organomegaly  Extremities: no cyanosis, clubbing or edema  Musculoskeletal: normal range of motion, no joint swelling, deformity or tenderness  Neurologic: reflexes normal and symmetric, no cranial nerve deficit, gait, coordination and speech normal, sensation of skin normal      Assessment:      Problem List Items Addressed This Visit     * (Principal) Pressure injury of left buttock, stage 3 (HCC) (Chronic)    Dermatitis associated with moisture    History of cerebrovascular accident (CVA) with residual deficit           Procedure Note  Indications:  Based on my examination of this patient's wound(s)/ulcer(s) today, debridement is required to promote healing and evaluate the wound base. Performed by: Susan Whaley MD    Consent obtained:  Yes    Time out taken:  Yes    Pain Control: Anesthetic  Anesthetic: 2% Lidocaine Gel Topical       Debridement:Non-excisional Debridement    Using curette the wound(s)/ulcer(s) was/were sharply debrided down through and including the removal of epidermis and dermis.         Devitalized Tissue Debrided:  fibrin, biofilm, slough and exudate      Pre Debridement Measurements:  Are located in the [place]  Documentation Flow Sheet    Wound/Ulcer #: 3    Percent of Wound(s)/Ulcer(s) Debrided: 100%    Total Surface Area Debrided:  0.2 sq cm       Diabetic/Pressure/Non Pressure Ulcers only:  Ulcer: Pressure ulcer, Stage 3           Post Debridement Measurements:    Wound/Ulcer Descriptions are Pre Debridement --EXCEPT MEASUREMENTS    Wound 03/28/19 Buttocks Left WOUND 3 LEFT BUTTOCKS PRESSURE STAGE 3 (Active)   Wound Image   3/10/2020  2:17 PM   Wound Pressure Stage  3 3/10/2020  2:17 PM   Dressing Status Old drainage 3/10/2020  2:17 PM   Dressing Changed Changed/New 12/17/2019 12:07 PM   Wound Cleansed Soap and water 3/10/2020  2:17 PM   Wound Length (cm) 1 cm 3/10/2020  2:17 PM   Wound Width (cm) 0.2 cm 3/10/2020  2:17 PM   Wound Depth (cm) 0.1 cm 3/10/2020  2:17 PM   Wound Surface Area (cm^2) 0.2 cm^2 3/10/2020  2:17 PM   Change in Wound Size % (l*w) 66.67 3/10/2020  2:17 PM   Wound Volume (cm^3) 0.02 cm^3 3/10/2020  2:17 PM   Wound Healing % 67 3/10/2020  2:17 PM   Post-Procedure Length (cm) 1 cm 3/10/2020  2:55 PM   Post-Procedure Width (cm) 0.2 cm 3/10/2020  2:55 PM   Post-Procedure Depth (cm) 0.1 cm 3/10/2020  2:55 PM   Post-Procedure Surface Area (cm^2) 0.2 cm^2 3/10/2020  2:55 PM   Post-Procedure Volume (cm^3) 0.02 cm^3 3/10/2020  2:55 PM   Distance Tunneling (cm) 0 cm 3/10/2020  2:17 PM   Tunneling Position ___ O'Clock 0 3/10/2020  2:17 PM   Undermining Starts ___ O'Clock 0 3/10/2020  2:17 PM   Undermining Ends___ O'Clock 0 3/10/2020  2:17 PM   Undermining Maxium Distance (cm) 0 3/10/2020  2:17 PM   Wound Assessment Red;Drainage;Slough; Yellow 3/10/2020  2:17 PM   Drainage Amount Small 3/10/2020  2:17 PM   Drainage Description Serosanguinous 3/10/2020  2:17 PM   Odor None 3/10/2020  2:17 PM   Margins Attached edges 3/10/2020  2:17 PM   Rachana-wound Assessment Dry 3/10/2020  2:17 PM   Non-staged Wound Description Not applicable 2/90/7087  7:82 PM   [place]%Wound Bed 0 3/10/2020  2:17 PM   Red%Wound Bed 95 3/10/2020  2:17 PM   Yellow%Wound Bed 5 3/10/2020  2:17 PM   Black%Wound Bed 0 3/10/2020  2:17 PM   Purple%Wound Bed 0 3/10/2020  2:17 PM   Other%Wound Bed 0 2/11/2020  1:15 PM   Number of days: 348             Estimated Blood Loss:  Minimal    Hemostasis Achieved:  by pressure    Procedural Pain:  0  / 10     Post Procedural Pain:  0 / 10     Response to treatment:  Well tolerated by patient. Plan:     Problem List Items Addressed This Visit     * (Principal) Pressure injury of left buttock, stage 3 (HCC) (Chronic)    Dermatitis associated with moisture    History of cerebrovascular accident (CVA) with residual deficit          Treatment Note please see attached Discharge Instructions    In my professional opinion this patient would benefit from HBO Therapy: No    Written patient dismissal instructions given to patient and signed by patient or POA. Discharge Instructions       Visit Discharge/Physician Orders    Discharge condition: Stable    Discharge to: ECF    Left via:public transportation    Accompanied by:  caregivers    [facility]: [facility]    Dressing Orders: Buttock Wound:  Apply skin barrier ointment to area several times daily. Apply and change depends when wet. Treatment Orders:  Keep hands from scratching wound area   Apply barrier cream three time a day and as needed for soiling     Barrier cream over the counter such as GABRIEL cream   Gabriel contains zinc oxide and miconazole 2% antifungal.     Avoid Pressure to wound site. Turn frequently (turn at least every two hours when in bed)  While in chair reposition every 30 minutes  Patient advised to sit up 1 hour at a time.     Continue Protein rich diet (unless restricted by your physician)  Add Protein supplement and protein shakes daily  Take Multivitamin daily     Please use Roho cushion in chair    Please keep nails trimmed regularly.       [facility] ______2

## 2020-03-13 LAB
BILIRUBIN URINE: NEGATIVE
BLOOD, URINE: NEGATIVE
CLARITY: ABNORMAL
COLOR: YELLOW
GLUCOSE URINE: NEGATIVE MG/DL
KETONES, URINE: NEGATIVE MG/DL
LEUKOCYTE ESTERASE, URINE: NEGATIVE
NITRITE, URINE: NEGATIVE
PH UA: 7 (ref 5–8)
PROTEIN UA: NEGATIVE MG/DL
SPECIFIC GRAVITY UA: 1.02 (ref 1–1.03)
UROBILINOGEN, URINE: 1 E.U./DL

## 2020-04-28 ENCOUNTER — TELEPHONE (OUTPATIENT)
Dept: PODIATRY | Facility: CLINIC | Age: 30
End: 2020-04-28

## 2020-04-28 NOTE — TELEPHONE ENCOUNTER
Left message with [facility] advising that patient appointment with Dr. Rob had been cancelled for 5/5/2020 due to Covid 19.  Advised that we would contact them back once we were able to reschedule.

## 2020-05-05 ENCOUNTER — HOSPITAL ENCOUNTER (OUTPATIENT)
Dept: WOUND CARE | Age: 30
Discharge: HOME | End: 2020-05-05

## 2020-05-19 ENCOUNTER — OFFICE VISIT (OUTPATIENT)
Age: 30
End: 2020-05-19

## 2020-05-19 VITALS — TEMPERATURE: 97.8 F | OXYGEN SATURATION: 96 %

## 2020-05-19 NOTE — PATIENT INSTRUCTIONS
petting, snuggling, being kissed or licked, and sharing food. If you must care for your pet or be around animals while you are sick, wash your hands before and after you interact with pets and wear a facemask. Call ahead before visiting your doctor  If you have a medical appointment, call the healthcare provider and tell them that you have or may have COVID-19. This will help the healthcare providers office take steps to keep other people from getting infected or exposed. Wear a facemask  You should wear a facemask when you are around other people (e.g., sharing a room or vehicle) or pets and before you enter a healthcare providers office. If you are not able to wear a facemask (for example, because it causes trouble breathing), then people who live with you should not stay in the same room with you, or they should wear a facemask if they enter your room. Cover your coughs and sneezes  Cover your mouth and nose with a tissue when you cough or sneeze. Throw used tissues in a lined trash can. Immediately wash your hands with soap and water for at least 20 seconds or, if soap and water are not available, clean your hands with an alcohol-based hand  that contains at least 60% alcohol. Clean your hands often  Wash your hands often with soap and water for at least 20 seconds, especially after blowing your nose, coughing, or sneezing; going to the bathroom; and before eating or preparing food. If soap and water are not readily available, use an alcohol-based hand  with at least 60% alcohol, covering all surfaces of your hands and rubbing them together until they feel dry. Soap and water are the best option if hands are visibly dirty. Avoid touching your eyes, nose, and mouth with unwashed hands. Avoid sharing personal household items  You should not share dishes, drinking glasses, cups, eating utensils, towels, or bedding with other people or pets in your home.  After using these items, they should be washed thoroughly with soap and water. Clean all high-touch surfaces everyday  High touch surfaces include counters, tabletops, doorknobs, bathroom fixtures, toilets, phones, keyboards, tablets, and bedside tables. Also, clean any surfaces that may have blood, stool, or body fluids on them. Use a household cleaning spray or wipe, according to the label instructions. Labels contain instructions for safe and effective use of the cleaning product including precautions you should take when applying the product, such as wearing gloves and making sure you have good ventilation during use of the product. Monitor your symptoms  Seek prompt medical attention if your illness is worsening (e.g., difficulty breathing). Before seeking care, call your healthcare provider and tell them that you have, or are being evaluated for, COVID-19. Put on a facemask before you enter the facility. These steps will help the healthcare providers office to keep other people in the office or waiting room from getting infected or exposed. Ask your healthcare provider to call the local or [facility] health department. Persons who are placed under active monitoring or facilitated self-monitoring should follow instructions provided by their local health department or occupational health professionals, as appropriate. When working with your local health department check their available hours. If you have a medical emergency and need to call 911, notify the dispatch personnel that you have, or are being evaluated for COVID-19. If possible, put on a facemask before emergency medical services arrive. Discontinuing home isolation  Patients with confirmed COVID-19 should remain under home isolation precautions until the risk of secondary transmission to others is thought to be low.  The decision to discontinue home isolation precautions should be made on a case-by-case basis, in consultation with healthcare providers and state and [facility] health departments. [employer] allows you to send messages to your doctor, view your test results, renew your prescriptions, schedule appointments, view visit notes, and more. How Do I Sign Up? 1. In your Internet browser, go to [email or web address]  2. Click on the Sign Up Now link in the Sign In box. You will see the New Member Sign Up page. 3. Enter your [employer] Access Code exactly as it appears below. You will not need to use this code after youve completed the sign-up process. If you do not sign up before the expiration date, you must request a new code. [employer] Access Code: [email or web address]  Expires: 7/3/2020  9:32 AM    4. Enter your Social Security Number ([id number]) and Date of Birth (mm/dd/yyyy) as indicated and click Submit. You will be taken to the next sign-up page. 5. Create a [employer] ID. This will be your [employer] login ID and cannot be changed, so think of one that is secure and easy to remember. 6. Create a [employer] password. You can change your password at any time. 7. Enter your Password Reset Question and Answer. This can be used at a later time if you forget your password. 8. Enter your e-mail address. You will receive e-mail notification when new information is available in [address]. 9. Click Sign Up. You can now view your medical record. Additional Information  If you have questions, please contact the physician practice where you receive care. Remember, [employer] is NOT to be used for urgent needs. For medical emergencies, dial 911. For questions regarding your [employer] account call [phone]. If you have a clinical question, please call your doctor's office.

## 2020-05-20 LAB
REPORT: NORMAL
SARS-COV-2: NOT DETECTED
THIS TEST SENT TO: NORMAL

## 2020-06-05 ENCOUNTER — TELEPHONE (OUTPATIENT)
Dept: PODIATRY | Facility: CLINIC | Age: 30
End: 2020-06-05

## 2020-08-03 ENCOUNTER — TELEPHONE (OUTPATIENT)
Dept: PODIATRY | Facility: CLINIC | Age: 30
End: 2020-08-03

## 2020-08-03 NOTE — TELEPHONE ENCOUNTER
[facility] stated not sure can bring pt to appt ,  She will check with Charo and call back. appt for 8/4/20

## 2020-08-29 ENCOUNTER — OFFICE VISIT (OUTPATIENT)
Age: 30
End: 2020-08-29

## 2020-08-29 VITALS — OXYGEN SATURATION: 96 % | HEART RATE: 93 BPM | TEMPERATURE: 97.6 F

## 2020-09-02 LAB — SARS-COV-2, NAA: NOT DETECTED

## 2021-01-09 ENCOUNTER — OFFICE VISIT (OUTPATIENT)
Age: 31
End: 2021-01-09

## 2021-01-09 VITALS — OXYGEN SATURATION: 92 % | TEMPERATURE: 96.2 F | HEART RATE: 120 BPM

## 2021-01-09 DIAGNOSIS — Z11.59 SCREENING FOR VIRAL DISEASE: Primary | ICD-10-CM

## 2021-01-09 PROCEDURE — 99999 PR OFFICE/OUTPT VISIT,PROCEDURE ONLY: CPT | Performed by: NURSE PRACTITIONER

## 2021-01-11 LAB — SARS-COV-2, PCR: NOT DETECTED

## 2021-01-19 ENCOUNTER — APPOINTMENT (OUTPATIENT)
Dept: CT IMAGING | Facility: HOSPITAL | Age: 31
End: 2021-01-19

## 2021-01-19 ENCOUNTER — HOSPITAL ENCOUNTER (EMERGENCY)
Facility: HOSPITAL | Age: 31
Discharge: HOME | End: 2021-01-20
Admitting: INTERNAL MEDICINE

## 2021-01-19 ENCOUNTER — APPOINTMENT (OUTPATIENT)
Dept: GENERAL RADIOLOGY | Facility: HOSPITAL | Age: 31
End: 2021-01-19

## 2021-01-19 ENCOUNTER — APPOINTMENT (OUTPATIENT)
Dept: ULTRASOUND IMAGING | Facility: HOSPITAL | Age: 31
End: 2021-01-19

## 2021-01-19 DIAGNOSIS — L03.115 CELLULITIS OF RIGHT LEG: Primary | ICD-10-CM

## 2021-01-19 LAB
ALBUMIN SERPL-MCNC: 3.9 G/DL (ref 3.5–5.2)
ALBUMIN/GLOB SERPL: 0.8 G/DL
ALP SERPL-CCNC: 120 U/L (ref 39–117)
ALT SERPL W P-5'-P-CCNC: 25 U/L (ref 1–41)
ANION GAP SERPL CALCULATED.3IONS-SCNC: 9 MMOL/L (ref 5–15)
APTT PPP: 29.5 SECONDS (ref 24.1–35)
AST SERPL-CCNC: 17 U/L (ref 1–40)
BASOPHILS # BLD AUTO: 0.07 10*3/MM3 (ref 0–0.2)
BASOPHILS NFR BLD AUTO: 0.8 % (ref 0–1.5)
BILIRUB SERPL-MCNC: 0.2 MG/DL (ref 0–1.2)
BUN SERPL-MCNC: 18 MG/DL (ref 6–20)
BUN/CREAT SERPL: 24 (ref 7–25)
CALCIUM SPEC-SCNC: 9.5 MG/DL (ref 8.6–10.5)
CHLORIDE SERPL-SCNC: 105 MMOL/L (ref 98–107)
CO2 SERPL-SCNC: 30 MMOL/L (ref 22–29)
CREAT SERPL-MCNC: 0.75 MG/DL (ref 0.76–1.27)
D-LACTATE SERPL-SCNC: 0.9 MMOL/L (ref 0.5–2)
DEPRECATED RDW RBC AUTO: 44.7 FL (ref 37–54)
EOSINOPHIL # BLD AUTO: 0.13 10*3/MM3 (ref 0–0.4)
EOSINOPHIL NFR BLD AUTO: 1.5 % (ref 0.3–6.2)
ERYTHROCYTE [DISTWIDTH] IN BLOOD BY AUTOMATED COUNT: 13.8 % (ref 12.3–15.4)
GFR SERPL CREATININE-BSD FRML MDRD: 121 ML/MIN/1.73
GLOBULIN UR ELPH-MCNC: 4.8 GM/DL
GLUCOSE SERPL-MCNC: 98 MG/DL (ref 65–99)
HCT VFR BLD AUTO: 34.7 % (ref 37.5–51)
HGB BLD-MCNC: 11.6 G/DL (ref 13–17.7)
HOLD SPECIMEN: NORMAL
HOLD SPECIMEN: NORMAL
IMM GRANULOCYTES # BLD AUTO: 0.13 10*3/MM3 (ref 0–0.05)
IMM GRANULOCYTES NFR BLD AUTO: 1.5 % (ref 0–0.5)
INR PPP: 1.12 (ref 0.91–1.09)
LYMPHOCYTES # BLD AUTO: 2.11 10*3/MM3 (ref 0.7–3.1)
LYMPHOCYTES NFR BLD AUTO: 24.6 % (ref 19.6–45.3)
MCH RBC QN AUTO: 29.6 PG (ref 26.6–33)
MCHC RBC AUTO-ENTMCNC: 33.4 G/DL (ref 31.5–35.7)
MCV RBC AUTO: 88.5 FL (ref 79–97)
MONOCYTES # BLD AUTO: 0.61 10*3/MM3 (ref 0.1–0.9)
MONOCYTES NFR BLD AUTO: 7.1 % (ref 5–12)
NEUTROPHILS NFR BLD AUTO: 5.53 10*3/MM3 (ref 1.7–7)
NEUTROPHILS NFR BLD AUTO: 64.5 % (ref 42.7–76)
NRBC BLD AUTO-RTO: 0 /100 WBC (ref 0–0.2)
NT-PROBNP SERPL-MCNC: 130.7 PG/ML (ref 0–450)
PLATELET # BLD AUTO: 281 10*3/MM3 (ref 140–450)
PMV BLD AUTO: 9.9 FL (ref 6–12)
POTASSIUM SERPL-SCNC: 4.3 MMOL/L (ref 3.5–5.2)
PROT SERPL-MCNC: 8.7 G/DL (ref 6–8.5)
PROTHROMBIN TIME: 14 SECONDS (ref 11.9–14.6)
RBC # BLD AUTO: 3.92 10*6/MM3 (ref 4.14–5.8)
SODIUM SERPL-SCNC: 144 MMOL/L (ref 136–145)
WBC # BLD AUTO: 8.58 10*3/MM3 (ref 3.4–10.8)
WHOLE BLOOD HOLD SPECIMEN: NORMAL
WHOLE BLOOD HOLD SPECIMEN: NORMAL

## 2021-01-19 PROCEDURE — 85610 PROTHROMBIN TIME: CPT | Performed by: NURSE PRACTITIONER

## 2021-01-19 PROCEDURE — 73700 CT LOWER EXTREMITY W/O DYE: CPT

## 2021-01-19 PROCEDURE — 93971 EXTREMITY STUDY: CPT

## 2021-01-19 PROCEDURE — 99283 EMERGENCY DEPT VISIT LOW MDM: CPT

## 2021-01-19 PROCEDURE — 73590 X-RAY EXAM OF LOWER LEG: CPT

## 2021-01-19 PROCEDURE — 83605 ASSAY OF LACTIC ACID: CPT | Performed by: NURSE PRACTITIONER

## 2021-01-19 PROCEDURE — 85730 THROMBOPLASTIN TIME PARTIAL: CPT | Performed by: NURSE PRACTITIONER

## 2021-01-19 PROCEDURE — 87040 BLOOD CULTURE FOR BACTERIA: CPT | Performed by: NURSE PRACTITIONER

## 2021-01-19 PROCEDURE — 96365 THER/PROPH/DIAG IV INF INIT: CPT

## 2021-01-19 PROCEDURE — 80053 COMPREHEN METABOLIC PANEL: CPT | Performed by: NURSE PRACTITIONER

## 2021-01-19 PROCEDURE — 85025 COMPLETE CBC W/AUTO DIFF WBC: CPT | Performed by: NURSE PRACTITIONER

## 2021-01-19 PROCEDURE — 36415 COLL VENOUS BLD VENIPUNCTURE: CPT

## 2021-01-19 PROCEDURE — 93971 EXTREMITY STUDY: CPT | Performed by: SURGERY

## 2021-01-19 PROCEDURE — 83880 ASSAY OF NATRIURETIC PEPTIDE: CPT | Performed by: NURSE PRACTITIONER

## 2021-01-19 RX ORDER — CLINDAMYCIN HYDROCHLORIDE 300 MG/1
300 CAPSULE ORAL 4 TIMES DAILY
Qty: 40 CAPSULE | Refills: 0 | Status: SHIPPED | OUTPATIENT
Start: 2021-01-19 | End: 2021-01-28

## 2021-01-19 RX ORDER — SODIUM CHLORIDE 0.9 % (FLUSH) 0.9 %
10 SYRINGE (ML) INJECTION AS NEEDED
Status: DISCONTINUED | OUTPATIENT
Start: 2021-01-19 | End: 2021-01-20 | Stop reason: HOSPADM

## 2021-01-19 RX ORDER — ASPIRIN 325 MG
325 TABLET, DELAYED RELEASE (ENTERIC COATED) ORAL DAILY
Qty: 30 TABLET | Refills: 0 | Status: SHIPPED | OUTPATIENT
Start: 2021-01-19 | End: 2021-01-28

## 2021-01-19 RX ORDER — CLINDAMYCIN PHOSPHATE 600 MG/50ML
600 INJECTION INTRAVENOUS ONCE
Status: COMPLETED | OUTPATIENT
Start: 2021-01-19 | End: 2021-01-19

## 2021-01-19 RX ADMIN — CLINDAMYCIN IN 5 PERCENT DEXTROSE 600 MG: 12 INJECTION, SOLUTION INTRAVENOUS at 22:40

## 2021-01-20 VITALS
OXYGEN SATURATION: 100 % | BODY MASS INDEX: 39.76 KG/M2 | DIASTOLIC BLOOD PRESSURE: 56 MMHG | HEART RATE: 80 BPM | RESPIRATION RATE: 18 BRPM | HEIGHT: 73 IN | TEMPERATURE: 98 F | WEIGHT: 300 LBS | SYSTOLIC BLOOD PRESSURE: 127 MMHG

## 2021-01-20 NOTE — DISCHARGE INSTRUCTIONS
Is important you complete your antibiotic prescription its entirety even if you begin to feel better.  Please begin taking a full dose of aspirin every day until you are evaluated and advised to stop by her primary care provider.  Please make sure that you are elevating your legs at least twice a day.  Please make sure that you are applying warm compresses to the leg.    Please have close follow-up with your primary care provider for reevaluation to assure your infection is improving.  Your imaging today showed no evidence of blood clots and no fractures.  Please below for further information regarding cellulitis, the infection of the skin you have noted today.        Cellulitis, Adult    Cellulitis is a skin infection. The infected area is usually warm, red, swollen, and tender. This condition occurs most often in the arms and lower legs. The infection can travel to the muscles, blood, and underlying tissue and become serious. It is very important to get treated for this condition.  What are the causes?  Cellulitis is caused by bacteria. The bacteria enter through a break in the skin, such as a cut, burn, insect bite, open sore, or crack.  What increases the risk?  This condition is more likely to occur in people who:  · Have a weak body defense system (immune system).  · Have open wounds on the skin, such as cuts, burns, bites, and scrapes. Bacteria can enter the body through these open wounds.  · Are older than 60 years of age.  · Have diabetes.  · Have a type of long-lasting (chronic) liver disease (cirrhosis) or kidney disease.  · Are obese.  · Have a skin condition such as:  ? Itchy rash (eczema).  ? Slow movement of blood in the veins (venous stasis).  ? Fluid buildup below the skin (edema).  · Have had radiation therapy.  · Use IV drugs.  What are the signs or symptoms?  Symptoms of this condition include:  · Redness, streaking, or spotting on the skin.  · Swollen area of the skin.  · Tenderness or pain when  an area of the skin is touched.  · Warm skin.  · A fever.  · Chills.  · Blisters.  How is this diagnosed?  This condition is diagnosed based on a medical history and physical exam. You may also have tests, including:  · Blood tests.  · Imaging tests.  How is this treated?  Treatment for this condition may include:  · Medicines, such as antibiotic medicines or medicines to treat allergies (antihistamines).  · Supportive care, such as rest and application of cold or warm cloths (compresses) to the skin.  · Hospital care, if the condition is severe.  The infection usually starts to get better within 1-2 days of treatment.  Follow these instructions at home:    Medicines  · Take over-the-counter and prescription medicines only as told by your health care provider.  · If you were prescribed an antibiotic medicine, take it as told by your health care provider. Do not stop taking the antibiotic even if you start to feel better.  General instructions  · Drink enough fluid to keep your urine pale yellow.  · Do not touch or rub the infected area.  · Raise (elevate) the infected area above the level of your heart while you are sitting or lying down.  · Apply warm or cold compresses to the affected area as told by your health care provider.  · Keep all follow-up visits as told by your health care provider. This is important. These visits let your health care provider make sure a more serious infection is not developing.  Contact a health care provider if:  · You have a fever.  · Your symptoms do not begin to improve within 1-2 days of starting treatment.  · Your bone or joint underneath the infected area becomes painful after the skin has healed.  · Your infection returns in the same area or another area.  · You notice a swollen bump in the infected area.  · You develop new symptoms.  · You have a general ill feeling (malaise) with muscle aches and pains.  Get help right away if:  · Your symptoms get worse.  · You feel very  sleepy.  · You develop vomiting or diarrhea that persists.  · You notice red streaks coming from the infected area.  · Your red area gets larger or turns dark in color.  These symptoms may represent a serious problem that is an emergency. Do not wait to see if the symptoms will go away. Get medical help right away. Call your local emergency services (911 in the [place] Do not drive yourself to the hospital.  Summary  · Cellulitis is a skin infection. This condition occurs most often in the arms and lower legs.  · Treatment for this condition may include medicines, such as antibiotic medicines or antihistamines.  · Take over-the-counter and prescription medicines only as told by your health care provider. If you were prescribed an antibiotic medicine, do not stop taking the antibiotic even if you start to feel better.  · Contact a health care provider if your symptoms do not begin to improve within 1-2 days of starting treatment or your symptoms get worse.  · Keep all follow-up visits as told by your health care provider. This is important. These visits let your health care provider make sure that a more serious infection is not developing.  This information is not intended to replace advice given to you by your health care provider. Make sure you discuss any questions you have with your health care provider.  Document Revised: 05/09/2019 Document Reviewed: 05/09/2019  [employer]vier Patient Education © 2020 [employer]

## 2021-01-20 NOTE — ED PROVIDER NOTES
Subjective   Patient is a pleasant 30-year-old male who presents the ER today from neuro restorative care with complaint of right lower extremity swelling and pain.  Patient reports that his right lower extremity has been swollen and painful for the past 2 days.  He denies any injuries.  The patient states that it has been red and warm to the touch.  He states that he believes that his leg is infected.  The patient previous history of PE or DVT in the past.  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  He states that this occurred in 2005.  The patient has left-sided paralysis due to this.  The patient reports that he also has a wound to his buttocks.  He states that has been there for some time and is not new.  He was following up with wound care however he states that the wound care at [facility] closed and he has not been following up for this.  He denies any new complaints with that.  He denies any chest pain or shortness of breath.  He presents here today for further evaluation.      History provided by:  Patient   used: No    Leg Pain  Location:  Leg  Time since incident:  2 days  Leg location:  R lower leg  Pain details:     Quality:  Shooting and throbbing    Radiates to:  R leg    Severity:  Moderate    Onset quality:  Sudden    Duration:  2 days    Timing:  Constant    Progression:  Worsening  Chronicity:  New  Dislocation: no    Foreign body present:  No foreign bodies  Prior injury to area:  No  Relieved by:  Nothing  Worsened by:  Nothing  Ineffective treatments:  None tried  Associated symptoms: swelling    Associated symptoms: no back pain, no decreased ROM, no fatigue, no fever, no itching, no muscle weakness, no neck pain, no numbness, no stiffness and no tingling    Risk factors: obesity    Risk factors: no concern for non-accidental trauma, no frequent fractures, no known bone disorder and no recent illness        Review of Systems   Constitutional:  Negative for fatigue and fever.   Musculoskeletal: Negative for back pain, neck pain and stiffness.   Skin: Negative for itching.   All other systems reviewed and are negative.      Past Medical History:   Diagnosis Date   • CVA (cerebral vascular accident) (CMS/HCC)    • Depression    • Hemiparesis (CMS/HCC)    • Hydrocephalus    • Kidney stones    • Neurogenic bladder    • Neurogenic bowel    • Seizure (CMS/HCC)    • Sinusitis    • Sleep apnea    • Subdural hematoma (CMS/HCC)    • TBI (traumatic brain injury) (CMS/[facility])    • UTI (urinary tract infection)        Allergies   Allergen Reactions   • Dilantin [Phenytoin]    • Latex    • Penicillins    • Sulfa Antibiotics        Past Surgical History:   Procedure Laterality Date   • BACLOFEN PUMP IMPLANTATION     • CHEST TUBE INSERTION      REMOVED   • CRANIOTOMY      FOR EVACUATION OF SUBDURAL HEMATOMA & VENTRICULOSTOMY   • HAMSTRING RELEASE     • KIDNEY STONE SURGERY     • PEG TUBE INSERTION      REMOVED   • TRACHEOSTOMY      REMOVED   •  SHUNT INSERTION         Family History   Problem Relation Age of Onset   • No Known Problems Mother    • No Known Problems Father        Social History     Socioeconomic History   • Marital status: Single     Spouse name: Not on file   • Number of children: Not on file   • Years of education: Not on file   • Highest education level: Not on file   Tobacco Use   • Smoking status: Never Smoker   • Smokeless tobacco: Never Used   Substance and Sexual Activity   • Alcohol use: No   • Drug use: No           Objective   Physical Exam  Vitals signs and nursing note reviewed.   Constitutional:       Appearance: Normal appearance.   HENT:      Head: Normocephalic and atraumatic.   Eyes:      Conjunctiva/sclera: Conjunctivae normal.   Cardiovascular:      Rate and Rhythm: Normal rate and regular rhythm.   Pulmonary:      Effort: Pulmonary effort is normal.      Breath sounds: Normal breath sounds.   Abdominal:      General: Bowel sounds are  normal.      Palpations: Abdomen is soft.   Musculoskeletal:        Legs:    Skin:     General: Skin is warm and dry.      Capillary Refill: Capillary refill takes less than 2 seconds.   Neurological:      General: No focal deficit present.      Mental Status: He is alert.   Psychiatric:         Mood and Affect: Mood normal.         Procedures           ED Course  ED Course as of Jan 19 2231   Tue Jan 19, 2021   1900 Assumed care of patient at this time from Ms. Melita Berrios NP.  Pending results Of ultrasound will reevaluate and disposition accordingly.  Please see MsSam Agustina's note above for HPI, ROS, as well as physical examination findings.    [JS]   1901 Pt case turned over to JOSE Tineo.    [LF]   1902 CBC with no acute findings.CMP with elevated alk phos 120, no other acute findings perLactic acid, BNP, APTT WNL.PT/INR WNL.X-ray shows: 1. Subtle transverse sclerotic line at the proximal tibia, which could be seen with fracture. CT may be useful for further evaluation.  2. Significant pretibial edema and edema in Hoffa's fat.Ultrasound pending transportation to department.     [JS]   1948 US shows: Right LEV: no thrombus vis. PTV and peron were non vis due to edema.     [JS]   2032 CT pending transportation to department.    [JS]   2057 CT pending dictation.     [JS]   2119 CT LE shows: 1. No evidence of acute fracture. Radiographic finding likely correlates with degenerative changes.  2. Osteoarthritis type changes at the knee.    [JS]   2134 Upon reevaluation at this time patient resting in bed reporting his pain is most significant when his leg is being palpated but is tolerable at rest.  Reviewed with patient lab as well as imaging findings.  Discussed with patient need for treatment of cellulitis at this time.  Discussed importance of continued close PCP follow-up.  Discussed importance of completion of oral antibiotics, use of daily aspirin, warm compresses, as well as elevation.  Reviewed  very strict return precautions, signs of worsening infection, need for me to return to ED should she develop any new or worsening symptoms or patient with no further questions, concerns, needs at this time is now stable for discharge.    [JS]   9650 Discussed case at this time with Dr. Buddy Riggs who is in agreement that patient is safe for continued outpatient treatment of cellulitis.  In agreement with oral antibiotics.  Recommends addition of full dose of aspirin on a daily basis as well as elevating legs twice a day with warm compresses with no further recommendations.      [JS]      ED Course User Index  [JS] Billy Shrestha PA-C  [LF] Melita Berrios, APRN                                           MDM  Number of Diagnoses or Management Options  Cellulitis of right leg:      Amount and/or Complexity of Data Reviewed  Clinical lab tests: reviewed  Tests in the radiology section of CPT®: reviewed  Tests in the medicine section of CPT®: reviewed  Obtain history from someone other than the patient: (Ms. Berrios, NP)    Patient Progress  Patient progress: improved      Final diagnoses:   Cellulitis of right leg            Billy Shrestha PA-C  01/19/21 223

## 2021-01-24 LAB
BACTERIA SPEC AEROBE CULT: NORMAL
BACTERIA SPEC AEROBE CULT: NORMAL

## 2021-01-25 NOTE — PROGRESS NOTES
[facility] - PODIATRY    Today's Date: 01/28/21    Patient Name: Octavio Werner  MRN: [id number]  CSN: [id number]  PCP: Solo Jimenez MD  Referring Provider: Solo Jimenez MD    SUBJECTIVE     Chief Complaint   Patient presents with   • Establish Care     pt is here for nail care - pt presents with long, thickened, discolored toenails - pt pain level /10 - pcp Tony     HPI: Octavio Werner, a 30 y.o.male, comes to clinic as a(n) new patient complaining of painful toenails and swelling in legs. Patient has h/o CVA, Depression, Hemiparesis, Hydrocephalus, Kidney Stone, Neurogenic Bladder/Bowel, Seizure, Sleep Apnea, Subdural hematoma, TBI. Patient is non-diabetic. Patient is mostly wheelchair bound and only able to transfer some due to CVA. Notes weakness and numbness in legs and feet. Denies open wounds or sores. States that he has swelling in his legs with the right worse than left. Notes that his toenails are long, thick, and crumbly. He is unable to care for them himself.  Admits pain at 6/10 level and described as aching, pressure and tingling. Denies previous treatment. Denies any constitutional symptoms. No other pedal complaints at this time.    Past Medical History:   Diagnosis Date   • CVA (cerebral vascular accident) (CMS/HCC)    • Depression    • Hemiparesis (CMS/HCC)    • Hydrocephalus (CMS/HCC)    • Kidney stones    • Neurogenic bladder    • Neurogenic bowel    • Seizure (CMS/HCC)    • Sinusitis    • Sleep apnea    • Subdural hematoma (CMS/HCC)    • TBI (traumatic brain injury) (CMS/HCC)    • UTI (urinary tract infection)      Past Surgical History:   Procedure Laterality Date   • BACLOFEN PUMP IMPLANTATION     • CHEST TUBE INSERTION      REMOVED   • CRANIOTOMY      FOR EVACUATION OF SUBDURAL HEMATOMA & VENTRICULOSTOMY   • HAMSTRING RELEASE     • KIDNEY STONE SURGERY     • PEG TUBE INSERTION      REMOVED   • TRACHEOSTOMY      REMOVED   •  SHUNT INSERTION       Family  History   Problem Relation Age of Onset   • No Known Problems Mother    • No Known Problems Father      Social History     Socioeconomic History   • Marital status: Single     Spouse name: Not on file   • Number of children: Not on file   • Years of education: Not on file   • Highest education level: Not on file   Tobacco Use   • Smoking status: Never Smoker   • Smokeless tobacco: Never Used   Substance and Sexual Activity   • Alcohol use: No   • Drug use: No   • Sexual activity: Defer     Allergies   Allergen Reactions   • Dilantin [Phenytoin]    • Latex    • Penicillins    • Sulfa Antibiotics      Current Outpatient Medications   Medication Sig Dispense Refill   • acetaminophen (TYLENOL) 500 MG tablet Take 500 mg by mouth Every 6 (Six) Hours As Needed for mild pain (1-3).     • amantadine (SYMMETREL) 100 MG capsule Take 100 mg by mouth 2 (Two) Times a Day.     • ARTIFICIAL TEAR SOLUTION OP Apply  to eye.     • baclofen (LIORESAL) 20 MG tablet Take 20 mg by mouth 4 (Four) Times a Day.     • busPIRone (BUSPAR) 10 MG tablet Take 10 mg by mouth 2 (Two) Times a Day.     • calcium carbonate (OS-YANNI) 600 MG tablet Take 600 mg by mouth Daily.     • Calcium Carbonate-Vitamin D (CALCIUM 500/VITAMIN D PO) Take  by mouth.     • chlorproMAZINE (THORAZINE) 25 MG tablet Take 25 mg by mouth Every Night.     • cholecalciferol (VITAMIN D3) 25 MCG (1000 UT) tablet Take 2,000 Units by mouth Daily.     • citalopram (CeleXA) 40 MG tablet Take 40 mg by mouth Daily.     • dexlansoprazole (DEXILANT) 60 MG capsule Take 60 mg by mouth Daily.     • docusate sodium (COLACE) 100 MG capsule Take 100 mg by mouth 2 (Two) Times a Day.     • DULoxetine (CYMBALTA) 60 MG capsule Take 60 mg by mouth Daily.     • gabapentin (NEURONTIN) 400 MG capsule      • ketoconazole (NIZORAL) 2 % cream      • ketoconazole (NIZORAL) 2 % shampoo      • lisinopril (PRINIVIL,ZESTRIL) 10 MG tablet      • meloxicam (MOBIC) 7.5 MG tablet Take 7.5 mg by mouth 2 (Two) Times  a Day.     • Multiple Vitamin (MULTI-VITAMIN PO) Take  by mouth.     • nystatin (MYCOSTATIN) 015636 UNIT/GM cream      • Omega-3 Fatty Acids (FISH OIL) 1000 MG capsule capsule Take 3,000 mg by mouth 3 (Three) Times a Day With Meals.     • oxyCODONE-acetaminophen (PERCOCET) 5-325 MG per tablet Take 1 tablet by mouth Every 6 (Six) Hours As Needed.     • polyethylene glycol (MIRALAX) powder      • risperiDONE (risperDAL) 1 MG tablet Take 1 mg by mouth 2 (Two) Times a Day.     • Suvorexant (Belsomra) 20 MG tablet Take  by mouth.     • vitamin C (ASCORBIC ACID) 500 MG tablet Take 500 mg by mouth 2 (Two) Times a Day.     • vitamin E 400 UNIT capsule Take 400 Units by mouth 2 (Two) Times a Day.     • zinc oxide 20 % ointment Apply  topically to the appropriate area as directed As Needed.       No current facility-administered medications for this visit.      Review of Systems   Constitutional: Negative for chills and fever.   HENT: Negative for congestion.    Respiratory: Negative for shortness of breath.    Cardiovascular: Positive for leg swelling. Negative for chest pain.   Gastrointestinal: Negative for constipation, diarrhea, nausea and vomiting.   Musculoskeletal: Positive for gait problem.        Foot pain   Skin: Negative for wound.   Neurological: Positive for speech difficulty, weakness and numbness.       OBJECTIVE     Vitals:    21 0956   Pulse: 92   SpO2: 99%       PHYSICAL EXAM  GEN:   Accompanied by transporter.     Foot/Ankle Exam:       General:   Appearance: appears stated age and healthy and obesity    Orientation: AAOx3    Affect: appropriate    Assistance: wheelchair    Shoe Gear:  Casual shoes    VASCULAR      Right Foot Vascularity   Dorsalis pedis:  2+  Posterior tibial:  2+  Skin Temperature: warm    Edema Gradin+ and pitting  CFT:  3  Pedal Hair Growth:  Present  Varicosities: none       Left Foot Vascularity   Dorsalis pedis:  2+  Posterior tibial:  2+  Skin Temperature: warm    Edema  Gradin+ and pitting  CFT:  3  Pedal Hair Growth:  Present  Varicosities: none        NEUROLOGIC     Right Foot Neurologic   Light touch sensation:  Diminished  Vibratory sensation:  Diminished  Hot/Cold sensation: diminished    Protective Sensation using Livingston-Xuan Monofilament:  8     Left Foot Neurologic   Light touch sensation:  Diminished  Vibratory sensation:  Diminished  Hot/cold sensation: diminished    Protective Sensation using Livingston-Xuan Monofilament:  3     MUSCULOSKELETAL      Right Foot Musculoskeletal   Ecchymosis:  None  Tenderness: toenails    Tenderness comment:  Diffusely to lower legs  Arch:  Normal     Left Foot Musculoskeletal   Ecchymosis:  None  Tenderness: toenails    Arch:  Normal     MUSCLE STRENGTH     Right Foot Muscle Strength   Foot dorsiflexion:  3  Foot plantar flexion:  4  Foot inversion:  3  Foot eversion:  3     Left Foot Muscle Strength   Foot dorsiflexion:  1  Foot plantar flexion:  1  Foot inversion:  1  Foot eversion:  1     RANGE OF MOTION      Right Foot Range of Motion   Foot and ankle ROM within normal limits       Left Foot Range of Motion   Foot and ankle ROM within normal limits       DERMATOLOGIC     Right Foot Dermatologic   Skin: skin intact    Nails: onychomycosis, abnormally thick, subungual debris, dystrophic nails and absent (5th toe)       Left Foot Dermatologic   Skin: skin intact    Nails: onychomycosis, abnormally thick, subungual debris and dystrophic nails        RADIOLOGY/NUCLEAR:  Xr Tibia Fibula 2 View Right    Result Date: 2021  Narrative: EXAM: XR TIBIA FIBULA 2 VW RIGHT- - 2021 6:19 PM CST  HISTORY: rt leg pain, swelling   COMPARISON: No existing relevant imaging studies available.  TECHNIQUE:  4 images.  AP and lateral views  FINDINGS:  There is a subtle transverse sclerotic line at the proximal tibia. Grossly normal alignment of the knee and ankle, not optimally evaluated on this exam. Small corticated density at the  lateral malleolus, which may represent sequelae of prior trauma. Significant pretibial edema and edema in Hoffa's fat. No radiodense foreign body.       Impression: 1. Subtle transverse sclerotic line at the proximal tibia, which could be seen with fracture. CT may be useful for further evaluation. 2. Significant pretibial edema and edema in Hoffa's fat. This report was finalized on 01/19/2021 18:52 by Dr Maritza Roberts MD.    Us Venous Doppler Lower Extremity Right (duplex)    Result Date: 1/20/2021  Narrative: History: Pain and swelling      Impression: Impression: There is no evidence of deep venous thrombosis or superficial thrombophlebitis of the right lower extremity.  Comments: Right lower extremity venous duplex exam was performed using color Doppler flow, Doppler wave form analysis, and grayscale imaging, with and without compression. There is no evidence of deep venous thrombosis of the common femoral, superficial femoral, and popliteal veins. The tibial veins were not visualized due to swelling. There is no thrombus identified in the saphenofemoral junction or the greater saphenous vein.  This report was finalized on 01/20/2021 16:53 by Dr. Jose Manuel Rodriguez MD.    Ct Lower Extremity Right Without Contrast    Result Date: 1/19/2021  Narrative: EXAM: CT LOWER EXTREMITY RIGHT WO CONTRAST- - 1/19/2021 8:34 PM CST  HISTORY: assess tibia, abnormal radiograph, pain and swelling on chart review  COMPARISON: Same day radiograph 1/19/2021.  DOSE LENGTH PRODUCT: 265 mGy cm. Automatic exposure control was utilized to make radiation dose as low as reasonably achievable.  TECHNIQUE: Unenhanced CT images were acquired then reconstructed and displayed as axial, coronal and sagittal multiplanar reformatted images. The coverage included the distal femur to the mid tibia fibula.  FINDINGS:  BONE: The bones all have normal configuration. No acute displaced fracture or aggressive bony lesion. Small spiculated sclerotic focus  in the proximal tibia, favored to represent a bone island.  JOINTS: Mild osteoarthritis type changes at the knee. No large knee joint effusion.  TENDONS AND MUSCLES: The tendons and muscles are normal configuration and density but limited in assessment.  SUBCUTANEOUS AND DEEP SOFT TISSUES: Vascular calcifications. Diffuse soft tissue swelling. No focal fluid collection appreciated on this unenhanced exam.       Impression: 1. No evidence of acute fracture. Radiographic finding likely correlates with degenerative changes. 2. Osteoarthritis type changes at the knee.   This report was finalized on 01/19/2021 21:07 by Dr Maritza Roberts MD.      LABORATORY/CULTURE RESULTS:      PATHOLOGY RESULTS:       ASSESSMENT/PLAN     Diagnoses and all orders for this visit:    1. Onychomycosis (Primary)    2. Closed traumatic brain injury with loss of consciousness, sequela (CMS/HCC)    3. Wheelchair dependence    4. Peripheral edema    5. Hemiparesis, unspecified hemiparesis etiology, unspecified laterality (CMS/[facility])    6. Other polyneuropathy      Comprehensive lower extremity examination and evaluation was performed.  Discussed findings and treatment plan including risks, benefits, and treatment options with patient in detail. Patient agreed with treatment plan.  After verbal consent obtained, nail(s) x9 debrided of length and thickness with nail nipper without incidence  Patient may maintain nails and calluses at home utilizing emery board or pumice stone between visits as needed   Rx: 20-30mmHg compression stockings - to be worn daily.   An After Visit Summary was printed and given to the patient at discharge, including (if requested) any available informative/educational handouts regarding diagnosis, treatment, or medications. All questions were answered to patient/family satisfaction. Should symptoms fail to improve or worsen they agree to call or return to clinic or to go to the Emergency Department. Discussed the  importance of following up with any needed screening tests/labs/specialist appointments and any requested follow-up recommended by me today. Importance of maintaining follow-up discussed and patient accepts that missed appointments can delay diagnosis and potentially lead to worsening of conditions.  Return in about 3 months (around 4/28/2021)., or sooner if acute issues arise.    Lab Frequency Next Occurrence       This document has been electronically signed by Shakir Rob DPM on January 28, 2021 10:12 CST

## 2021-01-27 ENCOUNTER — TELEPHONE (OUTPATIENT)
Dept: PODIATRY | Facility: CLINIC | Age: 31
End: 2021-01-27

## 2021-01-28 ENCOUNTER — OFFICE VISIT (OUTPATIENT)
Dept: PODIATRY | Facility: CLINIC | Age: 31
End: 2021-01-28

## 2021-01-28 VITALS — OXYGEN SATURATION: 99 % | HEIGHT: 73 IN | WEIGHT: 300 LBS | HEART RATE: 92 BPM | BODY MASS INDEX: 39.76 KG/M2

## 2021-01-28 DIAGNOSIS — Z99.3 WHEELCHAIR DEPENDENCE: ICD-10-CM

## 2021-01-28 DIAGNOSIS — G81.90 HEMIPARESIS, UNSPECIFIED HEMIPARESIS ETIOLOGY, UNSPECIFIED LATERALITY (HCC): ICD-10-CM

## 2021-01-28 DIAGNOSIS — B35.1 ONYCHOMYCOSIS: Primary | ICD-10-CM

## 2021-01-28 DIAGNOSIS — S06.9X9S CLOSED TRAUMATIC BRAIN INJURY WITH LOSS OF CONSCIOUSNESS, SEQUELA (HCC): ICD-10-CM

## 2021-01-28 DIAGNOSIS — R60.9 PERIPHERAL EDEMA: ICD-10-CM

## 2021-01-28 DIAGNOSIS — G62.89 OTHER POLYNEUROPATHY: ICD-10-CM

## 2021-01-28 PROCEDURE — 11721 DEBRIDE NAIL 6 OR MORE: CPT | Performed by: PODIATRIST

## 2021-01-28 PROCEDURE — 99203 OFFICE O/P NEW LOW 30 MIN: CPT | Performed by: PODIATRIST

## 2021-01-28 RX ORDER — SUVOREXANT 20 MG/1
TABLET, FILM COATED ORAL
COMMUNITY
End: 2021-06-22

## 2021-01-28 RX ORDER — ZINC OXIDE 20 %
OINTMENT (GRAM) TOPICAL AS NEEDED
COMMUNITY
End: 2023-01-18

## 2021-01-28 RX ORDER — MELATONIN
2000 DAILY
COMMUNITY
End: 2023-01-18

## 2021-01-28 NOTE — PATIENT INSTRUCTIONS
Peripheral Edema    Peripheral edema is swelling that is caused by a buildup of fluid. Peripheral edema most often affects the lower legs, ankles, and feet. It can also develop in the arms, hands, and face. The area of the body that has peripheral edema will look swollen. It may also feel heavy or warm. Your clothes may start to feel tight. Pressing on the area may make a temporary dent in your skin. You may not be able to move your swollen arm or leg as much as usual.  There are many causes of peripheral edema. It can happen because of a complication of other conditions such as congestive heart failure, kidney disease, or a problem with your blood circulation. It also can be a side effect of certain medicines or because of an infection. It often happens to women during pregnancy. Sometimes, the cause is not known.  Follow these instructions at home:  Managing pain, stiffness, and swelling    · Raise (elevate) your legs while you are sitting or lying down.  · Move around often to prevent stiffness and to lessen swelling.  · Do not sit or stand for long periods of time.  · Wear support stockings as told by your health care provider.  Medicines  · Take over-the-counter and prescription medicines only as told by your health care provider.  · Your health care provider may prescribe medicine to help your body get rid of excess water (diuretic).  General instructions  · Pay attention to any changes in your symptoms.  · Follow instructions from your health care provider about limiting salt (sodium) in your diet. Sometimes, eating less salt may reduce swelling.  · Moisturize skin daily to help prevent skin from cracking and draining.  · Keep all follow-up visits as told by your health care provider. This is important.  Contact a health care provider if you have:  · A fever.  · Edema that starts suddenly or is getting worse, especially if you are pregnant or have a medical condition.  · Swelling in only one leg.  · Increased  swelling, redness, or pain in one or both of your legs.  · Drainage or sores at the area where you have edema.  Get help right away if you:  · Develop shortness of breath, especially when you are lying down.  · Have pain in your chest or abdomen.  · Feel weak.  · Feel faint.  Summary  · Peripheral edema is swelling that is caused by a buildup of fluid. Peripheral edema most often affects the lower legs, ankles, and feet.  · Move around often to prevent stiffness and to lessen swelling. Do not sit or stand for long periods of time.  · Pay attention to any changes in your symptoms.  · Contact a health care provider if you have edema that starts suddenly or is getting worse, especially if you are pregnant or have a medical condition.  · Get help right away if you develop shortness of breath, especially when lying down.  This information is not intended to replace advice given to you by your health care provider. Make sure you discuss any questions you have with your health care provider.  Document Revised: 09/11/2019 Document Reviewed: 09/11/2019  Elsevier Patient Education © 2020 [employer]

## 2021-04-06 ENCOUNTER — HOSPITAL ENCOUNTER (OUTPATIENT)
Dept: WOUND CARE | Age: 31
Discharge: HOME | End: 2021-04-06
Payer: COMMERCIAL

## 2021-04-06 VITALS
HEART RATE: 96 BPM | SYSTOLIC BLOOD PRESSURE: 124 MMHG | DIASTOLIC BLOOD PRESSURE: 81 MMHG | TEMPERATURE: 98.8 F | RESPIRATION RATE: 20 BRPM

## 2021-04-06 DIAGNOSIS — E66.01 CLASS 3 SEVERE OBESITY DUE TO EXCESS CALORIES WITHOUT SERIOUS COMORBIDITY WITH BODY MASS INDEX (BMI) OF 40.0 TO 44.9 IN ADULT (HCC): Chronic | ICD-10-CM

## 2021-04-06 DIAGNOSIS — L30.8 DERMATITIS ASSOCIATED WITH MOISTURE: Primary | Chronic | ICD-10-CM

## 2021-04-06 PROBLEM — L89.323 PRESSURE INJURY OF LEFT BUTTOCK, STAGE 3 (HCC): Status: ACTIVE | Noted: 2019-05-13

## 2021-04-06 PROBLEM — S91.105A OPEN WOUND OF THIRD TOE OF LEFT FOOT: Status: ACTIVE | Noted: 2019-08-20

## 2021-04-06 PROCEDURE — 99214 OFFICE O/P EST MOD 30 MIN: CPT

## 2021-04-06 PROCEDURE — 99213 OFFICE O/P EST LOW 20 MIN: CPT | Performed by: SURGERY

## 2021-04-06 ASSESSMENT — PAIN DESCRIPTION - ORIENTATION: ORIENTATION: LEFT

## 2021-04-06 NOTE — PROGRESS NOTES
WOUND CARE HISTORY AND PHYSICAL    Patient Care Team:  Juliana Farris MD as PCP - General (Family Medicine)     CC:     Raji Robles is a 33 y.o. male who presents today for wound evaluation. Wound Type: pressure  Wound Location: left buttocks and R groin  Modifying factors: chronic pressure, decreased mobility, shear force and obesity    Patient Active Problem List   Diagnosis Code    Muscle spasticity M62.838    Post-traumatic spasticity R25.2    Open wound of buttock S31.809A    Dermatitis associated with moisture L30.8    History of cerebrovascular accident (CVA) with residual deficit I69.30    Renal calculus, right N20.0    Pressure injury of left buttock, stage 3 ([facility]) W19.725    Class 3 severe obesity due to excess calories without serious comorbidity in [facility]) E66.01    Open wound of third toe of left foot S91.105A       HPI:  He reports he developed a wound on left buttock and R groin. This started 1 week(s) ago. He believes this is  healing. He has been applying nothing. He has not had  fever or chills. He has/with TBI and [facility].     Raji Robles is a 33 y.o. male [facility] the [facility]:  Patient Active Problem List    Diagnosis Date Noted    Open wound of third toe of left foot 08/20/2019    Class 3 severe obesity due to excess calories without serious comorbidity in [facility]) 07/30/2019    Pressure injury of left buttock, stage 3 ([facility] [address] 05/13/2019    Renal calculus, right 08/14/2018    Open wound of buttock 08/13/2018    Dermatitis associated with moisture 08/13/2018    History of cerebrovascular accident (CVA) with residual deficit 08/13/2018    Post-traumatic spasticity 03/23/2016    Muscle spasticity      Current Outpatient Medications   Medication Sig Dispense Refill    Cholecalciferol (VITAMIN D3 PO) Take 2,000 Units by mouth daily      Amantadine (SYMMETREL) 100 MG TABS tablet Take 100 mg by mouth 2 times daily      hydrOXYzine (ATARAX) 50 MG tablet Take 50 mg by mouth nightly      Suvorexant (BELSOMRA) 20 MG TABS Take 20 mg by mouth nightly.  azelastine HCl 0.15 % SOLN by Nasal route 2 times daily as needed      baclofen (LIORESAL) 20 MG tablet Take 20 mg by mouth 4 times daily      busPIRone (BUSPAR) 10 MG tablet Take 10 mg by mouth daily      chlorproMAZINE (THORAZINE) 25 MG tablet Take 25 mg by mouth nightly      meloxicam (MOBIC) 7.5 MG tablet Take 1 tablet by mouth 2 times daily 30 tablet 3    loratadine (CLARITIN) 10 MG capsule Take 10 mg by mouth daily      fluticasone (FLONASE) 50 MCG/ACT nasal spray 1 spray by Each Nare route 2 times daily      melatonin 5 MG TABS tablet Take 5 mg by mouth nightly       gabapentin (NEURONTIN) 400 MG capsule Take 400 mg by mouth 3 times daily.  lisinopril (PRINIVIL;ZESTRIL) 10 MG tablet Take 10 mg by mouth daily      zinc oxide 20 % ointment Apply topically 2 times daily Apply topically as needed.       polyethylene glycol (GLYCOLAX) powder Take 17 g by mouth 2 times daily       dexlansoprazole (DEXILANT) 60 MG CPDR delayed release capsule Take 60 mg by mouth daily      citalopram (CELEXA) 40 MG tablet Take 40 mg by mouth daily      DULoxetine (CYMBALTA) 60 MG capsule Take 60 mg by mouth daily      Multiple Vitamins-Minerals (THERAPEUTIC MULTIVITAMIN-MINERALS) tablet Take 1 tablet by mouth daily      Calcium Carbonate-Vitamin D (CALCIUM-VITAMIN D) 500-200 MG-UNIT per tablet Take 1 tablet by mouth 2 times daily (with meals)      docusate sodium (COLACE) 100 MG capsule Take 200 mg by mouth 2 times daily      Omega-3 Fatty Acids (FISH OIL) 1000 MG CAPS Take 3,000 mg by mouth 2 times daily      risperiDONE (RISPERDAL) 1 MG tablet Take 1 mg by mouth 2 times daily      Ascorbic Acid (VITAMIN C) 500 MG CAPS Take 500 mg by mouth 2 times daily      propranolol (INDERAL) 20 MG tablet Take 20 mg by mouth 2 times daily       acetaminophen (TYLENOL) 500 MG tablet Take 1,000 mg by mouth every 6 hours as needed for Pain or Fever      tiZANidine (ZANAFLEX) 2 MG tablet Take 2 mg by mouth 2 times daily At 2 pm and Bedtime      zolpidem (AMBIEN) 10 MG tablet Take by mouth nightly.  loperamide (IMODIUM) 2 MG capsule Take 2 mg by mouth 4 times daily as needed for Diarrhea      hydrocortisone 2.5 % cream Apply topically 2 times daily Apply topically 2 times daily.  tacrolimus (PROTOPIC) 0.03 % ointment Apply topically 2 times daily Apply topically 2 times daily.  vitamin E 400 UNIT capsule Take 400 Units by mouth 2 times daily       ketoconazole (NIZORAL) 2 % cream Apply topically 3 times daily Apply topically daily.  oxyCODONE-acetaminophen (PERCOCET)  MG per tablet Take 1 tablet by mouth every 6 hours as needed for Pain.  tiZANidine (ZANAFLEX) 4 MG tablet Take 2 mg by mouth 2 times daily       guaiFENesin 400 MG tablet Take 800 mg by mouth three times daily 2 Tablets TID PO PRN       nystatin (MYCOSTATIN) 035244 UNIT/GM cream Apply 1 Dose topically as needed for Dry Skin Apply topically 2 times daily.  ketoconazole (NIZORAL) 2 % shampoo Apply topically daily as needed for Itching Apply topically daily as needed. No current facility-administered medications for this encounter.       Allergies: Latex, Dilantin [phenytoin], Pcn [penicillins], and Sulfa antibiotics  Past Medical History:   Diagnosis Date    Arthritis     Cerebral artery occlusion with cerebral infarction ([facility] [address]     Depression     Difficult intubation     Hemorrhoids     History of blood transfusion     Hypertension     Kidney stone     Muscle spasticity     Prolonged emergence from general anesthesia     Retention of urine        Past Surgical History:   Procedure Laterality Date    BACK SURGERY      BACLOFEN PUMP IMPLANTATION      BRAIN SURGERY      KIDNEY STONE SURGERY      [facility] FRAGMENT KIDNEY STONE/ ESWL Right 10/2/2018    ESWL EXTRACORPEAL SHOCK WAVE LITHOTRIPSY performed by Katherine Zelaya MD at [address]     History reviewed. No pertinent family history. Social History     Tobacco Use    Smoking status: Never Smoker    Smokeless tobacco: Never Used   Substance Use Topics    Alcohol use: No         Review of Systems    Constitutional - no significant activity change, appetite change, or unexpected weight change. No fever or chills. No diaphoresis or significant fatigue. HENT - no significant rhinorrhea or epistaxis. No tinnitus or significant hearing loss. Eyes - no sudden vision change or amaurosis. Respiratory - no significant shortness of breath, wheezing, or stridor. No apnea, cough, or chest tightness associated with shortness of breath. Cardiovascular - no chest pain, syncope, or significant dizziness. No palpitations. Patient reports no claudication. Gastrointestinal - no abdominal swelling or pain. No blood in stool. No severe constipation, diarrhea, nausea, or vomiting. Genitourinary - No difficulty urinating, dysuria, frequency, or urgency. No flank pain or hematuria. Musculoskeletal - no back pain, gait disturbance, or myalgia. Skin - no color change, rash, pallor. Neurologic - no dizziness, facial asymmetry, or light headedness. No seizures. No speech difficulty or lateralizing weakness. Hematologic - no easy bruising or excessive bleeding. Psychiatric - no severe anxiety or nervousness. No confusion. All other review of systems are negative. Physical Exam    /81   Pulse 96   Temp 98.8 °F (37.1 °C) (Tympanic)   Resp 20     Constitutional - well developed, well nourished. No diaphoresis or acute distress. HENT - head normocephalic. Right external ear canal appears normal.  Left external ear canal appears normal.  Septum appears midline. Lips,teeth,gums normal  Eyes - conjunctiva normal.  EOMS normal.  No exudate. No icterus. PERRLA  Neck- ROM appears normal, no tracheal deviation.   Cardiovascular - Regular rate and rhythm. Heart sounds are normal.  No murmur, rub, or gallop. Carotid pulses are 2+ to palpation bilaterally without bruit. Extremities - Radial and brachial pulses are 2+ to palpation bilaterally. Femoral pulses: present 2+bilaterally;Popliteal pulses: present 2+bilaterally Right DP: present 2+; Right PT present 2+; Left DP: present 2+; Left PT: present 2+  No cyanosis, clubbing. no edema. No signs atheroembolic event. Pulmonary - effort appears normal.  No respiratory distress. Lungs - Breath sounds normal. No wheezes or rales. GI - Abdomen - soft, non tender, bowel sounds X 4 quadrants. No guarding or rebound tenderness. No distension or palpable mass. Genitourinary - deferred. Musculoskeletal - ROM appears normal. no edema. Skin: warm,dry  Neurologic - alert and oriented X 3. Sensation normal  Psychiatric - mood, affect, and behavior appear normal.  Judgment and thought processes appear normal.  Wound - left buttock and R groin    Wound Picture:                Assessment     left buttock and R groin wounds    1. Dermatitis associated with moisture    2. Class 3 severe obesity due to excess calories without serious comorbidity with body mass index (BMI) of 40.0 to 44.9 in adult [facility])          Post Debridement Measurements and Assessment:  Wound 03/28/19 Buttocks Left WOUND 3 LEFT BUTTOCKS PRESSURE STAGE 3 (Active)   Number of days: 740       Wound 04/06/21 Buttocks Left;Right Wound #1 right and left buttocks MASD (Active)   Wound Image   04/06/21 1420   Wound Etiology Other 04/06/21 1420   Dressing Status New dressing applied; Other (Comment) 04/06/21 1440   Wound Cleansed Soap and water 04/06/21 1420   Dressing/Treatment Moisture barrier 04/06/21 1440   Wound Length (cm) 2 cm 04/06/21 1420   Wound Width (cm) 6 cm 04/06/21 1420   Wound Depth (cm) 0.1 cm 04/06/21 1420   Wound Surface Area (cm^2) 12 cm^2 04/06/21 1420   Wound Volume (cm^3) 1.2 cm^3 04/06/21 1420   Post-Procedure Length (cm) 0 cm 04/06/21 1438   Post-Procedure Width (cm) 0 cm 04/06/21 1438   Post-Procedure Depth (cm) 0 cm 04/06/21 1438   Post-Procedure Surface Area (cm^2) 0 cm^2 04/06/21 1438   Post-Procedure Volume (cm^3) 0 cm^3 04/06/21 1438   Distance Tunneling (cm) 0 cm 04/06/21 1420   Tunneling Position ___ O'Clock 0 04/06/21 1420   Undermining Starts ___ O'Clock 0 04/06/21 1420   Undermining Ends___ O'Clock 0 04/06/21 1420   Undermining Maxium Distance (cm) 0 04/06/21 1420   Wound Assessment Denuded;Eschar less than 20%; Pink/red 04/06/21 1420   Drainage Amount Scant 04/06/21 1420   Drainage Description Serous 04/06/21 1420   Odor None 04/06/21 1420   Rachana-wound Assessment Fragile 04/06/21 1420   Margins Defined edges 04/06/21 1420   Wound Thickness Description not for Pressure Injury Partial thickness 04/06/21 1420   Number of days: 0          The patients pain isPain Level: 3 Pain Type: Acute pain 0. Plan for wound - Dress per physician order    Treatment:     Compression : No   Offloading : Yes   Dressing : SEE AVS   Additional Therapy : Cont to offload as much as possible and moisturize. NO OPEN Wounds AT THIS TIME RTO PRN   Discussed appropriate home care of this wound. Wound redressed. Patient instructions were given. Follow up: PRN  Recommend no smoking  Offloading instructions given    Discharge Instructions         [address] [place] [address] and [facility]   Physician Orders and Discharge Instructions  [address] [place] [address]  Telephone: [phone]    NAME:  Acosta Otero OF BIRTH:  1990  MEDICAL RECORD NUMBER:  [id number]  DATE:  4/6/2021    Discharge condition: Stable    Discharge to: ECF    Left via:Private automobile    Accompanied by:  caregivers    [facility]    Dressing Orders: Abdomen Folds and Buttocks:   Wash with soap and water.  Apply JENNY cream (skin barrier and antifungal ointment mix) to wound bed. Re-apply cream after incontinent episodes. Wash area with soap and water. DO NOT scrub cream off. Wash off soilage with soap and water, re-apply cream over left ointment. Baby oil will remove ointment without scrubbing also. May use inter-dry cloth in folds when areas are no longer red to prevent breakdown. Treatment Orders:     PRESSURE RELIEF INSTRUCTONS    Avoid Pressure to wound site. Turn frequently (turn at least every two hours when in bed)  While in chair reposition every 30 minutes  Patient advised to sit up no more than 30minutes at a time for meals ONLY. Please do not elevate the head of the bed higher than 30 degrees. Off-load heels by applying heel-lift boots or \"float\" heels by placing pillows under calves so that heels do not touch mattress. Continue Protein rich diet (unless restricted by your physician)  Take Multivitamin daily    Please use Roho cushion in chair  Please use low air loss bed        [address] [place] [address],[address] follow up visit ______Call if needed_______________________  (Please note your next appointment above and if you are unable to keep, kindly give a 24 hour notice. Thank you.)          If you experience any of the following, please call the [address] during business hours:    * Increase in Pain  * Temperature over 101  * Increase in drainage from your wound  * Drainage with a foul odor  * Bleeding  * Increase in swelling  * Need for compression bandage changes due to slippage, breakthrough drainage. If you need medical attention outside of the business hours of the [address] please contact your PCP or go to the nearest emergency room.           Electronically signed by Kiley Herman MD on 21 at 5:11 PM CDT

## 2021-04-21 NOTE — PROGRESS NOTES
[facility] - PODIATRY    Today's Date: 04/29/21    Patient Name: Octavio Werner  MRN: [id number]  CSN: [id number]  PCP: Solo Jimenez MD  Referring Provider: No ref. provider found    SUBJECTIVE     Chief Complaint   Patient presents with   • Follow-up     pt is here for nail care - pt presents with long, thickened, discolored toenails - pcp 04/23/21     HPI: Octavio Werner, a 31 y.o.male, comes to clinic as a(n) established patient complaining of painful toenails and swelling in legs. Patient has h/o CVA, Depression, Hemiparesis, Hydrocephalus, Kidney Stone, Neurogenic Bladder/Bowel, Seizure, Sleep Apnea, Subdural hematoma, TBI. Patient is non-diabetic. Patient is mostly wheelchair bound and only able to transfer some due to CVA. Notes weakness and numbness in legs and feet. Denies open wounds or sores. States that he has swelling in his legs with the right worse than left. Notes that his toenails are long, thick, and crumbly. He is unable to care for them himself. Does not think that the compression stockings were picked up.  Admits pain at 3/10 level and described as aching, pressure and tingling. Relates previous treatment(s) including nail care by podiatrist. Denies any constitutional symptoms. No other pedal complaints at this time.    Past Medical History:   Diagnosis Date   • CVA (cerebral vascular accident) (CMS/HCC)    • Depression    • Hemiparesis (CMS/HCC)    • Hydrocephalus (CMS/HCC)    • Kidney stones    • Neurogenic bladder    • Neurogenic bowel    • Seizure (CMS/HCC)    • Sinusitis    • Sleep apnea    • Subdural hematoma (CMS/HCC)    • TBI (traumatic brain injury) (CMS/HCC)    • UTI (urinary tract infection)      Past Surgical History:   Procedure Laterality Date   • BACLOFEN PUMP IMPLANTATION     • CHEST TUBE INSERTION      REMOVED   • CRANIOTOMY      FOR EVACUATION OF SUBDURAL HEMATOMA & VENTRICULOSTOMY   • HAMSTRING RELEASE     • KIDNEY STONE SURGERY     • PEG TUBE  INSERTION      REMOVED   • TRACHEOSTOMY      REMOVED   •  SHUNT INSERTION       Family History   Problem Relation Age of Onset   • No Known Problems Mother    • No Known Problems Father      Social History     Socioeconomic History   • Marital status: Single     Spouse name: Not on file   • Number of children: Not on file   • Years of education: Not on file   • Highest education level: Not on file   Tobacco Use   • Smoking status: Never Smoker   • Smokeless tobacco: Never Used   Vaping Use   • Vaping Use: Never used   Substance and Sexual Activity   • Alcohol use: No   • Drug use: No   • Sexual activity: Defer     Allergies   Allergen Reactions   • Dilantin [Phenytoin]    • Latex    • Penicillins    • Sulfa Antibiotics      Current Outpatient Medications   Medication Sig Dispense Refill   • acetaminophen (TYLENOL) 500 MG tablet Take 500 mg by mouth Every 6 (Six) Hours As Needed for mild pain (1-3).     • amantadine (SYMMETREL) 100 MG capsule Take 100 mg by mouth 2 (Two) Times a Day.     • ARTIFICIAL TEAR SOLUTION OP Apply  to eye.     • azelastine (ASTEPRO) 0.15 % solution nasal spray into the nostril(s) as directed by provider.     • baclofen (LIORESAL) 20 MG tablet Take 20 mg by mouth 4 (Four) Times a Day.     • busPIRone (BUSPAR) 10 MG tablet Take 10 mg by mouth 2 (Two) Times a Day.     • calcium carbonate (OS-YANNI) 600 MG tablet Take 600 mg by mouth Daily.     • Calcium Carbonate-Vitamin D (CALCIUM 500/VITAMIN D PO) Take  by mouth.     • chlorproMAZINE (THORAZINE) 25 MG tablet Take 25 mg by mouth Every Night.     • cholecalciferol (VITAMIN D3) 25 MCG (1000 UT) tablet Take 2,000 Units by mouth Daily.     • citalopram (CeleXA) 40 MG tablet Take 40 mg by mouth Daily.     • dexlansoprazole (DEXILANT) 60 MG capsule Take 60 mg by mouth Daily.     • docusate sodium (COLACE) 100 MG capsule Take 100 mg by mouth 2 (Two) Times a Day.     • DULoxetine (CYMBALTA) 60 MG capsule Take 60 mg by mouth Daily.     • fluticasone  (FLONASE) 50 MCG/ACT nasal spray 1 spray.     • gabapentin (NEURONTIN) 400 MG capsule      • guaiFENesin (HUMIBID 3) 400 MG tablet Take 800 mg by mouth.     • hydrocortisone 2.5 % cream Apply  topically to the appropriate area as directed.     • hydrOXYzine (ATARAX) 50 MG tablet Take 50 mg by mouth.     • ketoconazole (NIZORAL) 2 % cream      • ketoconazole (NIZORAL) 2 % shampoo      • lisinopril (PRINIVIL,ZESTRIL) 10 MG tablet      • loperamide (IMODIUM) 2 MG capsule Take 2 mg by mouth.     • Loratadine 10 MG capsule Take 10 mg by mouth.     • melatonin 5 MG tablet tablet Take 5 mg by mouth.     • meloxicam (MOBIC) 7.5 MG tablet Take 7.5 mg by mouth 2 (Two) Times a Day.     • Multiple Vitamin (MULTI-VITAMIN PO) Take  by mouth.     • multivitamin with minerals (therapeutic multivitamin-minerals) tablet tablet Take 1 tablet by mouth.     • nystatin (MYCOSTATIN) 736231 UNIT/GM cream      • Omega-3 Fatty Acids (FISH OIL) 1000 MG capsule capsule Take 3,000 mg by mouth 3 (Three) Times a Day With Meals.     • oxyCODONE-acetaminophen (PERCOCET) 5-325 MG per tablet Take 1 tablet by mouth Every 6 (Six) Hours As Needed.     • polyethylene glycol (MIRALAX) powder      • propranolol (INDERAL) 20 MG tablet Take 20 mg by mouth.     • risperiDONE (risperDAL) 1 MG tablet Take 1 mg by mouth 2 (Two) Times a Day.     • Suvorexant (Belsomra) 20 MG tablet Take  by mouth.     • tacrolimus (PROTOPIC) 0.03 % ointment Apply  topically to the appropriate area as directed.     • tiZANidine (ZANAFLEX) 4 MG tablet Take 2 mg by mouth.     • vitamin C (ASCORBIC ACID) 500 MG tablet Take 500 mg by mouth 2 (Two) Times a Day.     • vitamin E 400 UNIT capsule Take 400 Units by mouth 2 (Two) Times a Day.     • zinc oxide 20 % ointment Apply  topically to the appropriate area as directed As Needed.     • zolpidem (AMBIEN) 10 MG tablet Take  by mouth.       No current facility-administered medications for this visit.     Review of Systems    Constitutional: Negative for chills and fever.   HENT: Negative for congestion.    Respiratory: Negative for shortness of breath.    Cardiovascular: Positive for leg swelling. Negative for chest pain.   Gastrointestinal: Negative for constipation, diarrhea, nausea and vomiting.   Musculoskeletal: Positive for gait problem.        Foot pain   Skin: Negative for wound.   Neurological: Positive for speech difficulty, weakness and numbness.       OBJECTIVE     Vitals:    21 1055   BP: 130/80   Pulse: 74   SpO2: 99%       PHYSICAL EXAM  GEN:   Accompanied by transporter.     Foot/Ankle Exam:       General:   Appearance: obesity    Orientation: AAOx3    Affect: appropriate    Gait: involuntary movements    Assistance: wheelchair    Shoe Gear:  Casual shoes    VASCULAR      Right Foot Vascularity   Dorsalis pedis:  2+  Posterior tibial:  2+  Skin Temperature: warm    Edema Gradin+ and pitting  CFT:  3  Pedal Hair Growth:  Present  Varicosities: none       Left Foot Vascularity   Dorsalis pedis:  2+  Posterior tibial:  2+  Skin Temperature: warm    Edema Gradin+ and pitting  CFT:  3  Pedal Hair Growth:  Present  Varicosities: none        NEUROLOGIC     Right Foot Neurologic   Light touch sensation:  Diminished  Vibratory sensation:  Diminished  Hot/Cold sensation: diminished    Protective Sensation using [place]-Xuan Monofilament:  8     Left Foot Neurologic   Light touch sensation:  Diminished  Vibratory sensation:  Diminished  Hot/cold sensation: diminished    Protective Sensation using [place]-Xuan Monofilament:  3     MUSCULOSKELETAL      Right Foot Musculoskeletal   Ecchymosis:  None  Tenderness: toenails    Tenderness comment:  Diffusely to lower legs  Arch:  Normal     Left Foot Musculoskeletal   Ecchymosis:  None  Tenderness: toenails    Arch:  Normal     MUSCLE STRENGTH     Right Foot Muscle Strength   Foot dorsiflexion:  3  Foot plantar flexion:  4  Foot inversion:  3  Foot eversion:   3     Left Foot Muscle Strength   Foot dorsiflexion:  1  Foot plantar flexion:  1  Foot inversion:  1  Foot eversion:  1     RANGE OF MOTION      Right Foot Range of Motion   Foot and ankle ROM within normal limits       Left Foot Range of Motion   Foot and ankle ROM within normal limits       DERMATOLOGIC     Right Foot Dermatologic   Skin: skin intact    Nails: onychomycosis, abnormally thick, subungual debris, dystrophic nails and absent (5th toe)       Left Foot Dermatologic   Skin: skin intact    Nails: onychomycosis, abnormally thick, subungual debris and dystrophic nails        RADIOLOGY/NUCLEAR:  No results found.    LABORATORY/CULTURE RESULTS:      PATHOLOGY RESULTS:       ASSESSMENT/PLAN     Diagnoses and all orders for this visit:    1. Onychomycosis (Primary)    2. Closed traumatic brain injury with loss of consciousness, sequela (CMS/[facility])    3. Wheelchair dependence    4. Peripheral edema    5. Hemiparesis, unspecified hemiparesis etiology, unspecified laterality (CMS/[facility])    6. Other polyneuropathy      Comprehensive lower extremity examination and evaluation was performed.  Discussed findings and treatment plan including risks, benefits, and treatment options with patient in detail. Patient agreed with treatment plan.  After verbal consent obtained, nail(s) x9 debrided of length and thickness with nail nipper without incidence  Patient may maintain nails and calluses at home utilizing emery board or pumice stone between visits as needed    20-30mmHg compression stockings - to be worn daily.   An After Visit Summary was printed and given to the patient at discharge, including (if requested) any available informative/educational handouts regarding diagnosis, treatment, or medications. All questions were answered to patient/family satisfaction. Should symptoms fail to improve or worsen they agree to call or return to clinic or to go to the Emergency Department. Discussed the importance of  following up with any needed screening tests/labs/specialist appointments and any requested follow-up recommended by me today. Importance of maintaining follow-up discussed and patient accepts that missed appointments can delay diagnosis and potentially lead to worsening of conditions.  Return in about 4 months (around 8/29/2021)., or sooner if acute issues arise.    Lab Frequency Next Occurrence       This document has been electronically signed by Shakir Rob DPM on April 29, 2021 11:20 CDT

## 2021-04-28 ENCOUNTER — TELEPHONE (OUTPATIENT)
Dept: VASCULAR SURGERY | Facility: CLINIC | Age: 31
End: 2021-04-28

## 2021-04-29 ENCOUNTER — OFFICE VISIT (OUTPATIENT)
Dept: PODIATRY | Facility: CLINIC | Age: 31
End: 2021-04-29

## 2021-04-29 VITALS
HEART RATE: 74 BPM | WEIGHT: 300 LBS | BODY MASS INDEX: 39.76 KG/M2 | DIASTOLIC BLOOD PRESSURE: 80 MMHG | HEIGHT: 73 IN | OXYGEN SATURATION: 99 % | SYSTOLIC BLOOD PRESSURE: 130 MMHG

## 2021-04-29 DIAGNOSIS — G62.89 OTHER POLYNEUROPATHY: ICD-10-CM

## 2021-04-29 DIAGNOSIS — Z99.3 WHEELCHAIR DEPENDENCE: ICD-10-CM

## 2021-04-29 DIAGNOSIS — R60.9 PERIPHERAL EDEMA: ICD-10-CM

## 2021-04-29 DIAGNOSIS — S06.9X9S CLOSED TRAUMATIC BRAIN INJURY WITH LOSS OF CONSCIOUSNESS, SEQUELA (HCC): ICD-10-CM

## 2021-04-29 DIAGNOSIS — B35.1 ONYCHOMYCOSIS: Primary | ICD-10-CM

## 2021-04-29 DIAGNOSIS — G81.90 HEMIPARESIS, UNSPECIFIED HEMIPARESIS ETIOLOGY, UNSPECIFIED LATERALITY (HCC): ICD-10-CM

## 2021-04-29 PROCEDURE — 11721 DEBRIDE NAIL 6 OR MORE: CPT | Performed by: PODIATRIST

## 2021-04-29 RX ORDER — GUAIFENESIN 400 MG/1
800 TABLET ORAL
COMMUNITY
End: 2023-01-18

## 2021-04-29 RX ORDER — LORATADINE 10 MG/1
10 CAPSULE, LIQUID FILLED ORAL
COMMUNITY
End: 2023-01-18

## 2021-04-29 RX ORDER — AZELASTINE HCL 205.5 UG/1
SPRAY NASAL
COMMUNITY
End: 2023-01-18

## 2021-04-29 RX ORDER — PROPRANOLOL HYDROCHLORIDE 20 MG/1
20 TABLET ORAL
COMMUNITY
End: 2023-01-18

## 2021-04-29 RX ORDER — TIZANIDINE 4 MG/1
2 TABLET ORAL
COMMUNITY
End: 2023-01-18 | Stop reason: DRUGHIGH

## 2021-04-29 RX ORDER — CHOLECALCIFEROL (VITAMIN D3) 125 MCG
5 CAPSULE ORAL
COMMUNITY
End: 2023-01-18

## 2021-04-29 RX ORDER — LOPERAMIDE HYDROCHLORIDE 2 MG/1
2 CAPSULE ORAL
COMMUNITY
End: 2023-01-18

## 2021-04-29 RX ORDER — MULTIPLE VITAMINS W/ MINERALS TAB 9MG-400MCG
1 TAB ORAL
COMMUNITY
End: 2023-01-18

## 2021-04-29 RX ORDER — ZOLPIDEM TARTRATE 10 MG/1
TABLET ORAL
COMMUNITY
End: 2023-01-18

## 2021-04-29 RX ORDER — FLUTICASONE PROPIONATE 50 MCG
1 SPRAY, SUSPENSION (ML) NASAL
COMMUNITY
End: 2023-01-18

## 2021-04-29 RX ORDER — HYDROXYZINE 50 MG/1
50 TABLET, FILM COATED ORAL
COMMUNITY
End: 2023-01-18

## 2021-04-29 RX ORDER — TACROLIMUS 0.3 MG/G
OINTMENT TOPICAL
COMMUNITY
End: 2023-01-18

## 2021-05-28 DIAGNOSIS — H91.90 HEARING LOSS, UNSPECIFIED HEARING LOSS TYPE, UNSPECIFIED LATERALITY: Primary | ICD-10-CM

## 2021-06-01 ENCOUNTER — HOSPITAL ENCOUNTER (OUTPATIENT)
Dept: CT IMAGING | Age: 31
Discharge: HOME | End: 2021-06-01
Payer: COMMERCIAL

## 2021-06-01 DIAGNOSIS — R41.3 MEMORY LOSS: ICD-10-CM

## 2021-06-01 PROCEDURE — 70450 CT HEAD/BRAIN W/O DYE: CPT

## 2021-06-22 ENCOUNTER — OFFICE VISIT (OUTPATIENT)
Dept: OTOLARYNGOLOGY | Facility: CLINIC | Age: 31
End: 2021-06-22

## 2021-06-22 VITALS
HEIGHT: 73 IN | BODY MASS INDEX: 39.76 KG/M2 | SYSTOLIC BLOOD PRESSURE: 125 MMHG | HEART RATE: 81 BPM | WEIGHT: 300 LBS | DIASTOLIC BLOOD PRESSURE: 80 MMHG

## 2021-06-22 DIAGNOSIS — H91.90 HEARING LOSS, UNSPECIFIED HEARING LOSS TYPE, UNSPECIFIED LATERALITY: Primary | ICD-10-CM

## 2021-06-22 DIAGNOSIS — H61.23 CERUMEN DEBRIS ON TYMPANIC MEMBRANE OF BOTH EARS: ICD-10-CM

## 2021-06-22 DIAGNOSIS — H90.0 CONDUCTIVE HEARING LOSS OF BOTH EARS: ICD-10-CM

## 2021-06-22 DIAGNOSIS — H61.23 BILATERAL IMPACTED CERUMEN: ICD-10-CM

## 2021-06-22 PROCEDURE — 69210 REMOVE IMPACTED EAR WAX UNI: CPT | Performed by: OTOLARYNGOLOGY
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
## 2021-06-22 NOTE — PATIENT INSTRUCTIONS
EAR CARE: :using oil  Use a hair dryer on low heat and blow into the ear canals 2 times daily to keep ears dry.  DO Not use Q tips or Luana pins, ever!!  Do not use alcohol in the ear canal (this will cause dryness and itching)  NO peroxide or alcohol in ears  Oil: Use Sweet oil, Olive oil, or Mineral oil, purchased over the counter, once every other day, Do not use Q tips, You may use a hair dryer on low heat. Blow in ears for 10-15 seconds 2 times daily to dry ear canals or if ear canals are itching.    WATER PRECAUTIONS FOR EARS    Protecting your ears from water may sometimes be necessary for the health of your ears.     > Ear plugs: You may use earplugs: over the counter silicone plugs or a cotton ball coated with vasoline when bathing. If conservative measures are not working, consider obtaining molded earplugs from the audiology department to use while bathing or swimming.   Purchase inexpensive types that are most comfortable for you. You can make your own by using cotton balls mixed with a generous amount of Vaseline petroleum jelly. Gently place these in the ear canal.    > Dry the ear canal: after getting out of the shower or bath, use a hair dryer on low heat blowing in the ear for 10-15 seconds. Pulling gently backwards on the ear straightens the ear canal and allows the air to get further down.    > If you are to use ear drops, please place them in the ear canal and give them a few seconds to run down.  Follow this by blowing in the ear canal with a hair dryer set on low heat for approximately 10 to 15 seconds.  You may do this multiple times during the day to help keep the ear canal dry.    >If you are swimming frequently- place a drop of oil in each ear canal prior to entering water. After you are finished in the water, place a drop of vinegar in each ear canal. Use a hair dryer on low heat to blow in each ear canal for 10-15 seconds to dry ears out.          CONTACT INFORMATION:  The main office  phone number is [phone]. For emergencies after hours and on weekends, this number will convert over to our answering service and the on call provider will answer. Please try to keep non emergent phone calls/ questions to office hours 9am-5pm Monday through Friday.     [employer]orian  As an alternative, you can sign up and use the Epic MyChart system for more direct and quicker access for non emergent questions/ problems.  [facility] [employer] allows you to send messages to your doctor, view your test results, renew your prescriptions, schedule appointments, and more. To sign up, go to [facility] [employer] and click on the Sign Up Now link in the New User? box. Enter your [employer] Activation Code exactly as it appears below along with the last four digits of your Social Security Number and your Date of Birth () to complete the sign-up process. If you do not sign up before the expiration date, you must request a new code.    [employer]orian Activation Code: [email or web address]  Expires: 2021  1:48 PM    If you have questions, you can email [email or web address] or call [phone] to talk to our [employer] staff. Remember, [employer] is NOT to be used for urgent needs. For medical emergencies, dial 911.

## 2021-06-22 NOTE — PROGRESS NOTES
ENT PROCEDURE NOTE:  Pre-operative Diagnosis: Cerumen impaction    Post-operative Diagnosis: same    Anesthesia: none    Procedure:  Binocular ear microscopy with cerumen removal    Side:  BILATERAL Ears    Procedure Details:    The patient was placed supine on the procedure table. Using a speculum, the EAR SIDE: both ears were  examined with the microscope.   Using Micro-instrumentation and suction, the cerumen was cleared.    Findings:   Ear Canal:   BILATERAL: impacted cerumen, dark and thick onto TM  Tympanic Membrane:   LEFT: intact, partially cleaned of cerumen, patient not tolerate  RIGHT: cerumen cleared from most of TM, intact  Ossicular Chain:   BILATERAL: not well visualized  Middle ear space:   BILATERAL: no apparent fluid  Hearing: Improved R>>L    Condition:  Stable.  Patient tolerated procedure well.    Complications:  None    Post-procedure instructions reviewed with Patient, caregiver  Ear care initiated, instructions on AVS

## 2021-06-25 RX ORDER — OLIVE OIL
OIL (ML) MISCELLANEOUS
Qty: 30 ML | Refills: 0 | Status: SHIPPED | OUTPATIENT
Start: 2021-06-25 | End: 2023-01-18

## 2021-06-28 ENCOUNTER — PROCEDURE VISIT (OUTPATIENT)
Dept: ENT CLINIC | Age: 31
End: 2021-06-28

## 2021-06-28 DIAGNOSIS — H61.23 EXCESSIVE CERUMEN IN BOTH EAR CANALS: Primary | ICD-10-CM

## 2021-06-28 NOTE — PROGRESS NOTES
History   Salvador Celeste is a 33 y.o. male who presented to the clinic this date with complaints of decreased hearing bilaterally. Summary   Otoscopy revealed occluding cerumen bilaterally. Audio deferred pending cerumen removal.    Results   Otoscopy:    Right: Occluding Cerumen   Left: Occluding Cerumen    Plan   Results of today's testing were discussed with Mr. Ricardo Blanchard and the following recommendations were made:    1. Follow up with ENT as scheduled.   2. Recheck hearing following cerumen removal.

## 2021-07-13 ENCOUNTER — OFFICE VISIT (OUTPATIENT)
Dept: OTOLARYNGOLOGY | Facility: CLINIC | Age: 31
End: 2021-07-13

## 2021-07-13 DIAGNOSIS — H61.23 BILATERAL IMPACTED CERUMEN: ICD-10-CM

## 2021-07-13 DIAGNOSIS — H91.90 HEARING LOSS, UNSPECIFIED HEARING LOSS TYPE, UNSPECIFIED LATERALITY: Primary | ICD-10-CM

## 2021-07-13 DIAGNOSIS — H61.23 CERUMEN DEBRIS ON TYMPANIC MEMBRANE OF BOTH EARS: ICD-10-CM

## 2021-07-13 DIAGNOSIS — H90.0 CONDUCTIVE HEARING LOSS OF BOTH EARS: ICD-10-CM

## 2021-07-13 PROCEDURE — 99213 OFFICE O/P EST LOW 20 MIN: CPT | Performed by: OTOLARYNGOLOGY

## 2021-07-13 PROCEDURE — 69210 REMOVE IMPACTED EAR WAX UNI: CPT | Performed by: OTOLARYNGOLOGY

## 2021-07-13 RX ORDER — HYDROCORTISONE 25 MG/G
CREAM TOPICAL
COMMUNITY
Start: 2021-06-24 | End: 2023-01-18

## 2021-07-13 NOTE — PROGRESS NOTES
ENT PROCEDURE NOTE:  Pre-operative Diagnosis: Cerumen impaction    Post-operative Diagnosis: same    Anesthesia: none    Procedure:  Binocular ear microscopy with cerumen removal    Side:  BILATERAL Ears    Procedure Details:    The patient was placed supine on the procedure table. Using a speculum, the EAR SIDE: both ears were  examined with the microscope.   Using Micro-instrumentation and suction, the cerumen was cleared.    Findings:   Ear Canal:   BILATERAL: cerumen Medial canal against TM  Tympanic Membrane:   BILATERAL: cast removed AU, TMs normal  Ossicular Chain:   BILATERAL: intact  Middle ear space:   BILATERAL: no ME fluid  Hearing: Improved    Condition:  Stable.  Patient tolerated procedure well.    Complications:  None    Post-procedure instructions reviewed with Patient  Ear care initiated, instructions on AVS

## 2021-07-13 NOTE — PATIENT INSTRUCTIONS
EAR CARE: :using oil  Use a hair dryer on low heat and blow into the ear canals 2 times daily to keep ears dry.  DO Not use Q tips or Luana pins, ever!!  Do not use alcohol in the ear canal (this will cause dryness and itching)  NO peroxide or alcohol in ears  Oil: Use Sweet oil, Olive oil, or Mineral oil, purchased over the counter, 2 to 3 times a week, Do not use Q tips, You may use a hair dryer on low heat. Blow in ears for 10-15 seconds 2 times daily to dry ear canals or if ear canals are itching.    Hearing test ordered        CONTACT INFORMATION:  The main office phone number is [phone]. For emergencies after hours and on weekends, this number will convert over to our answering service and the on call provider will answer. Please try to keep non emergent phone calls/ questions to office hours 9am-5pm Monday through Friday.     [employer]  As an alternative, you can sign up and use the Epic MyChart system for more direct and quicker access for non emergent questions/ problems.  [facility] [employer] allows you to send messages to your doctor, view your test results, renew your prescriptions, schedule appointments, and more. To sign up, go to [employer] and click on the Sign Up Now link in the New User? box. Enter your [employer] Activation Code exactly as it appears below along with the last four digits of your Social Security Number and your Date of Birth () to complete the sign-up process. If you do not sign up before the expiration date, you must request a new code.    [employer] Activation Code: [email or web address]  Expires: 2021  1:03 PM    If you have questions, you can email [email or web address] or call [phone] to talk to our [employer] staff. Remember, [employer] is NOT to be used for urgent needs. For medical emergencies, dial 911.

## 2021-08-17 NOTE — PROGRESS NOTES
[facility] - PODIATRY    Today's Date: 08/26/21    Patient Name: Octavio Werner  MRN: [id number]  CSN: [id number]  PCP: Solo Jimenez MD  Referring Provider: No ref. provider found    SUBJECTIVE     Chief Complaint   Patient presents with   • Follow-up     pcp04/23/2021 4 month follow up- nail care- pt states pain in both feet- pt pain 7/10- pt presents wheelchair bound with thickened irregular toenails     HPI: Octavio Werner, a 31 y.o.male, comes to clinic as a(n) established patient complaining of painful toenails and swelling in legs. Patient has h/o CVA, Depression, Hemiparesis, Hydrocephalus, Kidney Stone, Neurogenic Bladder/Bowel, Seizure, Sleep Apnea, Subdural hematoma, TBI. Patient is non-diabetic. Patient is mostly wheelchair bound and only able to transfer some due to CVA. Relates weakness and numbness in legs and feet. Denies open wounds or sores. Continues to have swelling in both legs. Notes that his toenails are long, thick, and crumbly. They have only minimally grown since last visit. He is unable to care for them himself.  Admits pain at 7/10 level and described as aching, pressure and tingling. Relates previous treatment(s) including nail care by podiatrist. Denies any constitutional symptoms. No other pedal complaints at this time.    Past Medical History:   Diagnosis Date   • CVA (cerebral vascular accident) (CMS/HCC)    • Depression    • Hemiparesis (CMS/HCC)    • Hydrocephalus (CMS/HCC)    • Kidney stones    • Neurogenic bladder    • Neurogenic bowel    • Seizure (CMS/HCC)    • Sinusitis    • Sleep apnea    • Subdural hematoma (CMS/HCC)    • TBI (traumatic brain injury) (CMS/HCC)    • UTI (urinary tract infection)      Past Surgical History:   Procedure Laterality Date   • BACLOFEN PUMP IMPLANTATION     • CHEST TUBE INSERTION      REMOVED   • CRANIOTOMY      FOR EVACUATION OF SUBDURAL HEMATOMA & VENTRICULOSTOMY   • HAMSTRING RELEASE     • KIDNEY STONE SURGERY     • PEG  TUBE INSERTION      REMOVED   • TRACHEOSTOMY      REMOVED   •  SHUNT INSERTION       Family History   Problem Relation Age of Onset   • No Known Problems Mother    • No Known Problems Father      Social History     Socioeconomic History   • Marital status: Single     Spouse name: Not on file   • Number of children: Not on file   • Years of education: Not on file   • Highest education level: Not on file   Tobacco Use   • Smoking status: Never Smoker   • Smokeless tobacco: Never Used   Vaping Use   • Vaping Use: Never used   Substance and Sexual Activity   • Alcohol use: No   • Drug use: No   • Sexual activity: Defer     Allergies   Allergen Reactions   • Dilantin [Phenytoin]    • Latex    • Penicillins    • Sulfa Antibiotics      Current Outpatient Medications   Medication Sig Dispense Refill   • acetaminophen (TYLENOL) 500 MG tablet Take 500 mg by mouth Every 6 (Six) Hours As Needed for mild pain (1-3).     • amantadine (SYMMETREL) 100 MG capsule Take 100 mg by mouth 2 (Two) Times a Day.     • aspirin 325 MG tablet Take 325 mg by mouth Daily.     • azelastine (ASTEPRO) 0.15 % solution nasal spray into the nostril(s) as directed by provider.     • baclofen (LIORESAL) 20 MG tablet Take 20 mg by mouth 4 (Four) Times a Day.     • busPIRone (BUSPAR) 10 MG tablet Take 10 mg by mouth 2 (Two) Times a Day.     • calcium carbonate (OS-YANNI) 600 MG tablet Take 600 mg by mouth Daily.     • Calcium Carbonate-Vitamin D (CALCIUM 500/VITAMIN D PO) Take  by mouth.     • chlorproMAZINE (THORAZINE) 25 MG tablet Take 25 mg by mouth Every Night.     • cholecalciferol (VITAMIN D3) 25 MCG (1000 UT) tablet Take 2,000 Units by mouth Daily.     • citalopram (CeleXA) 40 MG tablet Take 40 mg by mouth Daily.     • dexlansoprazole (DEXILANT) 60 MG capsule Take 60 mg by mouth Daily.     • dextromethorphan-guaifenesin (ROBITUSSIN-DM)  MG/5ML syrup Take 5 mL by mouth Every 12 (Twelve) Hours.     • docusate sodium (COLACE) 100 MG capsule Take  100 mg by mouth 2 (Two) Times a Day.     • DULoxetine (CYMBALTA) 60 MG capsule Take 60 mg by mouth Daily.     • fluticasone (FLONASE) 50 MCG/ACT nasal spray 1 spray.     • gabapentin (NEURONTIN) 400 MG capsule      • hydrocortisone 2.5 % cream Apply  topically to the appropriate area as directed.     • Hydrocortisone, Perianal, (ANUSOL-HC) 2.5 % rectal cream      • hydrOXYzine (ATARAX) 50 MG tablet Take 50 mg by mouth.     • ketoconazole (NIZORAL) 2 % cream      • ketoconazole (NIZORAL) 2 % shampoo      • lisinopril (PRINIVIL,ZESTRIL) 10 MG tablet      • loperamide (IMODIUM) 2 MG capsule Take 2 mg by mouth.     • Loratadine 10 MG capsule Take 10 mg by mouth.     • melatonin 5 MG tablet tablet Take 5 mg by mouth.     • meloxicam (MOBIC) 7.5 MG tablet Take 7.5 mg by mouth 2 (Two) Times a Day.     • miconazole (Katherine Antifungal) 2 % cream Apply  topically to the appropriate area as directed 2 (Two) Times a Day.     • Multiple Vitamin (MULTI-VITAMIN PO) Take  by mouth.     • multivitamin with minerals (therapeutic multivitamin-minerals) tablet tablet Take 1 tablet by mouth.     • nystatin (MYCOSTATIN) 585704 UNIT/GM cream      • olive oil external oil Apply 1 drop in each ear once weekly for wax build up 30 mL 0   • Omega-3 Fatty Acids (FISH OIL) 1000 MG capsule capsule Take 3,000 mg by mouth 3 (Three) Times a Day With Meals.     • oxyCODONE-acetaminophen (PERCOCET) 5-325 MG per tablet Take 1 tablet by mouth Every 6 (Six) Hours As Needed.     • polyethylene glycol (MIRALAX) powder      • propranolol (INDERAL) 20 MG tablet Take 20 mg by mouth.     • risperiDONE (risperDAL) 1 MG tablet Take 1 mg by mouth 2 (Two) Times a Day.     • Suvorexant (BELSOMRA PO) Take  by mouth.     • tiZANidine (ZANAFLEX) 4 MG tablet Take 2 mg by mouth.     • vitamin C (ASCORBIC ACID) 500 MG tablet Take 500 mg by mouth 2 (Two) Times a Day.     • vitamin E 400 UNIT capsule Take 400 Units by mouth 2 (Two) Times a Day.     • zinc oxide 20 % ointment  Apply  topically to the appropriate area as directed As Needed.     • ARTIFICIAL TEAR SOLUTION OP Apply  to eye.     • guaiFENesin (HUMIBID 3) 400 MG tablet Take 800 mg by mouth.     • tacrolimus (PROTOPIC) 0.03 % ointment Apply  topically to the appropriate area as directed.     • zolpidem (AMBIEN) 10 MG tablet Take  by mouth.       No current facility-administered medications for this visit.     Review of Systems   Constitutional: Negative for chills and fever.   HENT: Negative for congestion.    Respiratory: Negative for shortness of breath.    Cardiovascular: Positive for leg swelling. Negative for chest pain.   Gastrointestinal: Negative for constipation, diarrhea, nausea and vomiting.   Musculoskeletal: Positive for gait problem.        Foot pain   Skin: Negative for wound.   Neurological: Positive for speech difficulty, weakness and numbness.       OBJECTIVE     Vitals:    21 1103   BP: 162/80   Pulse: 79   SpO2: 96%       PHYSICAL EXAM  GEN:   Accompanied by transporter.     Foot/Ankle Exam:       General:   Appearance: obesity    Orientation: AAOx3    Affect: appropriate    Gait: involuntary movements    Assistance: wheelchair    Shoe Gear:  Casual shoes    VASCULAR      Right Foot Vascularity   Dorsalis pedis:  2+  Posterior tibial:  2+  Skin Temperature: warm    Edema Gradin+ and pitting  CFT:  3  Pedal Hair Growth:  Present  Varicosities: none       Left Foot Vascularity   Dorsalis pedis:  2+  Posterior tibial:  2+  Skin Temperature: warm    Edema Gradin+ and pitting  CFT:  3  Pedal Hair Growth:  Present  Varicosities: none        NEUROLOGIC     Right Foot Neurologic   Light touch sensation:  Diminished  Vibratory sensation:  Diminished  Hot/Cold sensation: diminished    Protective Sensation using [place]-Xuan Monofilament:  8     Left Foot Neurologic   Light touch sensation:  Diminished  Vibratory sensation:  Diminished  Hot/cold sensation: diminished    Protective Sensation using  [place]-Xuan Monofilament:  3     MUSCULOSKELETAL      Right Foot Musculoskeletal   Ecchymosis:  None  Tenderness: toenails    Tenderness comment:  Diffusely to lower legs  Arch:  Normal     Left Foot Musculoskeletal   Ecchymosis:  None  Tenderness: toenails    Arch:  Normal     MUSCLE STRENGTH     Right Foot Muscle Strength   Foot dorsiflexion:  3  Foot plantar flexion:  4  Foot inversion:  3  Foot eversion:  3     Left Foot Muscle Strength   Foot dorsiflexion:  1  Foot plantar flexion:  1  Foot inversion:  1  Foot eversion:  1     RANGE OF MOTION      Right Foot Range of Motion   Foot and ankle ROM within normal limits       Left Foot Range of Motion   Foot and ankle ROM within normal limits       DERMATOLOGIC     Right Foot Dermatologic   Skin: skin intact    Nails: onychomycosis, abnormally thick, subungual debris, dystrophic nails and absent (5th toe)       Left Foot Dermatologic   Skin: skin intact    Nails: onychomycosis, abnormally thick, subungual debris and dystrophic nails        RADIOLOGY/NUCLEAR:  No results found.    LABORATORY/CULTURE RESULTS:      PATHOLOGY RESULTS:       ASSESSMENT/PLAN     Diagnoses and all orders for this visit:    1. Onychomycosis (Primary)    2. Other polyneuropathy    3. Closed traumatic brain injury with loss of consciousness, sequela (CMS/HCC)    4. Wheelchair dependence    5. Peripheral edema    6. Hemiparesis, unspecified hemiparesis etiology, unspecified laterality (CMS/HCC)      Comprehensive lower extremity examination and evaluation was performed.  Discussed findings and treatment plan including risks, benefits, and treatment options with patient in detail. Patient agreed with treatment plan.  After verbal consent obtained, nail(s) x9 debrided of length and thickness with nail nipper without incidence  Patient may maintain nails and calluses at home utilizing emery board or pumice stone between visits as needed   Due to slowed nail growth, will space out  appointments to every 6 months.  An After Visit Summary was printed and given to the patient at discharge, including (if requested) any available informative/educational handouts regarding diagnosis, treatment, or medications. All questions were answered to patient/family satisfaction. Should symptoms fail to improve or worsen they agree to call or return to clinic or to go to the Emergency Department. Discussed the importance of following up with any needed screening tests/labs/specialist appointments and any requested follow-up recommended by me today. Importance of maintaining follow-up discussed and patient accepts that missed appointments can delay diagnosis and potentially lead to worsening of conditions.  Return in about 6 months (around 2/26/2022)., or sooner if acute issues arise.    Lab Frequency Next Occurrence       This document has been electronically signed by Shakir Rob DPM on August 26, 2021 11:35 CDT

## 2021-08-25 ENCOUNTER — TELEPHONE (OUTPATIENT)
Dept: PODIATRY | Facility: CLINIC | Age: 31
End: 2021-08-25

## 2021-08-26 ENCOUNTER — OFFICE VISIT (OUTPATIENT)
Dept: PODIATRY | Facility: CLINIC | Age: 31
End: 2021-08-26

## 2021-08-26 VITALS
SYSTOLIC BLOOD PRESSURE: 162 MMHG | WEIGHT: 315 LBS | HEIGHT: 73 IN | OXYGEN SATURATION: 96 % | HEART RATE: 79 BPM | DIASTOLIC BLOOD PRESSURE: 80 MMHG | BODY MASS INDEX: 41.75 KG/M2

## 2021-08-26 DIAGNOSIS — Z99.3 WHEELCHAIR DEPENDENCE: ICD-10-CM

## 2021-08-26 DIAGNOSIS — G81.90 HEMIPARESIS, UNSPECIFIED HEMIPARESIS ETIOLOGY, UNSPECIFIED LATERALITY (HCC): ICD-10-CM

## 2021-08-26 DIAGNOSIS — R60.9 PERIPHERAL EDEMA: ICD-10-CM

## 2021-08-26 DIAGNOSIS — G62.89 OTHER POLYNEUROPATHY: ICD-10-CM

## 2021-08-26 DIAGNOSIS — S06.9X9S CLOSED TRAUMATIC BRAIN INJURY WITH LOSS OF CONSCIOUSNESS, SEQUELA (HCC): ICD-10-CM

## 2021-08-26 DIAGNOSIS — B35.1 ONYCHOMYCOSIS: Primary | ICD-10-CM

## 2021-08-26 PROCEDURE — 11721 DEBRIDE NAIL 6 OR MORE: CPT | Performed by: PODIATRIST

## 2021-08-26 RX ORDER — GUAIFENESIN AND DEXTROMETHORPHAN HYDROBROMIDE 100; 10 MG/5ML; MG/5ML
5 SOLUTION ORAL EVERY 12 HOURS
COMMUNITY
End: 2023-01-18

## 2021-08-26 RX ORDER — ASPIRIN 325 MG
325 TABLET ORAL DAILY
COMMUNITY
End: 2023-01-18
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
## 2021-11-09 NOTE — PROGRESS NOTES
GREG Chang     Chief Complaint   Patient presents with   • Ear Problem          HPI  Octavio Werner is a  31 y.o. male who is here for follow up. He has difficulty with hearing loss and cerumen accumulation.          Vital Signs:   Temp:  [98 °F (36.7 °C)] 98 °F (36.7 °C)    ENT Physical Exam  Constitutional  Appearance: obesity noted, grooming disheveled and unkempt;  Ear  Hearing: Hearing comments: hearing normal to conversation  Ear Canals: bilateral ear canals impacted cerumen observed;      Result Review            Assessment/Plan     Diagnoses and all orders for this visit:    1. Mixed conductive and sensorineural hearing loss of both ears (Primary)    2. Impacted cerumen, bilateral            Use hearing protection in loud noise situations.  Consider hearing aid amplification.     Return in about 1 year (around 11/10/2022).         GREG Chang  11/10/21  13:04 CST

## 2021-11-10 ENCOUNTER — PROCEDURE VISIT (OUTPATIENT)
Dept: OTOLARYNGOLOGY | Facility: CLINIC | Age: 31
End: 2021-11-10

## 2021-11-10 ENCOUNTER — OFFICE VISIT (OUTPATIENT)
Dept: OTOLARYNGOLOGY | Facility: CLINIC | Age: 31
End: 2021-11-10

## 2021-11-10 VITALS — WEIGHT: 315 LBS | TEMPERATURE: 98 F | HEIGHT: 73 IN | BODY MASS INDEX: 41.75 KG/M2

## 2021-11-10 DIAGNOSIS — H90.6 MIXED CONDUCTIVE AND SENSORINEURAL HEARING LOSS OF BOTH EARS: Primary | ICD-10-CM

## 2021-11-10 DIAGNOSIS — H61.23 IMPACTED CERUMEN, BILATERAL: ICD-10-CM

## 2021-11-10 PROCEDURE — 92567 TYMPANOMETRY: CPT

## 2021-11-10 PROCEDURE — 99213 OFFICE O/P EST LOW 20 MIN: CPT | Performed by: EMERGENCY MEDICINE

## 2021-11-10 PROCEDURE — 92557 COMPREHENSIVE HEARING TEST: CPT

## 2021-11-17 ENCOUNTER — HOSPITAL ENCOUNTER (OUTPATIENT)
Dept: WOUND CARE | Age: 31
Discharge: HOME | End: 2021-11-17
Payer: COMMERCIAL

## 2021-11-17 VITALS
TEMPERATURE: 97 F | WEIGHT: 315 LBS | HEIGHT: 72 IN | RESPIRATION RATE: 20 BRPM | SYSTOLIC BLOOD PRESSURE: 131 MMHG | BODY MASS INDEX: 42.66 KG/M2 | HEART RATE: 88 BPM | DIASTOLIC BLOOD PRESSURE: 84 MMHG

## 2021-11-17 DIAGNOSIS — I69.30 HISTORY OF CEREBROVASCULAR ACCIDENT (CVA) WITH RESIDUAL DEFICIT: ICD-10-CM

## 2021-11-17 DIAGNOSIS — L89.323 PRESSURE INJURY OF LEFT BUTTOCK, STAGE 3 (HCC): Primary | Chronic | ICD-10-CM

## 2021-11-17 DIAGNOSIS — L89.313 DECUBITUS ULCER OF RIGHT BUTTOCK, STAGE 3 (HCC): ICD-10-CM

## 2021-11-17 DIAGNOSIS — E66.01 CLASS 3 SEVERE OBESITY DUE TO EXCESS CALORIES WITHOUT SERIOUS COMORBIDITY WITH BODY MASS INDEX (BMI) OF 40.0 TO 44.9 IN ADULT (HCC): Chronic | ICD-10-CM

## 2021-11-17 DIAGNOSIS — L30.8 DERMATITIS ASSOCIATED WITH MOISTURE: Chronic | ICD-10-CM

## 2021-11-17 PROBLEM — S91.105A OPEN WOUND OF THIRD TOE OF LEFT FOOT: Status: RESOLVED | Noted: 2019-08-20 | Resolved: 2021-11-17

## 2021-11-17 PROCEDURE — 99215 OFFICE O/P EST HI 40 MIN: CPT | Performed by: NURSE PRACTITIONER

## 2021-11-17 PROCEDURE — 99214 OFFICE O/P EST MOD 30 MIN: CPT

## 2021-11-17 RX ORDER — ASPIRIN 325 MG
325 TABLET ORAL DAILY
Status: ON HOLD | COMMUNITY
End: 2022-10-04 | Stop reason: SDUPTHER

## 2021-11-17 ASSESSMENT — VISUAL ACUITY: OU: 1

## 2021-11-17 NOTE — PROGRESS NOTES
Patient Care Team:  Chio Larson MD as PCP - General (Family Medicine)    TODAY'S DATE:  11/17/2021     HISTORY of PRESENTILLNESS HPI   Alicia Haro is a 33 y.o. male who presents today for wound evaluation. He reports he developed a wound on left/right buttock. This started several years ago but at times can get worse. He believes this is not healing. He has been applying barrier cream on the wounds at times. He has not had  fever or chills. 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 Mr. Jerel Mendez has calloused tissue on his buttock from lymphedema and shearing. He is paralyzed on his left side and has terrible core strength, he is sliding out of his chair often causing shearing and excessive pressure on his buttock. Due to the drying nature of the his skin-he is extremely itchy and is constantly scratching the area with dirty fingernails. Bandages do not stick well to his skin leaving him limited to using creams to help with his wounds on his buttock. He does not wear underwear but he is continent however sweating is common for him.   Wound Type:pressure  Wound Location:buttock-left and right  Modifying factors:chronic pressure, decreased mobility, shear force, obesity, non-adherence and sweating and patient scratching at the skin    Patient Active Problem List   Diagnosis Code    Muscle spasticity M62.838    Post-traumatic spasticity R25.2    Decubitus ulcer of right buttock, stage 3 ([facility]) L89.313    Dermatitis associated with moisture L30.8    History of cerebrovascular accident (CVA) with residual deficit I69.30    Renal calculus, right N20.0    Pressure injury of left buttock, stage 3 ([facility]) L89.323    Class 3 severe obesity due to excess calories without serious comorbidity in [facility]) E66.01       Alicia Haro is a 33 y.o. male with the following history reviewed and recorded in [facility]:    Current Outpatient Medications   Medication Sig Dispense Refill    powder Take 17 g by mouth 2 times daily       dexlansoprazole (DEXILANT) 60 MG CPDR delayed release capsule Take 60 mg by mouth daily      guaiFENesin 400 MG tablet Take 800 mg by mouth three times daily 2 Tablets TID PO PRN       nystatin (MYCOSTATIN) 326746 UNIT/GM cream Apply 1 Dose topically as needed for Dry Skin Apply topically 2 times daily.  citalopram (CELEXA) 40 MG tablet Take 40 mg by mouth daily      DULoxetine (CYMBALTA) 60 MG capsule Take 60 mg by mouth daily      Multiple Vitamins-Minerals (THERAPEUTIC MULTIVITAMIN-MINERALS) tablet Take 1 tablet by mouth daily      Calcium Carbonate-Vitamin D (CALCIUM-VITAMIN D) 500-200 MG-UNIT per tablet Take 1 tablet by mouth 2 times daily (with meals)      docusate sodium (COLACE) 100 MG capsule Take 200 mg by mouth 2 times daily      Omega-3 Fatty Acids (FISH OIL) 1000 MG CAPS Take 3,000 mg by mouth 2 times daily      risperiDONE (RISPERDAL) 1 MG tablet Take 1 mg by mouth 2 times daily      Ascorbic Acid (VITAMIN C) 500 MG CAPS Take 500 mg by mouth 2 times daily      propranolol (INDERAL) 20 MG tablet Take 20 mg by mouth 2 times daily       ketoconazole (NIZORAL) 2 % shampoo Apply topically daily as needed for Itching Apply topically daily as needed.  acetaminophen (TYLENOL) 500 MG tablet Take 1,000 mg by mouth every 6 hours as needed for Pain or Fever      lisinopril (PRINIVIL;ZESTRIL) 10 MG tablet Take 10 mg by mouth daily       No current facility-administered medications for this encounter.      Allergies: Latex, Dilantin [phenytoin], Pcn [penicillins], and Sulfa antibiotics  Past Medical History:   Diagnosis Date    Arthritis     Cerebral artery occlusion with cerebral infarction ([facility] [address]     Depression     Difficult intubation     Hemorrhoids     History of blood transfusion     Hypertension     Kidney stone     Muscle spasticity     Prolonged emergence from general anesthesia     Retention of urine        Past Surgical History: Procedure Laterality Date    BACK SURGERY      BACLOFEN PUMP IMPLANTATION      BRAIN SURGERY      KIDNEY STONE SURGERY      DE FRAGMENT KIDNEY STONE/ ESWL Right 10/2/2018    ESWL EXTRACORPEAL SHOCK WAVE LITHOTRIPSY performed by Yvonne Garrido MD at [address]     History reviewed. No pertinent family history. Social History     Tobacco Use    Smoking status: Never Smoker    Smokeless tobacco: Never Used   Substance Use Topics    Alcohol use: No         Review of Systems    Review of Systems   Skin: Positive for wound. Neurological:        Left sided paralysis     All other systems reviewed and are negative. All other review of systems are negative. Physical Exam    /84   Pulse 88   Temp 97 °F (36.1 °C) (Temporal)   Resp 20   Ht 6' (1.829 m)   Wt (!) 321 lb (145.6 kg)   BMI 43.54 kg/m²     Physical Exam  Vitals reviewed. Exam conducted with a chaperone present. Constitutional:       Appearance: Normal appearance. He is obese. HENT:      Head: Normocephalic and atraumatic. Right Ear: External ear normal.      Left Ear: External ear normal.   Eyes:      General: Lids are normal. Lids are everted, no foreign bodies appreciated. Vision grossly intact. Gaze aligned appropriately. Cardiovascular:      Rate and Rhythm: Normal rate and regular rhythm. Pulses: Normal pulses. Heart sounds: Normal heart sounds. Pulmonary:      Effort: Pulmonary effort is normal.      Breath sounds: Normal breath sounds. Abdominal:      General: Bowel sounds are normal.   Musculoskeletal:      Comments: Left sided paralysis     Skin:     General: Skin is warm and dry. Findings: Wound present. Neurological:      Mental Status: He is alert and oriented to person, place, and time. Psychiatric:         Mood and Affect: Mood normal.         Behavior: Behavior normal.         Thought Content:  Thought content normal.         Judgment: Judgment normal.             Post Debridement Measurements and Assessment:    The patientspain isPain Level: 0  . Please refer to nursing measurements and assessment regarding wound pre and postdebridement. Wound 11/17/21 Buttocks Left wound 1- left buttock stage 3 (Active)   Wound Image    11/17/21 1206   Wound Etiology Pressure Stage  3 11/17/21 1206   Dressing Status Old drainage noted 11/17/21 1206   Wound Cleansed Soap and water 11/17/21 1206   Dressing/Treatment Barrier film 11/17/21 1206   Offloading for Diabetic Foot Ulcers Other (comment) 11/17/21 1206   Wound Length (cm) 1 cm 11/17/21 1206   Wound Width (cm) 1.4 cm 11/17/21 1206   Wound Depth (cm) 0.1 cm 11/17/21 1206   Wound Surface Area (cm^2) 1.4 cm^2 11/17/21 1206   Wound Volume (cm^3) 0.14 cm^3 11/17/21 1206   Wound Assessment Slough; [place]/red 11/17/21 1206   Drainage Amount Moderate 11/17/21 1206   Drainage Description Serosanguinous 11/17/21 1206   Odor None 11/17/21 1206   Rachana-wound Assessment Intact; Hyperkeratosis (callous) 11/17/21 1206   Margins Defined edges 11/17/21 1206   Wound Thickness Description not for Pressure Injury Full thickness 11/17/21 1206   Number of days: 0       Wound 11/17/21 Buttocks Right wound 2- right buttock stage 3 (Active)   Wound Image   11/17/21 1206   Wound Etiology Pressure Stage  3 11/17/21 1206   Dressing Status Old drainage noted 11/17/21 1206   Wound Cleansed Soap and water 11/17/21 1206   Dressing/Treatment Barrier film 11/17/21 1206   Offloading for Diabetic Foot Ulcers Other (comment) 11/17/21 1206   Wound Length (cm) 2.5 cm 11/17/21 1206   Wound Width (cm) 2 cm 11/17/21 1206   Wound Depth (cm) 0.1 cm 11/17/21 1206   Wound Surface Area (cm^2) 5 cm^2 11/17/21 1206   Wound Volume (cm^3) 0.5 cm^3 11/17/21 1206   Wound Assessment Slough; [place]/red 11/17/21 1206   Drainage Amount Moderate 11/17/21 1206   Drainage Description Serosanguinous 11/17/21 1206   Odor None 11/17/21 1206   Rachana-wound Assessment Hyperkeratosis (callous);  Intact 11/17/21 1206

## 2021-12-15 ENCOUNTER — HOSPITAL ENCOUNTER (OUTPATIENT)
Dept: WOUND CARE | Age: 31
Discharge: HOME | End: 2021-12-15
Payer: COMMERCIAL

## 2021-12-15 VITALS
RESPIRATION RATE: 20 BRPM | BODY MASS INDEX: 42.66 KG/M2 | SYSTOLIC BLOOD PRESSURE: 98 MMHG | HEIGHT: 72 IN | HEART RATE: 77 BPM | TEMPERATURE: 96.2 F | WEIGHT: 315 LBS | DIASTOLIC BLOOD PRESSURE: 55 MMHG

## 2021-12-15 DIAGNOSIS — L89.313 DECUBITUS ULCER OF RIGHT BUTTOCK, STAGE 3 (HCC): ICD-10-CM

## 2021-12-15 DIAGNOSIS — L89.323 PRESSURE INJURY OF LEFT BUTTOCK, STAGE 3 (HCC): Chronic | ICD-10-CM

## 2021-12-15 DIAGNOSIS — E66.01 CLASS 3 SEVERE OBESITY DUE TO EXCESS CALORIES WITHOUT SERIOUS COMORBIDITY WITH BODY MASS INDEX (BMI) OF 40.0 TO 44.9 IN ADULT (HCC): Chronic | ICD-10-CM

## 2021-12-15 DIAGNOSIS — L30.8 DERMATITIS ASSOCIATED WITH MOISTURE: Primary | Chronic | ICD-10-CM

## 2021-12-15 PROCEDURE — 99212 OFFICE O/P EST SF 10 MIN: CPT | Performed by: SURGERY

## 2021-12-15 PROCEDURE — 99214 OFFICE O/P EST MOD 30 MIN: CPT

## 2021-12-15 RX ORDER — BETAMETHASONE DIPROPIONATE 0.05 %
OINTMENT (GRAM) TOPICAL ONCE
Status: CANCELLED | OUTPATIENT
Start: 2021-12-15 | End: 2021-12-15

## 2021-12-15 RX ORDER — CLOBETASOL PROPIONATE 0.5 MG/G
OINTMENT TOPICAL ONCE
Status: CANCELLED | OUTPATIENT
Start: 2021-12-15 | End: 2021-12-15

## 2021-12-15 RX ORDER — GENTAMICIN SULFATE 1 MG/G
OINTMENT TOPICAL ONCE
Status: CANCELLED | OUTPATIENT
Start: 2021-12-15 | End: 2021-12-15

## 2021-12-15 RX ORDER — LIDOCAINE HYDROCHLORIDE 20 MG/ML
JELLY TOPICAL ONCE
Status: CANCELLED | OUTPATIENT
Start: 2021-12-15 | End: 2021-12-15

## 2021-12-15 RX ORDER — LIDOCAINE HYDROCHLORIDE 40 MG/ML
SOLUTION TOPICAL ONCE
Status: CANCELLED | OUTPATIENT
Start: 2021-12-15 | End: 2021-12-15

## 2021-12-15 RX ORDER — LIDOCAINE 50 MG/G
OINTMENT TOPICAL ONCE
Status: CANCELLED | OUTPATIENT
Start: 2021-12-15 | End: 2021-12-15

## 2021-12-15 RX ORDER — BACITRACIN ZINC AND POLYMYXIN B SULFATE 500; 1000 [USP'U]/G; [USP'U]/G
OINTMENT TOPICAL ONCE
Status: CANCELLED | OUTPATIENT
Start: 2021-12-15 | End: 2021-12-15

## 2021-12-15 RX ORDER — LIDOCAINE 40 MG/G
CREAM TOPICAL ONCE
Status: CANCELLED | OUTPATIENT
Start: 2021-12-15 | End: 2021-12-15

## 2021-12-15 RX ORDER — BACITRACIN, NEOMYCIN, POLYMYXIN B 400; 3.5; 5 [USP'U]/G; MG/G; [USP'U]/G
OINTMENT TOPICAL ONCE
Status: CANCELLED | OUTPATIENT
Start: 2021-12-15 | End: 2021-12-15

## 2021-12-15 RX ORDER — GINSENG 100 MG
CAPSULE ORAL ONCE
Status: CANCELLED | OUTPATIENT
Start: 2021-12-15 | End: 2021-12-15

## 2021-12-15 ASSESSMENT — PAIN DESCRIPTION - LOCATION: LOCATION: LEG

## 2021-12-15 ASSESSMENT — PAIN DESCRIPTION - PAIN TYPE: TYPE: CHRONIC PAIN

## 2021-12-15 ASSESSMENT — PAIN DESCRIPTION - DESCRIPTORS: DESCRIPTORS: ACHING

## 2021-12-15 ASSESSMENT — PAIN DESCRIPTION - ORIENTATION: ORIENTATION: RIGHT;LEFT

## 2021-12-15 NOTE — PROGRESS NOTES
[address]   Progress Note and Procedure Note      [address] RECORD NUMBER:  [id number]  AGE: 33 y.o. GENDER: male  : 1990  EPISODE DATE:  12/15/2021    Subjective:     Chief Complaint   Patient presents with    Wound Check         HISTORY of PRESENT ILLNESS TERRY Addison is a 33 y.o. male who presents today for wound/ulcer evaluation. History of Wound Context: Pt with buttock wounds here for eval/treat  Wound/Ulcer Pain Timing/Severity: none  Quality of pain: N/A  Severity:  0 / 10   Modifying Factors: None  Associated Signs/Symptoms: TBI( traumatic brain injury)    Ulcer Identification:  Ulcer Type: pressure  Contributing Factors: decreased mobility and shear force    Wound: pressure        PAST MEDICAL HISTORY        Diagnosis Date    Arthritis     Cerebral artery occlusion with cerebral infarction ([facility] [address]     Depression     Difficult intubation     Hemorrhoids     History of blood transfusion     Hypertension     Kidney stone     Muscle spasticity     Prolonged emergence from general anesthesia     Retention of urine        PAST SURGICAL HISTORY    Past Surgical History:   Procedure Laterality Date    BACK SURGERY      BACLOFEN PUMP IMPLANTATION      BRAIN SURGERY      KIDNEY STONE SURGERY      [place] FRAGMENT KIDNEY STONE/ ESWL Right 10/2/2018    ESWL EXTRACORPEAL SHOCK WAVE LITHOTRIPSY performed by Gabriele Bradley MD at [address]    History reviewed. No pertinent family history.     SOCIAL HISTORY    Social History     Tobacco Use    Smoking status: Never Smoker    Smokeless tobacco: Never Used   Vaping Use    Vaping Use: Never used   Substance Use Topics    Alcohol use: No    Drug use: No       ALLERGIES    Allergies   Allergen Reactions    Latex     Dilantin [Phenytoin]     Pcn [Penicillins]     Sulfa Antibiotics        MEDICATIONS    Current Outpatient Medications on File Prior to Encounter   Medication Sig Dispense Refill    miconazole (MICOTIN) 2 % cream Apply topically 2 times daily Apply topically 2 times daily.  aspirin 325 MG tablet Take 325 mg by mouth daily      tiZANidine (ZANAFLEX) 2 MG tablet Take 2 mg by mouth 2 times daily At 2 pm and Bedtime      Cholecalciferol (VITAMIN D3 PO) Take 2,000 Units by mouth daily      Amantadine (SYMMETREL) 100 MG TABS tablet Take 100 mg by mouth 2 times daily      hydrOXYzine (ATARAX) 50 MG tablet Take 50 mg by mouth nightly      Suvorexant (BELSOMRA) 20 MG TABS Take 20 mg by mouth nightly.  azelastine HCl 0.15 % SOLN 1 spray by Nasal route 2 times daily       baclofen (LIORESAL) 20 MG tablet Take 20 mg by mouth 4 times daily       busPIRone (BUSPAR) 10 MG tablet Take 10 mg by mouth 2 times daily       chlorproMAZINE (THORAZINE) 25 MG tablet Take 25 mg by mouth nightly      hydrocortisone 2.5 % cream Apply topically 2 times daily Apply topically 2 times daily.  meloxicam (MOBIC) 7.5 MG tablet Take 1 tablet by mouth 2 times daily 30 tablet 3    loratadine (CLARITIN) 10 MG capsule Take 10 mg by mouth daily      melatonin 5 MG TABS tablet Take 5 mg by mouth nightly       gabapentin (NEURONTIN) 400 MG capsule Take 400 mg by mouth 3 times daily.  lisinopril (PRINIVIL;ZESTRIL) 10 MG tablet Take 10 mg by mouth daily      oxyCODONE-acetaminophen (PERCOCET)  MG per tablet Take 1 tablet by mouth every 6 hours as needed for Pain.  tiZANidine (ZANAFLEX) 4 MG tablet Take 2 mg by mouth 2 times daily       zinc oxide 20 % ointment Apply topically 2 times daily Apply topically as needed.       polyethylene glycol (GLYCOLAX) powder Take 17 g by mouth 2 times daily       dexlansoprazole (DEXILANT) 60 MG CPDR delayed release capsule Take 60 mg by mouth daily      guaiFENesin 400 MG tablet Take 800 mg by mouth three times daily 2 Tablets TID PO PRN       nystatin (MYCOSTATIN) 757907 UNIT/GM cream Apply 1 Dose topically as needed for Dry Skin Apply topically 2 times daily.  citalopram (CELEXA) 40 MG tablet Take 40 mg by mouth daily      DULoxetine (CYMBALTA) 60 MG capsule Take 60 mg by mouth daily      Multiple Vitamins-Minerals (THERAPEUTIC MULTIVITAMIN-MINERALS) tablet Take 1 tablet by mouth daily      Calcium Carbonate-Vitamin D (CALCIUM-VITAMIN D) 500-200 MG-UNIT per tablet Take 1 tablet by mouth 2 times daily (with meals)      docusate sodium (COLACE) 100 MG capsule Take 200 mg by mouth 2 times daily      Omega-3 Fatty Acids (FISH OIL) 1000 MG CAPS Take 3,000 mg by mouth 2 times daily      risperiDONE (RISPERDAL) 1 MG tablet Take 1 mg by mouth 2 times daily      Ascorbic Acid (VITAMIN C) 500 MG CAPS Take 500 mg by mouth 2 times daily      propranolol (INDERAL) 20 MG tablet Take 20 mg by mouth 2 times daily       ketoconazole (NIZORAL) 2 % shampoo Apply topically daily as needed for Itching Apply topically daily as needed.  acetaminophen (TYLENOL) 500 MG tablet Take 1,000 mg by mouth every 6 hours as needed for Pain or Fever      zolpidem (AMBIEN) 10 MG tablet Take by mouth nightly.  loperamide (IMODIUM) 2 MG capsule Take 2 mg by mouth 4 times daily as needed for Diarrhea      tacrolimus (PROTOPIC) 0.03 % ointment Apply topically 2 times daily Apply topically 2 times daily.  vitamin E 400 UNIT capsule Take 400 Units by mouth 2 times daily       fluticasone (FLONASE) 50 MCG/ACT nasal spray 1 spray by Each Nare route 2 times daily      ketoconazole (NIZORAL) 2 % cream Apply topically 3 times daily Apply topically daily. No current facility-administered medications on file prior to encounter. REVIEW OF SYSTEMS    A comprehensive review of systems was negative.     Objective:      BP (!) 98/55   Pulse 77   Temp 96.2 °F (35.7 °C) (Temporal)   Resp 20   Ht 6' (1.829 m)   Wt (!) 324 lb 11.8 oz (147.3 kg)   BMI 44.04 kg/m²     Wt Readings from Last 3 Encounters:   12/15/21 (!) 324 lb 11.8 oz (147.3 kg) 11/17/21 (!) 321 lb (145.6 kg)   03/10/20 (!) 308 lb (139.7 kg)       PHYSICAL EXAM    General Appearance: alert and oriented to person, place and time, well developed and well- nourished, in no acute distress  Skin: warm and dry, no rash or erythema  Head: normocephalic and atraumatic  Eyes: pupils equal, round, and reactive to light, extraocular eye movements intact, conjunctivae normal  ENT: tympanic membrane, external ear and ear canal normal bilaterally, nose without deformity, nasal mucosa and turbinates normal without polyps, lips teeth and gums normal  Neck: supple and non-tender without mass, no thyromegaly or thyroid nodules, no cervical lymphadenopathy  Pulmonary/Chest: clear to auscultation bilaterally- no wheezes, rales or rhonchi, normal air movement, no respiratory distress  Cardiovascular: normal rate, regular rhythm, normal S1 and S2, no murmurs, rubs, clicks, or gallops, distal pulses intact, no carotid bruits  Abdomen: soft, non-tender, non-distended, normal bowel sounds, no masses or organomegaly  Extremities: no cyanosis, clubbing or edema  Musculoskeletal: normal range of motion, no joint swelling, deformity or tenderness  Neurologic: reflexes normal and symmetric, no cranial nerve deficit, gait, coordination and speech normal, sensation of skin normal      Assessment:      Patient Active Problem List   Diagnosis Code    Muscle spasticity M62.838    Post-traumatic spasticity R25.2    Decubitus ulcer of right buttock, stage 3 (HCC) L89.313    Dermatitis associated with moisture L30.8    History of cerebrovascular accident (CVA) with residual deficit I69.30    Renal calculus, right N20.0    Pressure injury of left buttock, stage 3 (HCC) W92.642    Class 3 severe obesity due to excess calories without serious comorbidity in adult [facility]) E66.01             Wound 11/17/21 Buttocks Left wound 1- left buttock stage 3 (Active)   Wound Image   12/15/21 1031   Wound Etiology Pressure Stage  3 12/15/21 1031   Dressing Status Old drainage noted 12/15/21 1031   Wound Cleansed Soap and water 12/15/21 1031   Dressing/Treatment Barrier film 11/17/21 1206   Offloading for Diabetic Foot Ulcers Other (comment) 12/15/21 1031   Wound Length (cm) 2 cm 12/15/21 1031   Wound Width (cm) 2.1 cm 12/15/21 1031   Wound Depth (cm) 0.2 cm 12/15/21 1031   Wound Surface Area (cm^2) 4.2 cm^2 12/15/21 1031   Change in Wound Size % (l*w) -200 12/15/21 1031   Wound Volume (cm^3) 0.84 cm^3 12/15/21 1031   Wound Healing % -500 12/15/21 1031   Distance Tunneling (cm) 0 cm 12/15/21 1031   Tunneling Position ___ O'Clock 0 12/15/21 1031   Undermining Starts ___ O'Clock 0 12/15/21 1031   Undermining Ends___ O'Clock 0 12/15/21 1031   Undermining Maxium Distance (cm) 0 12/15/21 1031   Wound Assessment [place]/red; [facility] 12/15/21 1031   Drainage Amount Moderate 12/15/21 1031   Drainage Description Serosanguinous 12/15/21 1031   Odor None 12/15/21 1031   Rachana-wound Assessment Hyperkeratosis (callous);  Hyperpigmented 12/15/21 1031   Margins Defined edges 12/15/21 1031   Wound Thickness Description not for Pressure Injury Full thickness 12/15/21 1031   Number of days: 27       Wound 11/17/21 Buttocks Right wound 2- right buttock stage 3 (Active)   Wound Image   12/15/21 1031   Wound Etiology Pressure Stage  3 12/15/21 1031   Dressing Status Old drainage noted 12/15/21 1031   Wound Cleansed Soap and water 12/15/21 1031   Dressing/Treatment Barrier film 11/17/21 1206   Offloading for Diabetic Foot Ulcers Other (comment) 12/15/21 1031   Wound Length (cm) 1 cm 12/15/21 1031   Wound Width (cm) 1.2 cm 12/15/21 1031   Wound Depth (cm) 0.1 cm 12/15/21 1031   Wound Surface Area (cm^2) 1.2 cm^2 12/15/21 1031   Change in Wound Size % (l*w) 76 12/15/21 1031   Wound Volume (cm^3) 0.12 cm^3 12/15/21 1031   Wound Healing % 76 12/15/21 1031   Distance Tunneling (cm) 0 cm 12/15/21 1031   Tunneling Position ___ O'Clock 0 12/15/21 1031   Undermining Starts ___ O'Clock 0 12/15/21 1031   Undermining Ends___ O'Clock 0 12/15/21 1031   Undermining Maxium Distance (cm) 0 12/15/21 1031   Wound Assessment Pink/red 12/15/21 1031   Drainage Amount Moderate 12/15/21 1031   Drainage Description Serosanguinous 12/15/21 1031   Odor None 12/15/21 1031   Rachana-wound Assessment Hyperkeratosis (callous); Hyperpigmented 12/15/21 1031   Margins Defined edges 12/15/21 1031   Wound Thickness Description not for Pressure Injury Full thickness 12/15/21 1031   Number of days: 27             Plan:     Problem List Items Addressed This Visit     Dermatitis associated with moisture - Primary (Chronic)    * (Principal) Pressure injury of left buttock, stage 3 (HCC) (Chronic)    Class 3 severe obesity due to excess calories without serious comorbidity in [facility]) (Chronic)    Decubitus ulcer of right buttock, stage 3 (HCC)        Wounds stable and pt cannot keep from picking and scratching the area   Cont barrier cream and pressure offloading efforts. RTO 1 month or PRN      Treatment Note please see attached Discharge Instructions    In my professional opinion this patient would benefit from HBO Therapy: No    Written patient dismissal instructions given to patient and signed by patient or POA. Discharge [address] and Hyperbaric Oxygen Therapy   Physician Orders and Discharge Instructions  [address]  Telephone: [phone]    NAME:  Yoan Chance OF BIRTH:  1990  MEDICAL RECORD NUMBER:  [id number]  DATE:  12/15/2021    Discharge condition: Stable    Discharge to: Home    Left via:Private automobile    Accompanied by: Staff    [facility]F/HHA: [facility]    Dressing Orders:  Apply barrier cream (zinc,antifungal, lanolin type of products are good)-keep on with every toileting  QUIT SCRATCHING    Treatment Orders:  Avoid Pressure to wound site.   Turn frequently (turn at least every two hours when in bed)  While in chair reposition every 30 minutes  Patient advised to sit up no more than 30minutes at a time for meals ONLY. Please do not elevate the head of the bed higher than 30 degrees. Continue Protein rich diet (unless restricted by your physician)  Take Multivitamin daily  Please use Roho cushion in chair  Please use low air loss bed    [address] [facility] follow up visit _____________3 weeks________________  (Please note your next appointment above and if you are unable to keep, kindly give a 24 hour notice. Thank you.)    If you experience any of the following, please call the [facility] during business hours:    * Increase in Pain  * Temperature over 101  * Increase in drainage from your wound  * Drainage with a foul odor  * Bleeding  * Increase in swelling  * Need for compression bandage changes due to slippage, breakthrough drainage. If you need medical attention outside of the business hours of the [address] please contact your PCP or go to the nearest emergency room.         Electronically signed by Donovan Tran MD on 12/15/2021 at 10:48 AM

## 2021-12-29 PROCEDURE — 99283 EMERGENCY DEPT VISIT LOW MDM: CPT

## 2021-12-30 ENCOUNTER — HOSPITAL ENCOUNTER (EMERGENCY)
Facility: HOSPITAL | Age: 31
Discharge: HOME | End: 2021-12-30
Attending: INTERNAL MEDICINE | Admitting: INTERNAL MEDICINE

## 2021-12-30 VITALS
RESPIRATION RATE: 20 BRPM | HEART RATE: 73 BPM | BODY MASS INDEX: 39.17 KG/M2 | DIASTOLIC BLOOD PRESSURE: 71 MMHG | SYSTOLIC BLOOD PRESSURE: 117 MMHG | HEIGHT: 75 IN | WEIGHT: 315 LBS | OXYGEN SATURATION: 97 % | TEMPERATURE: 98 F

## 2021-12-30 DIAGNOSIS — K52.9 GASTROENTERITIS: Primary | ICD-10-CM

## 2021-12-30 DIAGNOSIS — R11.2 NAUSEA AND VOMITING, INTRACTABILITY OF VOMITING NOT SPECIFIED, UNSPECIFIED VOMITING TYPE: ICD-10-CM

## 2021-12-30 LAB
ALBUMIN SERPL-MCNC: 3.8 G/DL (ref 3.5–5.2)
ALBUMIN/GLOB SERPL: 1.1 G/DL
ALP SERPL-CCNC: 108 U/L (ref 39–117)
ALT SERPL W P-5'-P-CCNC: 18 U/L (ref 1–41)
ANION GAP SERPL CALCULATED.3IONS-SCNC: 11 MMOL/L (ref 5–15)
ANISOCYTOSIS BLD QL: NORMAL
AST SERPL-CCNC: 12 U/L (ref 1–40)
BACTERIA UR QL AUTO: ABNORMAL /HPF
BILIRUB SERPL-MCNC: 0.2 MG/DL (ref 0–1.2)
BILIRUB UR QL STRIP: NEGATIVE
BUN SERPL-MCNC: 15 MG/DL (ref 6–20)
BUN/CREAT SERPL: 16.7 (ref 7–25)
CALCIUM SPEC-SCNC: 8.6 MG/DL (ref 8.6–10.5)
CHLORIDE SERPL-SCNC: 108 MMOL/L (ref 98–107)
CLARITY UR: CLEAR
CLUMPED PLATELETS: PRESENT
CO2 SERPL-SCNC: 26 MMOL/L (ref 22–29)
COLOR UR: YELLOW
CREAT SERPL-MCNC: 0.9 MG/DL (ref 0.76–1.27)
DEPRECATED RDW RBC AUTO: 44.4 FL (ref 37–54)
EOSINOPHIL # BLD MANUAL: 0.24 10*3/MM3 (ref 0–0.4)
EOSINOPHIL NFR BLD MANUAL: 4 % (ref 0.3–6.2)
ERYTHROCYTE [DISTWIDTH] IN BLOOD BY AUTOMATED COUNT: 14.5 % (ref 12.3–15.4)
GFR SERPL CREATININE-BSD FRML MDRD: 97 ML/MIN/1.73
GLOBULIN UR ELPH-MCNC: 3.5 GM/DL
GLUCOSE SERPL-MCNC: 123 MG/DL (ref 65–99)
GLUCOSE UR STRIP-MCNC: NEGATIVE MG/DL
HCT VFR BLD AUTO: 35.7 % (ref 37.5–51)
HGB BLD-MCNC: 11.1 G/DL (ref 13–17.7)
HGB UR QL STRIP.AUTO: ABNORMAL
HOLD SPECIMEN: NORMAL
HYALINE CASTS UR QL AUTO: ABNORMAL /LPF
KETONES UR QL STRIP: NEGATIVE
LEUKOCYTE ESTERASE UR QL STRIP.AUTO: ABNORMAL
LIPASE SERPL-CCNC: 53 U/L (ref 13–60)
LYMPHOCYTES # BLD MANUAL: 2.17 10*3/MM3 (ref 0.7–3.1)
LYMPHOCYTES NFR BLD MANUAL: 6 % (ref 5–12)
MCH RBC QN AUTO: 26.7 PG (ref 26.6–33)
MCHC RBC AUTO-ENTMCNC: 31.1 G/DL (ref 31.5–35.7)
MCV RBC AUTO: 85.8 FL (ref 79–97)
MONOCYTES # BLD: 0.36 10*3/MM3 (ref 0.1–0.9)
NEUTROPHILS # BLD AUTO: 3.25 10*3/MM3 (ref 1.7–7)
NEUTROPHILS NFR BLD MANUAL: 54 % (ref 42.7–76)
NITRITE UR QL STRIP: NEGATIVE
PH UR STRIP.AUTO: 5.5 [PH] (ref 5–8)
PLATELET # BLD AUTO: 206 10*3/MM3 (ref 140–450)
PMV BLD AUTO: 11.5 FL (ref 6–12)
POLYCHROMASIA BLD QL SMEAR: NORMAL
POTASSIUM SERPL-SCNC: 4.2 MMOL/L (ref 3.5–5.2)
PROT SERPL-MCNC: 7.3 G/DL (ref 6–8.5)
PROT UR QL STRIP: NEGATIVE
RBC # BLD AUTO: 4.16 10*6/MM3 (ref 4.14–5.8)
RBC # UR STRIP: ABNORMAL /HPF
REF LAB TEST METHOD: ABNORMAL
SARS-COV-2 RNA PNL SPEC NAA+PROBE: NOT DETECTED
SMALL PLATELETS BLD QL SMEAR: ADEQUATE
SODIUM SERPL-SCNC: 145 MMOL/L (ref 136–145)
SP GR UR STRIP: 1.02 (ref 1–1.03)
SQUAMOUS #/AREA URNS HPF: ABNORMAL /HPF
UROBILINOGEN UR QL STRIP: ABNORMAL
VARIANT LYMPHS NFR BLD MANUAL: 36 % (ref 19.6–45.3)
WBC # UR STRIP: ABNORMAL /HPF
WBC MORPH BLD: NORMAL
WBC NRBC COR # BLD: 6.02 10*3/MM3 (ref 3.4–10.8)
WHOLE BLOOD HOLD SPECIMEN: NORMAL
WHOLE BLOOD HOLD SPECIMEN: NORMAL

## 2021-12-30 PROCEDURE — 80053 COMPREHEN METABOLIC PANEL: CPT | Performed by: INTERNAL MEDICINE

## 2021-12-30 PROCEDURE — 81003 URINALYSIS AUTO W/O SCOPE: CPT

## 2021-12-30 PROCEDURE — 87086 URINE CULTURE/COLONY COUNT: CPT | Performed by: INTERNAL MEDICINE

## 2021-12-30 PROCEDURE — 81001 URINALYSIS AUTO W/SCOPE: CPT | Performed by: INTERNAL MEDICINE

## 2021-12-30 PROCEDURE — C9803 HOPD COVID-19 SPEC COLLECT: HCPCS

## 2021-12-30 PROCEDURE — 85007 BL SMEAR W/DIFF WBC COUNT: CPT | Performed by: INTERNAL MEDICINE

## 2021-12-30 PROCEDURE — 85025 COMPLETE CBC W/AUTO DIFF WBC: CPT | Performed by: INTERNAL MEDICINE

## 2021-12-30 PROCEDURE — 83690 ASSAY OF LIPASE: CPT | Performed by: INTERNAL MEDICINE

## 2021-12-30 PROCEDURE — 87147 CULTURE TYPE IMMUNOLOGIC: CPT | Performed by: INTERNAL MEDICINE

## 2021-12-30 PROCEDURE — 87635 SARS-COV-2 COVID-19 AMP PRB: CPT

## 2021-12-30 RX ORDER — ONDANSETRON 4 MG/1
4 TABLET, ORALLY DISINTEGRATING ORAL 4 TIMES DAILY PRN
Qty: 15 TABLET | Refills: 0 | Status: SHIPPED | OUTPATIENT
Start: 2021-12-30 | End: 2023-01-18

## 2021-12-30 RX ORDER — SODIUM CHLORIDE 0.9 % (FLUSH) 0.9 %
10 SYRINGE (ML) INJECTION AS NEEDED
Status: DISCONTINUED | OUTPATIENT
Start: 2021-12-30 | End: 2021-12-30 | Stop reason: HOSPADM

## 2021-12-31 ENCOUNTER — TELEPHONE (OUTPATIENT)
Dept: EMERGENCY DEPT | Facility: HOSPITAL | Age: 31
End: 2021-12-31

## 2021-12-31 LAB — BACTERIA SPEC AEROBE CULT: ABNORMAL

## 2022-01-11 ENCOUNTER — HOSPITAL ENCOUNTER (OUTPATIENT)
Dept: WOUND CARE | Age: 32
Discharge: HOME | End: 2022-01-11
Payer: COMMERCIAL

## 2022-01-11 VITALS — WEIGHT: 315 LBS | BODY MASS INDEX: 44.01 KG/M2

## 2022-01-11 DIAGNOSIS — L89.323 PRESSURE INJURY OF LEFT BUTTOCK, STAGE 3 (HCC): Chronic | ICD-10-CM

## 2022-01-11 DIAGNOSIS — L89.313 DECUBITUS ULCER OF RIGHT BUTTOCK, STAGE 3 (HCC): ICD-10-CM

## 2022-01-11 DIAGNOSIS — L30.8 DERMATITIS ASSOCIATED WITH MOISTURE: Primary | Chronic | ICD-10-CM

## 2022-01-11 DIAGNOSIS — E66.01 CLASS 3 SEVERE OBESITY DUE TO EXCESS CALORIES WITHOUT SERIOUS COMORBIDITY WITH BODY MASS INDEX (BMI) OF 40.0 TO 44.9 IN ADULT (HCC): Chronic | ICD-10-CM

## 2022-01-11 PROCEDURE — 6370000000 HC RX 637 (ALT 250 FOR IP): Performed by: SURGERY

## 2022-01-11 PROCEDURE — 97597 DBRDMT OPN WND 1ST 20 CM/<: CPT | Performed by: SURGERY

## 2022-01-11 PROCEDURE — 97597 DBRDMT OPN WND 1ST 20 CM/<: CPT

## 2022-01-11 RX ORDER — CLOBETASOL PROPIONATE 0.5 MG/G
OINTMENT TOPICAL ONCE
Status: CANCELLED | OUTPATIENT
Start: 2022-01-11 | End: 2022-01-11

## 2022-01-11 RX ORDER — BACITRACIN, NEOMYCIN, POLYMYXIN B 400; 3.5; 5 [USP'U]/G; MG/G; [USP'U]/G
OINTMENT TOPICAL ONCE
Status: CANCELLED | OUTPATIENT
Start: 2022-01-11 | End: 2022-01-11

## 2022-01-11 RX ORDER — GINSENG 100 MG
CAPSULE ORAL ONCE
Status: CANCELLED | OUTPATIENT
Start: 2022-01-11 | End: 2022-01-11

## 2022-01-11 RX ORDER — LIDOCAINE HYDROCHLORIDE 40 MG/ML
SOLUTION TOPICAL ONCE
Status: CANCELLED | OUTPATIENT
Start: 2022-01-11 | End: 2022-01-11

## 2022-01-11 RX ORDER — LIDOCAINE 40 MG/G
CREAM TOPICAL ONCE
Status: CANCELLED | OUTPATIENT
Start: 2022-01-11 | End: 2022-01-11

## 2022-01-11 RX ORDER — LIDOCAINE HYDROCHLORIDE 20 MG/ML
JELLY TOPICAL ONCE
Status: COMPLETED | OUTPATIENT
Start: 2022-01-11 | End: 2022-01-11

## 2022-01-11 RX ORDER — BACITRACIN ZINC AND POLYMYXIN B SULFATE 500; 1000 [USP'U]/G; [USP'U]/G
OINTMENT TOPICAL ONCE
Status: CANCELLED | OUTPATIENT
Start: 2022-01-11 | End: 2022-01-11

## 2022-01-11 RX ORDER — LIDOCAINE 50 MG/G
OINTMENT TOPICAL ONCE
Status: CANCELLED | OUTPATIENT
Start: 2022-01-11 | End: 2022-01-11

## 2022-01-11 RX ORDER — LIDOCAINE HYDROCHLORIDE 20 MG/ML
JELLY TOPICAL ONCE
Status: CANCELLED | OUTPATIENT
Start: 2022-01-11 | End: 2022-01-11

## 2022-01-11 RX ORDER — BETAMETHASONE DIPROPIONATE 0.05 %
OINTMENT (GRAM) TOPICAL ONCE
Status: CANCELLED | OUTPATIENT
Start: 2022-01-11 | End: 2022-01-11

## 2022-01-11 RX ORDER — GENTAMICIN SULFATE 1 MG/G
OINTMENT TOPICAL ONCE
Status: CANCELLED | OUTPATIENT
Start: 2022-01-11 | End: 2022-01-11

## 2022-01-11 RX ORDER — OLIVE OIL
OIL (ML) MISCELLANEOUS PRN
Status: ON HOLD | COMMUNITY
End: 2022-04-12 | Stop reason: HOSPADM

## 2022-01-11 RX ADMIN — LIDOCAINE HYDROCHLORIDE: 20 JELLY TOPICAL at 14:12

## 2022-01-11 NOTE — PROGRESS NOTES
Take 500 mg by mouth 2 times daily      propranolol (INDERAL) 20 MG tablet Take 20 mg by mouth 2 times daily as needed Hold for /70 or lower      olive oil external oil Apply topically as needed Apply topically.  miconazole (MICOTIN) 2 % cream Apply topically 2 times daily Apply topically 2 times daily.  Cholecalciferol (VITAMIN D3 PO) Take 2,000 Units by mouth daily      azelastine HCl 0.15 % SOLN 1 spray by Nasal route 2 times daily       loperamide (IMODIUM) 2 MG capsule Take 2 mg by mouth 4 times daily as needed for Diarrhea      hydrocortisone 2.5 % cream Apply topically 2 times daily Apply topically 2 times daily.  loratadine (CLARITIN) 10 MG capsule Take 10 mg by mouth daily      fluticasone (FLONASE) 50 MCG/ACT nasal spray 1 spray by Each Nare route 2 times daily      ketoconazole (NIZORAL) 2 % cream Apply topically Indications: \scaly scalp Use as directed.  oxyCODONE-acetaminophen (PERCOCET)  MG per tablet Take 1 tablet by mouth every 6 hours as needed for Pain.  dexlansoprazole (DEXILANT) 60 MG CPDR delayed release capsule Take 60 mg by mouth daily      nystatin (MYCOSTATIN) 941721 UNIT/GM cream Apply 1 Dose topically as needed for Dry Skin Apply topically 2 times daily.  Calcium Carbonate-Vitamin D (CALCIUM-VITAMIN D) 500-200 MG-UNIT per tablet Take 1 tablet by mouth 2 times daily (with meals)      ketoconazole (NIZORAL) 2 % shampoo Apply topically daily as needed for Itching Apply topically daily as needed.  acetaminophen (TYLENOL) 500 MG tablet Take 1,000 mg by mouth every 6 hours as needed for Pain or Fever       No current facility-administered medications on file prior to encounter. REVIEW OF SYSTEMS    A comprehensive review of systems was negative. Objective: There were no vitals taken for this visit.     Wt Readings from Last 3 Encounters:   12/15/21 (!) 324 lb 11.8 oz (147.3 kg)   11/17/21 (!) 321 lb (145.6 kg)   03/10/20 (!) 308 lb (139.7 kg)       PHYSICAL EXAM    General Appearance: alert and oriented to person, place and time, well developed and well- nourished, in no acute distress  Skin: warm and dry, no rash or erythema  Head: normocephalic and atraumatic  Eyes: pupils equal, round, and reactive to light, extraocular eye movements intact, conjunctivae normal  ENT: tympanic membrane, external ear and ear canal normal bilaterally, nose without deformity, nasal mucosa and turbinates normal without polyps, lips teeth and gums normal  Neck: supple and non-tender without mass, no thyromegaly or thyroid nodules, no cervical lymphadenopathy  Pulmonary/Chest: clear to auscultation bilaterally- no wheezes, rales or rhonchi, normal air movement, no respiratory distress  Cardiovascular: normal rate, regular rhythm, normal S1 and S2, no murmurs, rubs, clicks, or gallops, distal pulses intact, no carotid bruits  Abdomen: soft, non-tender, non-distended, normal bowel sounds, no masses or organomegaly  Extremities: no cyanosis, clubbing or edema  Musculoskeletal: normal range of motion, no joint swelling, deformity or tenderness  Neurologic: reflexes normal and symmetric, no cranial nerve deficit, gait, coordination and speech normal, sensation of skin normal      Assessment:      Problem List Items Addressed This Visit     Dermatitis associated with moisture - Primary (Chronic)    * (Principal) Pressure injury of left buttock, stage 3 (HCC) (Chronic)    Class 3 severe obesity due to excess calories without serious comorbidity in adult (HCC) (Chronic)    Decubitus ulcer of right buttock, stage 3 ([address]    Relevant Orders    Initiate Outpatient Wound Care Protocol           Procedure Note  Indications:  Based on my examination of this patient's wound(s)/ulcer(s) today, debridement is required to promote healing and evaluate the wound base.     Performed by: Vega White MD    Consent obtained:  Yes    Time out taken:  Yes    Pain Control: Debridement:Non-excisional Debridement    Using curette the wound(s)/ulcer(s) was/were sharply debrided down through and including the removal of epidermis and dermis.         Devitalized Tissue Debrided:  fibrin, biofilm, slough, necrotic/eschar and exudate      Pre Debridement Measurements:  Are located in the Wound/Ulcer Documentation Flow Sheet    Wound/Ulcer #: 1 and 2    Percent of Wound(s)/Ulcer(s) Debrided: 100%    Total Surface Area Debrided:  7 sq cm       Diabetic/Pressure/Non Pressure Ulcers only:  Ulcer: Pressure ulcer, Stage 3             Post Debridement Measurements:    Wound/Ulcer Descriptions are Pre Debridement --EXCEPT MEASUREMENTS    Wound 11/17/21 Buttocks Left wound 1- left buttock stage 3 (Active)   Wound Image    01/11/22 1412   Wound Etiology Pressure Stage  3 01/11/22 1412   Dressing Status Other (Comment) 12/15/21 1050   Wound Cleansed Soap and water 01/11/22 1412   Dressing/Treatment Moisture barrier 12/15/21 1050   Offloading for Diabetic Foot Ulcers Other (comment) 12/15/21 1050   Wound Length (cm) 2 cm 01/11/22 1412   Wound Width (cm) 1 cm 01/11/22 1412   Wound Depth (cm) 0.1 cm 01/11/22 1412   Wound Surface Area (cm^2) 2 cm^2 01/11/22 1412   Change in Wound Size % (l*w) -42.86 01/11/22 1412   Wound Volume (cm^3) 0.2 cm^3 01/11/22 1412   Wound Healing % -43 01/11/22 1412   Post-Procedure Length (cm) 2 cm 01/11/22 1418   Post-Procedure Width (cm) 1 cm 01/11/22 1418   Post-Procedure Depth (cm) 0.1 cm 01/11/22 1418   Post-Procedure Surface Area (cm^2) 2 cm^2 01/11/22 1418   Post-Procedure Volume (cm^3) 0.2 cm^3 01/11/22 1418   Distance Tunneling (cm) 0 cm 01/11/22 1412   Tunneling Position ___ O'Clock 0 01/11/22 1412   Undermining Starts ___ O'Clock 0 01/11/22 1412   Undermining Ends___ O'Clock 0 01/11/22 1412   Undermining Maxium Distance (cm) 0 01/11/22 1412   Wound Assessment Pink/red;Slough 01/11/22 1412   Drainage Amount Moderate 01/11/22 1412   Drainage Description Serosanguinous 01/11/22 1412   Odor None 01/11/22 1412   Rachana-wound Assessment Hyperkeratosis (callous) 01/11/22 1412   Margins Attached edges 01/11/22 1412   Wound Thickness Description not for Pressure Injury Full thickness 12/15/21 1031   Number of days: 55       Wound 11/17/21 Buttocks Right wound 2- right buttock stage 3 (Active)   Wound Image   01/11/22 1412   Wound Etiology Pressure Stage  3 01/11/22 1412   Dressing Status Old drainage noted 01/11/22 1412   Wound Cleansed Soap and water 01/11/22 1412   Dressing/Treatment Moisture barrier 12/15/21 1050   Offloading for Diabetic Foot Ulcers Other (comment) 12/15/21 1050   Wound Length (cm) 2 cm 01/11/22 1412   Wound Width (cm) 2.8 cm 01/11/22 1412   Wound Depth (cm) 0.1 cm 01/11/22 1412   Wound Surface Area (cm^2) 5.6 cm^2 01/11/22 1412   Change in Wound Size % (l*w) -12 01/11/22 1412   Wound Volume (cm^3) 0.56 cm^3 01/11/22 1412   Wound Healing % -12 01/11/22 1412   Post-Procedure Length (cm) 2 cm 01/11/22 1418   Post-Procedure Width (cm) 2.5 cm 01/11/22 1418   Post-Procedure Depth (cm) 0.1 cm 01/11/22 1418   Post-Procedure Surface Area (cm^2) 5 cm^2 01/11/22 1418   Post-Procedure Volume (cm^3) 0.5 cm^3 01/11/22 1418   Distance Tunneling (cm) 0 cm 01/11/22 1412   Tunneling Position ___ O'Clock 0 01/11/22 1412   Undermining Starts ___ O'Clock 0 01/11/22 1412   Undermining Ends___ O'Clock 0 01/11/22 1412   Undermining Maxium Distance (cm) 0 01/11/22 1412   Wound Assessment Pink/red;Slough 01/11/22 1412   Drainage Amount Moderate 01/11/22 1412   Drainage Description Serosanguinous 01/11/22 1412   Odor None 01/11/22 1412   Rachana-wound Assessment Hyperkeratosis (callous) 01/11/22 1412   Margins Attached edges 01/11/22 1412   Wound Thickness Description not for Pressure Injury Full thickness 12/15/21 1031   Number of days: 55             Estimated Blood Loss:  Minimal    Hemostasis Achieved:  by pressure    Procedural Pain:  0  / 10     Post Procedural Pain:  0 / 10 Response to treatment:  Well tolerated by patient. Plan:     Problem List Items Addressed This Visit     Dermatitis associated with moisture - Primary (Chronic)    * (Principal) Pressure injury of left buttock, stage 3 (HCC) (Chronic)    Class 3 severe obesity due to excess calories without serious comorbidity in adult [facility]) (Chronic)    Decubitus ulcer of right buttock, stage 3 ([address]    Relevant Orders    Initiate Outpatient Wound Care Protocol          Cont current care Cont offloading measures. Try barrier creams for moisture protection    Treatment Note please see attached Discharge Instructions    In my professional opinion this patient would benefit from HBO Therapy: No    Written patient dismissal instructions given to patient and signed by patient or POA. Discharge [address] and Hyperbaric Oxygen Therapy   Physician Orders and Discharge Instructions  [address] [facility]  [place], [address]  Telephone: [phone]    NAME:  Maurice Wilkerson OF BIRTH:  1990  MEDICAL RECORD NUMBER:  [id number]  DATE:  1/11/2022    Discharge condition: Stable    Discharge to: Home    Left via:Private automobile    Accompanied by: Staff     [facility]HHA: [facility]     Dressing Orders:  Apply barrier cream (zinc,antifungal, lanolin type of products are good)-keep on with every toileting  QUIT SCRATCHING     Treatment Orders:  Avoid Pressure to wound site. Turn frequently (turn at least every two hours when in bed)  While in chair reposition every 30 minutes  Patient advised to sit up no more than 30minutes at a time for meals ONLY. Please do not elevate the head of the bed higher than 30 degrees.   Continue Protein rich diet (unless restricted by your physician)  Take Multivitamin daily  Please use Roho cushion in chair  Please use low air loss bed     [facility] follow up visit _____________2 weeks______________  (Please note your next appointment above and if you are unable to keep, kindly give a 24 hour notice. Thank you.)    If you experience any of the following, please call the [address] during business hours:    * Increase in Pain  * Temperature over 101  * Increase in drainage from your wound  * Drainage with a foul odor  * Bleeding  * Increase in swelling  * Need for compression bandage changes due to slippage, breakthrough drainage. If you need medical attention outside of the business hours of the [address] please contact your PCP or go to the nearest emergency room.         Electronically signed by Keaton Wagner MD on 1/11/2022 at 2:28 PM

## 2022-01-31 ENCOUNTER — TELEPHONE (OUTPATIENT)
Dept: PODIATRY | Facility: CLINIC | Age: 32
End: 2022-01-31

## 2022-02-21 ENCOUNTER — HOSPITAL ENCOUNTER (OUTPATIENT)
Dept: WOUND CARE | Age: 32
Discharge: HOME | End: 2022-02-21
Payer: COMMERCIAL

## 2022-02-21 VITALS
TEMPERATURE: 96.8 F | DIASTOLIC BLOOD PRESSURE: 71 MMHG | RESPIRATION RATE: 20 BRPM | SYSTOLIC BLOOD PRESSURE: 106 MMHG | HEART RATE: 81 BPM

## 2022-02-21 DIAGNOSIS — L30.8 DERMATITIS ASSOCIATED WITH MOISTURE: Chronic | ICD-10-CM

## 2022-02-21 DIAGNOSIS — E66.01 CLASS 3 SEVERE OBESITY DUE TO EXCESS CALORIES WITHOUT SERIOUS COMORBIDITY WITH BODY MASS INDEX (BMI) OF 40.0 TO 44.9 IN ADULT (HCC): Chronic | ICD-10-CM

## 2022-02-21 DIAGNOSIS — L89.323 PRESSURE INJURY OF LEFT BUTTOCK, STAGE 3 (HCC): Chronic | ICD-10-CM

## 2022-02-21 DIAGNOSIS — L89.313 DECUBITUS ULCER OF RIGHT BUTTOCK, STAGE 3 (HCC): Primary | ICD-10-CM

## 2022-02-21 PROCEDURE — 6370000000 HC RX 637 (ALT 250 FOR IP): Performed by: SURGERY

## 2022-02-21 PROCEDURE — 99212 OFFICE O/P EST SF 10 MIN: CPT | Performed by: SURGERY

## 2022-02-21 PROCEDURE — 99214 OFFICE O/P EST MOD 30 MIN: CPT

## 2022-02-21 RX ORDER — LIDOCAINE 50 MG/G
OINTMENT TOPICAL ONCE
OUTPATIENT
Start: 2022-02-21 | End: 2022-02-21

## 2022-02-21 RX ORDER — BACITRACIN, NEOMYCIN, POLYMYXIN B 400; 3.5; 5 [USP'U]/G; MG/G; [USP'U]/G
OINTMENT TOPICAL ONCE
OUTPATIENT
Start: 2022-02-21 | End: 2022-02-21

## 2022-02-21 RX ORDER — GINSENG 100 MG
CAPSULE ORAL ONCE
OUTPATIENT
Start: 2022-02-21 | End: 2022-02-21

## 2022-02-21 RX ORDER — LIDOCAINE HYDROCHLORIDE 20 MG/ML
JELLY TOPICAL ONCE
Status: CANCELLED | OUTPATIENT
Start: 2022-02-21 | End: 2022-02-21

## 2022-02-21 RX ORDER — GENTAMICIN SULFATE 1 MG/G
OINTMENT TOPICAL ONCE
OUTPATIENT
Start: 2022-02-21 | End: 2022-02-21

## 2022-02-21 RX ORDER — BETAMETHASONE DIPROPIONATE 0.05 %
OINTMENT (GRAM) TOPICAL ONCE
OUTPATIENT
Start: 2022-02-21 | End: 2022-02-21

## 2022-02-21 RX ORDER — BACITRACIN ZINC AND POLYMYXIN B SULFATE 500; 1000 [USP'U]/G; [USP'U]/G
OINTMENT TOPICAL ONCE
OUTPATIENT
Start: 2022-02-21 | End: 2022-02-21

## 2022-02-21 RX ORDER — CLOBETASOL PROPIONATE 0.5 MG/G
OINTMENT TOPICAL ONCE
OUTPATIENT
Start: 2022-02-21 | End: 2022-02-21

## 2022-02-21 RX ORDER — LIDOCAINE HYDROCHLORIDE 20 MG/ML
JELLY TOPICAL ONCE
Status: COMPLETED | OUTPATIENT
Start: 2022-02-21 | End: 2022-02-21

## 2022-02-21 RX ORDER — LIDOCAINE 40 MG/G
CREAM TOPICAL ONCE
OUTPATIENT
Start: 2022-02-21 | End: 2022-02-21

## 2022-02-21 RX ORDER — LIDOCAINE HYDROCHLORIDE 40 MG/ML
SOLUTION TOPICAL ONCE
OUTPATIENT
Start: 2022-02-21 | End: 2022-02-21

## 2022-02-21 RX ORDER — LIDOCAINE HYDROCHLORIDE 20 MG/ML
JELLY TOPICAL ONCE
OUTPATIENT
Start: 2022-02-21 | End: 2022-02-21

## 2022-02-21 RX ADMIN — LIDOCAINE HYDROCHLORIDE: 20 JELLY TOPICAL at 12:09

## 2022-02-21 ASSESSMENT — PAIN DESCRIPTION - PROGRESSION: CLINICAL_PROGRESSION: NOT CHANGED

## 2022-02-21 ASSESSMENT — PAIN SCALES - GENERAL: PAINLEVEL_OUTOF10: 10

## 2022-02-21 ASSESSMENT — PAIN DESCRIPTION - ORIENTATION: ORIENTATION: LEFT

## 2022-02-21 ASSESSMENT — PAIN DESCRIPTION - LOCATION: LOCATION: FOOT

## 2022-02-21 ASSESSMENT — PAIN DESCRIPTION - DESCRIPTORS: DESCRIPTORS: BURNING

## 2022-02-21 ASSESSMENT — PAIN DESCRIPTION - ONSET: ONSET: ON-GOING

## 2022-02-21 ASSESSMENT — PAIN DESCRIPTION - PAIN TYPE: TYPE: CHRONIC PAIN

## 2022-02-21 ASSESSMENT — PAIN DESCRIPTION - FREQUENCY: FREQUENCY: CONTINUOUS

## 2022-02-21 NOTE — PROGRESS NOTES
[address]   Progress Note and Procedure Note      [address] RECORD NUMBER:  [id number]  AGE: 33 y.o. GENDER: male  : 1990  EPISODE DATE:  2022    Subjective:     Chief Complaint   Patient presents with    Wound Check         HISTORY of PRESENT ILLNESS TERRY Herrera is a 33 y.o. male who presents today for wound/ulcer evaluation. History of Wound Context: Pt with buttocks wounds here for eval/treat  Wound/Ulcer Pain Timing/Severity: none  Quality of pain: N/A  Severity:  0 / 10   Modifying Factors: None  Associated Signs/Symptoms: none    Ulcer Identification:  Ulcer Type: pressure  Contributing Factors: chronic pressure, decreased mobility, shear force and obesity    Wound: pressure        PAST MEDICAL HISTORY        Diagnosis Date    Arthritis     Cerebral artery occlusion with cerebral infarction ([facility] [address]     Depression     Difficult intubation     Hemorrhoids     History of blood transfusion     Hypertension     Kidney stone     Muscle spasticity     Prolonged emergence from general anesthesia     Retention of urine        PAST SURGICAL HISTORY    Past Surgical History:   Procedure Laterality Date    BACK SURGERY      BACLOFEN PUMP IMPLANTATION      BRAIN SURGERY      KIDNEY STONE SURGERY      AR FRAGMENT KIDNEY STONE/ ESWL Right 10/2/2018    ESWL EXTRACORPEAL SHOCK WAVE LITHOTRIPSY performed by Chloe Womack MD at [address]    History reviewed. No pertinent family history.     SOCIAL HISTORY    Social History     Tobacco Use    Smoking status: Never Smoker    Smokeless tobacco: Never Used   Vaping Use    Vaping Use: Never used   Substance Use Topics    Alcohol use: No    Drug use: No       ALLERGIES    Allergies   Allergen Reactions    Latex     Dilantin [Phenytoin]     Pcn [Penicillins]     Sulfa Antibiotics        MEDICATIONS    Current Outpatient Medications on File Prior to Encounter   Medication Sig Dispense Refill    miconazole (MICOTIN) 2 % cream Apply topically 2 times daily Apply topically 2 times daily.  aspirin 325 MG tablet Take 325 mg by mouth daily      tiZANidine (ZANAFLEX) 2 MG tablet Take 2 mg by mouth 2 times daily At 2 pm and Bedtime      Cholecalciferol (VITAMIN D3 PO) Take 2,000 Units by mouth daily      Amantadine (SYMMETREL) 100 MG TABS tablet Take 100 mg by mouth 2 times daily      hydrOXYzine (ATARAX) 50 MG tablet Take 50 mg by mouth nightly      Suvorexant (BELSOMRA) 20 MG TABS Take 20 mg by mouth nightly.  azelastine HCl 0.15 % SOLN 1 spray by Nasal route 2 times daily       baclofen (LIORESAL) 20 MG tablet Take 20 mg by mouth 4 times daily       busPIRone (BUSPAR) 10 MG tablet Take 10 mg by mouth 2 times daily       chlorproMAZINE (THORAZINE) 25 MG tablet Take 25 mg by mouth nightly      hydrocortisone 2.5 % cream Apply topically 2 times daily Apply topically 2 times daily.  vitamin E 400 UNIT capsule Take 400 Units by mouth 2 times daily       meloxicam (MOBIC) 7.5 MG tablet Take 1 tablet by mouth 2 times daily 30 tablet 3    loratadine (CLARITIN) 10 MG capsule Take 10 mg by mouth daily      melatonin 5 MG TABS tablet Take 5 mg by mouth nightly       gabapentin (NEURONTIN) 400 MG capsule Take 400 mg by mouth 3 times daily.  ketoconazole (NIZORAL) 2 % cream Apply topically Indications: \scaly scalp Use as directed.  lisinopril (PRINIVIL;ZESTRIL) 10 MG tablet Take 10 mg by mouth daily      oxyCODONE-acetaminophen (PERCOCET)  MG per tablet Take 1 tablet by mouth every 6 hours as needed for Pain.  tiZANidine (ZANAFLEX) 4 MG tablet Take 2 mg by mouth 2 times daily       zinc oxide 20 % ointment Apply topically 2 times daily Apply topically as needed.       polyethylene glycol (GLYCOLAX) powder Take 17 g by mouth 2 times daily       dexlansoprazole (DEXILANT) 60 MG CPDR delayed release capsule Take 60 mg by mouth daily      guaiFENesin 400 MG tablet Take 800 mg by mouth three times daily 2 Tablets TID PO PRN       nystatin (MYCOSTATIN) 199063 UNIT/GM cream Apply 1 Dose topically as needed for Dry Skin Apply topically 2 times daily.  citalopram (CELEXA) 40 MG tablet Take 40 mg by mouth daily      DULoxetine (CYMBALTA) 60 MG capsule Take 60 mg by mouth daily      Multiple Vitamins-Minerals (THERAPEUTIC MULTIVITAMIN-MINERALS) tablet Take 1 tablet by mouth daily      Calcium Carbonate-Vitamin D (CALCIUM-VITAMIN D) 500-200 MG-UNIT per tablet Take 1 tablet by mouth 2 times daily (with meals)      docusate sodium (COLACE) 100 MG capsule Take 200 mg by mouth 2 times daily      Omega-3 Fatty Acids (FISH OIL) 1000 MG CAPS Take 3,000 mg by mouth 2 times daily      risperiDONE (RISPERDAL) 1 MG tablet Take 1 mg by mouth 2 times daily      Ascorbic Acid (VITAMIN C) 500 MG CAPS Take 500 mg by mouth 2 times daily      propranolol (INDERAL) 20 MG tablet Take 20 mg by mouth 2 times daily as needed Hold for /70 or lower      ketoconazole (NIZORAL) 2 % shampoo Apply topically daily as needed for Itching Apply topically daily as needed.  acetaminophen (TYLENOL) 500 MG tablet Take 1,000 mg by mouth every 6 hours as needed for Pain or Fever      olive oil external oil Apply topically as needed Apply topically.  loperamide (IMODIUM) 2 MG capsule Take 2 mg by mouth 4 times daily as needed for Diarrhea      fluticasone (FLONASE) 50 MCG/ACT nasal spray 1 spray by Each Nare route 2 times daily       No current facility-administered medications on file prior to encounter. REVIEW OF SYSTEMS    A comprehensive review of systems was negative.     Objective:      /71   Pulse 81   Temp 96.8 °F (36 °C) (Temporal)   Resp 20     Wt Readings from Last 3 Encounters:   01/11/22 (!) 324 lb 8.3 oz (147.2 kg)   12/15/21 (!) 324 lb 11.8 oz (147.3 kg)   11/17/21 (!) 321 lb (145.6 kg)       PHYSICAL EXAM    General Appearance: alert and oriented to person, place and time, well developed and well- nourished, in no acute distress  Skin: warm and dry, no rash or erythema  Head: normocephalic and atraumatic  Eyes: pupils equal, round, and reactive to light, extraocular eye movements intact, conjunctivae normal  ENT: tympanic membrane, external ear and ear canal normal bilaterally, nose without deformity, nasal mucosa and turbinates normal without polyps, lips teeth and gums normal  Neck: supple and non-tender without mass, no thyromegaly or thyroid nodules, no cervical lymphadenopathy  Pulmonary/Chest: clear to auscultation bilaterally- no wheezes, rales or rhonchi, normal air movement, no respiratory distress  Cardiovascular: normal rate, regular rhythm, normal S1 and S2, no murmurs, rubs, clicks, or gallops, distal pulses intact, no carotid bruits  Abdomen: soft, non-tender, non-distended, normal bowel sounds, no masses or organomegaly  Extremities: no cyanosis, clubbing or edema  Musculoskeletal: normal range of motion, no joint swelling, deformity or tenderness  Neurologic: reflexes normal and symmetric, no cranial nerve deficit, gait, coordination and speech normal, sensation of skin normal      Assessment:      Patient Active Problem List   Diagnosis Code    Muscle spasticity M62.838    Post-traumatic spasticity R25.2    Decubitus ulcer of right buttock, stage 3 (HCC) L89.313    Dermatitis associated with moisture L30.8    History of cerebrovascular accident (CVA) with residual deficit I69.30    Renal calculus, right N20.0    Pressure injury of left buttock, stage 3 (HCC) V11.427    Class 3 severe obesity due to excess calories without serious comorbidity in adult ([facility] [address] E66.01             Wound 11/17/21 Buttocks Left wound 1- left buttock stage 3 (Active)   Wound Image   02/21/22 1210   Wound Etiology Pressure Stage  3 02/21/22 1210   Dressing Status Other (Comment) 12/15/21 1050   Wound Cleansed Soap and water 02/21/22 1210   Dressing/Treatment Moisture barrier 01/11/22 1530   Offloading for Diabetic Foot Ulcers Other (comment) 12/15/21 1050   Wound Length (cm) 2 cm 02/21/22 1210   Wound Width (cm) 1 cm 02/21/22 1210   Wound Depth (cm) 0.1 cm 02/21/22 1210   Wound Surface Area (cm^2) 2 cm^2 02/21/22 1210   Change in Wound Size % (l*w) -42.86 02/21/22 1210   Wound Volume (cm^3) 0.2 cm^3 02/21/22 1210   Wound Healing % -43 02/21/22 1210   Post-Procedure Length (cm) 2 cm 01/11/22 1418   Post-Procedure Width (cm) 1 cm 01/11/22 1418   Post-Procedure Depth (cm) 0.1 cm 01/11/22 1418   Post-Procedure Surface Area (cm^2) 2 cm^2 01/11/22 1418   Post-Procedure Volume (cm^3) 0.2 cm^3 01/11/22 1418   Distance Tunneling (cm) 0 cm 02/21/22 1210   Tunneling Position ___ O'Clock 0 02/21/22 1210   Undermining Starts ___ O'Clock 0 02/21/22 1210   Undermining Ends___ O'Clock 00 02/21/22 1210   Undermining Maxium Distance (cm) 0 02/21/22 1210   Wound Assessment Pink/red;Slough 02/21/22 1210   Drainage Amount Moderate 02/21/22 1210   Drainage Description Serosanguinous 02/21/22 1210   Odor None 02/21/22 1210   Rachana-wound Assessment Hyperkeratosis (callous) 02/21/22 1210   Margins Attached edges 02/21/22 1210   Wound Thickness Description not for Pressure Injury Full thickness 02/21/22 1210   Number of days: 96       Wound 11/17/21 Buttocks Right wound 2- right buttock stage 3 (Active)   Wound Image   02/21/22 1210   Wound Etiology Pressure Stage  3 02/21/22 1210   Dressing Status Old drainage noted 02/21/22 1210   Wound Cleansed Soap and water 02/21/22 1210   Dressing/Treatment Moisture barrier 01/11/22 1530   Offloading for Diabetic Foot Ulcers Other (comment) 12/15/21 1050   Wound Length (cm) 0.3 cm 02/21/22 1210   Wound Width (cm) 1 cm 02/21/22 1210   Wound Depth (cm) 1 cm 02/21/22 1210   Wound Surface Area (cm^2) 0.3 cm^2 02/21/22 1210   Change in Wound Size % (l*w) 94 02/21/22 1210   Wound Volume (cm^3) 0.3 cm^3 02/21/22 1210   Wound Healing % 40 02/21/22 1210 Post-Procedure Length (cm) 2 cm 01/11/22 1418   Post-Procedure Width (cm) 2.5 cm 01/11/22 1418   Post-Procedure Depth (cm) 0.1 cm 01/11/22 1418   Post-Procedure Surface Area (cm^2) 5 cm^2 01/11/22 1418   Post-Procedure Volume (cm^3) 0.5 cm^3 01/11/22 1418   Distance Tunneling (cm) 0 cm 02/21/22 1210   Tunneling Position ___ O'Clock 0 02/21/22 1210   Undermining Starts ___ O'Clock 0 02/21/22 1210   Undermining Ends___ O'Clock 00 02/21/22 1210   Undermining Maxium Distance (cm) 0 02/21/22 1210   Wound Assessment Pink/red;Slough 02/21/22 1210   Drainage Amount Moderate 02/21/22 1210   Drainage Description Serosanguinous 02/21/22 1210   Odor None 02/21/22 1210   Rachana-wound Assessment Hyperkeratosis (callous) 02/21/22 1210   Margins Attached edges 02/21/22 1210   Wound Thickness Description not for Pressure Injury Full thickness 02/21/22 1210   Number of days: 96             Plan:     Problem List Items Addressed This Visit     Decubitus ulcer of right buttock, stage 3 (HCC) - Primary (Chronic)    Relevant Orders    Initiate Outpatient Wound Care Protocol    Dermatitis associated with moisture (Chronic)    Pressure injury of left buttock, stage 3 (HCC) (Chronic)    Class 3 severe obesity due to excess calories without serious comorbidity in [facility]) (Chronic)        Pt still having neuro awareness issues. Needs to stay off buttocks and rotate pressure from side to side. RTO 2 weeks. Cont current barrier cream treatment. Treatment Note please see attached Discharge Instructions    In my professional opinion this patient would benefit from HBO Therapy: No    Written patient dismissal instructions given to patient and signed by patient or POA.          Discharge [address] and Hyperbaric Oxygen Therapy   Physician Orders and Discharge Instructions  [address]  Telephone: [phone]    NAME:  Jonah Ruiz Kaylie August OF BIRTH:  1990  MEDICAL RECORD NUMBER:  [id number]  DATE:  2/21/2022    Discharge condition: Stable    Discharge to: Home    Left via:Private automobile    Accompanied by: Staff     ECF/HHA: Neuro-restorative     Dressing Orders:  Apply barrier cream (zinc,antifungal, lanolin type of products are good)-keep on with every toileting  QUIT SCRATCHING     Treatment Orders:  Avoid Pressure to wound site. Turn frequently (turn at least every two hours when in bed)  While in chair reposition every 30 minutes  Patient advised to sit up no more than 30minutes at a time for meals ONLY. Please do not elevate the head of the bed higher than 30 degrees. Continue Protein rich diet (unless restricted by your physician)  Take Multivitamin daily  Please use Roho cushion in chair  Please use low air loss bed    [facility] follow up visit _____________2 weeks________________  (Please note your next appointment above and if you are unable to keep, kindly give a 24 hour notice. Thank you.)    If you experience any of the following, please call the [facility] during business hours:    * Increase in Pain  * Temperature over 101  * Increase in drainage from your wound  * Drainage with a foul odor  * Bleeding  * Increase in swelling  * Need for compression bandage changes due to slippage, breakthrough drainage. If you need medical attention outside of the business hours of the [facility] please contact your PCP or go to the nearest emergency room.         Electronically signed by Joaquina Nam MD on 2/21/2022 at 12:32 PM

## 2022-03-03 ENCOUNTER — TELEPHONE (OUTPATIENT)
Dept: PODIATRY | Facility: CLINIC | Age: 32
End: 2022-03-03

## 2022-03-15 ENCOUNTER — HOSPITAL ENCOUNTER (OUTPATIENT)
Dept: WOUND CARE | Age: 32
Discharge: HOME | End: 2022-03-15
Payer: COMMERCIAL

## 2022-03-15 VITALS
BODY MASS INDEX: 39.17 KG/M2 | WEIGHT: 315 LBS | SYSTOLIC BLOOD PRESSURE: 108 MMHG | HEART RATE: 76 BPM | TEMPERATURE: 97.5 F | RESPIRATION RATE: 20 BRPM | DIASTOLIC BLOOD PRESSURE: 74 MMHG | HEIGHT: 75 IN

## 2022-03-15 PROCEDURE — 99212 OFFICE O/P EST SF 10 MIN: CPT | Performed by: SURGERY

## 2022-03-15 PROCEDURE — 99214 OFFICE O/P EST MOD 30 MIN: CPT

## 2022-03-15 ASSESSMENT — PAIN DESCRIPTION - LOCATION: LOCATION: BACK

## 2022-03-15 ASSESSMENT — PAIN SCALES - GENERAL: PAINLEVEL_OUTOF10: 10

## 2022-03-15 ASSESSMENT — PAIN DESCRIPTION - ORIENTATION: ORIENTATION: LOWER

## 2022-03-15 ASSESSMENT — PAIN DESCRIPTION - ONSET: ONSET: ON-GOING

## 2022-03-15 ASSESSMENT — PAIN DESCRIPTION - DESCRIPTORS: DESCRIPTORS: ACHING

## 2022-03-15 ASSESSMENT — PAIN DESCRIPTION - PAIN TYPE: TYPE: CHRONIC PAIN

## 2022-03-15 ASSESSMENT — PAIN DESCRIPTION - FREQUENCY: FREQUENCY: CONTINUOUS

## 2022-03-15 NOTE — PROGRESS NOTES
[address]   Progress Note and Procedure Note      [address] RECORD NUMBER:  [id number]  AGE: 33 y.o. GENDER: male  : 1990  EPISODE DATE:  3/15/2022    Subjective:     Chief Complaint   Patient presents with    Wound Check         HISTORY of PRESENT ILLNESS HPI     Nate Mcgowan is a 33 y.o. male who presents today for wound/ulcer evaluation. History of Wound Context: Pt with TBI and sacral wounds here for eval/treat  Wound/Ulcer Pain Timing/Severity: none  Quality of pain: N/A  Severity:  0 / 10   Modifying Factors: None  Associated Signs/Symptoms: none    Ulcer Identification:  Ulcer Type: pressure  Contributing Factors: chronic pressure, shear force and obesity    Wound: pressure        PAST MEDICAL HISTORY        Diagnosis Date    Arthritis     Cerebral artery occlusion with cerebral infarction ([facility] [address]     Depression     Difficult intubation     Hemorrhoids     History of blood transfusion     Hypertension     Kidney stone     Muscle spasticity     Prolonged emergence from general anesthesia     Retention of urine        PAST SURGICAL HISTORY    Past Surgical History:   Procedure Laterality Date    BACK SURGERY      BACLOFEN PUMP IMPLANTATION      BRAIN SURGERY      KIDNEY STONE SURGERY      AR FRAGMENT KIDNEY STONE/ ESWL Right 10/2/2018    ESWL EXTRACORPEAL SHOCK WAVE LITHOTRIPSY performed by Gonzalo Rosenbaum MD at [address]    History reviewed. No pertinent family history.     SOCIAL HISTORY    Social History     Tobacco Use    Smoking status: Never Smoker    Smokeless tobacco: Never Used   Vaping Use    Vaping Use: Never used   Substance Use Topics    Alcohol use: No    Drug use: No       ALLERGIES    Allergies   Allergen Reactions    Latex     Dilantin [Phenytoin]     Pcn [Penicillins]     Sulfa Antibiotics        MEDICATIONS    Current Outpatient Medications on File Prior to Encounter   Medication Sig Dispense Refill    olive oil external oil Apply topically as needed Apply topically.  miconazole (MICOTIN) 2 % cream Apply topically 2 times daily Apply topically 2 times daily.  aspirin 325 MG tablet Take 325 mg by mouth daily      tiZANidine (ZANAFLEX) 2 MG tablet Take 2 mg by mouth 2 times daily At 2 pm and Bedtime      Cholecalciferol (VITAMIN D3 PO) Take 2,000 Units by mouth daily      Amantadine (SYMMETREL) 100 MG TABS tablet Take 100 mg by mouth 2 times daily      hydrOXYzine (ATARAX) 50 MG tablet Take 50 mg by mouth nightly      Suvorexant (BELSOMRA) 20 MG TABS Take 20 mg by mouth nightly.  azelastine HCl 0.15 % SOLN 1 spray by Nasal route 2 times daily       baclofen (LIORESAL) 20 MG tablet Take 20 mg by mouth 4 times daily       busPIRone (BUSPAR) 10 MG tablet Take 10 mg by mouth 2 times daily       chlorproMAZINE (THORAZINE) 25 MG tablet Take 25 mg by mouth nightly      loperamide (IMODIUM) 2 MG capsule Take 2 mg by mouth 4 times daily as needed for Diarrhea      hydrocortisone 2.5 % cream Apply topically 2 times daily Apply topically 2 times daily.  vitamin E 400 UNIT capsule Take 400 Units by mouth 2 times daily       meloxicam (MOBIC) 7.5 MG tablet Take 1 tablet by mouth 2 times daily 30 tablet 3    loratadine (CLARITIN) 10 MG capsule Take 10 mg by mouth daily      melatonin 5 MG TABS tablet Take 5 mg by mouth nightly       gabapentin (NEURONTIN) 400 MG capsule Take 400 mg by mouth 3 times daily.  ketoconazole (NIZORAL) 2 % cream Apply topically Indications: \scaly scalp Use as directed.  lisinopril (PRINIVIL;ZESTRIL) 10 MG tablet Take 10 mg by mouth daily      oxyCODONE-acetaminophen (PERCOCET)  MG per tablet Take 1 tablet by mouth every 6 hours as needed for Pain.  tiZANidine (ZANAFLEX) 4 MG tablet Take 2 mg by mouth 2 times daily       zinc oxide 20 % ointment Apply topically 2 times daily Apply topically as needed.       polyethylene glycol (GLYCOLAX) powder Take 17 g by mouth 2 times daily       dexlansoprazole (DEXILANT) 60 MG CPDR delayed release capsule Take 60 mg by mouth daily      guaiFENesin 400 MG tablet Take 800 mg by mouth three times daily 2 Tablets TID PO PRN       nystatin (MYCOSTATIN) 718648 UNIT/GM cream Apply 1 Dose topically as needed for Dry Skin Apply topically 2 times daily.  citalopram (CELEXA) 40 MG tablet Take 40 mg by mouth daily      DULoxetine (CYMBALTA) 60 MG capsule Take 60 mg by mouth daily      Multiple Vitamins-Minerals (THERAPEUTIC MULTIVITAMIN-MINERALS) tablet Take 1 tablet by mouth daily      Calcium Carbonate-Vitamin D (CALCIUM-VITAMIN D) 500-200 MG-UNIT per tablet Take 1 tablet by mouth 2 times daily (with meals)      docusate sodium (COLACE) 100 MG capsule Take 200 mg by mouth 2 times daily      Omega-3 Fatty Acids (FISH OIL) 1000 MG CAPS Take 3,000 mg by mouth 2 times daily      risperiDONE (RISPERDAL) 1 MG tablet Take 1 mg by mouth 2 times daily      Ascorbic Acid (VITAMIN C) 500 MG CAPS Take 500 mg by mouth 2 times daily      propranolol (INDERAL) 20 MG tablet Take 20 mg by mouth 2 times daily as needed Hold for /70 or lower      ketoconazole (NIZORAL) 2 % shampoo Apply topically daily as needed for Itching Apply topically daily as needed.  acetaminophen (TYLENOL) 500 MG tablet Take 1,000 mg by mouth every 6 hours as needed for Pain or Fever      fluticasone (FLONASE) 50 MCG/ACT nasal spray 1 spray by Each Nare route 2 times daily       No current facility-administered medications on file prior to encounter. REVIEW OF SYSTEMS    A comprehensive review of systems was negative.     Objective:      /74   Pulse 76   Temp 97.5 °F (36.4 °C) (Temporal)   Resp 20   Ht 6' 3\" (1.905 m)   Wt (S) (!) 324 lb (147 kg) Comment: Feb 28th per facility  BMI 40.50 kg/m²     Wt Readings from Last 3 Encounters:   03/15/22 (S) (!) 324 lb (147 kg)   01/11/22 (!) 324 lb 8.3 oz (147.2 kg) 12/15/21 (!) 324 lb 11.8 oz (147.3 kg)       PHYSICAL EXAM    General Appearance: alert and oriented to person, place and time, well developed and well- nourished, in no acute distress  Skin: warm and dry, no rash or erythema  Head: normocephalic and atraumatic  Eyes: pupils equal, round, and reactive to light, extraocular eye movements intact, conjunctivae normal  ENT: tympanic membrane, external ear and ear canal normal bilaterally, nose without deformity, nasal mucosa and turbinates normal without polyps, lips teeth and gums normal  Neck: supple and non-tender without mass, no thyromegaly or thyroid nodules, no cervical lymphadenopathy  Pulmonary/Chest: clear to auscultation bilaterally- no wheezes, rales or rhonchi, normal air movement, no respiratory distress  Cardiovascular: normal rate, regular rhythm, normal S1 and S2, no murmurs, rubs, clicks, or gallops, distal pulses intact, no carotid bruits  Abdomen: soft, non-tender, non-distended, normal bowel sounds, no masses or organomegaly  Extremities: no cyanosis, clubbing or edema  Neurologic: reflexes normal and symmetric, no cranial nerve deficit, gait, coordination and speech normal, sensation of skin normal      Assessment:      Patient Active Problem List   Diagnosis Code    Muscle spasticity M62.838    Post-traumatic spasticity R25.2    Decubitus ulcer of right buttock, stage 3 (HCC) L89.313    Dermatitis associated with moisture L30.8    History of cerebrovascular accident (CVA) with residual deficit I69.30    Renal calculus, right N20.0    Pressure injury of left buttock, stage 3 (HCC) P35.118    Class 3 severe obesity due to excess calories without serious comorbidity in adult ([facility] [address] E66.01             Wound 11/17/21 Buttocks Left wound 1- left buttock stage 3 (Active)   Wound Image   03/15/22 1414   Wound Etiology Pressure Stage  3 03/15/22 1414   Dressing Status Old drainage noted 03/15/22 1414   Wound Cleansed Soap and water 03/15/22 1414 Dressing/Treatment Moisture barrier 02/21/22 1240   Wound Length (cm) 2.5 cm 03/15/22 1414   Wound Width (cm) 3 cm 03/15/22 1414   Wound Depth (cm) 0.2 cm 03/15/22 1414   Wound Surface Area (cm^2) 7.5 cm^2 03/15/22 1414   Change in Wound Size % (l*w) -435.71 03/15/22 1414   Wound Volume (cm^3) 1.5 cm^3 03/15/22 1414   Wound Healing % -971 03/15/22 1414   Distance Tunneling (cm) 0 cm 03/15/22 1414   Tunneling Position ___ O'Clock 0 03/15/22 1414   Undermining Starts ___ O'Clock 0 03/15/22 1414   Undermining Ends___ O'Clock 0 03/15/22 1414   Undermining Maxium Distance (cm) 0 03/15/22 1414   Wound Assessment [place]/red;Slough 03/15/22 1414   Drainage Amount Moderate 03/15/22 1414   Drainage Description Serosanguinous 03/15/22 1414   Odor None 03/15/22 1414   Rachana-wound Assessment Hyperkeratosis (callous) 03/15/22 1414   Margins Attached edges 03/15/22 1414   Wound Thickness Description not for Pressure Injury Full thickness 02/21/22 1210   Number of days: 118       Wound 11/17/21 Buttocks Right wound 2- right buttock stage 3 (Active)   Wound Image   03/15/22 1414   Wound Etiology Pressure Stage  3 03/15/22 1414   Dressing Status Old drainage noted 03/15/22 1414   Wound Cleansed Soap and water 03/15/22 1414   Dressing/Treatment Moisture barrier 02/21/22 1240   Wound Length (cm) 2 cm 03/15/22 1414   Wound Width (cm) 3 cm 03/15/22 1414   Wound Depth (cm) 0.1 cm 03/15/22 1414   Wound Surface Area (cm^2) 6 cm^2 03/15/22 1414   Change in Wound Size % (l*w) -20 03/15/22 1414   Wound Volume (cm^3) 0.6 cm^3 03/15/22 1414   Wound Healing % -20 03/15/22 1414   Distance Tunneling (cm) 0 cm 03/15/22 1414   Tunneling Position ___ O'Clock 0 03/15/22 1414   Undermining Starts ___ O'Clock 0 03/15/22 1414   Undermining Ends___ O'Clock 0 03/15/22 1414   Undermining Max[place] Distance (cm) 0 03/15/22 1414   Wound Assessment [place]/red;Slough 03/15/22 1414   Drainage Amount Moderate 03/15/22 1414   Drainage Description Serosanguinous 03/15/22 1414   Odor None 03/15/22 1414   Rachana-wound Assessment Hyperkeratosis (callous) 03/15/22 1414   Margins Defined edges 03/15/22 1414   Wound Thickness Description not for Pressure Injury Full thickness 02/21/22 1210   Number of days: 118             Plan:     Problem List Items Addressed This Visit     None        Pt getting better from TBI and buttocks are stable. Prescribed magic butt cream RTO 3 weeks      Treatment Note please see attached Discharge Instructions    In my professional opinion this patient would benefit from HBO Therapy: No    Written patient dismissal instructions given to patient and signed by patient or POA. Discharge [address] and Hyperbaric Oxygen Therapy   Physician Orders and Discharge Instructions  Cheyanne Pearson  Telephone: [phone]    NAME:  Rose Aiken OF BIRTH:  1990  MEDICAL RECORD NUMBER:  [id number]  DATE:  3/15/2022    Discharge condition: Stable    Discharge to: Home    Left via:Private automobile    Accompanied by: Staff     ECF/HHA: [facility]     Dressing Orders:  Soap and water wash  Apply barrier cream to buttocks twice daily.      Treatment Orders:  Avoid Pressure to wound site. Turn frequently (turn at least every two hours when in bed)  While in chair reposition every 30 minutes  Patient advised to sit up no more than 30minutes at a time for meals ONLY. Please do not elevate the head of the bed higher than 30 degrees. Continue Protein rich diet (unless restricted by your physician)  Take Multivitamin daily  Please use Roho cushion in chair  Please use low air loss bed  Go to [facility] to  new barrier cream, should be ready at noon on Wednesday    [address] [place] [address],[address] follow up visit _____________3 weeks________________  (Please note your next appointment above and if you are unable to keep, kindly give a 24 hour notice.  Thank you.)    If you experience any of the following, please call the [facility] during business hours:    * Increase in Pain  * Temperature over 101  * Increase in drainage from your wound  * Drainage with a foul odor  * Bleeding  * Increase in swelling  * Need for compression bandage changes due to slippage, breakthrough drainage. If you need medical attention outside of the business hours of the [facility] please contact your PCP or go to the nearest emergency room.         Electronically signed by Destiny Garza MD on 3/15/2022 at 2:32 PM

## 2022-04-05 ENCOUNTER — HOSPITAL ENCOUNTER (OUTPATIENT)
Dept: WOUND CARE | Age: 32
Discharge: HOME | End: 2022-04-05
Payer: COMMERCIAL

## 2022-04-05 VITALS
SYSTOLIC BLOOD PRESSURE: 139 MMHG | RESPIRATION RATE: 20 BRPM | HEIGHT: 75 IN | BODY MASS INDEX: 39.17 KG/M2 | TEMPERATURE: 97.2 F | DIASTOLIC BLOOD PRESSURE: 92 MMHG | WEIGHT: 315 LBS | HEART RATE: 89 BPM

## 2022-04-05 DIAGNOSIS — E66.01 CLASS 3 SEVERE OBESITY DUE TO EXCESS CALORIES WITHOUT SERIOUS COMORBIDITY WITH BODY MASS INDEX (BMI) OF 40.0 TO 44.9 IN ADULT (HCC): Chronic | ICD-10-CM

## 2022-04-05 DIAGNOSIS — L89.313 DECUBITUS ULCER OF RIGHT BUTTOCK, STAGE 3 (HCC): Chronic | ICD-10-CM

## 2022-04-05 DIAGNOSIS — L89.323 PRESSURE INJURY OF LEFT BUTTOCK, STAGE 3 (HCC): Primary | Chronic | ICD-10-CM

## 2022-04-05 DIAGNOSIS — L30.8 DERMATITIS ASSOCIATED WITH MOISTURE: Chronic | ICD-10-CM

## 2022-04-05 PROCEDURE — 6370000000 HC RX 637 (ALT 250 FOR IP): Performed by: SURGERY

## 2022-04-05 PROCEDURE — 97597 DBRDMT OPN WND 1ST 20 CM/<: CPT | Performed by: SURGERY

## 2022-04-05 PROCEDURE — 97597 DBRDMT OPN WND 1ST 20 CM/<: CPT

## 2022-04-05 RX ORDER — CLOBETASOL PROPIONATE 0.5 MG/G
OINTMENT TOPICAL ONCE
OUTPATIENT
Start: 2022-04-05 | End: 2022-04-05

## 2022-04-05 RX ORDER — LIDOCAINE HYDROCHLORIDE 20 MG/ML
JELLY TOPICAL ONCE
OUTPATIENT
Start: 2022-04-05 | End: 2022-04-05

## 2022-04-05 RX ORDER — GENTAMICIN SULFATE 1 MG/G
OINTMENT TOPICAL ONCE
OUTPATIENT
Start: 2022-04-05 | End: 2022-04-05

## 2022-04-05 RX ORDER — BACITRACIN ZINC AND POLYMYXIN B SULFATE 500; 1000 [USP'U]/G; [USP'U]/G
OINTMENT TOPICAL ONCE
OUTPATIENT
Start: 2022-04-05 | End: 2022-04-05

## 2022-04-05 RX ORDER — BETAMETHASONE DIPROPIONATE 0.05 %
OINTMENT (GRAM) TOPICAL ONCE
OUTPATIENT
Start: 2022-04-05 | End: 2022-04-05

## 2022-04-05 RX ORDER — BACITRACIN, NEOMYCIN, POLYMYXIN B 400; 3.5; 5 [USP'U]/G; MG/G; [USP'U]/G
OINTMENT TOPICAL ONCE
OUTPATIENT
Start: 2022-04-05 | End: 2022-04-05

## 2022-04-05 RX ORDER — LIDOCAINE HYDROCHLORIDE 20 MG/ML
JELLY TOPICAL ONCE
Status: COMPLETED | OUTPATIENT
Start: 2022-04-05 | End: 2022-04-05

## 2022-04-05 RX ORDER — LIDOCAINE HYDROCHLORIDE 40 MG/ML
SOLUTION TOPICAL ONCE
OUTPATIENT
Start: 2022-04-05 | End: 2022-04-05

## 2022-04-05 RX ORDER — LIDOCAINE 50 MG/G
OINTMENT TOPICAL ONCE
OUTPATIENT
Start: 2022-04-05 | End: 2022-04-05

## 2022-04-05 RX ORDER — GINSENG 100 MG
CAPSULE ORAL ONCE
OUTPATIENT
Start: 2022-04-05 | End: 2022-04-05

## 2022-04-05 RX ORDER — LIDOCAINE 40 MG/G
CREAM TOPICAL ONCE
OUTPATIENT
Start: 2022-04-05 | End: 2022-04-05

## 2022-04-05 RX ADMIN — LIDOCAINE HYDROCHLORIDE: 20 JELLY TOPICAL at 14:33

## 2022-04-05 ASSESSMENT — PAIN DESCRIPTION - ORIENTATION
ORIENTATION: RIGHT;LEFT
ORIENTATION: RIGHT;LEFT

## 2022-04-05 ASSESSMENT — PAIN DESCRIPTION - LOCATION
LOCATION: LEG
LOCATION: LEG

## 2022-04-05 ASSESSMENT — PAIN DESCRIPTION - PAIN TYPE
TYPE: ACUTE PAIN
TYPE: ACUTE PAIN

## 2022-04-05 ASSESSMENT — PAIN DESCRIPTION - FREQUENCY: FREQUENCY: CONTINUOUS

## 2022-04-05 ASSESSMENT — PAIN DESCRIPTION - PROGRESSION: CLINICAL_PROGRESSION: NOT CHANGED

## 2022-04-05 ASSESSMENT — PAIN SCALES - GENERAL
PAINLEVEL_OUTOF10: 10
PAINLEVEL_OUTOF10: 10

## 2022-04-05 ASSESSMENT — PAIN DESCRIPTION - DESCRIPTORS
DESCRIPTORS: THROBBING
DESCRIPTORS: THROBBING

## 2022-04-05 ASSESSMENT — PAIN DESCRIPTION - ONSET: ONSET: ON-GOING

## 2022-04-05 NOTE — PROGRESS NOTES
[address]   Progress Note and Procedure Note      [address] RECORD NUMBER:  [id number]  AGE: 27 y.o. GENDER: male  : 1990  EPISODE DATE:  2022    Subjective:     Chief Complaint   Patient presents with    Wound Check         HISTORY of PRESENT ILLNESS HPI     Keren Ashley is a 27 y.o. male who presents today for wound/ulcer evaluation. Wound Context: Pt with pressure wounds R&L buttocks here for eval/treat  Wound/Ulcer Pain Timing/Severity: none  Quality of pain: N/A  Severity:  2 / 10   Modifying Factors: None  Associated Signs/Symptoms: itching    Ulcer Identification:  Ulcer Type: pressure  Contributing Factors: chronic pressure, decreased mobility, shear force and obesity    Wound: pressure        PAST MEDICAL HISTORY        Diagnosis Date    Arthritis     Cerebral artery occlusion with cerebral infarction ([address]     Depression     Difficult intubation     Hemorrhoids     History of blood transfusion     Hypertension     Kidney stone     Muscle spasticity     Prolonged emergence from general anesthesia     Retention of urine        PAST SURGICAL HISTORY    Past Surgical History:   Procedure Laterality Date    BACK SURGERY      BACLOFEN PUMP IMPLANTATION      BRAIN SURGERY      KIDNEY STONE SURGERY      CT FRAGMENT KIDNEY STONE/ ESWL Right 10/2/2018    ESWL EXTRACORPEAL SHOCK WAVE LITHOTRIPSY performed by Emily Carlson MD at [address]    History reviewed. No pertinent family history.     SOCIAL HISTORY    Social History     Tobacco Use    Smoking status: Never Smoker    Smokeless tobacco: Never Used   Vaping Use    Vaping Use: Never used   Substance Use Topics    Alcohol use: No    Drug use: No       ALLERGIES    Allergies   Allergen Reactions    Latex     Dilantin [Phenytoin]     Pcn [Penicillins]     Sulfa Antibiotics        MEDICATIONS    Current Outpatient Medications on File Prior to Encounter   Medication Sig Dispense Refill    olive oil external oil Apply topically as needed Apply topically.  miconazole (MICOTIN) 2 % cream Apply topically 2 times daily Apply topically 2 times daily.  aspirin 325 MG tablet Take 325 mg by mouth daily      tiZANidine (ZANAFLEX) 2 MG tablet Take 2 mg by mouth 2 times daily At 2 pm and Bedtime      Cholecalciferol (VITAMIN D3 PO) Take 2,000 Units by mouth daily      Amantadine (SYMMETREL) 100 MG TABS tablet Take 100 mg by mouth 2 times daily      hydrOXYzine (ATARAX) 50 MG tablet Take 50 mg by mouth nightly      Suvorexant (BELSOMRA) 20 MG TABS Take 20 mg by mouth nightly.  azelastine HCl 0.15 % SOLN 1 spray by Nasal route 2 times daily       baclofen (LIORESAL) 20 MG tablet Take 20 mg by mouth 4 times daily       busPIRone (BUSPAR) 10 MG tablet Take 10 mg by mouth 2 times daily       chlorproMAZINE (THORAZINE) 25 MG tablet Take 25 mg by mouth nightly      loperamide (IMODIUM) 2 MG capsule Take 2 mg by mouth 4 times daily as needed for Diarrhea      hydrocortisone 2.5 % cream Apply topically 2 times daily Apply topically 2 times daily.  vitamin E 400 UNIT capsule Take 400 Units by mouth 2 times daily       meloxicam (MOBIC) 7.5 MG tablet Take 1 tablet by mouth 2 times daily 30 tablet 3    loratadine (CLARITIN) 10 MG capsule Take 10 mg by mouth daily      fluticasone (FLONASE) 50 MCG/ACT nasal spray 1 spray by Each Nare route 2 times daily      melatonin 5 MG TABS tablet Take 5 mg by mouth nightly       gabapentin (NEURONTIN) 400 MG capsule Take 400 mg by mouth 3 times daily.  ketoconazole (NIZORAL) 2 % cream Apply topically Indications: \scaly scalp Use as directed.  lisinopril (PRINIVIL;ZESTRIL) 10 MG tablet Take 10 mg by mouth daily      oxyCODONE-acetaminophen (PERCOCET)  MG per tablet Take 1 tablet by mouth every 6 hours as needed for Pain.       tiZANidine (ZANAFLEX) 4 MG tablet Take 2 mg by mouth 2 times daily       zinc oxide 20 % ointment Apply topically 2 times daily Apply topically as needed.  polyethylene glycol (GLYCOLAX) powder Take 17 g by mouth 2 times daily       dexlansoprazole (DEXILANT) 60 MG CPDR delayed release capsule Take 60 mg by mouth daily      guaiFENesin 400 MG tablet Take 800 mg by mouth three times daily 2 Tablets TID PO PRN       nystatin (MYCOSTATIN) 463024 UNIT/GM cream Apply 1 Dose topically as needed for Dry Skin Apply topically 2 times daily.  citalopram (CELEXA) 40 MG tablet Take 40 mg by mouth daily      DULoxetine (CYMBALTA) 60 MG capsule Take 60 mg by mouth daily      Multiple Vitamins-Minerals (THERAPEUTIC MULTIVITAMIN-MINERALS) tablet Take 1 tablet by mouth daily      Calcium Carbonate-Vitamin D (CALCIUM-VITAMIN D) 500-200 MG-UNIT per tablet Take 1 tablet by mouth 2 times daily (with meals)      docusate sodium (COLACE) 100 MG capsule Take 200 mg by mouth 2 times daily      Omega-3 Fatty Acids (FISH OIL) 1000 MG CAPS Take 3,000 mg by mouth 2 times daily      risperiDONE (RISPERDAL) 1 MG tablet Take 1 mg by mouth 2 times daily      Ascorbic Acid (VITAMIN C) 500 MG CAPS Take 500 mg by mouth 2 times daily      propranolol (INDERAL) 20 MG tablet Take 20 mg by mouth 2 times daily as needed Hold for /70 or lower      ketoconazole (NIZORAL) 2 % shampoo Apply topically daily as needed for Itching Apply topically daily as needed.  acetaminophen (TYLENOL) 500 MG tablet Take 1,000 mg by mouth every 6 hours as needed for Pain or Fever       No current facility-administered medications on file prior to encounter. REVIEW OF SYSTEMS    A comprehensive review of systems was negative.     Objective:      BP (!) 139/92   Pulse 89   Temp 97.2 °F (36.2 °C) (Temporal)   Resp 20   Ht 6' 3\" (1.905 m)   Wt (!) 324 lb (147 kg)   BMI 40.50 kg/m²     Wt Readings from Last 3 Encounters:   04/05/22 (!) 324 lb (147 kg)   03/15/22 (S) (!) 324 lb (147 kg)   01/11/22 (!) 324 lb 8.3 oz (147.2 kg)       PHYSICAL EXAM    General Appearance: alert and oriented to person, place and time, well developed and well- nourished, in no acute distress  Skin: warm and dry, no rash or erythema  Head: normocephalic and atraumatic  Eyes: pupils equal, round, and reactive to light, extraocular eye movements intact, conjunctivae normal  ENT: tympanic membrane, external ear and ear canal normal bilaterally, nose without deformity, nasal mucosa and turbinates normal without polyps, lips teeth and gums normal  Neck: supple and non-tender without mass, no thyromegaly or thyroid nodules, no cervical lymphadenopathy  Pulmonary/Chest: clear to auscultation bilaterally- no wheezes, rales or rhonchi, normal air movement, no respiratory distress  Cardiovascular: normal rate, regular rhythm, normal S1 and S2, no murmurs, rubs, clicks, or gallops, distal pulses intact, no carotid bruits  Abdomen: soft, non-tender, non-distended, normal bowel sounds, no masses or organomegaly  Extremities: no cyanosis, clubbing or edema  Musculoskeletal: normal range of motion, no joint swelling, deformity or tenderness  Neurologic: reflexes normal and symmetric, no cranial nerve deficit, gait, coordination and speech normal, sensation of skin normal      Assessment:      Problem List Items Addressed This Visit     Decubitus ulcer of right buttock, stage 3 (HCC) (Chronic)    Relevant Orders    Initiate Outpatient Wound Care Protocol    Dermatitis associated with moisture (Chronic)    * (Principal) Pressure injury of left buttock, stage 3 (HCC) - Primary (Chronic)    Class 3 severe obesity due to excess calories without serious comorbidity in [facility]) (Chronic)           Procedure Note  Indications:  Based on my examination of this patient's wound(s)/ulcer(s) today, debridement is required to promote healing and evaluate the wound base.     Performed by: Marcela Crain MD    Consent obtained:  Yes    Time out taken:  Yes    Pain Control: Anesthetic  Anesthetic: 2% Lidocaine Gel Topical       Debridement:Non-excisional Debridement    Using curette the wound(s)/ulcer(s) was/were sharply debrided down through and including the removal of epidermis and dermis.         Devitalized Tissue Debrided:  fibrin, biofilm, slough, necrotic/eschar and exudate      Pre Debridement Measurements:  Are located in the Wound/Ulcer Documentation Flow Sheet    Wound/Ulcer #: 1 and 2    Percent of Wound(s)/Ulcer(s) Debrided: 100%    Total Surface Area Debrided:  14 sq cm       Diabetic/Pressure/Non Pressure Ulcers only:  Ulcer: Pressure ulcer, Stage 3             Post Debridement Measurements:    Wound/Ulcer Descriptions are Pre Debridement --EXCEPT MEASUREMENTS    Wound 11/17/21 Buttocks Left wound 1- left buttock stage 3 (Active)   Wound Image   04/05/22 1432   Wound Etiology Pressure Stage  3 04/05/22 1432   Dressing Status New drainage noted 04/05/22 1432   Wound Cleansed Soap and water 04/05/22 1432   Dressing/Treatment Moisture barrier 03/15/22 1450   Offloading for Diabetic Foot Ulcers Other (comment) 12/15/21 1050   Wound Length (cm) 2.5 cm 04/05/22 1432   Wound Width (cm) 3.5 cm 04/05/22 1432   Wound Depth (cm) 0.4 cm 04/05/22 1432   Wound Surface Area (cm^2) 8.75 cm^2 04/05/22 1432   Change in Wound Size % (l*w) -525 04/05/22 1432   Wound Volume (cm^3) 3.5 cm^3 04/05/22 1432   Wound Healing % -2400 04/05/22 1432   Post-Procedure Length (cm) 2.5 cm 04/05/22 1440   Post-Procedure Width (cm) 3.5 cm 04/05/22 1440   Post-Procedure Depth (cm) 0.4 cm 04/05/22 1440   Post-Procedure Surface Area (cm^2) 8.75 cm^2 04/05/22 1440   Post-Procedure Volume (cm^3) 3.5 cm^3 04/05/22 1440   Distance Tunneling (cm) 0 cm 04/05/22 1432   Tunneling Position ___ O'Clock 0 04/05/22 1432   Undermining Starts ___ O'Clock 0 04/05/22 1432   Undermining Ends___ O'Clock 0 04/05/22 1432   Undermining Maxium Distance (cm) 0 04/05/22 1432   Wound Assessment Pink/red;Slough 04/05/22 1432 Drainage Amount Moderate 04/05/22 1432   Drainage Description Serosanguinous 04/05/22 1432   Odor None 04/05/22 1432   Rachana-wound Assessment Hyperkeratosis (callous) 04/05/22 1432   Margins Attached edges 04/05/22 1432   Wound Thickness Description not for Pressure Injury Full thickness 02/21/22 1210   Number of days: 139       Wound 11/17/21 Buttocks Right wound 2- right buttock stage 3 (Active)   Wound Image   04/05/22 1432   Wound Etiology Pressure Stage  3 04/05/22 1432   Dressing Status New drainage noted 04/05/22 1432   Wound Cleansed Soap and water 04/05/22 1432   Dressing/Treatment Moisture barrier 03/15/22 1450   Offloading for Diabetic Foot Ulcers Other (comment) 12/15/21 1050   Wound Length (cm) 2.4 cm 04/05/22 1432   Wound Width (cm) 2.4 cm 04/05/22 1432   Wound Depth (cm) 0.1 cm 04/05/22 1432   Wound Surface Area (cm^2) 5.76 cm^2 04/05/22 1432   Change in Wound Size % (l*w) -15.2 04/05/22 1432   Wound Volume (cm^3) 0.576 cm^3 04/05/22 1432   Wound Healing % -15 04/05/22 1432   Post-Procedure Length (cm) 2.4 cm 04/05/22 1440   Post-Procedure Width (cm) 2.4 cm 04/05/22 1440   Post-Procedure Depth (cm) 0.1 cm 04/05/22 1440   Post-Procedure Surface Area (cm^2) 5.76 cm^2 04/05/22 1440   Post-Procedure Volume (cm^3) 0.576 cm^3 04/05/22 1440   Distance Tunneling (cm) 0 cm 04/05/22 1432   Tunneling Position ___ O'Clock 0 04/05/22 1432   Undermining Starts ___ O'Clock 0 04/05/22 1432   Undermining Ends___ O'Clock 0 04/05/22 1432   Undermining Maxium Distance (cm) 0 04/05/22 1432   Wound Assessment Pink/red;Slough 04/05/22 1432   Drainage Amount Moderate 04/05/22 1432   Drainage Description Serosanguinous 04/05/22 1432   Odor None 04/05/22 1432   Rachana-wound Assessment Hyperkeratosis (callous) 04/05/22 1432   Margins Defined edges 04/05/22 1432   Wound Thickness Description not for Pressure Injury Full thickness 02/21/22 1210   Number of days: 139             Estimated Blood Loss:  Minimal    Hemostasis Achieved:  by pressure    Procedural Pain:  0  / 10     Post Procedural Pain:  0 / 10     Response to treatment:  Well tolerated by patient. Plan:     Problem List Items Addressed This Visit     Decubitus ulcer of right buttock, stage 3 (HCC) (Chronic)    Relevant Orders    Initiate Outpatient Wound Care Protocol    Dermatitis associated with moisture (Chronic)    * (Principal) Pressure injury of left buttock, stage 3 (HCC) - Primary (Chronic)    Class 3 severe obesity due to excess calories without serious comorbidity in [facility]) (Chronic)          Use barrier cream and benadryl for itching as needed. RTO 2 weeks    Treatment Note please see attached Discharge Instructions    In my professional opinion this patient would benefit from HBO Therapy: No    Written patient dismissal instructions given to patient and signed by patient or POA. Discharge [address] and Hyperbaric Oxygen Therapy   Physician Orders and Discharge Instructions  [address]  Telephone: [phone]    NAME:  Dinesh Moise OF BIRTH:  1990  MEDICAL RECORD NUMBER:  [id number]  DATE:  4/5/2022    Discharge condition: Stable    Discharge to: Home    Left via:Private automobile    Accompanied by: Staff     ECF/HHA: [facility]     Dressing Orders: Right and Left Buttocks   Soap and water wash  Apply barrier cream to buttocks twice daily. Keep area clean and dry      Treatment Orders:  Avoid Pressure to wound site. Turn frequently (turn at least every two hours when in bed)  While in chair reposition every 30 minutes  Patient advised to sit up no more than 30minutes at a time for meals ONLY. Please do not elevate the head of the bed higher than 30 degrees.   Continue Protein rich diet (unless restricted by your physician)  Take Multivitamin daily  Please use Roho cushion in chair  Please use low air loss bed  Follow up with your Family Practitioner regarding itching     [facility] follow up visit _____________2 weeks with Maude Pritchard ______________  (Please note your next appointment above and if you are unable to keep, kindly give a 24 hour notice. Thank you.)    If you experience any of the following, please call the [address] [place] [address] [facility] during business hours:    * Increase in Pain  * Temperature over 101  * Increase in drainage from your wound  * Drainage with a foul odor  * Bleeding  * Increase in swelling  * Need for compression bandage changes due to slippage, breakthrough drainage. If you need medical attention outside of the business hours of the [address] please contact your PCP or go to the nearest emergency room.         Electronically signed by Amada Alegria MD on 4/5/2022 at 2:41 PM

## 2022-04-07 ENCOUNTER — TELEPHONE (OUTPATIENT)
Dept: PODIATRY | Facility: CLINIC | Age: 32
End: 2022-04-07

## 2022-04-07 ENCOUNTER — HOSPITAL ENCOUNTER (OUTPATIENT)
Age: 32
Setting detail: OBSERVATION
Discharge: OTHER INSTITUTION | End: 2022-04-12
Attending: FAMILY MEDICINE | Admitting: FAMILY MEDICINE
Payer: COMMERCIAL

## 2022-04-07 ENCOUNTER — APPOINTMENT (OUTPATIENT)
Dept: GENERAL RADIOLOGY | Age: 32
End: 2022-04-07
Payer: COMMERCIAL

## 2022-04-07 DIAGNOSIS — L03.315 CELLULITIS OF PERINEUM: ICD-10-CM

## 2022-04-07 DIAGNOSIS — L89.153 SACRAL DECUBITUS ULCER, STAGE III (HCC): Primary | ICD-10-CM

## 2022-04-07 PROBLEM — L03.90 CELLULITIS: Status: ACTIVE | Noted: 2022-04-07

## 2022-04-07 LAB
ALBUMIN SERPL-MCNC: 4.3 G/DL (ref 3.5–5.2)
ALP BLD-CCNC: 135 U/L (ref 40–130)
ALT SERPL-CCNC: 18 U/L (ref 5–41)
ANION GAP SERPL CALCULATED.3IONS-SCNC: 11 MMOL/L (ref 7–19)
AST SERPL-CCNC: 13 U/L (ref 5–40)
BASOPHILS ABSOLUTE: 0.1 K/UL (ref 0–0.2)
BASOPHILS RELATIVE PERCENT: 1.2 % (ref 0–1)
BILIRUB SERPL-MCNC: <0.2 MG/DL (ref 0.2–1.2)
BUN BLDV-MCNC: 23 MG/DL (ref 6–20)
CALCIUM SERPL-MCNC: 9.5 MG/DL (ref 8.6–10)
CHLORIDE BLD-SCNC: 106 MMOL/L (ref 98–111)
CO2: 28 MMOL/L (ref 22–29)
CREAT SERPL-MCNC: 1.1 MG/DL (ref 0.5–1.2)
EOSINOPHILS ABSOLUTE: 0.4 K/UL (ref 0–0.6)
EOSINOPHILS RELATIVE PERCENT: 5.7 % (ref 0–5)
GFR AFRICAN AMERICAN: >59
GFR NON-AFRICAN AMERICAN: >60
GLUCOSE BLD-MCNC: 88 MG/DL (ref 74–109)
HCT VFR BLD CALC: 38.6 % (ref 42–52)
HEMOGLOBIN: 11.8 G/DL (ref 14–18)
IMMATURE GRANULOCYTES #: 0 K/UL
LACTIC ACID, SEPSIS: 1.7 MG/DL (ref 0.5–1.9)
LACTIC ACID, SEPSIS: 1.9 MG/DL (ref 0.5–1.9)
LYMPHOCYTES ABSOLUTE: 1.6 K/UL (ref 1.1–4.5)
LYMPHOCYTES RELATIVE PERCENT: 24 % (ref 20–40)
MCH RBC QN AUTO: 26.9 PG (ref 27–31)
MCHC RBC AUTO-ENTMCNC: 30.6 G/DL (ref 33–37)
MCV RBC AUTO: 88.1 FL (ref 80–94)
MONOCYTES ABSOLUTE: 0.7 K/UL (ref 0–0.9)
MONOCYTES RELATIVE PERCENT: 11.4 % (ref 0–10)
NEUTROPHILS ABSOLUTE: 3.7 K/UL (ref 1.5–7.5)
NEUTROPHILS RELATIVE PERCENT: 57.4 % (ref 50–65)
PDW BLD-RTO: 14.6 % (ref 11.5–14.5)
PLATELET # BLD: 290 K/UL (ref 130–400)
PMV BLD AUTO: 10.7 FL (ref 9.4–12.4)
POTASSIUM REFLEX MAGNESIUM: 4.5 MMOL/L (ref 3.5–5)
RBC # BLD: 4.38 M/UL (ref 4.7–6.1)
SODIUM BLD-SCNC: 145 MMOL/L (ref 136–145)
TOTAL PROTEIN: 7.8 G/DL (ref 6.6–8.7)
WBC # BLD: 6.5 K/UL (ref 4.8–10.8)

## 2022-04-07 PROCEDURE — 87186 SC STD MICRODIL/AGAR DIL: CPT

## 2022-04-07 PROCEDURE — 36415 COLL VENOUS BLD VENIPUNCTURE: CPT

## 2022-04-07 PROCEDURE — 2580000003 HC RX 258: Performed by: FAMILY MEDICINE

## 2022-04-07 PROCEDURE — 6360000002 HC RX W HCPCS: Performed by: PHYSICIAN ASSISTANT

## 2022-04-07 PROCEDURE — 80053 COMPREHEN METABOLIC PANEL: CPT

## 2022-04-07 PROCEDURE — 87070 CULTURE OTHR SPECIMN AEROBIC: CPT

## 2022-04-07 PROCEDURE — 87205 SMEAR GRAM STAIN: CPT

## 2022-04-07 PROCEDURE — 2580000003 HC RX 258: Performed by: PHYSICIAN ASSISTANT

## 2022-04-07 PROCEDURE — 83605 ASSAY OF LACTIC ACID: CPT

## 2022-04-07 PROCEDURE — 99284 EMERGENCY DEPT VISIT MOD MDM: CPT

## 2022-04-07 PROCEDURE — 96365 THER/PROPH/DIAG IV INF INIT: CPT

## 2022-04-07 PROCEDURE — 72170 X-RAY EXAM OF PELVIS: CPT

## 2022-04-07 PROCEDURE — 87040 BLOOD CULTURE FOR BACTERIA: CPT

## 2022-04-07 PROCEDURE — 96375 TX/PRO/DX INJ NEW DRUG ADDON: CPT

## 2022-04-07 PROCEDURE — 96366 THER/PROPH/DIAG IV INF ADDON: CPT

## 2022-04-07 PROCEDURE — 85025 COMPLETE CBC W/AUTO DIFF WBC: CPT

## 2022-04-07 PROCEDURE — G0378 HOSPITAL OBSERVATION PER HR: HCPCS

## 2022-04-07 PROCEDURE — 1210000000 HC MED SURG R&B

## 2022-04-07 PROCEDURE — 96374 THER/PROPH/DIAG INJ IV PUSH: CPT

## 2022-04-07 PROCEDURE — 6370000000 HC RX 637 (ALT 250 FOR IP): Performed by: FAMILY MEDICINE

## 2022-04-07 RX ORDER — DOCUSATE SODIUM 100 MG/1
200 CAPSULE, LIQUID FILLED ORAL 2 TIMES DAILY
Status: DISCONTINUED | OUTPATIENT
Start: 2022-04-07 | End: 2022-04-13 | Stop reason: HOSPADM

## 2022-04-07 RX ORDER — ONDANSETRON 2 MG/ML
4 INJECTION INTRAMUSCULAR; INTRAVENOUS EVERY 6 HOURS PRN
Status: DISCONTINUED | OUTPATIENT
Start: 2022-04-07 | End: 2022-04-13 | Stop reason: HOSPADM

## 2022-04-07 RX ORDER — BUSPIRONE HYDROCHLORIDE 10 MG/1
10 TABLET ORAL 2 TIMES DAILY
Status: DISCONTINUED | OUTPATIENT
Start: 2022-04-07 | End: 2022-04-13 | Stop reason: HOSPADM

## 2022-04-07 RX ORDER — PROPRANOLOL HYDROCHLORIDE 10 MG/1
20 TABLET ORAL 2 TIMES DAILY
Status: DISCONTINUED | OUTPATIENT
Start: 2022-04-07 | End: 2022-04-13 | Stop reason: HOSPADM

## 2022-04-07 RX ORDER — POLYETHYLENE GLYCOL 3350 17 G/17G
17 POWDER, FOR SOLUTION ORAL 2 TIMES DAILY
Status: DISCONTINUED | OUTPATIENT
Start: 2022-04-07 | End: 2022-04-13 | Stop reason: HOSPADM

## 2022-04-07 RX ORDER — AZELASTINE HCL 205.5 UG/1
1 SPRAY NASAL 2 TIMES DAILY
Status: DISCONTINUED | OUTPATIENT
Start: 2022-04-07 | End: 2022-04-07 | Stop reason: CLARIF

## 2022-04-07 RX ORDER — ONDANSETRON 4 MG/1
4 TABLET, ORALLY DISINTEGRATING ORAL EVERY 8 HOURS PRN
Status: DISCONTINUED | OUTPATIENT
Start: 2022-04-07 | End: 2022-04-13 | Stop reason: HOSPADM

## 2022-04-07 RX ORDER — MORPHINE SULFATE 2 MG/ML
2 INJECTION, SOLUTION INTRAMUSCULAR; INTRAVENOUS ONCE
Status: COMPLETED | OUTPATIENT
Start: 2022-04-07 | End: 2022-04-07

## 2022-04-07 RX ORDER — VITAMIN B COMPLEX
2000 TABLET ORAL DAILY
Status: DISCONTINUED | OUTPATIENT
Start: 2022-04-08 | End: 2022-04-13 | Stop reason: HOSPADM

## 2022-04-07 RX ORDER — ACETAMINOPHEN 500 MG
1000 TABLET ORAL EVERY 6 HOURS PRN
Status: DISCONTINUED | OUTPATIENT
Start: 2022-04-07 | End: 2022-04-13 | Stop reason: HOSPADM

## 2022-04-07 RX ORDER — LISINOPRIL 10 MG/1
10 TABLET ORAL DAILY
Status: DISCONTINUED | OUTPATIENT
Start: 2022-04-08 | End: 2022-04-13 | Stop reason: HOSPADM

## 2022-04-07 RX ORDER — MECOBALAMIN 5000 MCG
5 TABLET,DISINTEGRATING ORAL NIGHTLY
Status: DISCONTINUED | OUTPATIENT
Start: 2022-04-07 | End: 2022-04-13 | Stop reason: HOSPADM

## 2022-04-07 RX ORDER — BACLOFEN 10 MG/1
20 TABLET ORAL 4 TIMES DAILY
Status: DISCONTINUED | OUTPATIENT
Start: 2022-04-07 | End: 2022-04-13 | Stop reason: HOSPADM

## 2022-04-07 RX ORDER — RISPERIDONE 0.5 MG/1
1 TABLET, FILM COATED ORAL 2 TIMES DAILY
Status: DISCONTINUED | OUTPATIENT
Start: 2022-04-07 | End: 2022-04-13 | Stop reason: HOSPADM

## 2022-04-07 RX ORDER — PANTOPRAZOLE SODIUM 40 MG/1
40 TABLET, DELAYED RELEASE ORAL
Status: DISCONTINUED | OUTPATIENT
Start: 2022-04-08 | End: 2022-04-13 | Stop reason: HOSPADM

## 2022-04-07 RX ORDER — DULOXETIN HYDROCHLORIDE 30 MG/1
60 CAPSULE, DELAYED RELEASE ORAL DAILY
Status: DISCONTINUED | OUTPATIENT
Start: 2022-04-08 | End: 2022-04-13 | Stop reason: HOSPADM

## 2022-04-07 RX ORDER — TIZANIDINE 4 MG/1
2 TABLET ORAL 2 TIMES DAILY
Status: DISCONTINUED | OUTPATIENT
Start: 2022-04-08 | End: 2022-04-07

## 2022-04-07 RX ORDER — SODIUM CHLORIDE 9 MG/ML
INJECTION, SOLUTION INTRAVENOUS PRN
Status: DISCONTINUED | OUTPATIENT
Start: 2022-04-07 | End: 2022-04-13 | Stop reason: HOSPADM

## 2022-04-07 RX ORDER — ONDANSETRON 2 MG/ML
4 INJECTION INTRAMUSCULAR; INTRAVENOUS ONCE
Status: COMPLETED | OUTPATIENT
Start: 2022-04-07 | End: 2022-04-07

## 2022-04-07 RX ORDER — AMANTADINE HYDROCHLORIDE 100 MG/1
100 CAPSULE, GELATIN COATED ORAL 2 TIMES DAILY
Status: DISCONTINUED | OUTPATIENT
Start: 2022-04-07 | End: 2022-04-13 | Stop reason: HOSPADM

## 2022-04-07 RX ORDER — FLUTICASONE PROPIONATE 50 MCG
1 SPRAY, SUSPENSION (ML) NASAL 2 TIMES DAILY
Status: DISCONTINUED | OUTPATIENT
Start: 2022-04-07 | End: 2022-04-13 | Stop reason: HOSPADM

## 2022-04-07 RX ORDER — SODIUM CHLORIDE 0.9 % (FLUSH) 0.9 %
5-40 SYRINGE (ML) INJECTION PRN
Status: DISCONTINUED | OUTPATIENT
Start: 2022-04-07 | End: 2022-04-13 | Stop reason: HOSPADM

## 2022-04-07 RX ORDER — TIZANIDINE 4 MG/1
2 TABLET ORAL 2 TIMES DAILY
Status: DISCONTINUED | OUTPATIENT
Start: 2022-04-07 | End: 2022-04-13 | Stop reason: HOSPADM

## 2022-04-07 RX ORDER — ASCORBIC ACID 500 MG
500 TABLET ORAL 2 TIMES DAILY
Status: DISCONTINUED | OUTPATIENT
Start: 2022-04-07 | End: 2022-04-13 | Stop reason: HOSPADM

## 2022-04-07 RX ORDER — CETIRIZINE HYDROCHLORIDE 10 MG/1
5 TABLET ORAL DAILY
Status: DISCONTINUED | OUTPATIENT
Start: 2022-04-08 | End: 2022-04-13 | Stop reason: HOSPADM

## 2022-04-07 RX ORDER — SODIUM CHLORIDE 0.9 % (FLUSH) 0.9 %
5-40 SYRINGE (ML) INJECTION EVERY 12 HOURS SCHEDULED
Status: DISCONTINUED | OUTPATIENT
Start: 2022-04-07 | End: 2022-04-13 | Stop reason: HOSPADM

## 2022-04-07 RX ORDER — OXYCODONE AND ACETAMINOPHEN 10; 325 MG/1; MG/1
1 TABLET ORAL EVERY 6 HOURS PRN
Status: DISCONTINUED | OUTPATIENT
Start: 2022-04-07 | End: 2022-04-13 | Stop reason: HOSPADM

## 2022-04-07 RX ORDER — ASPIRIN 325 MG
325 TABLET ORAL DAILY
Status: DISCONTINUED | OUTPATIENT
Start: 2022-04-08 | End: 2022-04-13 | Stop reason: HOSPADM

## 2022-04-07 RX ORDER — GABAPENTIN 400 MG/1
400 CAPSULE ORAL 3 TIMES DAILY
Status: DISCONTINUED | OUTPATIENT
Start: 2022-04-07 | End: 2022-04-13 | Stop reason: HOSPADM

## 2022-04-07 RX ADMIN — DOCUSATE SODIUM 200 MG: 100 CAPSULE, LIQUID FILLED ORAL at 23:21

## 2022-04-07 RX ADMIN — ONDANSETRON 4 MG: 2 INJECTION INTRAMUSCULAR; INTRAVENOUS at 19:02

## 2022-04-07 RX ADMIN — TIZANIDINE 2 MG: 4 TABLET ORAL at 23:21

## 2022-04-07 RX ADMIN — Medication 5 MG: at 23:20

## 2022-04-07 RX ADMIN — Medication 10 ML: at 23:24

## 2022-04-07 RX ADMIN — POLYETHYLENE GLYCOL 3350 17 G: 17 POWDER, FOR SOLUTION ORAL at 23:19

## 2022-04-07 RX ADMIN — OXYCODONE AND ACETAMINOPHEN 1 TABLET: 10; 325 TABLET ORAL at 23:20

## 2022-04-07 RX ADMIN — CEFEPIME HYDROCHLORIDE 2000 MG: 2 INJECTION, POWDER, FOR SOLUTION INTRAVENOUS at 19:03

## 2022-04-07 RX ADMIN — RISPERIDONE 1 MG: 0.5 TABLET ORAL at 23:20

## 2022-04-07 RX ADMIN — PROPRANOLOL HYDROCHLORIDE 20 MG: 10 TABLET ORAL at 23:21

## 2022-04-07 RX ADMIN — OXYCODONE HYDROCHLORIDE AND ACETAMINOPHEN 500 MG: 500 TABLET ORAL at 23:21

## 2022-04-07 RX ADMIN — GABAPENTIN 400 MG: 400 CAPSULE ORAL at 23:20

## 2022-04-07 RX ADMIN — BUSPIRONE HYDROCHLORIDE 10 MG: 10 TABLET ORAL at 23:21

## 2022-04-07 RX ADMIN — BACLOFEN 20 MG: 10 TABLET ORAL at 23:21

## 2022-04-07 RX ADMIN — MORPHINE SULFATE 2 MG: 2 INJECTION, SOLUTION INTRAMUSCULAR; INTRAVENOUS at 19:02

## 2022-04-07 ASSESSMENT — ENCOUNTER SYMPTOMS
COLOR CHANGE: 0
EYE DISCHARGE: 0
PHOTOPHOBIA: 0
SHORTNESS OF BREATH: 0
BACK PAIN: 0
COUGH: 0
EYE ITCHING: 0
APNEA: 0

## 2022-04-07 ASSESSMENT — PAIN SCALES - GENERAL
PAINLEVEL_OUTOF10: 8
PAINLEVEL_OUTOF10: 7
PAINLEVEL_OUTOF10: 10

## 2022-04-07 NOTE — ED NOTES
Call placed to 's answering service @ [phone]. Stefanie Kline returned call to Brea Stanton at [phone].  Electronically signed by Bridgett Sanders on 4/7/2022 at 5:31 PM       Bridgett Sanders  04/07/22 [phone]
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
## 2022-04-08 PROCEDURE — G0378 HOSPITAL OBSERVATION PER HR: HCPCS

## 2022-04-08 PROCEDURE — 6360000002 HC RX W HCPCS: Performed by: FAMILY MEDICINE

## 2022-04-08 PROCEDURE — 2580000003 HC RX 258: Performed by: PHYSICIAN ASSISTANT

## 2022-04-08 PROCEDURE — 6370000000 HC RX 637 (ALT 250 FOR IP): Performed by: FAMILY MEDICINE

## 2022-04-08 PROCEDURE — 2580000003 HC RX 258: Performed by: FAMILY MEDICINE

## 2022-04-08 PROCEDURE — 6360000002 HC RX W HCPCS: Performed by: PHYSICIAN ASSISTANT

## 2022-04-08 PROCEDURE — 96372 THER/PROPH/DIAG INJ SC/IM: CPT

## 2022-04-08 PROCEDURE — 96366 THER/PROPH/DIAG IV INF ADDON: CPT

## 2022-04-08 PROCEDURE — 1210000000 HC MED SURG R&B

## 2022-04-08 PROCEDURE — 96376 TX/PRO/DX INJ SAME DRUG ADON: CPT

## 2022-04-08 RX ADMIN — PROPRANOLOL HYDROCHLORIDE 20 MG: 10 TABLET ORAL at 09:34

## 2022-04-08 RX ADMIN — POLYETHYLENE GLYCOL 3350 17 G: 17 POWDER, FOR SOLUTION ORAL at 20:09

## 2022-04-08 RX ADMIN — BACLOFEN 20 MG: 10 TABLET ORAL at 20:06

## 2022-04-08 RX ADMIN — Medication 10 ML: at 20:10

## 2022-04-08 RX ADMIN — OXYCODONE AND ACETAMINOPHEN 1 TABLET: 10; 325 TABLET ORAL at 09:16

## 2022-04-08 RX ADMIN — ONDANSETRON 4 MG: 2 INJECTION INTRAMUSCULAR; INTRAVENOUS at 00:27

## 2022-04-08 RX ADMIN — GABAPENTIN 400 MG: 400 CAPSULE ORAL at 09:36

## 2022-04-08 RX ADMIN — ANORECTAL OINTMENT: 15.7; .44; 24; 20.6 OINTMENT TOPICAL at 09:39

## 2022-04-08 RX ADMIN — AMANTADINE HYDROCHLORIDE 100 MG: 100 CAPSULE, LIQUID FILLED ORAL at 20:06

## 2022-04-08 RX ADMIN — BACLOFEN 20 MG: 10 TABLET ORAL at 13:17

## 2022-04-08 RX ADMIN — RISPERIDONE 1 MG: 0.5 TABLET ORAL at 09:34

## 2022-04-08 RX ADMIN — ASPIRIN 325 MG: 325 TABLET ORAL at 09:36

## 2022-04-08 RX ADMIN — DOCUSATE SODIUM 200 MG: 100 CAPSULE, LIQUID FILLED ORAL at 09:36

## 2022-04-08 RX ADMIN — BUSPIRONE HYDROCHLORIDE 10 MG: 10 TABLET ORAL at 09:35

## 2022-04-08 RX ADMIN — LISINOPRIL 10 MG: 10 TABLET ORAL at 09:34

## 2022-04-08 RX ADMIN — RISPERIDONE 1 MG: 0.5 TABLET ORAL at 20:06

## 2022-04-08 RX ADMIN — ANORECTAL OINTMENT: 15.7; .44; 24; 20.6 OINTMENT TOPICAL at 20:07

## 2022-04-08 RX ADMIN — GABAPENTIN 400 MG: 400 CAPSULE ORAL at 20:06

## 2022-04-08 RX ADMIN — OXYCODONE HYDROCHLORIDE AND ACETAMINOPHEN 500 MG: 500 TABLET ORAL at 20:06

## 2022-04-08 RX ADMIN — SODIUM CHLORIDE, PRESERVATIVE FREE 10 ML: 5 INJECTION INTRAVENOUS at 00:27

## 2022-04-08 RX ADMIN — Medication 5 MG: at 20:06

## 2022-04-08 RX ADMIN — OXYCODONE AND ACETAMINOPHEN 1 TABLET: 10; 325 TABLET ORAL at 15:20

## 2022-04-08 RX ADMIN — DULOXETINE 60 MG: 30 CAPSULE, DELAYED RELEASE ORAL at 09:36

## 2022-04-08 RX ADMIN — PROPRANOLOL HYDROCHLORIDE 20 MG: 10 TABLET ORAL at 20:15

## 2022-04-08 RX ADMIN — ENOXAPARIN SODIUM 40 MG: 100 INJECTION SUBCUTANEOUS at 09:35

## 2022-04-08 RX ADMIN — BACLOFEN 20 MG: 10 TABLET ORAL at 16:24

## 2022-04-08 RX ADMIN — POLYETHYLENE GLYCOL 3350 17 G: 17 POWDER, FOR SOLUTION ORAL at 09:39

## 2022-04-08 RX ADMIN — TIZANIDINE 2 MG: 4 TABLET ORAL at 20:07

## 2022-04-08 RX ADMIN — BACLOFEN 20 MG: 10 TABLET ORAL at 09:36

## 2022-04-08 RX ADMIN — PANTOPRAZOLE SODIUM 40 MG: 40 TABLET, DELAYED RELEASE ORAL at 06:05

## 2022-04-08 RX ADMIN — Medication 2000 UNITS: at 09:36

## 2022-04-08 RX ADMIN — CETIRIZINE HYDROCHLORIDE 5 MG: 10 TABLET, FILM COATED ORAL at 09:37

## 2022-04-08 RX ADMIN — BUSPIRONE HYDROCHLORIDE 10 MG: 10 TABLET ORAL at 20:07

## 2022-04-08 RX ADMIN — Medication 10 ML: at 09:40

## 2022-04-08 RX ADMIN — ANORECTAL OINTMENT: 15.7; .44; 24; 20.6 OINTMENT TOPICAL at 13:18

## 2022-04-08 RX ADMIN — DOCUSATE SODIUM 200 MG: 100 CAPSULE, LIQUID FILLED ORAL at 20:06

## 2022-04-08 RX ADMIN — GABAPENTIN 400 MG: 400 CAPSULE ORAL at 13:17

## 2022-04-08 RX ADMIN — AMANTADINE HYDROCHLORIDE 100 MG: 100 CAPSULE, LIQUID FILLED ORAL at 09:35

## 2022-04-08 RX ADMIN — Medication 1 TABLET: at 16:24

## 2022-04-08 RX ADMIN — CEFEPIME HYDROCHLORIDE 2000 MG: 2 INJECTION, POWDER, FOR SOLUTION INTRAVENOUS at 09:16

## 2022-04-08 RX ADMIN — TIZANIDINE 2 MG: 4 TABLET ORAL at 09:33

## 2022-04-08 RX ADMIN — OXYCODONE HYDROCHLORIDE AND ACETAMINOPHEN 500 MG: 500 TABLET ORAL at 09:35

## 2022-04-08 ASSESSMENT — PAIN SCALES - GENERAL
PAINLEVEL_OUTOF10: 10
PAINLEVEL_OUTOF10: 9
PAINLEVEL_OUTOF10: 10

## 2022-04-08 NOTE — PROGRESS NOTES
PHARMACY NOTE  Cait Sandy was ordered Azelastine Nasal [place]. Per the [address], this medication is non-formulary and not stocked by pharmacy. It has been discontinued. The medication can be reordered at discharge.      Electronically signed by Lucinda Perla [facility] on 4/7/2022 at 10:18 PM

## 2022-04-08 NOTE — CARE COORDINATION
ISHAAN placed call to [facility] [phone]; spoke with Aakash Fournier; SW asked if Pt is okay to return to their facility at d/c he states as far as he knows he is but he is driving and asked SW to call back in 10 min;   Electronically signed by VIOLA Angelo on 4/8/2022 at 11:16 AM      SW placed f/u call to Aakash Fournier; he stated his Rusty Forrest would like SW to call her re: Pt @ [phone]; spoke with Rob Cleveland; she stated Pt has been expressing a desire to go to a SNF for a while; their Russell Case has been working with him and his mother re: potential placement options;     [facility] [address] [facility] [address]; she noted that she has made a referral to [facility]; but has not received any word back yet; mother would also like to try and have him placed closer to her in [facility]; but this may be an issue re: insurance (he has [address], [employer] [facility] SW will f/u with [facility] and Pt/mother to send additional referrals. She also indicated that Pt is his own person. No guardian. .   Electronically signed by VIOLA Angelo on 4/8/2022 at 11:39 AM

## 2022-04-08 NOTE — H&P
History and Physical      CHIEF COMPLAINT:  Wound on Buttocks    Reason for Admission:  Wound on his Buttocks    History Obtained From:  Patient, chart    HISTORY OF PRESENT ILLNESS:      The patient is a 27 y.o. male who was admitted from the ER with wound on buttocks. He has a chronic wound, and is seeing Wound Care, but some concern by Facility he is living in, that the wound is worse. He has no new complaints. No CP or SOA. No abdominal pain or N/V. No issues with PO. No worsening edema. No fevers. Past Medical History:        Diagnosis Date    Arthritis     Cerebral artery occlusion with cerebral infarction ([facility] [address]     Depression     Difficult intubation     Hemorrhoids     History of blood transfusion     Hypertension     Kidney stone     Muscle spasticity     Prolonged emergence from general anesthesia     Retention of urine      Past Surgical History:        Procedure Laterality Date    BACK SURGERY      BACLOFEN PUMP IMPLANTATION      BRAIN SURGERY      KIDNEY STONE SURGERY      OK FRAGMENT KIDNEY STONE/ ESWL Right 10/2/2018    ESWL EXTRACORPEAL SHOCK WAVE LITHOTRIPSY performed [facility] Tiff [place] [facility] [address]         Medications Prior to Admission:    Medications Prior to Admission: olive oil external oil, Apply topically as needed Apply topically. miconazole (MICOTIN) 2 % cream, Apply topically 2 times daily Apply topically 2 times daily. aspirin 325 MG tablet, Take 325 mg by mouth daily  tiZANidine (ZANAFLEX) 2 MG tablet, Take 2 mg by mouth 2 times daily At 2 pm and Bedtime  Cholecalciferol (VITAMIN D3 PO), Take 2,000 Units by mouth daily  Amantadine (SYMMETREL) 100 MG TABS tablet, Take 100 mg by mouth 2 times daily  hydrOXYzine (ATARAX) 50 MG tablet, Take 50 mg by mouth nightly  Suvorexant (BELSOMRA) 20 MG TABS, Take 20 mg by mouth nightly.   azelastine HCl 0.15 % SOLN, 1 spray by Nasal route 2 times daily   baclofen (LIORESAL) 20 MG tablet, Take 20 mg by mouth 4 times daily busPIRone (BUSPAR) 10 MG tablet, Take 10 mg by mouth 2 times daily   chlorproMAZINE (THORAZINE) 25 MG tablet, Take 25 mg by mouth nightly  loperamide (IMODIUM) 2 MG capsule, Take 2 mg by mouth 4 times daily as needed for Diarrhea  hydrocortisone 2.5 % cream, Apply topically 2 times daily Apply topically 2 times daily. vitamin E 400 UNIT capsule, Take 400 Units by mouth 2 times daily   meloxicam (MOBIC) 7.5 MG tablet, Take 1 tablet by mouth 2 times daily  loratadine (CLARITIN) 10 MG capsule, Take 10 mg by mouth daily  fluticasone (FLONASE) 50 MCG/ACT nasal spray, 1 spray by Each Nare route 2 times daily  melatonin 5 MG TABS tablet, Take 5 mg by mouth nightly   gabapentin (NEURONTIN) 400 MG capsule, Take 400 mg by mouth 3 times daily. ketoconazole (NIZORAL) 2 % cream, Apply topically Indications: \scaly scalp Use as directed. lisinopril (PRINIVIL;ZESTRIL) 10 MG tablet, Take 10 mg by mouth daily  oxyCODONE-acetaminophen (PERCOCET)  MG per tablet, Take 1 tablet by mouth every 6 hours as needed for Pain. tiZANidine (ZANAFLEX) 4 MG tablet, Take 2 mg by mouth 2 times daily   zinc oxide 20 % ointment, Apply topically 2 times daily Apply topically as needed. polyethylene glycol (GLYCOLAX) powder, Take 17 g by mouth 2 times daily   dexlansoprazole (DEXILANT) 60 MG CPDR delayed release capsule, Take 60 mg by mouth daily  guaiFENesin 400 MG tablet, Take 800 mg by mouth three times daily 2 Tablets TID PO PRN   nystatin (MYCOSTATIN) 107066 UNIT/GM cream, Apply 1 Dose topically as needed for Dry Skin Apply topically 2 times daily.   citalopram (CELEXA) 40 MG tablet, Take 40 mg by mouth daily  DULoxetine (CYMBALTA) 60 MG capsule, Take 60 mg by mouth daily  Multiple Vitamins-Minerals (THERAPEUTIC MULTIVITAMIN-MINERALS) tablet, Take 1 tablet by mouth daily  Calcium Carbonate-Vitamin D (CALCIUM-VITAMIN D) 500-200 MG-UNIT per tablet, Take 1 tablet by mouth 2 times daily (with meals)  docusate sodium (COLACE) 100 MG capsule, Take 200 mg by mouth 2 times daily  Omega-3 Fatty Acids (FISH OIL) 1000 MG CAPS, Take 3,000 mg by mouth 2 times daily  risperiDONE (RISPERDAL) 1 MG tablet, Take 1 mg by mouth 2 times daily  Ascorbic Acid (VITAMIN C) 500 MG CAPS, Take 500 mg by mouth 2 times daily  propranolol (INDERAL) 20 MG tablet, Take 20 mg by mouth 2 times daily as needed Hold for /70 or lower  ketoconazole (NIZORAL) 2 % shampoo, Apply topically daily as needed for Itching Apply topically daily as needed. acetaminophen (TYLENOL) 500 MG tablet, Take 1,000 mg by mouth every 6 hours as needed for Pain or Fever    Allergies:  Latex, Dilantin [phenytoin], Pcn [penicillins], and Sulfa antibiotics    Social History:   TOBACCO:   reports that he has never smoked. He has never used smokeless tobacco.  ETOH:   reports no history of alcohol use. DRUGS:   reports no history of drug use. MARITAL STATUS:  Not   OCCUPATION:  Not working  Patient currently lives in a [facility]      Family History:   History reviewed. No pertinent family history. REVIEW OF SYSTEMS:  Constitutional: neg  CV: neg  Pulmonary: neg  GI: neg  : neg  Psych: neg  Neuro: neg  Skin: wound buttocks  MusculoSkeletal: neg  HEENT: neg  Joints: neg  Vitals:  /69   Pulse 71   Temp 98.1 °F (36.7 °C)   Resp 16   Ht 6' 3\" (1.905 m)   SpO2 98%   BMI 40.50 kg/m²     PHYSICAL EXAM:  Gen: NAD, alert, pleasant  HEENT: WNL  Lymph: no LAD  Neck: no JVD or masses  Chest: CTA bilat  CV: RRR  Abdomen: NT/ND  Extrem: no C/C/E  Neuro: non focal  Skin: see ER provider, Nursing note for wound information  Joints: no redness  DATA:  I have reviewed the admission labs and imaging tests. ASSESSMENT AND PLAN:      Principal Problem:    Sacral decubitus ulcer----wound care evaluation, on antibiotics    H/O Brain Injury    HTN---follow BP    Chronic Pain, Spasticity----continue home meds    Social----will ask SS to help with determining plan for d/c, ?  SNF vs back to Brain Injury Facility          After seeing pt, I did talk to Wound Care Provider. He has been seeing Mr. Willis Huff outpatient, and says the wound actually looks better. He has written orders and does not think he needs antibiotics.          Wilman Winslow MD  5:30 PM 4/8/2022

## 2022-04-08 NOTE — PROGRESS NOTES
[facility]  Nurse  Consult Note       NAME:  [id number] RECORD NUMBER:  [id number]  AGE: 27 y.o. GENDER: male  : 1990  TODAY'S DATE:  2022    Subjective   Reason for Wound Nurse Evaluation and Assessment: Buttocks      Akash Humphrey is a 27 y.o. 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 first documented 21  Current Wound Care Treatment:  Calmoseptine, offloading    Patient Goal of Care:  [x] Wound Healing  [] Odor Control  [] Palliative Care  [x] Pain Control   [x] Other: Itch control        PAST MEDICAL HISTORY        Diagnosis Date    Arthritis     Cerebral artery occlusion with cerebral infarction ([facility] [address]     Depression     Difficult intubation     Hemorrhoids     History of blood transfusion     Hypertension     Kidney stone     Muscle spasticity     Prolonged emergence from general anesthesia     Retention of urine        PAST SURGICAL HISTORY    Past Surgical History:   Procedure Laterality Date    BACK SURGERY      BACLOFEN PUMP IMPLANTATION      BRAIN SURGERY      KIDNEY STONE SURGERY      [place] FRAGMENT KIDNEY STONE/ ESWL Right 10/2/2018    ESWL EXTRACORPEAL SHOCK WAVE LITHOTRIPSY performed by Shawnee Fu MD at [address]    History reviewed. No pertinent family history. SOCIAL HISTORY    Social History     Tobacco Use    Smoking status: Never Smoker    Smokeless tobacco: Never Used   Vaping Use    Vaping Use: Never used   Substance Use Topics    Alcohol use: No    Drug use: No       ALLERGIES    Allergies   Allergen Reactions    Latex     Dilantin [Phenytoin]     Pcn [Penicillins]     Sulfa Antibiotics        MEDICATIONS    No current facility-administered medications on file prior to encounter.      Current Outpatient Medications on File Prior to Encounter   Medication Sig Dispense Refill    olive oil external oil Apply topically as needed Apply topically.  miconazole (MICOTIN) 2 % cream Apply topically 2 times daily Apply topically 2 times daily.  aspirin 325 MG tablet Take 325 mg by mouth daily      tiZANidine (ZANAFLEX) 2 MG tablet Take 2 mg by mouth 2 times daily At 2 pm and Bedtime      Cholecalciferol (VITAMIN D3 PO) Take 2,000 Units by mouth daily      Amantadine (SYMMETREL) 100 MG TABS tablet Take 100 mg by mouth 2 times daily      hydrOXYzine (ATARAX) 50 MG tablet Take 50 mg by mouth nightly      Suvorexant (BELSOMRA) 20 MG TABS Take 20 mg by mouth nightly.  azelastine HCl 0.15 % SOLN 1 spray by Nasal route 2 times daily       baclofen (LIORESAL) 20 MG tablet Take 20 mg by mouth 4 times daily       busPIRone (BUSPAR) 10 MG tablet Take 10 mg by mouth 2 times daily       chlorproMAZINE (THORAZINE) 25 MG tablet Take 25 mg by mouth nightly      loperamide (IMODIUM) 2 MG capsule Take 2 mg by mouth 4 times daily as needed for Diarrhea      hydrocortisone 2.5 % cream Apply topically 2 times daily Apply topically 2 times daily.  vitamin E 400 UNIT capsule Take 400 Units by mouth 2 times daily       meloxicam (MOBIC) 7.5 MG tablet Take 1 tablet by mouth 2 times daily 30 tablet 3    loratadine (CLARITIN) 10 MG capsule Take 10 mg by mouth daily      fluticasone (FLONASE) 50 MCG/ACT nasal spray 1 spray by Each Nare route 2 times daily      melatonin 5 MG TABS tablet Take 5 mg by mouth nightly       gabapentin (NEURONTIN) 400 MG capsule Take 400 mg by mouth 3 times daily.  ketoconazole (NIZORAL) 2 % cream Apply topically Indications: \scaly scalp Use as directed.  lisinopril (PRINIVIL;ZESTRIL) 10 MG tablet Take 10 mg by mouth daily      oxyCODONE-acetaminophen (PERCOCET)  MG per tablet Take 1 tablet by mouth every 6 hours as needed for Pain.       tiZANidine (ZANAFLEX) 4 MG tablet Take 2 mg by mouth 2 times daily       zinc oxide 20 % ointment Apply topically 2 times daily Apply topically as needed.  polyethylene glycol (GLYCOLAX) powder Take 17 g by mouth 2 times daily       dexlansoprazole (DEXILANT) 60 MG CPDR delayed release capsule Take 60 mg by mouth daily      guaiFENesin 400 MG tablet Take 800 mg by mouth three times daily 2 Tablets TID PO PRN       nystatin (MYCOSTATIN) 257083 UNIT/GM cream Apply 1 Dose topically as needed for Dry Skin Apply topically 2 times daily.  citalopram (CELEXA) 40 MG tablet Take 40 mg by mouth daily      DULoxetine (CYMBALTA) 60 MG capsule Take 60 mg by mouth daily      Multiple Vitamins-Minerals (THERAPEUTIC MULTIVITAMIN-MINERALS) tablet Take 1 tablet by mouth daily      Calcium Carbonate-Vitamin D (CALCIUM-VITAMIN D) 500-200 MG-UNIT per tablet Take 1 tablet by mouth 2 times daily (with meals)      docusate sodium (COLACE) 100 MG capsule Take 200 mg by mouth 2 times daily      Omega-3 Fatty Acids (FISH OIL) 1000 MG CAPS Take 3,000 mg by mouth 2 times daily      risperiDONE (RISPERDAL) 1 MG tablet Take 1 mg by mouth 2 times daily      Ascorbic Acid (VITAMIN C) 500 MG CAPS Take 500 mg by mouth 2 times daily      propranolol (INDERAL) 20 MG tablet Take 20 mg by mouth 2 times daily as needed Hold for /70 or lower      ketoconazole (NIZORAL) 2 % shampoo Apply topically daily as needed for Itching Apply topically daily as needed.       acetaminophen (TYLENOL) 500 MG tablet Take 1,000 mg by mouth every 6 hours as needed for Pain or Fever         Objective    /72   Pulse 88   Temp 97.8 °F (36.6 °C) (Tympanic)   Resp 20   SpO2 100%     LABS:  WBC:    Lab Results   Component Value Date    WBC 6.5 04/07/2022     H/H:    Lab Results   Component Value Date    HGB 11.8 04/07/2022    HCT 38.6 04/07/2022     PTT:    Lab Results   Component Value Date    APTT 30.0 09/28/2018   [APTT}  PT/INR:    Lab Results   Component Value Date    PROTIME 13.2 09/28/2018    INR 1.01 09/28/2018     HgBA1c:    Lab Results   Component Value Date    LABA1C 5.0 08/13/2014       Assessment   Luiz Risk Score: Luiz Scale Score: 13    Patient Active Problem List   Diagnosis Code    Muscle spasticity M62.838    Post-traumatic spasticity R25.2    Decubitus ulcer of right buttock, stage 3 ([address] L89.313    Dermatitis associated with moisture L30.8    History of cerebrovascular accident (CVA) with residual deficit I69.30    Renal calculus, right N20.0    Pressure injury of left buttock, stage 3 (HCC) L89.323    Class 3 severe obesity due to excess calories without serious comorbidity in adult [facility]) E66.01    Sacral decubitus ulcer, stage III (HCC) L89.153    Cellulitis L03.90       Measurements:  Wound 11/17/21 Buttocks Left wound 1- left buttock stage 3 (Active)   Wound Image   04/08/22 1037   Wound Etiology Pressure Stage  3 04/08/22 1037   Dressing Status New dressing applied 04/08/22 1037   Wound Cleansed Soap and water 04/08/22 1037   Dressing/Treatment Zinc paste 04/08/22 1037   Dressing Change Due 04/08/22 04/08/22 1037   Wound Length (cm) 2.5 cm 04/08/22 1037   Wound Width (cm) 3.5 cm 04/08/22 1037   Wound Depth (cm) 0.3 cm 04/08/22 1037   Wound Surface Area (cm^2) 8.75 cm^2 04/08/22 1037   Change in Wound Size % (l*w) -525 04/08/22 1037   Wound Volume (cm^3) 2.625 cm^3 04/08/22 1037   Wound Healing % -1775 04/08/22 1037   Post-Procedure Length (cm) 2.5 cm 04/05/22 1440   Post-Procedure Width (cm) 3.5 cm 04/05/22 1440   Post-Procedure Depth (cm) 0.4 cm 04/05/22 1440   Post-Procedure Surface Area (cm^2) 8.75 cm^2 04/05/22 1440   Post-Procedure Volume (cm^3) 3.5 cm^3 04/05/22 1440   Distance Tunneling (cm) 0 cm 04/05/22 1432   Tunneling Position ___ O'Clock 0 04/05/22 1432   Undermining Starts ___ O'Clock 0 04/05/22 1432   Undermining Ends___ O'Clock 0 04/05/22 1432   Undermining Maxium Distance (cm) 0 04/05/22 1432   Wound Assessment Pink/red 04/08/22 1037 Drainage Amount Small 04/08/22 1037   Drainage Description Serosanguinous 04/08/22 1037   Odor None 04/08/22 1037   Rachana-wound Assessment Hyperkeratosis (callous) 04/08/22 1037   Margins Attached edges 04/08/22 1037   Number of days: 141       Wound 11/17/21 Buttocks Right wound 2- right buttock stage 3 (Active)   Wound Image   04/08/22 1037   Wound Etiology Pressure Stage  3 04/08/22 1037   Dressing Status New dressing applied 04/08/22 1037   Wound Cleansed Soap and water 04/08/22 1037   Dressing/Treatment Zinc paste 04/08/22 1037   Dressing Change Due 04/08/22 04/08/22 1037   Wound Length (cm) 2.4 cm 04/08/22 1037   Wound Width (cm) 2.4 cm 04/08/22 1037   Wound Depth (cm) 0.1 cm 04/08/22 1037   Wound Surface Area (cm^2) 5.76 cm^2 04/08/22 1037   Change in Wound Size % (l*w) -15.2 04/08/22 1037   Wound Volume (cm^3) 0.576 cm^3 04/08/22 1037   Wound Healing % -15 04/08/22 1037   Post-Procedure Length (cm) 2.4 cm 04/05/22 1440   Post-Procedure Width (cm) 2.4 cm 04/05/22 1440   Post-Procedure Depth (cm) 0.1 cm 04/05/22 1440   Post-Procedure Surface Area (cm^2) 5.76 cm^2 04/05/22 1440   Post-Procedure Volume (cm^3) 0.576 cm^3 04/05/22 1440   Distance Tunneling (cm) 0 cm 04/05/22 1432   Tunneling Position ___ O'Clock 0 04/05/22 1432   Undermining Starts ___ O'Clock 0 04/05/22 1432   Undermining Ends___ O'Clock 0 04/05/22 1432   Undermining Maxium Distance (cm) 0 04/05/22 1432   Wound Assessment Pink/red 04/08/22 1037   Drainage Amount Moderate 04/08/22 1037   Drainage Description Serosanguinous 04/08/22 1037   Odor None 04/08/22 1037   Rachana-wound Assessment Hyperkeratosis (callous) 04/08/22 1037   Margins Attached edges 04/08/22 1037   Number of days: 141            Response to treatment:  Well tolerated by patient., With complaints of pain.      Pain Assessment:  Severity:  10 / 10  Quality of pain: dull, aching, pressure  Wound Pain Timing/Severity: constant, intermittent  Premedicated: Yes    Plan   Plan of Care: Wound 11/17/21 Buttocks Left wound 1- left buttock stage 3-Dressing/Treatment: Zinc paste  Wound 11/17/21 Buttocks Right wound 2- right buttock stage 3-Dressing/Treatment: Zinc paste    Specialty Bed Required : Yes   [x] Low Air Loss (Placed on Dolphin Mattress)  [] Pressure Redistribution  [] Fluid Immersion  [] Bariatric  [] Total Pressure Relief  [] Other:     Current Diet: ADULT DIET; Regular  Dietician consult:  No    Discharge Plan:  Placement for patient upon discharge: assisted living    Patient appropriate for Outpatient [address]: Yes  (Current patient of Gilberto Hong [address] Appt 4/19/21)    Referrals:  []   [] 2003 Note [facility]  [] Supplies  [] Other    Patient/Caregiver Teaching:  Level of patient/caregiver understanding able to:   [x] Indicates understanding       [] Needs reinforcement  [] Unsuccessful      [x] Verbal Understanding  [] Demonstrated understanding       [] No evidence of learning  [] Refused teaching         [] N/A       Patient has chronic pressure injury to buttocks that is surrounded by pink scar tissue and moisture associated dermatitis. There is hyperkeratosis along the gluteal crease consistent with prolonged pressure and moisture that contributes to wound formation with shearing. There is also surrounding discoloration of the skin to the buttocks, also consistent with chronic moisture. The wounds do not appear infected. There is no warmth, fluctuation, or induration palpated at the wound or periwound. There is no visual indication of cellulitis to the periwound at this assessment. The wounds look unchanged or slightly improved from the photos taken 2 days ago at the wound care center. Patient also has scratches to lumbar back that are epithelialized at this assessment. Intertrigo noted to axillaries. Recommendation:    Place on low air loss mattress. Utilize [employer] in [place].  Turn and position to 30 degrees lateral such that pressure is relieved from affect area. BUTTOCKS: Clean with soap and water. Pat dry. Apply Calmoseptine to wounds 3 times per day and prn soiling or bathing.      Electronically signed by   Sukumar Bonilla RN, [address] [place] [address],[address] 4/8/2022

## 2022-04-08 NOTE — PLAN OF CARE
Problem: Nutrition  Goal: Optimal nutrition therapy  Outcome: Ongoing  Note: Nutrition Problem #1: Increased nutrient needs  Intervention: Food and/or Nutrient Delivery: Continue Current Diet,Start Oral Nutrition Supplement  Nutritional Goals: PO >75%, wound improvement

## 2022-04-09 PROBLEM — I10 HYPERTENSION: Status: ACTIVE | Noted: 2022-04-09

## 2022-04-09 PROBLEM — I69.319 COGNITIVE DEFICIT DUE TO OLD CEREBROVASCULAR ACCIDENT (CVA): Status: ACTIVE | Noted: 2022-04-09

## 2022-04-09 LAB
ANION GAP SERPL CALCULATED.3IONS-SCNC: 8 MMOL/L (ref 7–19)
BASOPHILS ABSOLUTE: 0.1 K/UL (ref 0–0.2)
BASOPHILS RELATIVE PERCENT: 1.9 % (ref 0–1)
BUN BLDV-MCNC: 16 MG/DL (ref 6–20)
CALCIUM SERPL-MCNC: 8.9 MG/DL (ref 8.6–10)
CHLORIDE BLD-SCNC: 101 MMOL/L (ref 98–111)
CO2: 26 MMOL/L (ref 22–29)
CREAT SERPL-MCNC: 0.8 MG/DL (ref 0.5–1.2)
EOSINOPHILS ABSOLUTE: 0.4 K/UL (ref 0–0.6)
EOSINOPHILS RELATIVE PERCENT: 8 % (ref 0–5)
GFR AFRICAN AMERICAN: >59
GFR NON-AFRICAN AMERICAN: >60
GLUCOSE BLD-MCNC: 110 MG/DL (ref 74–109)
HCT VFR BLD CALC: 34.9 % (ref 42–52)
HEMOGLOBIN: 10.7 G/DL (ref 14–18)
IMMATURE GRANULOCYTES #: 0 K/UL
LYMPHOCYTES ABSOLUTE: 2.1 K/UL (ref 1.1–4.5)
LYMPHOCYTES RELATIVE PERCENT: 38.7 % (ref 20–40)
MCH RBC QN AUTO: 26.6 PG (ref 27–31)
MCHC RBC AUTO-ENTMCNC: 30.7 G/DL (ref 33–37)
MCV RBC AUTO: 86.8 FL (ref 80–94)
MONOCYTES ABSOLUTE: 0.7 K/UL (ref 0–0.9)
MONOCYTES RELATIVE PERCENT: 12.3 % (ref 0–10)
NEUTROPHILS ABSOLUTE: 2.1 K/UL (ref 1.5–7.5)
NEUTROPHILS RELATIVE PERCENT: 38.7 % (ref 50–65)
PDW BLD-RTO: 14.1 % (ref 11.5–14.5)
PLATELET # BLD: 231 K/UL (ref 130–400)
PMV BLD AUTO: 10.9 FL (ref 9.4–12.4)
POTASSIUM SERPL-SCNC: 4.3 MMOL/L (ref 3.5–5)
RBC # BLD: 4.02 M/UL (ref 4.7–6.1)
SODIUM BLD-SCNC: 135 MMOL/L (ref 136–145)
WBC # BLD: 5.4 K/UL (ref 4.8–10.8)

## 2022-04-09 PROCEDURE — 80048 BASIC METABOLIC PNL TOTAL CA: CPT

## 2022-04-09 PROCEDURE — 1210000000 HC MED SURG R&B

## 2022-04-09 PROCEDURE — 85025 COMPLETE CBC W/AUTO DIFF WBC: CPT

## 2022-04-09 PROCEDURE — 6370000000 HC RX 637 (ALT 250 FOR IP): Performed by: FAMILY MEDICINE

## 2022-04-09 PROCEDURE — 96372 THER/PROPH/DIAG INJ SC/IM: CPT

## 2022-04-09 PROCEDURE — 2580000003 HC RX 258: Performed by: FAMILY MEDICINE

## 2022-04-09 PROCEDURE — 36415 COLL VENOUS BLD VENIPUNCTURE: CPT

## 2022-04-09 PROCEDURE — G0378 HOSPITAL OBSERVATION PER HR: HCPCS

## 2022-04-09 PROCEDURE — 6360000002 HC RX W HCPCS: Performed by: FAMILY MEDICINE

## 2022-04-09 RX ADMIN — ANORECTAL OINTMENT: 15.7; .44; 24; 20.6 OINTMENT TOPICAL at 21:34

## 2022-04-09 RX ADMIN — DOCUSATE SODIUM 200 MG: 100 CAPSULE, LIQUID FILLED ORAL at 21:34

## 2022-04-09 RX ADMIN — Medication 1 TABLET: at 18:26

## 2022-04-09 RX ADMIN — RISPERIDONE 1 MG: 0.5 TABLET ORAL at 08:05

## 2022-04-09 RX ADMIN — ENOXAPARIN SODIUM 40 MG: 100 INJECTION SUBCUTANEOUS at 08:04

## 2022-04-09 RX ADMIN — RISPERIDONE 1 MG: 0.5 TABLET ORAL at 21:29

## 2022-04-09 RX ADMIN — AMANTADINE HYDROCHLORIDE 100 MG: 100 CAPSULE, LIQUID FILLED ORAL at 21:30

## 2022-04-09 RX ADMIN — GABAPENTIN 400 MG: 400 CAPSULE ORAL at 13:41

## 2022-04-09 RX ADMIN — ASPIRIN 325 MG: 325 TABLET ORAL at 08:06

## 2022-04-09 RX ADMIN — ANORECTAL OINTMENT: 15.7; .44; 24; 20.6 OINTMENT TOPICAL at 08:07

## 2022-04-09 RX ADMIN — TIZANIDINE 2 MG: 4 TABLET ORAL at 08:06

## 2022-04-09 RX ADMIN — Medication 1 TABLET: at 08:04

## 2022-04-09 RX ADMIN — POLYETHYLENE GLYCOL 3350 17 G: 17 POWDER, FOR SOLUTION ORAL at 08:04

## 2022-04-09 RX ADMIN — BACLOFEN 20 MG: 10 TABLET ORAL at 21:30

## 2022-04-09 RX ADMIN — PROPRANOLOL HYDROCHLORIDE 20 MG: 10 TABLET ORAL at 21:31

## 2022-04-09 RX ADMIN — POLYETHYLENE GLYCOL 3350 17 G: 17 POWDER, FOR SOLUTION ORAL at 21:28

## 2022-04-09 RX ADMIN — BACLOFEN 20 MG: 10 TABLET ORAL at 08:06

## 2022-04-09 RX ADMIN — AMANTADINE HYDROCHLORIDE 100 MG: 100 CAPSULE, LIQUID FILLED ORAL at 08:07

## 2022-04-09 RX ADMIN — FLUTICASONE PROPIONATE 1 SPRAY: 50 SPRAY, METERED NASAL at 08:07

## 2022-04-09 RX ADMIN — BACLOFEN 20 MG: 10 TABLET ORAL at 13:41

## 2022-04-09 RX ADMIN — Medication 5 MG: at 21:30

## 2022-04-09 RX ADMIN — BUSPIRONE HYDROCHLORIDE 10 MG: 10 TABLET ORAL at 21:30

## 2022-04-09 RX ADMIN — GABAPENTIN 400 MG: 400 CAPSULE ORAL at 21:30

## 2022-04-09 RX ADMIN — Medication 10 ML: at 08:07

## 2022-04-09 RX ADMIN — PROPRANOLOL HYDROCHLORIDE 20 MG: 10 TABLET ORAL at 08:05

## 2022-04-09 RX ADMIN — DULOXETINE 60 MG: 30 CAPSULE, DELAYED RELEASE ORAL at 08:06

## 2022-04-09 RX ADMIN — Medication 10 ML: at 21:35

## 2022-04-09 RX ADMIN — TIZANIDINE 2 MG: 4 TABLET ORAL at 21:30

## 2022-04-09 RX ADMIN — OXYCODONE HYDROCHLORIDE AND ACETAMINOPHEN 500 MG: 500 TABLET ORAL at 21:30

## 2022-04-09 RX ADMIN — DOCUSATE SODIUM 200 MG: 100 CAPSULE, LIQUID FILLED ORAL at 08:05

## 2022-04-09 RX ADMIN — OXYCODONE HYDROCHLORIDE AND ACETAMINOPHEN 500 MG: 500 TABLET ORAL at 08:07

## 2022-04-09 RX ADMIN — CETIRIZINE HYDROCHLORIDE 5 MG: 10 TABLET, FILM COATED ORAL at 08:05

## 2022-04-09 RX ADMIN — GABAPENTIN 400 MG: 400 CAPSULE ORAL at 08:06

## 2022-04-09 RX ADMIN — BUSPIRONE HYDROCHLORIDE 10 MG: 10 TABLET ORAL at 08:07

## 2022-04-09 RX ADMIN — OXYCODONE AND ACETAMINOPHEN 1 TABLET: 10; 325 TABLET ORAL at 08:05

## 2022-04-09 RX ADMIN — ANORECTAL OINTMENT: 15.7; .44; 24; 20.6 OINTMENT TOPICAL at 15:32

## 2022-04-09 RX ADMIN — Medication 2000 UNITS: at 08:06

## 2022-04-09 RX ADMIN — BACLOFEN 20 MG: 10 TABLET ORAL at 18:26

## 2022-04-09 RX ADMIN — PANTOPRAZOLE SODIUM 40 MG: 40 TABLET, DELAYED RELEASE ORAL at 06:00

## 2022-04-09 ASSESSMENT — PAIN DESCRIPTION - PROGRESSION
CLINICAL_PROGRESSION: NOT CHANGED
CLINICAL_PROGRESSION: NOT CHANGED

## 2022-04-09 ASSESSMENT — PAIN DESCRIPTION - FREQUENCY
FREQUENCY: INTERMITTENT

## 2022-04-09 ASSESSMENT — PAIN SCALES - GENERAL
PAINLEVEL_OUTOF10: 0
PAINLEVEL_OUTOF10: 3
PAINLEVEL_OUTOF10: 3
PAINLEVEL_OUTOF10: 2
PAINLEVEL_OUTOF10: 10

## 2022-04-09 ASSESSMENT — PAIN DESCRIPTION - PAIN TYPE
TYPE: ACUTE PAIN
TYPE: ACUTE PAIN
TYPE: CHRONIC PAIN
TYPE: CHRONIC PAIN

## 2022-04-09 ASSESSMENT — PAIN - FUNCTIONAL ASSESSMENT: PAIN_FUNCTIONAL_ASSESSMENT: ACTIVITIES ARE NOT PREVENTED

## 2022-04-09 ASSESSMENT — PAIN DESCRIPTION - ONSET: ONSET: ON-GOING

## 2022-04-09 ASSESSMENT — ENCOUNTER SYMPTOMS: SHORTNESS OF BREATH: 0

## 2022-04-09 ASSESSMENT — PAIN DESCRIPTION - LOCATION
LOCATION: COCCYX
LOCATION: LEG

## 2022-04-09 ASSESSMENT — PAIN DESCRIPTION - DESCRIPTORS: DESCRIPTORS: ACHING;CONSTANT

## 2022-04-09 ASSESSMENT — PAIN DESCRIPTION - ORIENTATION
ORIENTATION: RIGHT;LEFT
ORIENTATION: POSTERIOR;LEFT

## 2022-04-09 ASSESSMENT — PAIN DESCRIPTION - DIRECTION: RADIATING_TOWARDS: LEGS

## 2022-04-09 NOTE — PROGRESS NOTES
Daily Progress Note  Summer Aguilar  MRN: [id number] LOS: 2    Admit Date: 4/7/2022 4/9/2022 2:51 PM    Subjective:          Chief Complaint:  Chief Complaint   Patient presents with    Wound Infection     sacral wound, chronic. wound care md sent for hospital admission       Interval History:    Reviewed overnight events and nursing notes. Patient resting comfortably this Afternoon. Does complain of pain from his sacral wound but reports that is well controlled. Review of Systems   Constitutional: Negative for chills and fever. Respiratory: Negative for shortness of breath. Cardiovascular: Negative for chest pain. Musculoskeletal:        Sacral pain       DIET:  ADULT ORAL NUTRITION SUPPLEMENT; Lunch, Dinner; Low Calorie/High Protein Oral Supplement  ADULT DIET; Regular    Medications:      sodium chloride        menthol-zinc oxide   Topical TID    sodium chloride flush  5-40 mL IntraVENous 2 times per day    amantadine  100 mg Oral BID    ascorbic acid  500 mg Oral BID    aspirin  325 mg Oral Daily    baclofen  20 mg Oral 4x Daily    busPIRone  10 mg Oral BID    calcium-cholecalciferol  1 tablet Oral BID WC    Vitamin D  2,000 Units Oral Daily    pantoprazole  40 mg Oral QAM AC    docusate sodium  200 mg Oral BID    DULoxetine  60 mg Oral Daily    fluticasone  1 spray Each Nostril BID    gabapentin  400 mg Oral TID    lisinopril  10 mg Oral Daily    cetirizine  5 mg Oral Daily    melatonin  5 mg Oral Nightly    polyethylene glycol  17 g Oral BID    risperiDONE  1 mg Oral BID    tiZANidine  2 mg Oral BID    propranolol  20 mg Oral BID    enoxaparin  40 mg SubCUTAneous Daily       Data:     Code Status: Full Code    History reviewed. No pertinent family history.   Social History     Socioeconomic History    Marital status: Single     Spouse name: Not on file    Number of children: Not on file    Years of education: Not on file    Highest education level: Not on file 18   BILITOT <0.2   ALKPHOS 135*     Lipids: No results for input(s): CHOL, HDL in the last 72 hours. Invalid input(s): LDLCALCU  INR: No results for input(s): INR in the last 72 hours. Objective:     Vitals: /72   Pulse 62   Temp 97.9 °F (36.6 °C)   Resp 16   Ht 6' 3\" (1.905 m)   SpO2 97%   BMI 40.50 kg/m²      Intake/Output Summary (Last 24 hours) at 2022 1451  Last data filed at 2022 1345  Gross per 24 hour   Intake 1560 ml   Output 200 ml   Net 1360 ml    Temp (24hrs), Av.8 °F (36.6 °C), Min:97.2 °F (36.2 °C), Max:98.6 °F (37 °C)    Glucose:  No results for input(s): POCGLU in the last 72 hours. Physical Exam  Vitals reviewed. Constitutional:       Appearance: He is obese. HENT:      Head: Normocephalic and atraumatic. Nose: Nose normal.      Mouth/Throat:      Mouth: Mucous membranes are moist.   Eyes:      Extraocular Movements: Extraocular movements intact. Pupils: Pupils are equal, round, and reactive to light. Cardiovascular:      Rate and Rhythm: Normal rate and regular rhythm. Heart sounds: No murmur heard. Pulmonary:      Effort: Pulmonary effort is normal. No respiratory distress. Breath sounds: Normal breath sounds. No wheezing. Abdominal:      General: Abdomen is flat. Bowel sounds are normal. There is no distension. Tenderness: There is no abdominal tenderness. Musculoskeletal:         General: No swelling. Normal range of motion. Cervical back: Normal range of motion and neck supple. Skin:     General: Skin is warm and dry. Capillary Refill: Capillary refill takes less than 2 seconds. Comments: Diffuse dermatitis with evidence of tinea in bilateral axilla   Neurological:      General: No focal deficit present. Mental Status: He is alert and oriented to person, place, and time.    Psychiatric:         Mood and Affect: Mood normal.         Behavior: Behavior normal.           Assessment and Plan:     Primary Problem:  Sacral decubitus ulcer, stage III [facility]    Hospital Problem list:  Principal Problem:    Sacral decubitus ulcer, stage III ([facility] [address]  Active Problems:    Cellulitis  Resolved Problems:    * No resolved hospital problems. *      PMH:  Past Medical History:   Diagnosis Date    Arthritis     Cerebral artery occlusion with cerebral infarction ([facility] [address]     Depression     Difficult intubation     Hemorrhoids     History of blood transfusion     Hypertension     Kidney stone     Muscle spasticity     Prolonged emergence from general anesthesia     Retention of urine        Treatment Plan:  Sacral decubitus ulcer stage III  Reviewed wound care notes. Continue aggressive wound care. Wound culture positive but wound is actually improving per notes and they do not feel that it is infected. Likely all contaminants and no true cellulitis. Not currently on antibiotics. No leukocytosis. Hypertension  Stable on home medications    Old CVA  Continue current medications    Posttraumatic spasticity  Continue baclofen    Behavior disorder  Continue Risperdal    Disposition: Continue inpatient care, awaiting placement    Reviewed treatment plans with the patient and/or family. 30 minutes spent in face to face interaction and coordination of care.      Electronically signed by Lashell Valdez MD on 4/9/2022 at 2:51 PM

## 2022-04-10 PROCEDURE — 6370000000 HC RX 637 (ALT 250 FOR IP): Performed by: FAMILY MEDICINE

## 2022-04-10 PROCEDURE — 96376 TX/PRO/DX INJ SAME DRUG ADON: CPT

## 2022-04-10 PROCEDURE — 1210000000 HC MED SURG R&B

## 2022-04-10 PROCEDURE — 6360000002 HC RX W HCPCS: Performed by: FAMILY MEDICINE

## 2022-04-10 PROCEDURE — 96372 THER/PROPH/DIAG INJ SC/IM: CPT

## 2022-04-10 PROCEDURE — 2580000003 HC RX 258: Performed by: FAMILY MEDICINE

## 2022-04-10 PROCEDURE — G0378 HOSPITAL OBSERVATION PER HR: HCPCS

## 2022-04-10 RX ORDER — CLOTRIMAZOLE 1 %
CREAM (GRAM) TOPICAL 2 TIMES DAILY
Status: DISCONTINUED | OUTPATIENT
Start: 2022-04-10 | End: 2022-04-13 | Stop reason: HOSPADM

## 2022-04-10 RX ADMIN — ANORECTAL OINTMENT: 15.7; .44; 24; 20.6 OINTMENT TOPICAL at 14:23

## 2022-04-10 RX ADMIN — OXYCODONE AND ACETAMINOPHEN 1 TABLET: 10; 325 TABLET ORAL at 04:03

## 2022-04-10 RX ADMIN — POLYETHYLENE GLYCOL 3350 17 G: 17 POWDER, FOR SOLUTION ORAL at 07:59

## 2022-04-10 RX ADMIN — BACLOFEN 20 MG: 10 TABLET ORAL at 08:00

## 2022-04-10 RX ADMIN — PROPRANOLOL HYDROCHLORIDE 20 MG: 10 TABLET ORAL at 08:00

## 2022-04-10 RX ADMIN — Medication 1 TABLET: at 17:44

## 2022-04-10 RX ADMIN — GABAPENTIN 400 MG: 400 CAPSULE ORAL at 08:00

## 2022-04-10 RX ADMIN — CLOTRIMAZOLE: 0.01 CREAM TOPICAL at 13:34

## 2022-04-10 RX ADMIN — Medication 10 ML: at 20:10

## 2022-04-10 RX ADMIN — ASPIRIN 325 MG: 325 TABLET ORAL at 08:00

## 2022-04-10 RX ADMIN — RISPERIDONE 1 MG: 0.5 TABLET ORAL at 08:04

## 2022-04-10 RX ADMIN — DOCUSATE SODIUM 200 MG: 100 CAPSULE, LIQUID FILLED ORAL at 20:10

## 2022-04-10 RX ADMIN — CETIRIZINE HYDROCHLORIDE 5 MG: 10 TABLET, FILM COATED ORAL at 08:00

## 2022-04-10 RX ADMIN — AMANTADINE HYDROCHLORIDE 100 MG: 100 CAPSULE, LIQUID FILLED ORAL at 20:18

## 2022-04-10 RX ADMIN — DULOXETINE 60 MG: 30 CAPSULE, DELAYED RELEASE ORAL at 07:59

## 2022-04-10 RX ADMIN — ONDANSETRON 4 MG: 2 INJECTION INTRAMUSCULAR; INTRAVENOUS at 20:18

## 2022-04-10 RX ADMIN — Medication 5 MG: at 20:09

## 2022-04-10 RX ADMIN — TIZANIDINE 2 MG: 4 TABLET ORAL at 08:00

## 2022-04-10 RX ADMIN — Medication 1 TABLET: at 07:59

## 2022-04-10 RX ADMIN — POLYETHYLENE GLYCOL 3350 17 G: 17 POWDER, FOR SOLUTION ORAL at 20:11

## 2022-04-10 RX ADMIN — OXYCODONE HYDROCHLORIDE AND ACETAMINOPHEN 500 MG: 500 TABLET ORAL at 20:10

## 2022-04-10 RX ADMIN — PROPRANOLOL HYDROCHLORIDE 20 MG: 10 TABLET ORAL at 20:09

## 2022-04-10 RX ADMIN — DOCUSATE SODIUM 200 MG: 100 CAPSULE, LIQUID FILLED ORAL at 08:00

## 2022-04-10 RX ADMIN — BUSPIRONE HYDROCHLORIDE 10 MG: 10 TABLET ORAL at 07:59

## 2022-04-10 RX ADMIN — FLUTICASONE PROPIONATE 1 SPRAY: 50 SPRAY, METERED NASAL at 08:07

## 2022-04-10 RX ADMIN — TIZANIDINE 2 MG: 4 TABLET ORAL at 20:10

## 2022-04-10 RX ADMIN — ANORECTAL OINTMENT: 15.7; .44; 24; 20.6 OINTMENT TOPICAL at 08:07

## 2022-04-10 RX ADMIN — RISPERIDONE 1 MG: 0.5 TABLET ORAL at 20:10

## 2022-04-10 RX ADMIN — BACLOFEN 20 MG: 10 TABLET ORAL at 20:10

## 2022-04-10 RX ADMIN — CLOTRIMAZOLE: 0.01 CREAM TOPICAL at 20:14

## 2022-04-10 RX ADMIN — ENOXAPARIN SODIUM 40 MG: 100 INJECTION SUBCUTANEOUS at 08:01

## 2022-04-10 RX ADMIN — Medication 10 ML: at 08:07

## 2022-04-10 RX ADMIN — GABAPENTIN 400 MG: 400 CAPSULE ORAL at 14:23

## 2022-04-10 RX ADMIN — Medication 2000 UNITS: at 08:00

## 2022-04-10 RX ADMIN — GABAPENTIN 400 MG: 400 CAPSULE ORAL at 20:10

## 2022-04-10 RX ADMIN — BACLOFEN 20 MG: 10 TABLET ORAL at 14:23

## 2022-04-10 RX ADMIN — OXYCODONE HYDROCHLORIDE AND ACETAMINOPHEN 500 MG: 500 TABLET ORAL at 08:04

## 2022-04-10 RX ADMIN — ANORECTAL OINTMENT: 15.7; .44; 24; 20.6 OINTMENT TOPICAL at 20:15

## 2022-04-10 RX ADMIN — PANTOPRAZOLE SODIUM 40 MG: 40 TABLET, DELAYED RELEASE ORAL at 07:30

## 2022-04-10 RX ADMIN — AMANTADINE HYDROCHLORIDE 100 MG: 100 CAPSULE, LIQUID FILLED ORAL at 08:04

## 2022-04-10 RX ADMIN — BACLOFEN 20 MG: 10 TABLET ORAL at 17:44

## 2022-04-10 RX ADMIN — BUSPIRONE HYDROCHLORIDE 10 MG: 10 TABLET ORAL at 20:10

## 2022-04-10 ASSESSMENT — PAIN SCALES - GENERAL
PAINLEVEL_OUTOF10: 0
PAINLEVEL_OUTOF10: 0
PAINLEVEL_OUTOF10: 10

## 2022-04-10 ASSESSMENT — PAIN DESCRIPTION - PROGRESSION: CLINICAL_PROGRESSION: NOT CHANGED

## 2022-04-10 ASSESSMENT — ENCOUNTER SYMPTOMS: SHORTNESS OF BREATH: 0

## 2022-04-10 NOTE — PROGRESS NOTES
Daily Progress Note  Summer Aguilar  MRN: [id number] LOS: 3    Admit Date: 4/7/2022   4/10/2022 11:27 AM    Subjective:          Chief Complaint:  Chief Complaint   Patient presents with    Wound Infection     sacral wound, chronic. wound care md sent for hospital admission       Interval History:    Reviewed overnight events and nursing notes. Patient resting comfortably in the room. Continues to have soreness from his sacral ulcer. No other new complaints. Review of Systems   Constitutional: Negative for chills and fever. Respiratory: Negative for shortness of breath. Cardiovascular: Negative for chest pain. Musculoskeletal:        Sacral pain       DIET:  ADULT ORAL NUTRITION SUPPLEMENT; Lunch, Dinner; Low Calorie/High Protein Oral Supplement  ADULT DIET; Regular    Medications:      sodium chloride        clotrimazole   Topical BID    menthol-zinc oxide   Topical TID    sodium chloride flush  5-40 mL IntraVENous 2 times per day    amantadine  100 mg Oral BID    ascorbic acid  500 mg Oral BID    aspirin  325 mg Oral Daily    baclofen  20 mg Oral 4x Daily    busPIRone  10 mg Oral BID    calcium-cholecalciferol  1 tablet Oral BID WC    Vitamin D  2,000 Units Oral Daily    pantoprazole  40 mg Oral QAM AC    docusate sodium  200 mg Oral BID    DULoxetine  60 mg Oral Daily    fluticasone  1 spray Each Nostril BID    gabapentin  400 mg Oral TID    lisinopril  10 mg Oral Daily    cetirizine  5 mg Oral Daily    melatonin  5 mg Oral Nightly    polyethylene glycol  17 g Oral BID    risperiDONE  1 mg Oral BID    tiZANidine  2 mg Oral BID    propranolol  20 mg Oral BID    enoxaparin  40 mg SubCUTAneous Daily       Data:     Code Status: Full Code    History reviewed. No pertinent family history.   Social History     Socioeconomic History    Marital status: Single     Spouse name: Not on file    Number of children: Not on file    Years of education: Not on file    Highest education level: Not on file   Occupational History    Not on file   Tobacco Use    Smoking status: Never Smoker    Smokeless tobacco: Never Used   Vaping Use    Vaping Use: Never used   Substance and Sexual Activity    Alcohol use: No    Drug use: No    Sexual activity: Not on file   Other Topics Concern    Not on file   Social History Narrative    Not on file     Social Determinants of Health     Financial Resource Strain:     Difficulty of Paying Living Expenses: Not on file   Food Insecurity:     Worried About Running Out of Food in the Last Year: Not on file    Deena of Food in the Last Year: Not on file   Transportation Needs:     Lack of Transportation (Medical): Not on file    Lack of Transportation (Non-Medical):  Not on file   Physical Activity:     Days of Exercise per Week: Not on file    Minutes of Exercise per Session: Not on file   Stress:     Feeling of Stress : Not on file   Social Connections:     Frequency of Communication with Friends and Family: Not on file    Frequency of Social Gatherings with Friends and Family: Not on file    Attends [employer] Services: Not on file    Active Member of [address] [place] [address] or Organizations: Not on file    Attends Club or Organization Meetings: Not on file    Marital Status: Not on file   Intimate Partner Violence:     Fear of Current or Ex-Partner: Not on file    Emotionally Abused: Not on file    Physically Abused: Not on file    Sexually Abused: Not on file   Housing Stability:     Unable to Pay for Housing in the Last Year: Not on file    Number of [place] in the Last Year: Not on file    Unstable Housing in the Last Year: Not on file       Labs:  CBC:   Recent Labs     04/07/22  1454 04/09/22  0209   WBC 6.5 5.4   HGB 11.8* 10.7*    231     BMP:    Recent Labs     04/07/22  1454 04/09/22  0209    135*   K 4.5 4.3    101   CO2 28 26   BUN 23* 16   CREATININE 1.1 0.8   GLUCOSE 88 110*     Hepatic:   Recent Labs 22  1454   AST 13   ALT 18   BILITOT <0.2   ALKPHOS 135*     Lipids: No results for input(s): CHOL, HDL in the last 72 hours. Invalid input(s): LDLCALCU  INR: No results for input(s): INR in the last 72 hours. Objective:     Vitals: /75   Pulse 88   Temp 97.5 °F (36.4 °C) (Temporal)   Resp 18   Ht 6' 3\" (1.905 m)   Wt (!) 303 lb 7 oz (137.6 kg)   SpO2 92%   BMI 37.93 kg/m²      Intake/Output Summary (Last 24 hours) at 4/10/2022 1127  Last data filed at 4/10/2022 1039  Gross per 24 hour   Intake 2710 ml   Output 1825 ml   Net 885 ml    Temp (24hrs), Av.7 °F (36.5 °C), Min:97.3 °F (36.3 °C), Max:97.9 °F (36.6 °C)    Glucose:  No results for input(s): POCGLU in the last 72 hours. Physical Exam  Vitals reviewed. Constitutional:       Appearance: He is obese. HENT:      Head: Normocephalic and atraumatic. Nose: Nose normal.      Mouth/Throat:      Mouth: Mucous membranes are moist.   Eyes:      Extraocular Movements: Extraocular movements intact. Pupils: Pupils are equal, round, and reactive to light. Cardiovascular:      Rate and Rhythm: Normal rate and regular rhythm. Heart sounds: No murmur heard. Pulmonary:      Effort: Pulmonary effort is normal. No respiratory distress. Breath sounds: Normal breath sounds. No wheezing. Abdominal:      General: Abdomen is flat. Bowel sounds are normal. There is no distension. Tenderness: There is no abdominal tenderness. Musculoskeletal:         General: No swelling. Normal range of motion. Cervical back: Normal range of motion and neck supple. Skin:     General: Skin is warm and dry. Capillary Refill: Capillary refill takes less than 2 seconds. Comments: Diffuse dermatitis with evidence of tinea in bilateral axilla   Neurological:      General: No focal deficit present. Mental Status: He is alert and oriented to person, place, and time.    Psychiatric:         Mood and Affect: Mood normal. Behavior: Behavior normal.           Assessment and Plan:     Primary Problem:  Sacral decubitus ulcer, stage III [facility]    Hospital Problem list:  Principal Problem:    Sacral decubitus ulcer, stage III ([facility])  Active Problems:    Post-traumatic spasticity    Class 3 severe obesity due to excess calories without serious comorbidity in adult [facility])    Cellulitis    Hypertension    Cognitive deficit due to old cerebrovascular accident (CVA)  Resolved Problems:    * No resolved hospital problems. *      PMH:  Past Medical History:   Diagnosis Date    Arthritis     Cerebral artery occlusion with cerebral infarction ([facility] [address]     Depression     Difficult intubation     Hemorrhoids     History of blood transfusion     Hypertension     Kidney stone     Muscle spasticity     Prolonged emergence from general anesthesia     Retention of urine        Treatment Plan:  Sacral decubitus ulcer stage III  Reviewed wound care notes. Continue aggressive wound care. Wound culture positive but wound is actually improving per notes and they do not feel that it is infected. Likely all contaminants and no true cellulitis. Not currently on antibiotics. No leukocytosis. Hypertension  Stable on home medications    Old CVA  Continue current medications    Posttraumatic spasticity  Continue baclofen    Behavior disorder  Continue Risperdal    Tinea  Axillary tinea. Start Lotrimin cream.  May need oral antifungal    Disposition: Continue inpatient care, awaiting placement    Reviewed treatment plans with the patient and/or family. 20 minutes spent in face to face interaction and coordination of care.      Electronically signed by Jade Hou MD on 4/10/2022 at 11:27 AM

## 2022-04-11 PROCEDURE — G0378 HOSPITAL OBSERVATION PER HR: HCPCS

## 2022-04-11 PROCEDURE — 2580000003 HC RX 258: Performed by: FAMILY MEDICINE

## 2022-04-11 PROCEDURE — 6370000000 HC RX 637 (ALT 250 FOR IP): Performed by: FAMILY MEDICINE

## 2022-04-11 PROCEDURE — 6360000002 HC RX W HCPCS: Performed by: FAMILY MEDICINE

## 2022-04-11 PROCEDURE — 96372 THER/PROPH/DIAG INJ SC/IM: CPT

## 2022-04-11 RX ADMIN — Medication 10 ML: at 20:33

## 2022-04-11 RX ADMIN — ENOXAPARIN SODIUM 40 MG: 100 INJECTION SUBCUTANEOUS at 08:08

## 2022-04-11 RX ADMIN — BACLOFEN 20 MG: 10 TABLET ORAL at 18:31

## 2022-04-11 RX ADMIN — PROPRANOLOL HYDROCHLORIDE 20 MG: 10 TABLET ORAL at 20:31

## 2022-04-11 RX ADMIN — CLOTRIMAZOLE: 0.01 CREAM TOPICAL at 08:16

## 2022-04-11 RX ADMIN — BUSPIRONE HYDROCHLORIDE 10 MG: 10 TABLET ORAL at 08:09

## 2022-04-11 RX ADMIN — AMANTADINE HYDROCHLORIDE 100 MG: 100 CAPSULE, LIQUID FILLED ORAL at 08:09

## 2022-04-11 RX ADMIN — GABAPENTIN 400 MG: 400 CAPSULE ORAL at 08:08

## 2022-04-11 RX ADMIN — PANTOPRAZOLE SODIUM 40 MG: 40 TABLET, DELAYED RELEASE ORAL at 05:18

## 2022-04-11 RX ADMIN — BACLOFEN 20 MG: 10 TABLET ORAL at 20:31

## 2022-04-11 RX ADMIN — OXYCODONE HYDROCHLORIDE AND ACETAMINOPHEN 500 MG: 500 TABLET ORAL at 20:32

## 2022-04-11 RX ADMIN — Medication 5 MG: at 20:31

## 2022-04-11 RX ADMIN — RISPERIDONE 1 MG: 0.5 TABLET ORAL at 20:31

## 2022-04-11 RX ADMIN — LISINOPRIL 10 MG: 10 TABLET ORAL at 08:09

## 2022-04-11 RX ADMIN — CETIRIZINE HYDROCHLORIDE 5 MG: 10 TABLET, FILM COATED ORAL at 08:09

## 2022-04-11 RX ADMIN — POLYETHYLENE GLYCOL 3350 17 G: 17 POWDER, FOR SOLUTION ORAL at 08:10

## 2022-04-11 RX ADMIN — RISPERIDONE 1 MG: 0.5 TABLET ORAL at 08:08

## 2022-04-11 RX ADMIN — DOCUSATE SODIUM 200 MG: 100 CAPSULE, LIQUID FILLED ORAL at 20:31

## 2022-04-11 RX ADMIN — DOCUSATE SODIUM 200 MG: 100 CAPSULE, LIQUID FILLED ORAL at 08:08

## 2022-04-11 RX ADMIN — TIZANIDINE 2 MG: 4 TABLET ORAL at 08:09

## 2022-04-11 RX ADMIN — GABAPENTIN 400 MG: 400 CAPSULE ORAL at 20:32

## 2022-04-11 RX ADMIN — ANORECTAL OINTMENT: 15.7; .44; 24; 20.6 OINTMENT TOPICAL at 20:32

## 2022-04-11 RX ADMIN — Medication 1 TABLET: at 18:31

## 2022-04-11 RX ADMIN — ANORECTAL OINTMENT: 15.7; .44; 24; 20.6 OINTMENT TOPICAL at 08:25

## 2022-04-11 RX ADMIN — GABAPENTIN 400 MG: 400 CAPSULE ORAL at 15:57

## 2022-04-11 RX ADMIN — BACLOFEN 20 MG: 10 TABLET ORAL at 15:57

## 2022-04-11 RX ADMIN — Medication 2000 UNITS: at 08:10

## 2022-04-11 RX ADMIN — TIZANIDINE 2 MG: 4 TABLET ORAL at 20:34

## 2022-04-11 RX ADMIN — DULOXETINE 60 MG: 30 CAPSULE, DELAYED RELEASE ORAL at 08:07

## 2022-04-11 RX ADMIN — BACLOFEN 20 MG: 10 TABLET ORAL at 08:09

## 2022-04-11 RX ADMIN — PROPRANOLOL HYDROCHLORIDE 20 MG: 10 TABLET ORAL at 08:07

## 2022-04-11 RX ADMIN — OXYCODONE HYDROCHLORIDE AND ACETAMINOPHEN 500 MG: 500 TABLET ORAL at 08:08

## 2022-04-11 RX ADMIN — ANORECTAL OINTMENT: 15.7; .44; 24; 20.6 OINTMENT TOPICAL at 15:58

## 2022-04-11 RX ADMIN — BUSPIRONE HYDROCHLORIDE 10 MG: 10 TABLET ORAL at 20:32

## 2022-04-11 RX ADMIN — ASPIRIN 325 MG: 325 TABLET ORAL at 08:09

## 2022-04-11 RX ADMIN — Medication 1 TABLET: at 08:09

## 2022-04-11 RX ADMIN — Medication 10 ML: at 08:10

## 2022-04-11 RX ADMIN — CLOTRIMAZOLE: 0.01 CREAM TOPICAL at 20:32

## 2022-04-11 RX ADMIN — AMANTADINE HYDROCHLORIDE 100 MG: 100 CAPSULE, LIQUID FILLED ORAL at 20:31

## 2022-04-11 ASSESSMENT — PAIN SCALES - GENERAL: PAINLEVEL_OUTOF10: 0

## 2022-04-11 NOTE — PROGRESS NOTES
Comprehensive Nutrition Assessment    Type and Reason for Visit:  Reassess    Nutrition Recommendations/Plan: Continue current POC. Nutrition Assessment:  Pt remains at risk for nutrition compromise d/t wounds. Appetite is adequate, >75%. Per MD, wound is improving and is not infected. Continue current diet, ONS, and plan of care. Will continue to monitor. Malnutrition Assessment:  Malnutrition Status: At risk for malnutrition (Comment)    Context:  Acute Illness     Findings of the 6 clinical characteristics of malnutrition:  Energy Intake:  No significant decrease in energy intake  Weight Loss:  No significant weight loss     Body Fat Loss:  No significant body fat loss     Muscle Mass Loss:  No significant muscle mass loss    Fluid Accumulation:  1 - Mild Extremities   Strength:  Not Performed    Estimated Daily Nutrient Needs:  Energy (kcal):  3097-1957; Weight Used for Energy Requirements:  Admission     Protein (g):  106-134; Weight Used for Protein Requirements:  Ideal        Fluid (ml/day):  2493-2269; Method Used for Fluid Requirements:  1 ml/kcal      Nutrition Related Findings:  +2 BLE edema      Wounds:  Pressure Injury,Stage III       Current Nutrition Therapies:    ADULT ORAL NUTRITION SUPPLEMENT; Lunch, Dinner; Low Calorie/High Protein Oral Supplement  ADULT DIET;  Regular    Anthropometric Measures:  · Height: 6' 3\" (190.5 cm)  · Current Body Weight: 303 lb (137.4 kg)   · Admission Body Weight: 324 lb (147 kg) (stated)    · Usual Body Weight: 324 lb (147 kg) (12/15/2021)     · Ideal Body Weight: 196 lbs; % Ideal Body Weight 165.3 %   · BMI: 37.9  · BMI Categories: Obese Class 2 (BMI 35.0 -39.9)       Nutrition Diagnosis:   · Increased nutrient needs related to increase demand for energy/nutrients as evidenced by wounds    Nutrition Interventions:   Food and/or Nutrient Delivery:  Continue Current Diet,Continue Oral Nutrition Supplement  Nutrition Education/Counseling:  No recommendation at this time   Coordination of Nutrition Care:  Continue to monitor while inpatient    Goals:  PO >75%, wound improvement       Nutrition Monitoring and Evaluation:   Food/Nutrient Intake Outcomes:  Food and Nutrient Intake,Supplement Intake  Physical Signs/Symptoms Outcomes:  Biochemical Data,Skin,Nutrition Focused Physical Findings,Weight,Fluid Status or Edema,Hemodynamic Status     Electronically signed by Melina Browne on 4/11/22 at 11:18 AM CDT    Contact: [phone]

## 2022-04-11 NOTE — CARE COORDINATION
4/11/22: SW received return call from Kerri at [facility]; they can accept Pt tomorrow morning when his bed arrives.   [facility]  [phone] Fax for admissions  Electronically signed by VIOLA Thompson on 4/11/2022 at 3:25 PM

## 2022-04-11 NOTE — PROGRESS NOTES
Progress Note  Clair Thomas  4/11/2022 6:45 PM  Subjective:   Admit Date:   4/7/2022      CC/ADMIT DX:       Interval History:   Reviewed overnight events and nursing notes. He has no new medical complaints. No new pain. No CP or SOA. I have reviewed all labs/diagnostics from the last 24hrs. ROS:   I have done a 10 point ROS and all are negative, except what is mentioned in the HPI. ADULT ORAL NUTRITION SUPPLEMENT; Lunch, Dinner; Low Calorie/High Protein Oral Supplement  ADULT DIET;  Regular    Medications:      sodium chloride        clotrimazole   Topical BID    menthol-zinc oxide   Topical TID    sodium chloride flush  5-40 mL IntraVENous 2 times per day    amantadine  100 mg Oral BID    ascorbic acid  500 mg Oral BID    aspirin  325 mg Oral Daily    baclofen  20 mg Oral 4x Daily    busPIRone  10 mg Oral BID    calcium-cholecalciferol  1 tablet Oral BID WC    Vitamin D  2,000 Units Oral Daily    pantoprazole  40 mg Oral QAM AC    docusate sodium  200 mg Oral BID    DULoxetine  60 mg Oral Daily    fluticasone  1 spray Each Nostril BID    gabapentin  400 mg Oral TID    lisinopril  10 mg Oral Daily    cetirizine  5 mg Oral Daily    melatonin  5 mg Oral Nightly    polyethylene glycol  17 g Oral BID    risperiDONE  1 mg Oral BID    tiZANidine  2 mg Oral BID    propranolol  20 mg Oral BID    enoxaparin  40 mg SubCUTAneous Daily           Objective:   Vitals: BP (!) 123/97   Pulse 122   Temp 98.4 °F (36.9 °C)   Resp 18   Ht 6' 3\" (1.905 m)   Wt (!) 303 lb 7 oz (137.6 kg)   SpO2 95%   BMI 37.93 kg/m²      Intake/Output Summary (Last 24 hours) at 4/11/2022 1845  Last data filed at 4/11/2022 1000  Gross per 24 hour   Intake 240 ml   Output 425 ml   Net -185 ml     General appearance: alert and cooperative with exam  Lungs: clear to auscultation bilaterally  Heart: RRR  Abdomen: soft, non-tender; bowel sounds normal; no masses,  no organomegaly  Extremities: extremities normal, atraumatic, no cyanosis or edema  Neurologic:  No obvious focal neurologic deficits. Assessment and Plan:   Principal Problem:    Sacral decubitus ulcer, stage III ([facility])  Active Problems:    Post-traumatic spasticity    Class 3 severe obesity due to excess calories without serious comorbidity in [facility])    Cellulitis    Hypertension    Cognitive deficit due to old cerebrovascular accident (CVA)  Resolved Problems:    * No resolved hospital problems. *      Plan:  1. Continue present medication(s)   2. Follow with wound care  3. D/C planning ongoing      Discharge planning:   a skilled nursing facility     Reviewed treatment plans with the patient and/or family.              Electronically signed by Rahel Galdamez MD on 4/11/2022 at 6:45 PM

## 2022-04-11 NOTE — PLAN OF CARE
Nutrition Problem #1: Increased nutrient needs  Intervention: Food and/or Nutrient Delivery: Continue Current Diet,Continue Oral Nutrition Supplement  Nutritional Goals: PO >75%, wound improvement    Problem: Nutrition  Goal: Optimal nutrition therapy  Outcome: Ongoing

## 2022-04-12 VITALS
HEIGHT: 75 IN | BODY MASS INDEX: 37.73 KG/M2 | TEMPERATURE: 97.3 F | SYSTOLIC BLOOD PRESSURE: 138 MMHG | RESPIRATION RATE: 18 BRPM | OXYGEN SATURATION: 92 % | HEART RATE: 95 BPM | WEIGHT: 303.44 LBS | DIASTOLIC BLOOD PRESSURE: 85 MMHG

## 2022-04-12 LAB
BLOOD CULTURE, ROUTINE: NORMAL
CULTURE, BLOOD 2: NORMAL
GRAM STAIN RESULT: ABNORMAL
ORGANISM: ABNORMAL
WOUND/ABSCESS: ABNORMAL

## 2022-04-12 PROCEDURE — 2580000003 HC RX 258: Performed by: FAMILY MEDICINE

## 2022-04-12 PROCEDURE — 6360000002 HC RX W HCPCS: Performed by: FAMILY MEDICINE

## 2022-04-12 PROCEDURE — 6370000000 HC RX 637 (ALT 250 FOR IP): Performed by: FAMILY MEDICINE

## 2022-04-12 PROCEDURE — 96372 THER/PROPH/DIAG INJ SC/IM: CPT

## 2022-04-12 PROCEDURE — G0378 HOSPITAL OBSERVATION PER HR: HCPCS

## 2022-04-12 RX ORDER — CLOTRIMAZOLE 1 %
CREAM (GRAM) TOPICAL
Qty: 1 EACH | Refills: 0 | DISCHARGE
Start: 2022-04-12 | End: 2022-04-19

## 2022-04-12 RX ADMIN — LISINOPRIL 10 MG: 10 TABLET ORAL at 08:15

## 2022-04-12 RX ADMIN — BUSPIRONE HYDROCHLORIDE 10 MG: 10 TABLET ORAL at 08:15

## 2022-04-12 RX ADMIN — ASPIRIN 325 MG: 325 TABLET ORAL at 08:15

## 2022-04-12 RX ADMIN — BACLOFEN 20 MG: 10 TABLET ORAL at 14:39

## 2022-04-12 RX ADMIN — BACLOFEN 20 MG: 10 TABLET ORAL at 18:39

## 2022-04-12 RX ADMIN — BACLOFEN 20 MG: 10 TABLET ORAL at 08:14

## 2022-04-12 RX ADMIN — Medication 10 ML: at 08:18

## 2022-04-12 RX ADMIN — Medication 1 TABLET: at 08:15

## 2022-04-12 RX ADMIN — GABAPENTIN 400 MG: 400 CAPSULE ORAL at 08:14

## 2022-04-12 RX ADMIN — Medication 2000 UNITS: at 08:15

## 2022-04-12 RX ADMIN — OXYCODONE HYDROCHLORIDE AND ACETAMINOPHEN 500 MG: 500 TABLET ORAL at 08:15

## 2022-04-12 RX ADMIN — ANORECTAL OINTMENT: 15.7; .44; 24; 20.6 OINTMENT TOPICAL at 08:19

## 2022-04-12 RX ADMIN — GABAPENTIN 400 MG: 400 CAPSULE ORAL at 14:39

## 2022-04-12 RX ADMIN — DOCUSATE SODIUM 200 MG: 100 CAPSULE, LIQUID FILLED ORAL at 08:15

## 2022-04-12 RX ADMIN — DULOXETINE 60 MG: 30 CAPSULE, DELAYED RELEASE ORAL at 08:15

## 2022-04-12 RX ADMIN — OXYCODONE AND ACETAMINOPHEN 1 TABLET: 10; 325 TABLET ORAL at 06:17

## 2022-04-12 RX ADMIN — FLUTICASONE PROPIONATE 1 SPRAY: 50 SPRAY, METERED NASAL at 08:30

## 2022-04-12 RX ADMIN — ANORECTAL OINTMENT: 15.7; .44; 24; 20.6 OINTMENT TOPICAL at 14:38

## 2022-04-12 RX ADMIN — AMANTADINE HYDROCHLORIDE 100 MG: 100 CAPSULE, LIQUID FILLED ORAL at 08:15

## 2022-04-12 RX ADMIN — ONDANSETRON 4 MG: 4 TABLET, ORALLY DISINTEGRATING ORAL at 06:19

## 2022-04-12 RX ADMIN — POLYETHYLENE GLYCOL 3350 17 G: 17 POWDER, FOR SOLUTION ORAL at 08:31

## 2022-04-12 RX ADMIN — RISPERIDONE 1 MG: 0.5 TABLET ORAL at 08:13

## 2022-04-12 RX ADMIN — CETIRIZINE HYDROCHLORIDE 5 MG: 10 TABLET, FILM COATED ORAL at 08:14

## 2022-04-12 RX ADMIN — PROPRANOLOL HYDROCHLORIDE 20 MG: 10 TABLET ORAL at 08:31

## 2022-04-12 RX ADMIN — Medication 1 TABLET: at 18:39

## 2022-04-12 RX ADMIN — CLOTRIMAZOLE: 0.01 CREAM TOPICAL at 08:13

## 2022-04-12 RX ADMIN — ENOXAPARIN SODIUM 40 MG: 100 INJECTION SUBCUTANEOUS at 08:15

## 2022-04-12 RX ADMIN — TIZANIDINE 2 MG: 4 TABLET ORAL at 08:14

## 2022-04-12 RX ADMIN — PANTOPRAZOLE SODIUM 40 MG: 40 TABLET, DELAYED RELEASE ORAL at 06:15

## 2022-04-12 ASSESSMENT — PAIN DESCRIPTION - ONSET: ONSET: GRADUAL

## 2022-04-12 ASSESSMENT — PAIN DESCRIPTION - LOCATION: LOCATION: GENERALIZED

## 2022-04-12 ASSESSMENT — PAIN DESCRIPTION - FREQUENCY: FREQUENCY: INTERMITTENT

## 2022-04-12 ASSESSMENT — PAIN SCALES - GENERAL
PAINLEVEL_OUTOF10: 10
PAINLEVEL_OUTOF10: 0

## 2022-04-12 ASSESSMENT — PAIN DESCRIPTION - PAIN TYPE: TYPE: CHRONIC PAIN

## 2022-04-12 ASSESSMENT — PAIN DESCRIPTION - DESCRIPTORS: DESCRIPTORS: ACHING;CONSTANT

## 2022-04-12 ASSESSMENT — PAIN DESCRIPTION - PROGRESSION: CLINICAL_PROGRESSION: NOT CHANGED

## 2022-04-12 ASSESSMENT — PAIN - FUNCTIONAL ASSESSMENT: PAIN_FUNCTIONAL_ASSESSMENT: ACTIVITIES ARE NOT PREVENTED

## 2022-04-12 NOTE — CARE COORDINATION
ISHAAN placed return call to Pt's mother, Chantal Negron; she was aware that Pt is set to be d/c'd to [facility] today; ISHAAN answered questions that she had      Electronically signed by VIOLA Mcclain on 4/12/2022 at 10:44 AM

## 2022-04-12 NOTE — DISCHARGE SUMMARY
Hospital Discharge Summary    Cesario Dancer  :  1990  MRN:  [id number]    Admit date:  2022  Discharge date:      Admitting Physician:    Chico Burns MD    Discharge Diagnoses:    Principal Problem:    Sacral decubitus ulcer, stage III (HCC)  Active Problems:    Post-traumatic spasticity    Class 3 severe obesity due to excess calories without serious comorbidity in adult [facility])    Cellulitis    Hypertension    Cognitive deficit due to old cerebrovascular accident (CVA)  Resolved Problems:    * No resolved hospital problems. [facility] Course:   He was admitted with chronic sacral wound. He has been seen by Wound Care, and orders have been given for care. Wound care believes wound is improved and does not need antibiotics. He has remained stable. He is eating. He will be d/c to Nursing Home for further care, wound care. His VS are stable. Discharge Medications:         Medication List      START taking these medications    clotrimazole 1 % cream  Commonly known as: LOTRIMIN  Apply topically 2 times daily. menthol-zinc oxide 0.44-20.6 % Oint ointment  Commonly known as: CALMOSEPTINE  Apply topically 3 times daily for 7 days Max 30 ml per day. CHANGE how you take these medications    ketoconazole 2 % shampoo  Commonly known as: NIZORAL  What changed: Another medication with the same name was removed. Continue taking this medication, and follow the directions you see here. tiZANidine 4 MG tablet  Commonly known as: Enid Doan  What changed: Another medication with the same name was removed. Continue taking this medication, and follow the directions you see here.         CONTINUE taking these medications    Amantadine 100 MG Tabs tablet  Commonly known as: SYMMETREL     aspirin 325 MG tablet     azelastine HCl 0.15 % Soln     baclofen 20 MG tablet  Commonly known as: LIORESAL     busPIRone 10 MG tablet  Commonly known as: BUSPAR     calcium-vitamin D 500-200 MG-UNIT per tablet Dexilant 60 MG Cpdr delayed release capsule  Generic drug: dexlansoprazole     docusate sodium 100 MG capsule  Commonly known as: COLACE     DULoxetine 60 MG extended release capsule  Commonly known as: CYMBALTA     fish oil 1000 MG Caps     fluticasone 50 MCG/ACT nasal spray  Commonly known as: FLONASE     gabapentin 400 MG capsule  Commonly known as: NEURONTIN     lisinopril 10 MG tablet  Commonly known as: PRINIVIL;ZESTRIL     loratadine 10 MG capsule  Commonly known as: CLARITIN     melatonin 5 MG Tabs tablet     oxyCODONE-acetaminophen  MG per tablet  Commonly known as: PERCOCET     polyethylene glycol 17 GM/SCOOP powder  Commonly known as: GLYCOLAX     propranolol 20 MG tablet  Commonly known as: INDERAL     risperiDONE 1 MG tablet  Commonly known as: RISPERDAL     therapeutic multivitamin-minerals tablet     Vitamin C 500 MG Caps     VITAMIN D3 PO        STOP taking these medications    acetaminophen 500 MG tablet  Commonly known as: TYLENOL     Belsomra 20 MG Tabs  Generic drug: Suvorexant     chlorproMAZINE 25 MG tablet  Commonly known as: THORAZINE     citalopram 40 MG tablet  Commonly known as: CELEXA     guaiFENesin 400 MG tablet     hydrocortisone 2.5 % cream     hydrOXYzine 50 MG tablet  Commonly known as: ATARAX     loperamide 2 MG capsule  Commonly known as: IMODIUM     meloxicam 7.5 MG tablet  Commonly known as: MOBIC     miconazole 2 % cream  Commonly known as: MICOTIN     nystatin 067219 UNIT/GM cream  Commonly known as: MYCOSTATIN     olive oil external oil     vitamin E 400 UNIT capsule     zinc oxide 20 % ointment           Where to Get Your Medications      Information about where to get these medications is not yet available    Ask your nurse or doctor about these medications  · clotrimazole 1 % cream  · menthol-zinc oxide 0.44-20.6 % Oint ointment         Consults:  Wound Care    Significant Diagnostic Studies:  See records from stay      Disposition:    To Nursing Home in stable condition by Ambulance  Follow up with Gertrude Clancy MD in 2 weeks. F/U with Wound Care 4/19    Diet: as tolerated    Activity: needs assistance with all activity    Special Instructions: Recommendation for wound care:     Place on low air loss mattress. Utilize [employer] in [place]. Turn and position to 30 degrees lateral such that pressure is relieved from affect area.       BUTTOCKS: Clean with soap and water. Pat dry. Apply Calmoseptine to wounds 3 times per day and prn soiling or bathing. The Facility, patient or family are to call or return if there are any problems, questions, concerns or change in his  condition.      Signed:  Gertrude Clancy MD MD  4/12/2022, 1:48 PM

## 2022-04-12 NOTE — PLAN OF CARE
Problem: Skin Integrity:  Goal: Will show no infection signs and symptoms  Description: Will show no infection signs and symptoms  Outcome: Completed  Goal: Absence of new skin breakdown  Description: Absence of new skin breakdown  Outcome: Completed     Problem: Falls - Risk of:  Goal: Will remain free from falls  Description: Will remain free from falls  Outcome: Completed  Goal: Absence of physical injury  Description: Absence of physical injury  Outcome: Completed     Problem: Nutrition  Goal: Optimal nutrition therapy  Outcome: Completed

## 2022-04-12 NOTE — PLAN OF CARE
Problem: Skin Integrity:  Goal: Will show no infection signs and symptoms  Description: Will show no infection signs and symptoms  Outcome: Ongoing  Goal: Absence of new skin breakdown  Description: Absence of new skin breakdown  Outcome: Ongoing     Problem: Falls - Risk of:  Goal: Will remain free from falls  Description: Will remain free from falls  Outcome: Ongoing  Goal: Absence of physical injury  Description: Absence of physical injury  Outcome: Ongoing     Problem: Nutrition  Goal: Optimal nutrition therapy  4/12/2022 0045 by Jack Campa RN  Outcome: Ongoing  4/11/2022 1118 by Eugenio Burton  Outcome: Ongoing

## 2022-05-20 ENCOUNTER — LAB REQUISITION (OUTPATIENT)
Dept: LAB | Facility: HOSPITAL | Age: 32
End: 2022-05-20

## 2022-05-20 DIAGNOSIS — Z00.00 ENCOUNTER FOR GENERAL ADULT MEDICAL EXAMINATION WITHOUT ABNORMAL FINDINGS: ICD-10-CM

## 2022-05-20 LAB
ALBUMIN SERPL-MCNC: 4.5 G/DL (ref 3.5–5.2)
ALBUMIN/GLOB SERPL: 1.2 G/DL
ALP SERPL-CCNC: 159 U/L (ref 39–117)
ALT SERPL W P-5'-P-CCNC: 18 U/L (ref 1–41)
AMMONIA BLD-SCNC: 17 UMOL/L (ref 16–60)
ANION GAP SERPL CALCULATED.3IONS-SCNC: 13 MMOL/L (ref 5–15)
AST SERPL-CCNC: 11 U/L (ref 1–40)
BASOPHILS # BLD AUTO: 0.05 10*3/MM3 (ref 0–0.2)
BASOPHILS NFR BLD AUTO: 0.8 % (ref 0–1.5)
BILIRUB SERPL-MCNC: 0.2 MG/DL (ref 0–1.2)
BUN SERPL-MCNC: 29 MG/DL (ref 6–20)
BUN/CREAT SERPL: 21.5 (ref 7–25)
CALCIUM SPEC-SCNC: 9.4 MG/DL (ref 8.6–10.5)
CHLORIDE SERPL-SCNC: 105 MMOL/L (ref 98–107)
CO2 SERPL-SCNC: 26 MMOL/L (ref 22–29)
CREAT SERPL-MCNC: 1.35 MG/DL (ref 0.76–1.27)
DEPRECATED RDW RBC AUTO: 46.4 FL (ref 37–54)
EGFRCR SERPLBLD CKD-EPI 2021: 70.7 ML/MIN/1.73
EOSINOPHIL # BLD AUTO: 0.14 10*3/MM3 (ref 0–0.4)
EOSINOPHIL NFR BLD AUTO: 2.2 % (ref 0.3–6.2)
ERYTHROCYTE [DISTWIDTH] IN BLOOD BY AUTOMATED COUNT: 14.5 % (ref 12.3–15.4)
GLOBULIN UR ELPH-MCNC: 3.7 GM/DL
GLUCOSE SERPL-MCNC: 89 MG/DL (ref 65–99)
HCT VFR BLD AUTO: 38.3 % (ref 37.5–51)
HGB BLD-MCNC: 12.4 G/DL (ref 13–17.7)
IMM GRANULOCYTES # BLD AUTO: 0.02 10*3/MM3 (ref 0–0.05)
IMM GRANULOCYTES NFR BLD AUTO: 0.3 % (ref 0–0.5)
LYMPHOCYTES # BLD AUTO: 2.05 10*3/MM3 (ref 0.7–3.1)
LYMPHOCYTES NFR BLD AUTO: 31.6 % (ref 19.6–45.3)
MCH RBC QN AUTO: 28.3 PG (ref 26.6–33)
MCHC RBC AUTO-ENTMCNC: 32.4 G/DL (ref 31.5–35.7)
MCV RBC AUTO: 87.4 FL (ref 79–97)
MONOCYTES # BLD AUTO: 0.78 10*3/MM3 (ref 0.1–0.9)
MONOCYTES NFR BLD AUTO: 12 % (ref 5–12)
NEUTROPHILS NFR BLD AUTO: 3.44 10*3/MM3 (ref 1.7–7)
NEUTROPHILS NFR BLD AUTO: 53.1 % (ref 42.7–76)
NRBC BLD AUTO-RTO: 0 /100 WBC (ref 0–0.2)
PLATELET # BLD AUTO: 303 10*3/MM3 (ref 140–450)
PMV BLD AUTO: 11 FL (ref 6–12)
POTASSIUM SERPL-SCNC: 4.9 MMOL/L (ref 3.5–5.2)
PROT SERPL-MCNC: 8.2 G/DL (ref 6–8.5)
RBC # BLD AUTO: 4.38 10*6/MM3 (ref 4.14–5.8)
SODIUM SERPL-SCNC: 144 MMOL/L (ref 136–145)
WBC NRBC COR # BLD: 6.48 10*3/MM3 (ref 3.4–10.8)

## 2022-05-20 PROCEDURE — 80053 COMPREHEN METABOLIC PANEL: CPT | Performed by: NURSE PRACTITIONER

## 2022-05-20 PROCEDURE — 82140 ASSAY OF AMMONIA: CPT | Performed by: NURSE PRACTITIONER

## 2022-05-20 PROCEDURE — 85025 COMPLETE CBC W/AUTO DIFF WBC: CPT | Performed by: NURSE PRACTITIONER

## 2022-06-07 ENCOUNTER — HOSPITAL ENCOUNTER (INPATIENT)
Age: 32
LOS: 5 days | Discharge: HOME | DRG: 659 | End: 2022-06-12
Attending: EMERGENCY MEDICINE | Admitting: FAMILY MEDICINE
Payer: COMMERCIAL

## 2022-06-07 ENCOUNTER — APPOINTMENT (OUTPATIENT)
Dept: CT IMAGING | Age: 32
DRG: 659 | End: 2022-06-07
Payer: COMMERCIAL

## 2022-06-07 DIAGNOSIS — Z87.820 HISTORY OF TRAUMATIC BRAIN INJURY: ICD-10-CM

## 2022-06-07 DIAGNOSIS — N17.9 AKI (ACUTE KIDNEY INJURY) (HCC): Primary | ICD-10-CM

## 2022-06-07 LAB
ADENOVIRUS BY PCR: NOT DETECTED
ALBUMIN SERPL-MCNC: 3.8 G/DL (ref 3.5–5.2)
ALP BLD-CCNC: 111 U/L (ref 40–130)
ALT SERPL-CCNC: 14 U/L (ref 5–41)
ANION GAP SERPL CALCULATED.3IONS-SCNC: 11 MMOL/L (ref 7–19)
AST SERPL-CCNC: 8 U/L (ref 5–40)
BASOPHILS ABSOLUTE: 0.1 K/UL (ref 0–0.2)
BASOPHILS RELATIVE PERCENT: 0.6 % (ref 0–1)
BILIRUB SERPL-MCNC: <0.2 MG/DL (ref 0.2–1.2)
BORDETELLA PARAPERTUSSIS BY PCR: NOT DETECTED
BORDETELLA PERTUSSIS BY PCR: NOT DETECTED
BUN BLDV-MCNC: 41 MG/DL (ref 6–20)
CALCIUM SERPL-MCNC: 9.2 MG/DL (ref 8.6–10)
CHLAMYDOPHILIA PNEUMONIAE BY PCR: NOT DETECTED
CHLORIDE BLD-SCNC: 104 MMOL/L (ref 98–111)
CO2: 25 MMOL/L (ref 22–29)
CORONAVIRUS 229E BY PCR: NOT DETECTED
CORONAVIRUS HKU1 BY PCR: NOT DETECTED
CORONAVIRUS NL63 BY PCR: NOT DETECTED
CORONAVIRUS OC43 BY PCR: NOT DETECTED
CREAT SERPL-MCNC: 3 MG/DL (ref 0.5–1.2)
EOSINOPHILS ABSOLUTE: 0.2 K/UL (ref 0–0.6)
EOSINOPHILS RELATIVE PERCENT: 2.3 % (ref 0–5)
GFR AFRICAN AMERICAN: 29
GFR NON-AFRICAN AMERICAN: 24
GLUCOSE BLD-MCNC: 121 MG/DL (ref 74–109)
HCT VFR BLD CALC: 30.7 % (ref 42–52)
HEMOGLOBIN: 9.4 G/DL (ref 14–18)
HUMAN METAPNEUMOVIRUS BY PCR: NOT DETECTED
HUMAN RHINOVIRUS/ENTEROVIRUS BY PCR: NOT DETECTED
IMMATURE GRANULOCYTES #: 0 K/UL
INFLUENZA A BY PCR: NOT DETECTED
INFLUENZA B BY PCR: NOT DETECTED
LACTIC ACID: 1 MMOL/L (ref 0.5–1.9)
LYMPHOCYTES ABSOLUTE: 1.6 K/UL (ref 1.1–4.5)
LYMPHOCYTES RELATIVE PERCENT: 19.7 % (ref 20–40)
MCH RBC QN AUTO: 27.5 PG (ref 27–31)
MCHC RBC AUTO-ENTMCNC: 30.6 G/DL (ref 33–37)
MCV RBC AUTO: 89.8 FL (ref 80–94)
MONOCYTES ABSOLUTE: 0.8 K/UL (ref 0–0.9)
MONOCYTES RELATIVE PERCENT: 9.6 % (ref 0–10)
MYCOPLASMA PNEUMONIAE BY PCR: NOT DETECTED
NEUTROPHILS ABSOLUTE: 5.6 K/UL (ref 1.5–7.5)
NEUTROPHILS RELATIVE PERCENT: 67.4 % (ref 50–65)
PARAINFLUENZA VIRUS 1 BY PCR: NOT DETECTED
PARAINFLUENZA VIRUS 2 BY PCR: NOT DETECTED
PARAINFLUENZA VIRUS 3 BY PCR: NOT DETECTED
PARAINFLUENZA VIRUS 4 BY PCR: NOT DETECTED
PDW BLD-RTO: 15.4 % (ref 11.5–14.5)
PLATELET # BLD: 241 K/UL (ref 130–400)
PMV BLD AUTO: 11 FL (ref 9.4–12.4)
POTASSIUM SERPL-SCNC: 5.1 MMOL/L (ref 3.5–5)
RBC # BLD: 3.42 M/UL (ref 4.7–6.1)
RESPIRATORY SYNCYTIAL VIRUS BY PCR: NOT DETECTED
SARS-COV-2, PCR: NOT DETECTED
SODIUM BLD-SCNC: 140 MMOL/L (ref 136–145)
TOTAL PROTEIN: 7.8 G/DL (ref 6.6–8.7)
WBC # BLD: 8.3 K/UL (ref 4.8–10.8)

## 2022-06-07 PROCEDURE — G0378 HOSPITAL OBSERVATION PER HR: HCPCS

## 2022-06-07 PROCEDURE — 2580000003 HC RX 258: Performed by: EMERGENCY MEDICINE

## 2022-06-07 PROCEDURE — 1210000000 HC MED SURG R&B

## 2022-06-07 PROCEDURE — 85025 COMPLETE CBC W/AUTO DIFF WBC: CPT

## 2022-06-07 PROCEDURE — 36415 COLL VENOUS BLD VENIPUNCTURE: CPT

## 2022-06-07 PROCEDURE — 6360000002 HC RX W HCPCS: Performed by: INTERNAL MEDICINE

## 2022-06-07 PROCEDURE — 99285 EMERGENCY DEPT VISIT HI MDM: CPT

## 2022-06-07 PROCEDURE — 70450 CT HEAD/BRAIN W/O DYE: CPT | Performed by: RADIOLOGY

## 2022-06-07 PROCEDURE — 93005 ELECTROCARDIOGRAM TRACING: CPT | Performed by: EMERGENCY MEDICINE

## 2022-06-07 PROCEDURE — 70450 CT HEAD/BRAIN W/O DYE: CPT

## 2022-06-07 PROCEDURE — 80053 COMPREHEN METABOLIC PANEL: CPT

## 2022-06-07 PROCEDURE — 0202U NFCT DS 22 TRGT SARS-COV-2: CPT

## 2022-06-07 PROCEDURE — 83605 ASSAY OF LACTIC ACID: CPT

## 2022-06-07 RX ORDER — 0.9 % SODIUM CHLORIDE 0.9 %
1000 INTRAVENOUS SOLUTION INTRAVENOUS ONCE
Status: COMPLETED | OUTPATIENT
Start: 2022-06-07 | End: 2022-06-07

## 2022-06-07 RX ORDER — ENOXAPARIN SODIUM 100 MG/ML
40 INJECTION SUBCUTANEOUS EVERY 24 HOURS
Status: DISCONTINUED | OUTPATIENT
Start: 2022-06-07 | End: 2022-06-11

## 2022-06-07 RX ORDER — ONDANSETRON 4 MG/1
4 TABLET, ORALLY DISINTEGRATING ORAL EVERY 8 HOURS PRN
Status: DISCONTINUED | OUTPATIENT
Start: 2022-06-07 | End: 2022-06-12 | Stop reason: HOSPADM

## 2022-06-07 RX ORDER — MEDROXYPROGESTERONE ACETATE 5 MG/1
5 TABLET ORAL DAILY
Status: ON HOLD | COMMUNITY
End: 2022-10-04 | Stop reason: HOSPADM

## 2022-06-07 RX ORDER — SODIUM CHLORIDE 9 MG/ML
INJECTION, SOLUTION INTRAVENOUS PRN
Status: DISCONTINUED | OUTPATIENT
Start: 2022-06-07 | End: 2022-06-10 | Stop reason: SDUPTHER

## 2022-06-07 RX ORDER — TRAZODONE HYDROCHLORIDE 50 MG/1
50 TABLET ORAL NIGHTLY
COMMUNITY

## 2022-06-07 RX ORDER — SODIUM CHLORIDE 9 MG/ML
INJECTION, SOLUTION INTRAVENOUS CONTINUOUS
Status: DISCONTINUED | OUTPATIENT
Start: 2022-06-07 | End: 2022-06-11

## 2022-06-07 RX ORDER — CITALOPRAM 20 MG/1
20 TABLET ORAL DAILY
COMMUNITY

## 2022-06-07 RX ORDER — SODIUM CHLORIDE 0.9 % (FLUSH) 0.9 %
5-40 SYRINGE (ML) INJECTION EVERY 12 HOURS SCHEDULED
Status: DISCONTINUED | OUTPATIENT
Start: 2022-06-07 | End: 2022-06-10 | Stop reason: SDUPTHER

## 2022-06-07 RX ORDER — ACETAMINOPHEN 325 MG/1
650 TABLET ORAL EVERY 4 HOURS PRN
Status: DISCONTINUED | OUTPATIENT
Start: 2022-06-07 | End: 2022-06-12 | Stop reason: HOSPADM

## 2022-06-07 RX ORDER — SODIUM CHLORIDE 0.9 % (FLUSH) 0.9 %
5-40 SYRINGE (ML) INJECTION PRN
Status: DISCONTINUED | OUTPATIENT
Start: 2022-06-07 | End: 2022-06-10 | Stop reason: SDUPTHER

## 2022-06-07 RX ORDER — ONDANSETRON 2 MG/ML
4 INJECTION INTRAMUSCULAR; INTRAVENOUS EVERY 6 HOURS PRN
Status: DISCONTINUED | OUTPATIENT
Start: 2022-06-07 | End: 2022-06-12 | Stop reason: HOSPADM

## 2022-06-07 RX ADMIN — ENOXAPARIN SODIUM 40 MG: 100 INJECTION SUBCUTANEOUS at 23:20

## 2022-06-07 RX ADMIN — SODIUM CHLORIDE 1000 ML: 9 INJECTION, SOLUTION INTRAVENOUS at 17:43

## 2022-06-07 NOTE — PROGRESS NOTES
Joseluis Alvarez, provided with update  Electronically signed by Feliz Schofield RN on 6/7/2022 at 5:31 PM

## 2022-06-07 NOTE — PROGRESS NOTES
Report given to Geronimo Grier RN on 3rd floor  Electronically signed by Bronwyn Schwartz RN on 6/7/2022 at 6:06 PM

## 2022-06-07 NOTE — ED PROVIDER NOTES
[facility] EMERGENCY DEPT  eMERGENCY dEPARTMENT eNCOUnter      Pt Name: Rosalina Fields  MRN: [id number]  [place] 1990  Date of evaluation: 6/7/2022  Provider: Author Sindy MD    [address] [place] [address]       Chief Complaint   Patient presents with    Extremity Weakness     LKW 1200    Altered Mental Status         HISTORY OF PRESENT ILLNESS   (Location/Symptom, Timing/Onset,Context/Setting, Quality, Duration, Modifying Factors, Severity)  Note limiting factors. Rosalina Fields is a 27 y.o. male who presents to the emergency department via EMS for evaluation regarding extremity weakness and confusion. Patient has a prior history of traumatic brain injury, intracranial hemorrhage and prior CVA. He is a resident at the nursing home and is not ambulatory. He has chronic left upper and lower extremity paralysis. Nursing home staff reported to EMS that he is normally more alert and conversational.  He will answer basic questions. He normally is able to use his right upper extremity. Nursing home staff stated that today he is seemed more confused than his usual baseline. He was also having some difficulty using his right upper extremity to assist with movement. This was a change from his baseline. It sounds like the symptoms actually began at around noon today. On arrival to the ED patient is alert and follows basic commands. He tells me that he feels weak. Family reports that he has a prior history of a TBI. [facility] had [facility] at [facility] however had transition to the nursing home for management of a sacral pressure ulcer. HPI    NursingNotes were reviewed.     REVIEW OF SYSTEMS    (2-9 systems for level 4, 10 or more for level 5)     Review of Systems   Unable to perform ROS: Mental status change            PAST MEDICALHISTORY     Past Medical History:   Diagnosis Date    Arthritis     Cerebral artery occlusion with cerebral infarction ([facility] [address]     Depression     Difficult intubation     Hemorrhoids     History of blood transfusion     Hypertension     Kidney stone     Muscle spasticity     Prolonged emergence from general anesthesia     Retention of urine          SURGICAL HISTORY       Past Surgical History:   Procedure Laterality Date    BACK SURGERY      BACLOFEN PUMP IMPLANTATION      BRAIN SURGERY      KIDNEY STONE SURGERY      UT FRAGMENT KIDNEY STONE/ ESWL Right 10/2/2018    ESWL EXTRACORPEAL SHOCK WAVE LITHOTRIPSY performed by Vindo Tai MD at [facility]     Previous Medications    AMANTADINE (SYMMETREL) 100 MG TABS TABLET    Take 100 mg by mouth 2 times daily    ASCORBIC ACID (VITAMIN C) 500 MG CAPS    Take 500 mg by mouth 2 times daily    ASPIRIN 325 MG TABLET    Take 325 mg by mouth daily    AZELASTINE HCL 0.15 % SOLN    1 spray by Nasal route 2 times daily     BACLOFEN (LIORESAL) 20 MG TABLET    Take 20 mg by mouth 4 times daily     BUSPIRONE (BUSPAR) 10 MG TABLET    Take 10 mg by mouth 2 times daily     CALCIUM CARBONATE-VITAMIN D (CALCIUM-VITAMIN D) 500-200 MG-UNIT PER TABLET    Take 1 tablet by mouth 2 times daily (with meals)    CHOLECALCIFEROL (VITAMIN D3 PO)    Take 2,000 Units by mouth daily    DEXLANSOPRAZOLE (DEXILANT) 60 MG CPDR DELAYED RELEASE CAPSULE    Take 60 mg by mouth daily    DOCUSATE SODIUM (COLACE) 100 MG CAPSULE    Take 200 mg by mouth 2 times daily    DULOXETINE (CYMBALTA) 60 MG CAPSULE    Take 60 mg by mouth daily    FLUTICASONE (FLONASE) 50 MCG/ACT NASAL SPRAY    1 spray by Each Nare route 2 times daily    GABAPENTIN (NEURONTIN) 400 MG CAPSULE    Take 400 mg by mouth 3 times daily. KETOCONAZOLE (NIZORAL) 2 % SHAMPOO    Apply topically daily as needed for Itching Apply topically daily as needed.     LISINOPRIL (PRINIVIL;ZESTRIL) 10 MG TABLET    Take 10 mg by mouth daily    LORATADINE (CLARITIN) 10 MG CAPSULE    Take 10 mg by mouth daily    MELATONIN 5 MG TABS TABLET    Take 5 mg by mouth nightly     MULTIPLE VITAMINS-MINERALS (THERAPEUTIC MULTIVITAMIN-MINERALS) TABLET    Take 1 tablet by mouth daily    OMEGA-3 FATTY ACIDS (FISH OIL) 1000 MG CAPS    Take 3,000 mg by mouth 2 times daily    OXYCODONE-ACETAMINOPHEN (PERCOCET)  MG PER TABLET    Take 1 tablet by mouth every 6 hours as needed for Pain. POLYETHYLENE GLYCOL (GLYCOLAX) POWDER    Take 17 g by mouth 2 times daily     PROPRANOLOL (INDERAL) 20 MG TABLET    Take 20 mg by mouth 2 times daily as needed Hold for /70 or lower    RISPERIDONE (RISPERDAL) 1 MG TABLET    Take 1 mg by mouth 2 times daily    TIZANIDINE (ZANAFLEX) 4 MG TABLET    Take 2 mg by mouth 2 times daily        ALLERGIES     Latex, Dilantin [phenytoin], Pcn [penicillins], and Sulfa antibiotics    FAMILY HISTORY     No family history on file. SOCIAL HISTORY       Social History     Socioeconomic History    Marital status: Single     Spouse name: Not on file    Number of children: Not on file    Years of education: Not on file    Highest education level: Not on file   Occupational History    Not on file   Tobacco Use    Smoking status: Never Smoker    Smokeless tobacco: Never Used   Vaping Use    Vaping Use: Never used   Substance and Sexual Activity    Alcohol use: No    Drug use: No    Sexual activity: Not on file   Other Topics Concern    Not on file   Social History Narrative    Not on file     Social Determinants of Health     Financial Resource Strain:     Difficulty of Paying Living Expenses: Not on file   Food Insecurity:     Worried About Running Out of Food in the Last Year: Not on file    Deena of Food in the Last Year: Not on file   Transportation Needs:     Lack of Transportation (Medical): Not on file    Lack of Transportation (Non-Medical):  Not on file   Physical Activity:     Days of Exercise per Week: Not on file    Minutes of Exercise per Session: Not on file   Stress:     Feeling of Stress : Not on file   Social Connections:     Frequency of Communication with Friends and Family: Not on file    Frequency of Social Gatherings with Friends and Family: Not on file    Attends Taoist Services: Not on file    Active Member of Clubs or Organizations: Not on file    Attends Club or Organization Meetings: Not on file    Marital Status: Not on file   Intimate Partner Violence:     Fear of Current or Ex-Partner: Not on file    Emotionally Abused: Not on file    Physically Abused: Not on file    Sexually Abused: Not on file   Housing Stability:     Unable to Pay for Housing in the Last Year: Not on file    Number of [place] in the Last Year: Not on file    Unstable Housing in the Last Year: Not on file       SCREENINGS    [place] Coma Scale  Eye Opening: Spontaneous  Best Verbal Response: Oriented  Best Motor Response: Obeys commands  [place] Coma Scale Score: 15        PHYSICAL EXAM    (up to 7 for level 4, 8 or more for level 5)     ED Triage Vitals [06/07/22 1533]   BP Temp Temp Source Heart Rate Resp SpO2 Height Weight   124/71 98.6 °F (37 °C) Oral 77 16 98 % -- --       Physical Exam  Vitals and nursing note reviewed. Constitutional:       Appearance: He is obese. HENT:      Mouth/Throat:      Mouth: Mucous membranes are not dry. Pharynx: No posterior oropharyngeal erythema. Eyes:      General: No scleral icterus. Pupils: Pupils are equal, round, and reactive to light. Neck:      Trachea: No tracheal deviation. Cardiovascular:      Rate and Rhythm: Normal rate and regular rhythm. Pulses: Normal pulses. Heart sounds: Normal heart sounds. No murmur heard. Pulmonary:      Effort: Pulmonary effort is normal. No respiratory distress. Breath sounds: Normal breath sounds. No stridor. Abdominal:      General: There is no distension. Palpations: Abdomen is soft. Tenderness: There is no abdominal tenderness. There is no guarding. Musculoskeletal:      Right lower leg: No edema.       Left lower leg: No edema. Skin:     Capillary Refill: Capillary refill takes less than 2 seconds. Coloration: Skin is not pale. Findings: No rash. Comments: Sacral wound   Neurological:      Mental Status: He is alert. Comments: Patient uses his right upper extremity to squeeze my hand. He seems to follow basic commands. He does have residual left upper and lower extremity paralysis. There is no obvious facial drooping identified. Psychiatric:         Behavior: Behavior is cooperative. DIAGNOSTIC RESULTS     EKG: All EKG's areinterpreted by the Emergency Department Physician who either signs or Co-signs this chart in the absence of a cardiologist.    32 61 16: Normal sinus rhythm at a rate of 77, there is no evidence of acute ST elevation identified. QTc: 408 MS. RADIOLOGY:  Non-plain film images such as CT, Ultrasound and MRI are read by the radiologist. Plain radiographic images are visualized and preliminarily interpreted bythe emergency physician with the below findings:      [address]   Final Result   1. No acute intracranial bleed or mass. 2.  Multiple stable findings as above. 3.  No obvious interval change. Recommendation: Follow up as clinically indicated. All CT scans at this facility utilize dose modulation, iterative reconstruction, and/or weight based dosing when appropriate to reduce radiation dose to as low as reasonably achievable.     Electronically Signed by Pramod Lafleur MD at 07-Jun-2022 05:29:25 PM                       LABS:  Labs Reviewed   COMPREHENSIVE METABOLIC PANEL - Abnormal; Notable for the following components:       Result Value    Potassium 5.1 (*)     Glucose 121 (*)     BUN 41 (*)     CREATININE 3.0 (*)     GFR Non- 24 (*)     GFR  29 (*)     All other components within normal limits   CBC WITH AUTO DIFFERENTIAL - Abnormal; Notable for the following components:    RBC 3.42 (*)     Hemoglobin 9.4 (*) Hematocrit 30.7 (*)     MCHC 30.6 (*)     RDW 15.4 (*)     Neutrophils % 67.4 (*)     Lymphocytes % 19.7 (*)     All other components within normal limits   RESPIRATORY PANEL, MOLECULAR, WITH COVID-19   LACTIC ACID       All other labs were within normal range or not returned as of this dictation. EMERGENCY DEPARTMENT COURSE and DIFFERENTIAL DIAGNOSIS/MDM:   Vitals:    Vitals:    06/07/22 1533 06/07/22 1630   BP: 124/71 (!) 92/55   Pulse: 77 74   Resp: 16 14   Temp: 98.6 °F (37 °C)    TempSrc: Oral    SpO2: 98% 100%       MDM    Reassessment    We have spoken to patient's mother who describes his typical baseline mental status that sounds like that is about where he is today. Laboratory studies reveal elevated serum creatinine of 3.0/BUN 41. His serum potassium is 5.1. This is a change from previous with a normal serum creatinine of 0.8/BUN 16 in April 2022. CONSULTS:    Was discussed with Dr. Lane Hickman regarding admission to the service of Dr. Sher Rios. PROCEDURES:  Unless otherwise noted below, none     Procedures    FINAL IMPRESSION      1. SALVADOR (acute kidney injury) ([facility] [address]    2.  History of traumatic brain injury          DISPOSITION/PLAN   DISPOSITION Decision To Admit 06/07/2022 05:31:53 PM      (Please note that portions of this note were completed with a voice recognition program.  Efforts were made to edit thedictations but occasionally words are mis-transcribed.)    Celine Bledsoe MD (electronically signed)  Attending Emergency Physician         Celine Bledsoe MD  06/07/22 [id number]

## 2022-06-08 ENCOUNTER — APPOINTMENT (OUTPATIENT)
Dept: ULTRASOUND IMAGING | Age: 32
DRG: 659 | End: 2022-06-08
Payer: COMMERCIAL

## 2022-06-08 LAB
ANION GAP SERPL CALCULATED.3IONS-SCNC: 11 MMOL/L (ref 7–19)
BUN BLDV-MCNC: 32 MG/DL (ref 6–20)
CALCIUM SERPL-MCNC: 8.9 MG/DL (ref 8.6–10)
CHLORIDE BLD-SCNC: 108 MMOL/L (ref 98–111)
CO2: 26 MMOL/L (ref 22–29)
CREAT SERPL-MCNC: 1.9 MG/DL (ref 0.5–1.2)
EKG P AXIS: 30 DEGREES
EKG P-R INTERVAL: 148 MS
EKG Q-T INTERVAL: 364 MS
EKG QRS DURATION: 96 MS
EKG QTC CALCULATION (BAZETT): 393 MS
EKG T AXIS: 50 DEGREES
GFR AFRICAN AMERICAN: 49
GFR NON-AFRICAN AMERICAN: 41
GLUCOSE BLD-MCNC: 96 MG/DL (ref 74–109)
POTASSIUM SERPL-SCNC: 4.7 MMOL/L (ref 3.5–5)
SODIUM BLD-SCNC: 145 MMOL/L (ref 136–145)

## 2022-06-08 PROCEDURE — 6370000000 HC RX 637 (ALT 250 FOR IP): Performed by: FAMILY MEDICINE

## 2022-06-08 PROCEDURE — 36415 COLL VENOUS BLD VENIPUNCTURE: CPT

## 2022-06-08 PROCEDURE — 93010 ELECTROCARDIOGRAM REPORT: CPT | Performed by: INTERNAL MEDICINE

## 2022-06-08 PROCEDURE — 6360000002 HC RX W HCPCS: Performed by: INTERNAL MEDICINE

## 2022-06-08 PROCEDURE — 76770 US EXAM ABDO BACK WALL COMP: CPT

## 2022-06-08 PROCEDURE — 76770 US EXAM ABDO BACK WALL COMP: CPT | Performed by: RADIOLOGY

## 2022-06-08 PROCEDURE — 92610 EVALUATE SWALLOWING FUNCTION: CPT

## 2022-06-08 PROCEDURE — 1210000000 HC MED SURG R&B

## 2022-06-08 PROCEDURE — 80048 BASIC METABOLIC PNL TOTAL CA: CPT

## 2022-06-08 PROCEDURE — 2580000003 HC RX 258: Performed by: INTERNAL MEDICINE

## 2022-06-08 RX ORDER — PANTOPRAZOLE SODIUM 40 MG/1
40 TABLET, DELAYED RELEASE ORAL
Status: DISCONTINUED | OUTPATIENT
Start: 2022-06-09 | End: 2022-06-12 | Stop reason: HOSPADM

## 2022-06-08 RX ORDER — AMANTADINE HYDROCHLORIDE 100 MG/1
100 CAPSULE, GELATIN COATED ORAL 2 TIMES DAILY
Status: DISCONTINUED | OUTPATIENT
Start: 2022-06-08 | End: 2022-06-12 | Stop reason: HOSPADM

## 2022-06-08 RX ORDER — CITALOPRAM 20 MG/1
20 TABLET ORAL DAILY
Status: DISCONTINUED | OUTPATIENT
Start: 2022-06-08 | End: 2022-06-12 | Stop reason: HOSPADM

## 2022-06-08 RX ORDER — BACLOFEN 10 MG/1
20 TABLET ORAL 4 TIMES DAILY
Status: DISCONTINUED | OUTPATIENT
Start: 2022-06-08 | End: 2022-06-12 | Stop reason: HOSPADM

## 2022-06-08 RX ORDER — TRAZODONE HYDROCHLORIDE 50 MG/1
50 TABLET ORAL NIGHTLY
Status: DISCONTINUED | OUTPATIENT
Start: 2022-06-08 | End: 2022-06-12 | Stop reason: HOSPADM

## 2022-06-08 RX ORDER — BUSPIRONE HYDROCHLORIDE 10 MG/1
10 TABLET ORAL 2 TIMES DAILY
Status: DISCONTINUED | OUTPATIENT
Start: 2022-06-08 | End: 2022-06-12 | Stop reason: HOSPADM

## 2022-06-08 RX ORDER — RISPERIDONE 1 MG/1
1 TABLET, FILM COATED ORAL 2 TIMES DAILY
Status: DISCONTINUED | OUTPATIENT
Start: 2022-06-08 | End: 2022-06-12 | Stop reason: HOSPADM

## 2022-06-08 RX ORDER — GABAPENTIN 400 MG/1
400 CAPSULE ORAL 3 TIMES DAILY
Status: DISCONTINUED | OUTPATIENT
Start: 2022-06-08 | End: 2022-06-12 | Stop reason: HOSPADM

## 2022-06-08 RX ORDER — OXYCODONE AND ACETAMINOPHEN 10; 325 MG/1; MG/1
1 TABLET ORAL EVERY 6 HOURS PRN
Status: DISCONTINUED | OUTPATIENT
Start: 2022-06-08 | End: 2022-06-12 | Stop reason: HOSPADM

## 2022-06-08 RX ADMIN — BACLOFEN 20 MG: 10 TABLET ORAL at 20:36

## 2022-06-08 RX ADMIN — TRAZODONE HYDROCHLORIDE 50 MG: 50 TABLET ORAL at 20:36

## 2022-06-08 RX ADMIN — BACLOFEN 20 MG: 10 TABLET ORAL at 14:08

## 2022-06-08 RX ADMIN — ENOXAPARIN SODIUM 40 MG: 100 INJECTION SUBCUTANEOUS at 20:36

## 2022-06-08 RX ADMIN — RISPERIDONE 1 MG: 1 TABLET, FILM COATED ORAL at 20:36

## 2022-06-08 RX ADMIN — CITALOPRAM HYDROBROMIDE 20 MG: 20 TABLET ORAL at 14:08

## 2022-06-08 RX ADMIN — BUSPIRONE HYDROCHLORIDE 10 MG: 10 TABLET ORAL at 14:08

## 2022-06-08 RX ADMIN — GABAPENTIN 400 MG: 400 CAPSULE ORAL at 20:36

## 2022-06-08 RX ADMIN — BACLOFEN 20 MG: 10 TABLET ORAL at 18:10

## 2022-06-08 RX ADMIN — GABAPENTIN 400 MG: 400 CAPSULE ORAL at 14:07

## 2022-06-08 RX ADMIN — SODIUM CHLORIDE: 9 INJECTION, SOLUTION INTRAVENOUS at 00:31

## 2022-06-08 RX ADMIN — SODIUM CHLORIDE: 9 INJECTION, SOLUTION INTRAVENOUS at 09:10

## 2022-06-08 RX ADMIN — RISPERIDONE 1 MG: 1 TABLET, FILM COATED ORAL at 14:10

## 2022-06-08 RX ADMIN — BUSPIRONE HYDROCHLORIDE 10 MG: 10 TABLET ORAL at 20:36

## 2022-06-08 RX ADMIN — SODIUM CHLORIDE: 9 INJECTION, SOLUTION INTRAVENOUS at 20:40

## 2022-06-08 RX ADMIN — AMANTADINE HYDROCHLORIDE 100 MG: 100 CAPSULE, LIQUID FILLED ORAL at 20:36

## 2022-06-08 RX ADMIN — AMANTADINE HYDROCHLORIDE 100 MG: 100 CAPSULE, LIQUID FILLED ORAL at 14:08

## 2022-06-08 ASSESSMENT — PAIN SCALES - GENERAL: PAINLEVEL_OUTOF10: 0

## 2022-06-08 NOTE — PROGRESS NOTES
4 Eyes Skin Assessment    Alexis Koch is being assessed upon: Admission    I agree that I, Alma French RN, along with Jerad Evans (either 2 RN's or 1 LPN and 1 RN) have performed a thorough Head to Toe Skin Assessment on the patient. ALL assessment sites listed below have been assessed. Areas assessed by both nurses:     [x]   Head, Face, and Ears   [x]   Shoulders, Back, and Chest  [x]   Arms, Elbows, and Hands   [x]   Coccyx, Sacrum, and Ischium  [x]   Legs, Feet, and Heels    Does the Patient have Skin Breakdown? Yes, wound(s) noted upon assessment. It is the responsibility of the Primary Nurse to assure that the following documentation, preventions, orders, and consults are complete on the above noted wound(s): Wound LDA initiated. LDA Flowsheet Documentation includes the Rachana-wound, Wound Assessment, Measurements, Dressing Treatment, Drainage, and Color.  Healing PI to sacrum     Luiz Prevention initiated: Yes  Wound Care Orders initiated: Yes    [id number]  nurse consulted for Pressure Injury (Stage 3,4, Unstageable, DTI, NWPT, and Complex wounds) and New or Established Ostomies: Yes        Primary Nurse eSignature: Alma French RN on 6/8/2022 at 1:13 AM      Co-Signer eSignature: Electronically signed by Sara Ferrell RN on 6/8/22 at 3:04 AM CDT

## 2022-06-08 NOTE — CONSULTS
Renal Consult Note    Thank you to requesting provider: Dr Narciso Rich, for asking us to see Alexis Koch    Reason for consultation:  SALVADOR  Chief Complaint: Weakness and altered mental status    History of Presenting Illness      Patient is 77-year-old man with a past medical history of traumatic brain injury with previous intracranial hemorrhaging and CVA. He is typically not ambulatory and is a nursing home resident. His nursing home has reported decreased responsiveness and mobility. He was increasingly confused. He was subsequently transferred to [place] [address] emergency room where patient complained of weakness. His serum creatinine typically ranges 0.8-1.1 and it was as 3 on June 7. Patient was subsequently started on IV fluids and his output started to improve. He is incontinent to the urine. Renal service was consulted to manage his acute kidney injury. Patient is not a good historian.       Past Medical/Surgical History      Active Ambulatory Problems     Diagnosis Date Noted    Muscle spasticity     Post-traumatic spasticity 03/23/2016    Decubitus ulcer of right buttock, stage 3 ([address] 08/13/2018    Dermatitis associated with moisture 08/13/2018    History of cerebrovascular accident (CVA) with residual deficit 08/13/2018    Renal calculus, right 08/14/2018    Pressure injury of left buttock, stage 3 ([address] 05/13/2019    Class 3 severe obesity due to excess calories without serious comorbidity in adult ([address] 07/30/2019    Sacral decubitus ulcer, stage III ([address] 04/07/2022    Cellulitis 04/07/2022    Hypertension 04/09/2022    Cognitive deficit due to old cerebrovascular accident (CVA) 04/09/2022     Resolved Ambulatory Problems     Diagnosis Date Noted    Post-traumatic spasticity 03/23/2016    Pressure injury of contiguous region involving back and right buttock, stage 3 ([address] 07/25/2019    Open wound of third toe of left foot 08/20/2019    Ulcer of left foot, with fat layer exposed ([address] 11/19/2019    Athscl native arteries of left leg w ulceration oth prt foot ([facility] [address] 11/19/2019     Past Medical History:   Diagnosis Date    Arthritis     Cerebral artery occlusion with cerebral infarction ([facility] [address]     Depression     Depression     Difficult intubation     GERD (gastroesophageal reflux disease)     Hemiplegia and hemiparesis following cerebral infarction affecting left non-dominant side (HCC)     Hemorrhoids     History of blood transfusion     Kidney stone     Pressure ulcer     Prolonged emergence from general anesthesia     Retention of urine          Review of Systems     Constitutional:  No weight loss, no fever/chills  Eyes:  No eye pain, no eye redness  Cardiovascular:  No chest pain, no worsening of edema  Respiratory:  No hemoptysis, no stidor  Gastrointestinal:  No blood in stool, no n/v, no diarrhea  Genitoruinary:  No hematuria, no difficulty with urination  Musculoskeletal:  No joint swelling, no redness  Integumentary:  No Rash, no itching  Neurological:  Generalized weakness, No tremor  Psychiatric:  No depression, +_ confusion  Endocrine:  No polyuria, no polydipsia       Medications        Current Facility-Administered Medications:     amantadine (SYMMETREL) capsule 100 mg, 100 mg, Oral, BID, Chico Burns MD, 100 mg at 06/08/22 1408    baclofen (LIORESAL) tablet 20 mg, 20 mg, Oral, 4x Daily, Chico Burns MD, 20 mg at 06/08/22 1408    busPIRone (BUSPAR) tablet 10 mg, 10 mg, Oral, BID, Chico Burns MD, 10 mg at 06/08/22 1408    citalopram (CELEXA) tablet 20 mg, 20 mg, Oral, Daily, Chico Burns MD, 20 mg at 06/08/22 1408    [START ON 6/9/2022] pantoprazole (PROTONIX) tablet 40 mg, 40 mg, Oral, QAM AC, Chico Burns MD    gabapentin (NEURONTIN) capsule 400 mg, 400 mg, Oral, TID, Chico Burns MD, 400 mg at 06/08/22 1407    oxyCODONE-acetaminophen (PERCOCET)  MG per tablet 1 tablet, 1 tablet, Oral, Q6H PRN, Chico Burns MD    risperiDONE (RISPERDAL) tablet 1 mg, 1 mg, Oral, BID, Jung Ortiz MD, 1 mg at 06/08/22 1410    traZODone (DESYREL) tablet 50 mg, 50 mg, Oral, Nightly, Jung Ortiz MD    sodium chloride flush 0.9 % injection 5-40 mL, 5-40 mL, IntraVENous, 2 times per day, Loretta Barrios MD    sodium chloride flush 0.9 % injection 5-40 mL, 5-40 mL, IntraVENous, PRN, Loretta Barrios MD    0.9 % sodium chloride infusion, , IntraVENous, PRN, Loretta Barrios MD    enoxaparin (LOVENOX) injection 40 mg, 40 mg, SubCUTAneous, Q24H, Loretta Barrios MD, 40 mg at 06/07/22 2320    acetaminophen (TYLENOL) tablet 650 mg, 650 mg, Oral, Q4H PRN, Loretta Barrios MD    ondansetron (ZOFRAN-ODT) disintegrating tablet 4 mg, 4 mg, Oral, Q8H PRN **OR** ondansetron (ZOFRAN) injection 4 mg, 4 mg, IntraVENous, Q6H PRN, Loretta Barrios MD    0.9 % sodium chloride infusion, , IntraVENous, Continuous, Loretta Barrios MD, Last Rate: 125 mL/hr at 06/08/22 0910, New Bag at 06/08/22 0910  Outpatient Medications Marked as Taking for the 6/7/22 encounter [facility]OS[facility] Encounter)   Medication Sig Dispense Refill    traZODone (DESYREL) 50 MG tablet Take 50 mg by mouth nightly      medroxyPROGESTERone (PROVERA) 5 MG tablet Take 5 mg by mouth daily      citalopram (CELEXA) 20 mg tablet Take 20 mg by mouth daily         Allergies   Latex, Dilantin [phenytoin], Pcn [penicillins], and Sulfa antibiotics    Family History     History reviewed. No pertinent family history. Family history negative for kidney disease.      Social History      Social History     Socioeconomic History    Marital status: Single     Spouse name: None    Number of children: None    Years of education: None    Highest education level: None   Occupational History    None   Tobacco Use    Smoking status: Never Smoker    Smokeless tobacco: Never Used   Vaping Use    Vaping Use: Never used   Substance and Sexual Activity    Alcohol use: No    Drug use: No    Sexual activity: None   Other Topics Concern  None   Social History Narrative    None     Social Determinants of Health     Financial Resource Strain:     Difficulty of Paying Living Expenses: Not on file   Food Insecurity:     Worried About Running Out of Food in the Last Year: Not on file    Deena of Food in the Last Year: Not on file   Transportation Needs:     Lack of Transportation (Medical): Not on file    Lack of Transportation (Non-Medical): Not on file   Physical Activity:     Days of Exercise per Week: Not on file    Minutes of Exercise per Session: Not on file   Stress:     Feeling of Stress : Not on file   Social Connections:     Frequency of Communication with Friends and Family: Not on file    Frequency of Social Gatherings with Friends and Family: Not on file    Attends Jewish Services: Not on file    Active Member of [address] [place] [address] [employer] or Organizations: Not on file    Attends Club or Organization Meetings: Not on file    Marital Status: Not on file   Intimate Partner Violence:     Fear of Current or Ex-Partner: Not on file    Emotionally Abused: Not on file    Physically Abused: Not on file    Sexually Abused: Not on file   Housing Stability:     Unable to Pay for Housing in the Last Year: Not on file    Number of [place] in the Last Year: Not on file    Unstable Housing in the Last Year: Not on file       Physical Exam     Blood pressure 119/74, pulse 80, temperature (!) 96.6 °F (35.9 °C), temperature source Temporal, resp. rate 18, SpO2 99 %.     General: ill-appearing, obese  HEENT: Atraumatic, normocephalic  Neck:  Supple, normal range of movement  Chest:  CTAB, good respiratory effort, good air movement  CV:  RRR, no murmurs  Abdomen: Obese, mildly tender in epigastric area,, +BS, no hepatosplenomegaly  Extremities:  Trace peripheral edema  Neurological: Generalized weakness  Lymphatics:  No palpable lymph nodes   Skin:  No rash, no jaundice  Psychiatric: Alert but poor historian    Data     Recent Labs 06/07/22  1540   WBC 8.3   HGB 9.4*   HCT 30.7*   MCV 89.8        Recent Labs     06/07/22  1540 06/08/22  1035    145   K 5.1* 4.7    108   CO2 25 26   GLUCOSE 121* 96   BUN 41* 32*   CREATININE 3.0* 1.9*   LABGLOM 24* 41*   GFRAA 29* 49*       Assessment:  1. Acute kidney injury-dehydration  2. Traumatic brain injury  3. Morbid obesity  4. Anemia of chronic diseases      Plan:  · At this time, I agree with IV fluids and follow-up labs. Avoid hypotension nephrotoxins. Will obtain urinary studies. Follow-up labs.     Thank you for asking us to participate in the management of your patient, please do not hesitate to contact me for any concerns regarding my recommendations as outlined above.    -----------------------------  Electronically signed by Armani South MD on 6/8/22 at 5:26 PM CDT

## 2022-06-08 NOTE — CARE COORDINATION
Spoke with Reyna De Anda from [facility], pt is there under his medicaid. Has a bed hold. Can return when medically ready. Does not require a precert to return.    [facility]  [phone] Fax for admissions  Electronically signed by Dionne Chaney RN on 6/8/2022 at 11:15 AM

## 2022-06-08 NOTE — PROGRESS NOTES
Speech Language Pathology  Facility/Department: [facility] 3 [facility]/VAS/MED   CLINICAL BEDSIDE SWALLOW EVALUATION    NAME: Rima Elizabeth  : 1990  MRN: [id number]    ADMISSION DATE: 2022  ADMITTING DIAGNOSIS: has Muscle spasticity; Post-traumatic spasticity; Decubitus ulcer of right buttock, stage 3 ([address] Dermatitis associated with moisture; History of cerebrovascular accident (CVA) with residual deficit; Renal calculus, right; Pressure injury of left buttock, stage 3 ([address] Class 3 severe obesity due to excess calories without serious comorbidity in [facility]); Sacral decubitus ulcer, stage III ([facility] [address] Cellulitis; Hypertension; Cognitive deficit due to old cerebrovascular accident (CVA); and SALVADOR (acute kidney injury) ([facility] [address] on their problem list.  ONSET DATE: 2022    Date of Eval: 2022  Evaluating Therapist: KAYLEY Guerra    Current Diet level:  Current Diet : NPO      Primary Complaint  Patient Complaint: Pt verbalized no complaints. Pain:  Pain Assessment  Pain Assessment: 0-10  Pain Level: 0    Reason for Referral  Rima Elizabeth was referred for a bedside swallow evaluation to assess the efficiency of his swallow function, identify signs and symptoms of aspiration and make recommendations regarding safe dietary consistencies, effective compensatory strategies, and safe eating environment. Impression  Dysphagia Diagnosis: Mild to moderate oral stage dysphagia  Dysphagia Impression : Pt demo mild to moderate oral stage dysphagia, but it is believed to be baseline due to previous CVA. Dysphagia Outcome Severity Scale: Level 4: Mild moderate dysphagia- Intermittent supervision/cueing.  One - two diet consistencies restricted     Treatment Plan  Requires SLP Intervention: No  Duration of Treatment: no treatment needed; evaluation only  D/C Recommendations: No follow up therapy recommended post discharge       Recommended Diet and Intervention Easy to chew with thin liquids Recommended Form of Meds: Whole with water  Recommendations: Total feed       Compensatory Swallowing Strategies  Compensatory Swallowing Strategies : Alternate solids and liquids;Eat/Feed slowly;Upright as possible for all oral intake;Small bites/sips    Treatment/Goals  Short-term Goals  Timeframe for Short-term Goals: no treatment needed; evaluation only    General  Chart Reviewed: Yes  Subjective  Subjective: Pt was cooperative and pleasant. Pt was very sleepy but was able to complete eval.  Behavior/Cognition: Alert; Cooperative;Pleasant mood  Respiratory Status: O2 via nasual cannula  O2 Device: Nasal cannula  Communication Observation: Functional  Follows Directions: Simple  Dentition: Some missing teeth;Poor dental/oral hygeine  Patient Positioning: Upright in bed  Baseline Vocal Quality: Normal  Volitional Cough: Weak  Prior Dysphagia History: Pt reported no history of dysphagia. Consistencies Administered: Regular;Pureed; Soft and Bite-Sized; Minced and Moist;Ice Chips; Thin - teaspoon; Thin - straw           Vision/Hearing  Vision  Vision: Within Functional Limits  Hearing  Hearing: Within functional limits    Oral Motor Deficits  Oral/Motor  Oral Hygiene: Dried secretions  Gag: No Impairment    Oral Phase Dysfunction  Oral Phase  Oral Phase: Exceptions  Oral Phase  Oral Phase - Comment: Pt demo impaired mastication due to poor dental hygience and loss of teeth. Pt did demo left sided pocketing with more solid consisitencies. Pt demo lingual residue with more solid consistencies but able to clear with subsequent swallows. Indicators of Pharyngeal Phase Dysfunction   Pharyngeal Phase   Pharyngeal Phase: Pt demo good laryngeal elevation with all PO trials.   Pt did not demo any outward S/S of aspiration/penetration with any PO trial.    Prognosis  Individuals consulted  Consulted and agree with results and recommendations: Patient;RN  RN Name: Amanda Milian    Education  Patient Education Response: Loulou

## 2022-06-08 NOTE — PLAN OF CARE
Problem: Discharge Planning  Goal: Discharge to home or other facility with appropriate resources  Outcome: Progressing  Flowsheets (Taken 6/8/2022 0106)  Discharge to home or other facility with appropriate resources: Identify barriers to discharge with patient and caregiver     Problem: Safety - Adult  Goal: Free from fall injury  Outcome: Progressing     Problem: ABCDS Injury Assessment  Goal: Absence of physical injury  Outcome: Progressing     Problem: Skin/Tissue Integrity  Goal: Absence of new skin breakdown  Description: 1. Monitor for areas of redness and/or skin breakdown  2. Assess vascular access sites hourly  3. Every 4-6 hours minimum:  Change oxygen saturation probe site  4. Every 4-6 hours:  If on nasal continuous positive airway pressure, respiratory therapy assess nares and determine need for appliance change or resting period.   Outcome: Progressing

## 2022-06-09 ENCOUNTER — APPOINTMENT (OUTPATIENT)
Dept: CT IMAGING | Age: 32
DRG: 659 | End: 2022-06-09
Payer: COMMERCIAL

## 2022-06-09 LAB
ANION GAP SERPL CALCULATED.3IONS-SCNC: 12 MMOL/L (ref 7–19)
BACTERIA: NEGATIVE /HPF
BILIRUBIN URINE: NEGATIVE
BLOOD, URINE: NEGATIVE
BUN BLDV-MCNC: 23 MG/DL (ref 6–20)
CALCIUM SERPL-MCNC: 8.9 MG/DL (ref 8.6–10)
CHLORIDE BLD-SCNC: 109 MMOL/L (ref 98–111)
CLARITY: CLEAR
CO2: 23 MMOL/L (ref 22–29)
COLOR: YELLOW
CREAT SERPL-MCNC: 1.4 MG/DL (ref 0.5–1.2)
CREATININE URINE: 77.1 MG/DL (ref 4.2–622)
CRYSTALS, UA: ABNORMAL /HPF
EPITHELIAL CELLS, UA: 1 /HPF (ref 0–5)
GFR AFRICAN AMERICAN: >59
GFR NON-AFRICAN AMERICAN: 58
GLUCOSE BLD-MCNC: 94 MG/DL (ref 74–109)
GLUCOSE URINE: NEGATIVE MG/DL
HYALINE CASTS: 0 /HPF (ref 0–8)
KETONES, URINE: NEGATIVE MG/DL
LEUKOCYTE ESTERASE, URINE: ABNORMAL
NITRITE, URINE: NEGATIVE
PH UA: 5.5 (ref 5–8)
POTASSIUM REFLEX MAGNESIUM: 4.4 MMOL/L (ref 3.5–5)
PROTEIN UA: NEGATIVE MG/DL
RBC UA: 1 /HPF (ref 0–4)
SODIUM BLD-SCNC: 144 MMOL/L (ref 136–145)
SODIUM URINE: 123 MMOL/L
SPECIFIC GRAVITY UA: 1.01 (ref 1–1.03)
URIC ACID, SERUM: 12 MG/DL (ref 3.4–7)
UROBILINOGEN, URINE: 0.2 E.U./DL
WBC UA: 2 /HPF (ref 0–5)

## 2022-06-09 PROCEDURE — 6360000004 HC RX CONTRAST MEDICATION: Performed by: INTERNAL MEDICINE

## 2022-06-09 PROCEDURE — 6370000000 HC RX 637 (ALT 250 FOR IP): Performed by: INTERNAL MEDICINE

## 2022-06-09 PROCEDURE — 2580000003 HC RX 258: Performed by: INTERNAL MEDICINE

## 2022-06-09 PROCEDURE — 6360000002 HC RX W HCPCS: Performed by: INTERNAL MEDICINE

## 2022-06-09 PROCEDURE — 81001 URINALYSIS AUTO W/SCOPE: CPT

## 2022-06-09 PROCEDURE — 80048 BASIC METABOLIC PNL TOTAL CA: CPT

## 2022-06-09 PROCEDURE — 36415 COLL VENOUS BLD VENIPUNCTURE: CPT

## 2022-06-09 PROCEDURE — 82570 ASSAY OF URINE CREATININE: CPT

## 2022-06-09 PROCEDURE — 6370000000 HC RX 637 (ALT 250 FOR IP): Performed by: FAMILY MEDICINE

## 2022-06-09 PROCEDURE — 74177 CT ABD & PELVIS W/CONTRAST: CPT

## 2022-06-09 PROCEDURE — 1210000000 HC MED SURG R&B

## 2022-06-09 PROCEDURE — 84300 ASSAY OF URINE SODIUM: CPT

## 2022-06-09 PROCEDURE — 84550 ASSAY OF BLOOD/URIC ACID: CPT

## 2022-06-09 PROCEDURE — 99222 1ST HOSP IP/OBS MODERATE 55: CPT | Performed by: UROLOGY

## 2022-06-09 PROCEDURE — 74177 CT ABD & PELVIS W/CONTRAST: CPT | Performed by: RADIOLOGY

## 2022-06-09 RX ORDER — ALLOPURINOL 100 MG/1
200 TABLET ORAL DAILY
Status: DISCONTINUED | OUTPATIENT
Start: 2022-06-09 | End: 2022-06-12 | Stop reason: HOSPADM

## 2022-06-09 RX ADMIN — ALLOPURINOL 200 MG: 100 TABLET ORAL at 12:12

## 2022-06-09 RX ADMIN — RISPERIDONE 1 MG: 1 TABLET, FILM COATED ORAL at 10:20

## 2022-06-09 RX ADMIN — BACLOFEN 20 MG: 10 TABLET ORAL at 12:11

## 2022-06-09 RX ADMIN — CITALOPRAM HYDROBROMIDE 20 MG: 20 TABLET ORAL at 10:20

## 2022-06-09 RX ADMIN — BUSPIRONE HYDROCHLORIDE 10 MG: 10 TABLET ORAL at 20:52

## 2022-06-09 RX ADMIN — AMANTADINE HYDROCHLORIDE 100 MG: 100 CAPSULE, LIQUID FILLED ORAL at 20:50

## 2022-06-09 RX ADMIN — ENOXAPARIN SODIUM 40 MG: 100 INJECTION SUBCUTANEOUS at 20:50

## 2022-06-09 RX ADMIN — OXYCODONE HYDROCHLORIDE AND ACETAMINOPHEN 1 TABLET: 10; 325 TABLET ORAL at 03:24

## 2022-06-09 RX ADMIN — BACLOFEN 20 MG: 10 TABLET ORAL at 20:50

## 2022-06-09 RX ADMIN — SODIUM CHLORIDE: 9 INJECTION, SOLUTION INTRAVENOUS at 05:35

## 2022-06-09 RX ADMIN — TRAZODONE HYDROCHLORIDE 50 MG: 50 TABLET ORAL at 20:50

## 2022-06-09 RX ADMIN — OXYCODONE HYDROCHLORIDE AND ACETAMINOPHEN 1 TABLET: 10; 325 TABLET ORAL at 20:51

## 2022-06-09 RX ADMIN — GABAPENTIN 400 MG: 400 CAPSULE ORAL at 14:39

## 2022-06-09 RX ADMIN — GABAPENTIN 400 MG: 400 CAPSULE ORAL at 10:20

## 2022-06-09 RX ADMIN — RISPERIDONE 1 MG: 1 TABLET, FILM COATED ORAL at 20:52

## 2022-06-09 RX ADMIN — PANTOPRAZOLE SODIUM 40 MG: 40 TABLET, DELAYED RELEASE ORAL at 05:35

## 2022-06-09 RX ADMIN — BACLOFEN 20 MG: 10 TABLET ORAL at 10:20

## 2022-06-09 RX ADMIN — BACLOFEN 20 MG: 10 TABLET ORAL at 15:50

## 2022-06-09 RX ADMIN — OXYCODONE HYDROCHLORIDE AND ACETAMINOPHEN 1 TABLET: 10; 325 TABLET ORAL at 15:50

## 2022-06-09 RX ADMIN — SODIUM CHLORIDE, PRESERVATIVE FREE 10 ML: 5 INJECTION INTRAVENOUS at 10:20

## 2022-06-09 RX ADMIN — GABAPENTIN 400 MG: 400 CAPSULE ORAL at 20:51

## 2022-06-09 RX ADMIN — BUSPIRONE HYDROCHLORIDE 10 MG: 10 TABLET ORAL at 10:20

## 2022-06-09 RX ADMIN — AMANTADINE HYDROCHLORIDE 100 MG: 100 CAPSULE, LIQUID FILLED ORAL at 10:20

## 2022-06-09 RX ADMIN — IOPAMIDOL 70 ML: 755 INJECTION, SOLUTION INTRAVENOUS at 12:47

## 2022-06-09 ASSESSMENT — PAIN DESCRIPTION - DESCRIPTORS
DESCRIPTORS: ACHING
DESCRIPTORS: ACHING

## 2022-06-09 ASSESSMENT — PAIN SCALES - GENERAL
PAINLEVEL_OUTOF10: 0
PAINLEVEL_OUTOF10: 9
PAINLEVEL_OUTOF10: 9

## 2022-06-09 ASSESSMENT — PAIN DESCRIPTION - LOCATION
LOCATION: BACK;ABDOMEN
LOCATION: BACK

## 2022-06-09 ASSESSMENT — PAIN DESCRIPTION - ORIENTATION
ORIENTATION: MID
ORIENTATION: LEFT

## 2022-06-09 ASSESSMENT — PAIN - FUNCTIONAL ASSESSMENT: PAIN_FUNCTIONAL_ASSESSMENT: ACTIVITIES ARE NOT PREVENTED

## 2022-06-09 NOTE — PROGRESS NOTES
Progress Note  Herve Esposito  6/9/2022 3:01 PM  Subjective:   Admit Date:   6/7/2022      CC/ADMIT DX:       Interval History:   Reviewed overnight events and nursing notes. He has no new complaints. No CP or SOA. He is eating well. I have reviewed all labs/diagnostics from the last 24hrs. ROS:   I have done a 10 point ROS and all are negative, except what is mentioned in the HPI. ADULT DIET; Dysphagia - Soft and Bite Sized    Medications:      sodium chloride      sodium chloride 125 mL/hr at 06/09/22 0535      allopurinol  200 mg Oral Daily    amantadine  100 mg Oral BID    baclofen  20 mg Oral 4x Daily    busPIRone  10 mg Oral BID    citalopram  20 mg Oral Daily    pantoprazole  40 mg Oral QAM AC    gabapentin  400 mg Oral TID    risperiDONE  1 mg Oral BID    traZODone  50 mg Oral Nightly    sodium chloride flush  5-40 mL IntraVENous 2 times per day    enoxaparin  40 mg SubCUTAneous Q24H           Objective:   Vitals: /67   Pulse 81   Temp 97.2 °F (36.2 °C) (Temporal)   Resp 20   SpO2 98%      Intake/Output Summary (Last 24 hours) at 6/9/2022 1501  Last data filed at 6/9/2022 1020  Gross per 24 hour   Intake 885 ml   Output 400 ml   Net 485 ml     General appearance: alert and cooperative with exam  Lungs: clear to auscultation bilaterally  Heart: regular rate and rhythm, S1, S2 normal, no murmur, click, rub or gallop  Abdomen: soft, non-tender; bowel sounds normal; no masses,  no organomegaly  Extremities: extremities normal, atraumatic, no cyanosis or edema  Neurologic:  No obvious focal neurologic deficits. Assessment and Plan:   Principal Problem:    SALVADOR (acute kidney injury) ([facility] [address]  Resolved Problems:    * No resolved hospital problems. *    Renal Mass    H/O CVA    GERD    Chronic Pain    Plan:  1. Continue present medication(s)   2. Follow with Nephrology  3. Renal function is improving with hydration  4. Urology consult for renal mass.  He is a patient of  [facility]  5. Supportive care for pain      Discharge planning:   SNF    Reviewed treatment plans with the patient and/or family.              Electronically signed by Chichi Courtney MD on 6/9/2022 at 3:01 PM

## 2022-06-09 NOTE — H&P
History and Physical      CHIEF COMPLAINT:  AMS    Reason for Admission:  AMS, SALVADOR    History Obtained From:  Patient, chart    HISTORY OF PRESENT ILLNESS:      The patient is a 27 y.o. male who was admitted from ER with SALVADOR. He is a poor historian. He denies to me any illnesses. No N/V/D. He has been urinating. He is incontinent. He has no fevers. No abdominal pain. He denies any CP or SOA.    Past Medical History:        Diagnosis Date    Arthritis     Cellulitis     Cerebral artery occlusion with cerebral infarction ([address]     Depression     Depression     Difficult intubation     GERD (gastroesophageal reflux disease)     Hemiplegia and hemiparesis following cerebral infarction affecting left non-dominant side (HCC)     Hemorrhoids     History of blood transfusion     Hypertension     Kidney stone     Muscle spasticity     Pressure ulcer     Prolonged emergence from general anesthesia     Retention of urine      Past Surgical History:        Procedure Laterality Date    BACK SURGERY      BACLOFEN PUMP IMPLANTATION      BRAIN SURGERY      KIDNEY STONE SURGERY      [place] FRAGMENT KIDNEY STONE/ ESWL Right 10/2/2018    ESWL EXTRACORPEAL SHOCK WAVE LITHOTRIPSY performed by Rebecca Polanco MD at [facility] OR         Medications Prior to Admission:    Medications Prior to Admission: traZODone (DESYREL) 50 MG tablet, Take 50 mg by mouth nightly  medroxyPROGESTERone (PROVERA) 5 MG tablet, Take 5 mg by mouth daily  citalopram (CELEXA) 20 mg tablet, Take 20 mg by mouth daily  aspirin 325 MG tablet, Take 325 mg by mouth daily  Cholecalciferol (VITAMIN D3 PO), Take 2,000 Units by mouth daily  Amantadine (SYMMETREL) 100 MG TABS tablet, Take 100 mg by mouth 2 times daily  azelastine HCl 0.15 % SOLN, 1 spray by Nasal route 2 times daily   baclofen (LIORESAL) 20 MG tablet, Take 20 mg by mouth 4 times daily   busPIRone (BUSPAR) 10 MG tablet, Take 10 mg by mouth 2 times daily   loratadine (CLARITIN) 10 MG capsule, Take 10 mg by mouth daily  fluticasone (FLONASE) 50 MCG/ACT nasal spray, 1 spray by Each Nare route 2 times daily  melatonin 5 MG TABS tablet, Take 5 mg by mouth nightly  (Patient not taking: Reported on 6/7/2022)  gabapentin (NEURONTIN) 400 MG capsule, Take 400 mg by mouth 3 times daily. lisinopril (PRINIVIL;ZESTRIL) 10 MG tablet, Take 10 mg by mouth daily  oxyCODONE-acetaminophen (PERCOCET)  MG per tablet, Take 1 tablet by mouth every 6 hours as needed for Pain. tiZANidine (ZANAFLEX) 2 MG tablet, Take 2 mg by mouth 2 times daily   polyethylene glycol (GLYCOLAX) powder, Take 17 g by mouth 2 times daily   dexlansoprazole (DEXILANT) 60 MG CPDR delayed release capsule, Take 60 mg by mouth daily  DULoxetine (CYMBALTA) 60 MG capsule, Take 60 mg by mouth daily Not on med list from [facility]  Multiple Vitamins-Minerals (THERAPEUTIC MULTIVITAMIN-MINERALS) tablet, Take 1 tablet by mouth daily  Calcium Carbonate-Vitamin D (CALCIUM-VITAMIN D) 500-200 MG-UNIT per tablet, Take 1 tablet by mouth 2 times daily (with meals)  docusate sodium (COLACE) 100 MG capsule, Take 200 mg by mouth 2 times daily  Omega-3 Fatty Acids (FISH OIL) 1000 MG CAPS, Take 3,000 mg by mouth 2 times daily  risperiDONE (RISPERDAL) 1 MG tablet, Take 1 mg by mouth 2 times daily  Ascorbic Acid (VITAMIN C) 500 MG CAPS, Take 500 mg by mouth 2 times daily  propranolol (INDERAL) 20 MG tablet, Take 20 mg by mouth 2 times daily as needed Hold for /70 or lower  ketoconazole (NIZORAL) 2 % shampoo, Apply topically daily as needed for Itching Apply topically daily as needed. Allergies:  Latex, Dilantin [phenytoin], Pcn [penicillins], and Sulfa antibiotics    Social History:   TOBACCO:   reports that he has never smoked. He has never used smokeless tobacco.  ETOH:   reports no history of alcohol use. DRUGS:   reports no history of drug use.   MARITAL STATUS:  Not   OCCUPATION:  Not working  Patient currently lives in a nursing home      Family History:

## 2022-06-09 NOTE — PROGRESS NOTES
Nephrology ([facility]) Progress Note    Patient:  Beatriz May  YOB: 1990  Date of Service: 6/9/2022  MRN: [id number]   Acct: [de-identified]   Primary Care Physician: Reynaldo Ramos MD  Advance Directive: Full Code  Admit Date: 6/7/2022       Hospital Day: 2  Referring Provider: Reynaldo Ramos MD    Patient Seen, Chart, Consults notes, Labs, Radiology studies reviewed. Subjective:     Patient is 77-year-old man with a past medical history of traumatic brain injury with previous intracranial hemorrhaging and CVA. He is typically not ambulatory and is a nursing home resident. His nursing home has reported decreased responsiveness and mobility. He was increasingly confused. He was subsequently transferred to [place] [address] emergency room where patient complained of weakness. His serum creatinine typically ranges 0.8-1.1 and it was as 3 on June 7. Patient was subsequently started on IV fluids and his output started to improve. He is incontinent to the urine. Renal service was consulted to manage his acute kidney injury. Patient is not a good historian. Today, he was doing better. He is more awake and responsive.      Allergies:  Latex, Dilantin [phenytoin], Pcn [penicillins], and Sulfa antibiotics    Medicines:  Current Facility-Administered Medications   Medication Dose Route Frequency Provider Last Rate Last Admin    amantadine (SYMMETREL) capsule 100 mg  100 mg Oral BID Reynaldo Ramos MD   100 mg at 06/09/22 1020    baclofen (LIORESAL) tablet 20 mg  20 mg Oral 4x Daily Reynaldo Ramos MD   20 mg at 06/09/22 1020    busPIRone (BUSPAR) tablet 10 mg  10 mg Oral BID Reynaldo Ramos MD   10 mg at 06/09/22 1020    citalopram (CELEXA) tablet 20 mg  20 mg Oral Daily Reynaldo Ramos MD   20 mg at 06/09/22 1020    pantoprazole (PROTONIX) tablet 40 mg  40 mg Oral QAM AC Reynaldo Ramos MD   40 mg at 06/09/22 0535    gabapentin (NEURONTIN) capsule 400 mg  400 mg Oral TID Carter Beltran MD   400 mg at 06/09/22 1020    oxyCODONE-acetaminophen (PERCOCET)  MG per tablet 1 tablet  1 tablet Oral Q6H PRN Carter Beltran MD   1 tablet at 06/09/22 0324    risperiDONE (RISPERDAL) tablet 1 mg  1 mg Oral BID Carter Beltran MD   1 mg at 06/09/22 1020    traZODone (DESYREL) tablet 50 mg  50 mg Oral Nightly Carter Beltran MD   50 mg at 06/08/22 2036    sodium chloride flush 0.9 % injection 5-40 mL  5-40 mL IntraVENous 2 times per day Camryn Benedict MD   10 mL at 06/09/22 1020    sodium chloride flush 0.9 % injection 5-40 mL  5-40 mL IntraVENous PRN Camryn Benedict MD        0.9 % sodium chloride infusion   IntraVENous PRN Camryn Benedict MD        enoxaparin (LOVENOX) injection 40 mg  40 mg SubCUTAneous Q24H Camryn Benedict MD   40 mg at 06/08/22 2036    acetaminophen (TYLENOL) tablet 650 mg  650 mg Oral Q4H PRN Camryn Benedict MD        ondansetron (ZOFRAN-ODT) disintegrating tablet 4 mg  4 mg Oral Q8H PRN Camryn Benedict MD        Or    ondansetron [facility]) injection 4 mg  4 mg IntraVENous Q6H PRN Camryn Benedict MD        0.9 % sodium chloride infusion   IntraVENous Continuous Camryn Benedict  mL/hr at 06/09/22 0535 New Bag at 06/09/22 0535       Past Medical History:  Past Medical History:   Diagnosis Date    Arthritis     Cellulitis     Cerebral artery occlusion with cerebral infarction ([facility] [address]     Depression     Depression     Difficult intubation     GERD (gastroesophageal reflux disease)     Hemiplegia and hemiparesis following cerebral infarction affecting left non-dominant side (HCC)     Hemorrhoids     History of blood transfusion     Hypertension     Kidney stone     Muscle spasticity     Pressure ulcer     Prolonged emergence from general anesthesia     Retention of urine        Past Surgical History:  Past Surgical History:   Procedure Laterality Date    BACK SURGERY      BACLOFEN PUMP IMPLANTATION      BRAIN SURGERY      KIDNEY STONE SURGERY      [place] FRAGMENT KIDNEY STONE/ ESWL Right 10/2/2018    ESWL EXTRACORPEAL SHOCK WAVE LITHOTRIPSY performed by Emanuel Miller MD at [address] History  History reviewed. No pertinent family history. Social History  Social History     Socioeconomic History    Marital status: Single     Spouse name: Not on file    Number of children: Not on file    Years of education: Not on file    Highest education level: Not on file   Occupational History    Not on file   Tobacco Use    Smoking status: Never Smoker    Smokeless tobacco: Never Used   Vaping Use    Vaping Use: Never used   Substance and Sexual Activity    Alcohol use: No    Drug use: No    Sexual activity: Not on file   Other Topics Concern    Not on file   Social History Narrative    Not on file     Social Determinants of Health     Financial Resource Strain:     Difficulty of Paying Living Expenses: Not on file   Food Insecurity:     Worried About Running Out of Food in the Last Year: Not on file    Deena of Food in the Last Year: Not on file   Transportation Needs:     Lack of Transportation (Medical): Not on file    Lack of Transportation (Non-Medical):  Not on file   Physical Activity:     Days of Exercise per Week: Not on file    Minutes of Exercise per Session: Not on file   Stress:     Feeling of Stress : Not on file   Social Connections:     Frequency of Communication with Friends and Family: Not on file    Frequency of Social Gatherings with Friends and Family: Not on file    Attends Ep[employer] Services: Not on file    Active Member of Clubs or Organizations: Not on file    Attends Club or Organization Meetings: Not on file    Marital Status: Not on file   Intimate Partner Violence:     Fear of Current or Ex-Partner: Not on file    Emotionally Abused: Not on file    Physically Abused: Not on file    Sexually Abused: Not on file   Housing Stability:     Unable to Pay for Housing in the Last Year: Not on file    Number of Places Lived in the Last Year: Not on file    Unstable Housing in the Last Year: Not on file         Review of Systems:  History obtained from chart review and the patient  General ROS: No fever or chills  Respiratory ROS: No cough, shortness of breath, wheezing  Cardiovascular ROS: no chest pain or dyspnea on exertion  Gastrointestinal ROS: No abdominal pain or melena  Genito-Urinary ROS: No dysuria or hematuria  Musculoskeletal ROS: No joint pain or swelling         Objective:  Blood pressure 117/69, pulse 90, temperature 97 °F (36.1 °C), temperature source Temporal, resp. rate 16, SpO2 97 %. Intake/Output Summary (Last 24 hours) at 6/9/2022 1022  Last data filed at 6/9/2022 1020  Gross per 24 hour   Intake 645 ml   Output 400 ml   Net 245 ml     General:alert and awake  Chest:  clear to auscultation bilaterally without respiratory distress  CVS: regular rate and rhythm  Abdominal: soft, nontender, normal bowel sounds  Extremities: no cyanosis or edema  Skin: warm and dry without rash    Labs:  BMP:   Recent Labs     06/07/22  1540 06/08/22  1035 06/09/22  0230    145 144   K 5.1* 4.7 4.4    108 109   CO2 25 26 23   BUN 41* 32* 23*   CREATININE 3.0* 1.9* 1.4*   CALCIUM 9.2 8.9 8.9     CBC:   Recent Labs     06/07/22  1540   WBC 8.3   HGB 9.4*   HCT 30.7*   MCV 89.8        LIVER PROFILE:   Recent Labs     06/07/22  1540   AST 8   ALT 14   BILITOT <0.2   ALKPHOS 111     PT/INR: No results for input(s): PROTIME, INR in the last 72 hours. APTT: No results for input(s): APTT in the last 72 hours. BNP:  No results for input(s): BNP in the last 72 hours. Ionized Calcium:No results for input(s): IONCA in the last 72 hours. Magnesium:No results for input(s): MG in the last 72 hours. Phosphorus:No results for input(s): PHOS in the last 72 hours. HgbA1C: No results for input(s): LABA1C in the last 72 hours.   Hepatic:   Recent Labs     06/07/22  1540   ALKPHOS 111   ALT 14   AST 8   PROT 7.8 BILITOT <0.2   LABALBU 3.8     Lactic Acid:   Recent Labs     06/07/22  1936   LACTA 1.0     Troponin: No results for input(s): CKTOTAL, CKMB, TROPONINT in the last 72 hours. ABGs: No results for input(s): PH, PCO2, PO2, HCO3, O2SAT in the last 72 hours. CRP:  No results for input(s): CRP in the last 72 hours. Sed Rate:  No results for input(s): SEDRATE in the last 72 hours. Cultures:   No results for input(s): CULTURE in the last 72 hours. Radiology reports as per the Radiologist  Radiology: CT HEAD WO CONTRAST    Result Date: 6/7/2022  Exam: CT OF THE BRAIN WITHOUT CONTRAST Clinical data: Confusion. Technique: Contiguous axial images are obtained from the skull base to vertex without intravenous contrast. Reformatted/MPR images were performed. Radiation dose: CTDIvol =79.17 mGy, DLP =1593.72 mGy x cm. Prior studies: CT scan of brain dated 06/01/21. Findings: No acute intracranial abnormality is present. No evidence of acute cortical infarction, hemorrhage, mass or mass effect. Stable moderate dilatation of lateral ventricles, right chronic extra-axial fluid collection, right frontoparietal/temporal craniotomy cranioplasty, mild calcification of the right dura, gliotic changes and volume loss secondary to old ischemic events in the territory of the right middle, right occipital, left anterior cerebral arteries, old left ventricular valve catheter with tip in the third ventricle and moderately advanced periventricular and deep white matter changes; left greater than right. The posterior fossa is unremarkable. The skull base and rest of the calvarium are intact. The included portions of the paranasal sinuses and mastoid air cells are clear. 1.  No acute intracranial bleed or mass. 2.  Multiple stable findings as above. 3.  No obvious interval change. Recommendation: Follow up as clinically indicated.  All CT scans at this facility utilize dose modulation, iterative reconstruction, and/or weight based dosing when appropriate to reduce radiation dose to as low as reasonably achievable. Electronically Signed by Mj Cerda MD at 07-Jun-2022 05:29:25 PM             US RENAL COMPLETE    Result Date: 6/8/2022  Exam: RENAL ULTRASOUND Clinical data:Acute kidney injury. Technique: Real-time grayscale imaging of the kidneys was performed. Images supplemented with color Doppler technique. Prior studies: No prior studies submitted. Findings: Limited exam due to poor acoustic penetration secondary to increased bowel gas which limits the diagnostic capability. The right kidney [id number] x 6.3 x 6.0 cm. Ill-defined area of homogeneous increased echogenicity in the body of the right kidney (5.7 x 5.7 x 5.0 cm). Normal renal size, contour and cortical thickness. No discernible renal calculi or hydronephrosis. The left kidney [id number] x 5.4 x 5.5 cm. Technologist describes a hyperechoic focus with twinkle artifact which is not well demonstrated in the images. Normal renal size, contour, cortical thickness, and echogenicity are preserved. No evidence of renal masses or hydronephrosis. Limited evaluation of a not fully distended urinary bladder. 1.  Very limited exam as for reasons discussed above. 2. Ill-defined area of homogeneous increased echogenicity in the body of the right kidney (5.7 x 5.7 x 5.0 cm). Consider neoplastic process until proven otherwise. 3. Technologist describes a hyperechoic focus with twinkle artifact which is not well demonstrated in the images. Could represent a nonshadowing calculus. 4.  Limited evaluation of a not fully distended urinary bladder. Recommendation: Further work-up with CT of the abdomen and pelvis is recommended. Electronically Signed by Mj Cerda MD at 08-Jun-2022 04:46:46 PM                Assessment   1. Acute kidney injury-dehydration  2. Traumatic brain injury  3. Morbid obesity  4. Anemia of chronic diseases  5. Right kidney mass  6. Hyperuricemia    Plan:  Continue IV hydration. Will get a CT scan with IV contrast to further study the right kidney and ule out possibility of masses. Will also add allopurinol.

## 2022-06-10 ENCOUNTER — ANESTHESIA (OUTPATIENT)
Dept: OPERATING ROOM | Age: 32
DRG: 659 | End: 2022-06-10
Payer: COMMERCIAL

## 2022-06-10 ENCOUNTER — APPOINTMENT (OUTPATIENT)
Dept: GENERAL RADIOLOGY | Age: 32
DRG: 659 | End: 2022-06-10
Payer: COMMERCIAL

## 2022-06-10 ENCOUNTER — APPOINTMENT (OUTPATIENT)
Dept: MRI IMAGING | Age: 32
DRG: 659 | End: 2022-06-10
Payer: COMMERCIAL

## 2022-06-10 ENCOUNTER — ANESTHESIA EVENT (OUTPATIENT)
Dept: OPERATING ROOM | Age: 32
DRG: 659 | End: 2022-06-10
Payer: COMMERCIAL

## 2022-06-10 LAB
ANION GAP SERPL CALCULATED.3IONS-SCNC: 12 MMOL/L (ref 7–19)
BUN BLDV-MCNC: 17 MG/DL (ref 6–20)
CALCIUM SERPL-MCNC: 8.6 MG/DL (ref 8.6–10)
CHLORIDE BLD-SCNC: 108 MMOL/L (ref 98–111)
CO2: 23 MMOL/L (ref 22–29)
CREAT SERPL-MCNC: 1.4 MG/DL (ref 0.5–1.2)
GFR AFRICAN AMERICAN: >59
GFR NON-AFRICAN AMERICAN: 58
GLUCOSE BLD-MCNC: 118 MG/DL (ref 74–109)
POTASSIUM REFLEX MAGNESIUM: 4.4 MMOL/L (ref 3.5–5)
SODIUM BLD-SCNC: 143 MMOL/L (ref 136–145)

## 2022-06-10 PROCEDURE — 2709999900 HC NON-CHARGEABLE SUPPLY: Performed by: UROLOGY

## 2022-06-10 PROCEDURE — 7100000001 HC PACU RECOVERY - ADDTL 15 MIN: Performed by: UROLOGY

## 2022-06-10 PROCEDURE — 6360000002 HC RX W HCPCS: Performed by: UROLOGY

## 2022-06-10 PROCEDURE — 6370000000 HC RX 637 (ALT 250 FOR IP): Performed by: UROLOGY

## 2022-06-10 PROCEDURE — BT1F1ZZ FLUOROSCOPY OF LEFT KIDNEY, URETER AND BLADDER USING LOW OSMOLAR CONTRAST: ICD-10-PCS | Performed by: UROLOGY

## 2022-06-10 PROCEDURE — 2500000003 HC RX 250 WO HCPCS: Performed by: ANESTHESIOLOGY

## 2022-06-10 PROCEDURE — C1758 CATHETER, URETERAL: HCPCS | Performed by: UROLOGY

## 2022-06-10 PROCEDURE — 36415 COLL VENOUS BLD VENIPUNCTURE: CPT

## 2022-06-10 PROCEDURE — 3700000000 HC ANESTHESIA ATTENDED CARE: Performed by: UROLOGY

## 2022-06-10 PROCEDURE — 74420 UROGRAPHY RTRGR +-KUB: CPT | Performed by: UROLOGY

## 2022-06-10 PROCEDURE — 6360000002 HC RX W HCPCS: Performed by: REGISTERED NURSE

## 2022-06-10 PROCEDURE — 2500000003 HC RX 250 WO HCPCS: Performed by: REGISTERED NURSE

## 2022-06-10 PROCEDURE — 2580000003 HC RX 258: Performed by: INTERNAL MEDICINE

## 2022-06-10 PROCEDURE — 7100000000 HC PACU RECOVERY - FIRST 15 MIN: Performed by: UROLOGY

## 2022-06-10 PROCEDURE — 2580000003 HC RX 258: Performed by: ANESTHESIOLOGY

## 2022-06-10 PROCEDURE — 6370000000 HC RX 637 (ALT 250 FOR IP): Performed by: INTERNAL MEDICINE

## 2022-06-10 PROCEDURE — 1210000000 HC MED SURG R&B

## 2022-06-10 PROCEDURE — 74183 MRI ABD W/O CNTR FLWD CNTR: CPT

## 2022-06-10 PROCEDURE — A4216 STERILE WATER/SALINE, 10 ML: HCPCS | Performed by: ANESTHESIOLOGY

## 2022-06-10 PROCEDURE — 0T778DZ DILATION OF LEFT URETER WITH INTRALUMINAL DEVICE, VIA NATURAL OR ARTIFICIAL OPENING ENDOSCOPIC: ICD-10-PCS | Performed by: UROLOGY

## 2022-06-10 PROCEDURE — BT141ZZ FLUOROSCOPY OF KIDNEYS, URETERS AND BLADDER USING LOW OSMOLAR CONTRAST: ICD-10-PCS | Performed by: UROLOGY

## 2022-06-10 PROCEDURE — A9577 INJ MULTIHANCE: HCPCS | Performed by: UROLOGY

## 2022-06-10 PROCEDURE — 6370000000 HC RX 637 (ALT 250 FOR IP): Performed by: FAMILY MEDICINE

## 2022-06-10 PROCEDURE — 6360000004 HC RX CONTRAST MEDICATION: Performed by: UROLOGY

## 2022-06-10 PROCEDURE — 3600000004 HC SURGERY LEVEL 4 BASE: Performed by: UROLOGY

## 2022-06-10 PROCEDURE — C2617 STENT, NON-COR, TEM W/O DEL: HCPCS | Performed by: UROLOGY

## 2022-06-10 PROCEDURE — 3600000014 HC SURGERY LEVEL 4 ADDTL 15MIN: Performed by: UROLOGY

## 2022-06-10 PROCEDURE — 6360000002 HC RX W HCPCS: Performed by: ANESTHESIOLOGY

## 2022-06-10 PROCEDURE — 80048 BASIC METABOLIC PNL TOTAL CA: CPT

## 2022-06-10 PROCEDURE — C1769 GUIDE WIRE: HCPCS | Performed by: UROLOGY

## 2022-06-10 PROCEDURE — 3700000001 HC ADD 15 MINUTES (ANESTHESIA): Performed by: UROLOGY

## 2022-06-10 PROCEDURE — 74183 MRI ABD W/O CNTR FLWD CNTR: CPT | Performed by: RADIOLOGY

## 2022-06-10 PROCEDURE — 52332 CYSTOSCOPY AND TREATMENT: CPT | Performed by: UROLOGY

## 2022-06-10 PROCEDURE — 3209999900 FLUORO FOR SURGICAL PROCEDURES

## 2022-06-10 DEVICE — URETERAL STENT
Type: IMPLANTABLE DEVICE | Site: PENIS | Status: FUNCTIONAL
Brand: POLARIS™ ULTRA

## 2022-06-10 RX ORDER — HYDROMORPHONE HYDROCHLORIDE 1 MG/ML
0.25 INJECTION, SOLUTION INTRAMUSCULAR; INTRAVENOUS; SUBCUTANEOUS EVERY 5 MIN PRN
Status: DISCONTINUED | OUTPATIENT
Start: 2022-06-10 | End: 2022-06-10 | Stop reason: HOSPADM

## 2022-06-10 RX ORDER — FENTANYL CITRATE 50 UG/ML
INJECTION, SOLUTION INTRAMUSCULAR; INTRAVENOUS PRN
Status: DISCONTINUED | OUTPATIENT
Start: 2022-06-10 | End: 2022-06-10 | Stop reason: SDUPTHER

## 2022-06-10 RX ORDER — ONDANSETRON 2 MG/ML
4 INJECTION INTRAMUSCULAR; INTRAVENOUS
Status: COMPLETED | OUTPATIENT
Start: 2022-06-10 | End: 2022-06-10

## 2022-06-10 RX ORDER — ATROPA BELLADONNA AND OPIUM 16.2; 6 MG/1; MG/1
SUPPOSITORY RECTAL PRN
Status: DISCONTINUED | OUTPATIENT
Start: 2022-06-10 | End: 2022-06-10 | Stop reason: ALTCHOICE

## 2022-06-10 RX ORDER — SODIUM CHLORIDE 0.9 % (FLUSH) 0.9 %
5-40 SYRINGE (ML) INJECTION PRN
Status: DISCONTINUED | OUTPATIENT
Start: 2022-06-10 | End: 2022-06-12 | Stop reason: HOSPADM

## 2022-06-10 RX ORDER — PROPOFOL 10 MG/ML
INJECTION, EMULSION INTRAVENOUS PRN
Status: DISCONTINUED | OUTPATIENT
Start: 2022-06-10 | End: 2022-06-10 | Stop reason: SDUPTHER

## 2022-06-10 RX ORDER — ONDANSETRON 2 MG/ML
INJECTION INTRAMUSCULAR; INTRAVENOUS PRN
Status: DISCONTINUED | OUTPATIENT
Start: 2022-06-10 | End: 2022-06-10 | Stop reason: SDUPTHER

## 2022-06-10 RX ORDER — DEXAMETHASONE SODIUM PHOSPHATE 10 MG/ML
INJECTION, SOLUTION INTRAMUSCULAR; INTRAVENOUS PRN
Status: DISCONTINUED | OUTPATIENT
Start: 2022-06-10 | End: 2022-06-10 | Stop reason: SDUPTHER

## 2022-06-10 RX ORDER — SODIUM CHLORIDE 9 MG/ML
INJECTION, SOLUTION INTRAVENOUS PRN
Status: DISCONTINUED | OUTPATIENT
Start: 2022-06-10 | End: 2022-06-12 | Stop reason: HOSPADM

## 2022-06-10 RX ORDER — LIDOCAINE HYDROCHLORIDE 10 MG/ML
INJECTION, SOLUTION EPIDURAL; INFILTRATION; INTRACAUDAL; PERINEURAL PRN
Status: DISCONTINUED | OUTPATIENT
Start: 2022-06-10 | End: 2022-06-10 | Stop reason: SDUPTHER

## 2022-06-10 RX ORDER — ROCURONIUM BROMIDE 10 MG/ML
INJECTION, SOLUTION INTRAVENOUS PRN
Status: DISCONTINUED | OUTPATIENT
Start: 2022-06-10 | End: 2022-06-10 | Stop reason: SDUPTHER

## 2022-06-10 RX ORDER — HYDROMORPHONE HYDROCHLORIDE 1 MG/ML
0.5 INJECTION, SOLUTION INTRAMUSCULAR; INTRAVENOUS; SUBCUTANEOUS EVERY 5 MIN PRN
Status: DISCONTINUED | OUTPATIENT
Start: 2022-06-10 | End: 2022-06-10 | Stop reason: HOSPADM

## 2022-06-10 RX ORDER — LEVOFLOXACIN 5 MG/ML
750 INJECTION, SOLUTION INTRAVENOUS EVERY 24 HOURS
Status: COMPLETED | OUTPATIENT
Start: 2022-06-10 | End: 2022-06-12

## 2022-06-10 RX ORDER — SODIUM CHLORIDE 0.9 % (FLUSH) 0.9 %
5-40 SYRINGE (ML) INJECTION EVERY 12 HOURS SCHEDULED
Status: DISCONTINUED | OUTPATIENT
Start: 2022-06-10 | End: 2022-06-12 | Stop reason: HOSPADM

## 2022-06-10 RX ORDER — PHENAZOPYRIDINE HYDROCHLORIDE 100 MG/1
200 TABLET, FILM COATED ORAL 3 TIMES DAILY PRN
Status: DISCONTINUED | OUTPATIENT
Start: 2022-06-10 | End: 2022-06-12 | Stop reason: HOSPADM

## 2022-06-10 RX ORDER — LEVOFLOXACIN 5 MG/ML
INJECTION, SOLUTION INTRAVENOUS PRN
Status: DISCONTINUED | OUTPATIENT
Start: 2022-06-10 | End: 2022-06-10 | Stop reason: SDUPTHER

## 2022-06-10 RX ORDER — SUCCINYLCHOLINE CHLORIDE 20 MG/ML
INJECTION INTRAMUSCULAR; INTRAVENOUS PRN
Status: DISCONTINUED | OUTPATIENT
Start: 2022-06-10 | End: 2022-06-10 | Stop reason: SDUPTHER

## 2022-06-10 RX ORDER — BACITRACIN, NEOMYCIN, POLYMYXIN B 400; 3.5; 5 [USP'U]/G; MG/G; [USP'U]/G
OINTMENT TOPICAL 2 TIMES DAILY
Status: DISCONTINUED | OUTPATIENT
Start: 2022-06-10 | End: 2022-06-12 | Stop reason: HOSPADM

## 2022-06-10 RX ORDER — GINSENG 100 MG
CAPSULE ORAL 2 TIMES DAILY
Status: DISCONTINUED | OUTPATIENT
Start: 2022-06-10 | End: 2022-06-12 | Stop reason: HOSPADM

## 2022-06-10 RX ORDER — MIDAZOLAM HYDROCHLORIDE 1 MG/ML
INJECTION INTRAMUSCULAR; INTRAVENOUS PRN
Status: DISCONTINUED | OUTPATIENT
Start: 2022-06-10 | End: 2022-06-10

## 2022-06-10 RX ADMIN — LIDOCAINE HYDROCHLORIDE 100 MG: 10 INJECTION, SOLUTION EPIDURAL; INFILTRATION; INTRACAUDAL; PERINEURAL at 11:32

## 2022-06-10 RX ADMIN — AMANTADINE HYDROCHLORIDE 100 MG: 100 CAPSULE, LIQUID FILLED ORAL at 20:39

## 2022-06-10 RX ADMIN — CITALOPRAM HYDROBROMIDE 20 MG: 20 TABLET ORAL at 09:56

## 2022-06-10 RX ADMIN — ENOXAPARIN SODIUM 40 MG: 100 INJECTION SUBCUTANEOUS at 20:39

## 2022-06-10 RX ADMIN — GABAPENTIN 400 MG: 400 CAPSULE ORAL at 09:56

## 2022-06-10 RX ADMIN — SODIUM CHLORIDE: 9 INJECTION, SOLUTION INTRAVENOUS at 00:33

## 2022-06-10 RX ADMIN — ONDANSETRON 4 MG: 2 INJECTION INTRAMUSCULAR; INTRAVENOUS at 11:04

## 2022-06-10 RX ADMIN — BACLOFEN 20 MG: 10 TABLET ORAL at 09:56

## 2022-06-10 RX ADMIN — BACITRACIN: 500 OINTMENT TOPICAL at 15:51

## 2022-06-10 RX ADMIN — BUSPIRONE HYDROCHLORIDE 10 MG: 10 TABLET ORAL at 20:40

## 2022-06-10 RX ADMIN — ALLOPURINOL 200 MG: 100 TABLET ORAL at 09:55

## 2022-06-10 RX ADMIN — SUCCINYLCHOLINE CHLORIDE 140 MG: 20 INJECTION, SOLUTION INTRAMUSCULAR; INTRAVENOUS at 11:32

## 2022-06-10 RX ADMIN — FENTANYL CITRATE 100 MCG: 50 INJECTION, SOLUTION INTRAMUSCULAR; INTRAVENOUS at 11:32

## 2022-06-10 RX ADMIN — ONDANSETRON 4 MG: 2 INJECTION INTRAMUSCULAR; INTRAVENOUS at 12:02

## 2022-06-10 RX ADMIN — OXYCODONE HYDROCHLORIDE AND ACETAMINOPHEN 1 TABLET: 10; 325 TABLET ORAL at 07:54

## 2022-06-10 RX ADMIN — DEXAMETHASONE SODIUM PHOSPHATE 8 MG: 10 INJECTION, SOLUTION INTRAMUSCULAR; INTRAVENOUS at 11:49

## 2022-06-10 RX ADMIN — FAMOTIDINE 20 MG: 10 INJECTION INTRAVENOUS at 11:03

## 2022-06-10 RX ADMIN — RISPERIDONE 1 MG: 1 TABLET, FILM COATED ORAL at 21:00

## 2022-06-10 RX ADMIN — GADOBENATE DIMEGLUMINE 20 ML: 529 INJECTION, SOLUTION INTRAVENOUS at 15:32

## 2022-06-10 RX ADMIN — BACLOFEN 20 MG: 10 TABLET ORAL at 20:39

## 2022-06-10 RX ADMIN — LEVOFLOXACIN 750 MG: 5 INJECTION, SOLUTION INTRAVENOUS at 15:51

## 2022-06-10 RX ADMIN — TRAZODONE HYDROCHLORIDE 50 MG: 50 TABLET ORAL at 20:40

## 2022-06-10 RX ADMIN — GABAPENTIN 400 MG: 400 CAPSULE ORAL at 20:39

## 2022-06-10 RX ADMIN — SUGAMMADEX 150 MG: 100 INJECTION, SOLUTION INTRAVENOUS at 12:02

## 2022-06-10 RX ADMIN — OXYCODONE HYDROCHLORIDE AND ACETAMINOPHEN 1 TABLET: 10; 325 TABLET ORAL at 15:49

## 2022-06-10 RX ADMIN — RISPERIDONE 1 MG: 1 TABLET, FILM COATED ORAL at 09:56

## 2022-06-10 RX ADMIN — OXYCODONE HYDROCHLORIDE AND ACETAMINOPHEN 1 TABLET: 10; 325 TABLET ORAL at 20:39

## 2022-06-10 RX ADMIN — BACITRACIN: 500 OINTMENT TOPICAL at 23:02

## 2022-06-10 RX ADMIN — LEVOFLOXACIN 500 MG: 5 INJECTION, SOLUTION INTRAVENOUS at 11:38

## 2022-06-10 RX ADMIN — ROCURONIUM BROMIDE 10 MG: 10 INJECTION, SOLUTION INTRAVENOUS at 11:45

## 2022-06-10 RX ADMIN — FENTANYL CITRATE 50 MCG: 50 INJECTION, SOLUTION INTRAMUSCULAR; INTRAVENOUS at 11:48

## 2022-06-10 RX ADMIN — BUSPIRONE HYDROCHLORIDE 10 MG: 10 TABLET ORAL at 09:56

## 2022-06-10 RX ADMIN — AMANTADINE HYDROCHLORIDE 100 MG: 100 CAPSULE, LIQUID FILLED ORAL at 09:56

## 2022-06-10 RX ADMIN — BACLOFEN 20 MG: 10 TABLET ORAL at 19:21

## 2022-06-10 RX ADMIN — BACITRACIN ZINC, NEOMYCIN, POLYMYXIN B: 400; 3.5; 5 OINTMENT TOPICAL at 15:50

## 2022-06-10 RX ADMIN — FENTANYL CITRATE 100 MCG: 50 INJECTION, SOLUTION INTRAMUSCULAR; INTRAVENOUS at 11:44

## 2022-06-10 RX ADMIN — PROPOFOL 150 MG: 10 INJECTION, EMULSION INTRAVENOUS at 11:32

## 2022-06-10 ASSESSMENT — PAIN SCALES - GENERAL
PAINLEVEL_OUTOF10: 8
PAINLEVEL_OUTOF10: 0
PAINLEVEL_OUTOF10: 8

## 2022-06-10 ASSESSMENT — LIFESTYLE VARIABLES: SMOKING_STATUS: 0

## 2022-06-10 NOTE — OP NOTE
NAIN Gelato Fiasco OF [facility] JERRI [address] 76, [address] [facility]                                OPERATIVE REPORT    PATIENT NAME: Christie Lott                       :        1990  MED REC NO:   [id number]                              ROOM:       [facility]  ACCOUNT NO:   [de-identified]                           ADMIT DATE: 2022  PROVIDER:     Nae Parsons. Mar Ho MD    DATE OF PROCEDURE:  06/10/2022    PREOPERATIVE DIAGNOSES:  1. Left hydronephrosis. 2.  Left proximal ureteral calculus. POSTOPERATIVE DIAGNOSES:  1. Left proximal ureteral calculus (UPJ). 2.  Left hydronephrosis. 3.  Obstructed left pyelonephritis. PROCEDURES:  1. Cystoscopy. 2.  Left retrograde pyelogram.  3.  Insertion of left ureteral stent. SURGEON:  Aaron Ho MD    TYPE OF ANESTHESIA:  General.    ANESTHESIOLOGIST:  Arlet Medina. ESTIMATED BLOOD LOSS:  0 mL. COMPLICATIONS:  None. STENT LEFT IN POSITION:  26 cm x 6-Albanian double-J stent. CATHETER LEFT IN POSITION:  16-Albanian Lamar catheter. CONDITION:  Stable to the recovery room. CLINICAL HISTORY:  This is a 77-year-old white male who was admitted for  acute renal injury. The patient initially with mental status changes when evaluated by his  physician. Patient was then referred to [facility].  On presentation, chemistry profile revealed creatinine greater than 3. Subsequent renal ultrasound showed a question of right  renal mass which led to urological consultation. The patient subsequently had a CT scan which showed an obstructing large  left ureteropelvic junction calculus along with bilateral nonobstructing  renal calculi. The patient in retrospect may have had mental status  changes due to his obstructive pyelonephritis. The patient is brought  here now for cystoscopy, left retrograde pyelogram, and insertion of  left ureteral stent.   An MRI will be ordered for better evaluation of  the patient's right renal mass. OPERATIVE FINDINGS:  Cystoscopy revealed an injury to the distal  urethra. Etiology not certain. The prostatic urethra was approximately  3 cm long and open. The bladder mucosa had undergone minimal trabecular  changes. There appeared to be no evidence of stones or tumors in the  bladder. There is diminished efflux of urine from the left ureteral  orifice in comparison to that on the right. The left retrograde pyelogram demonstrated filling defect in the area of  the left ureteropelvic junction. This was consistent with the area  where the stone was positioned. With stent placement, there appeared to be purulent drainage coming from  the left kidney to suggest an underlying obstructive pyelonephritis. The stent was positioned in such a manner that the proximal end was left  in the renal pelvis and distal end was left in the bladder. PROCEDURE:  The patient was brought to the operating room and was  positioned in the supine position. Following the administration of  general anesthesia, the patient was repositioned in the dorsal lithotomy  position. Groin area was then prepped and draped in the usual sterile  manner to expose the penis. A #22-Niuean Olympus cystoscope was then passed transurethrally into the  bladder under direct vision. First, the urethra was examined with 30  degrees lens and the scope was passed in the bladder and bladder was  examined with 30 and 70 degrees lens respectively. Those findings are  as described above. A #5-Niuean cone tip catheter was passed through the working channel of  the cystoscope, bilateral retrograde pyelogram was performed on the left  side. Having accomplished this and having identified that there was  stone in the left proximal ureter in the area of the ureteropelvic  junction, attention was then directed towards the placement of a  double-J stent.     A Flexi-Tip Glidewire was passed through working channel of

## 2022-06-10 NOTE — PROGRESS NOTES
Patient awake and talking. Dr. Alex Sanchez at bedside. Aware of urine output. Lamar leger placed per order.

## 2022-06-10 NOTE — PROGRESS NOTES
Speech Language Pathology  {SLP ALL NOTES:[id number]  Orders received and chart reviewed. Pt was off the floor for a surgical procedure. Unable to complete BSE at this time. ST will follow and attempt at a later date.

## 2022-06-10 NOTE — ANESTHESIA POSTPROCEDURE EVALUATION
Department of Anesthesiology  Postprocedure Note    Patient: Tad Peres  MRN: [id number]  YOB: 1990  Date of evaluation: 6/10/2022  Time:  12:26 PM     Procedure Summary     Date: 06/10/22 Room / Location: [facility] OR [facility]    Anesthesia Start: 5261 Anesthesia Stop: 1226    Procedures:       CYSTOSCOPY LEFT PYLOGRAM (Left Penis)      LEFT URETERAL STENT INSERTION (Left Penis) Diagnosis:       Ureteral stone      (Ureteral stone [N20.1])    Surgeons: Kesha Ly MD Responsible Provider: ANGELA Eisenberg CRNA    Anesthesia Type: general ASA Status: 4          Anesthesia Type: No value filed. Naveed Phase I: Naveed Score: 10    Naveed Phase II:      Last vitals: Reviewed and per EMR flowsheets.        Anesthesia Post Evaluation    Patient location during evaluation: PACU  Patient participation: complete - patient participated  Level of consciousness: obtunded/minimal responses  Pain score: 0  Airway patency: patent  Nausea & Vomiting: no nausea and no vomiting  Complications: no  Cardiovascular status: hemodynamically stable  Respiratory status: acceptable, face mask, room air and spontaneous ventilation  Hydration status: euvolemic

## 2022-06-10 NOTE — CONSULTS
[facility] OF [facility] NIKUNJ Jenkins [address], [address] [facility]                                  CONSULTATION    PATIENT NAME: Sabino Basurto                       :        1990  MED REC NO:   [id number]                              ROOM:       [facility]  ACCOUNT NO:   [de-identified]                           ADMIT DATE: 2022  PROVIDER:     Brittney Hendrickson MD    CONSULT DATE:  2022    ATTENDING PHYSICIAN:  Arslan Grant MD    REASON FOR CONSULTATION:  This is a 64-year-old white male with acute  renal failure. For some reason, a question of right renal mass. I was  asked to evaluate the patient and treat definitively. CLINICAL HISTORY:  This is a 64-year-old white male whose mentation was  compromised by traumatic injury from prior intracranial hemorrhage/CVA  who is an indwellant  nursing home who was transferred because of change in  mental status. The patient was noted to be becoming increasingly unresponsive. Here,  initial evaluation included a chemistry profile which showed the patient  was in acute renal failure. The patient was admitted for evaluation of this  problem. While in-house, an ultrasound was obtained to evaluate the patient's  renal failure and findings showed a possibility of right renal mass. Urological consultation was obtained for these problems. MEDICATIONS:  1. Amantadine. 2.  Baclofen. 3.  BuSpar. 4.  Celexa. 5.  Protonix. 6.  Neurontin. 7.  Percocet. 8.  Risperdal.  9.  Trazodone. 10.  Tylenol. 11.  Zofran. ALLERGIES:  1. LASIX. 2.  DILANTIN. 3.  PENICILLIN. 4.  SULFA. PAST MEDICAL HISTORY:  1.  Prior cerebrovascular accident (left-sided deficits). 2.  Depression. 3.  GERD. 4.  Hypertension. 5.  Nephrolithiasis. 6.  Urinary retention. PAST SURGICAL HISTORY:  1.  Back surgery. 2.  Baclofen pump implantation. 3.  Craniotomy. 4.  Surgery to remove kidney stones.     FAMILY HISTORY:  The patient has support from mother, but apparently  lives in a nursing home. SOCIAL HISTORY:  The patient is single. Denies smoking or alcohol  usage. REVIEW OF SYSTEMS:  GENERAL:  The patient with compromised medical health in that does have  left-sided paresis. Overweight/obese. PULMONARY:  Denies shortness of breath. CARDIOVASCULAR:  Denies chest pain. :  Does have problems with incontinence. NEUROLOGIC:  Has left-sided deficits from prior cerebrovascular  accident. PSYCH:  Appropriate, but depressed. PHYSICAL EXAMINATION:  GENERAL:  Obese white male, in no apparent distress. VITAL SIGNS:  Blood pressure is 117/64 with a pulse of 90, respiratory  rate of 16, temperature of 97. HEENT:  Unremarkable. LUNGS:  Good expansion and excursion of the chest wall. Lung fields  clear. HEART:  Regular rate and rhythm. ABDOMEN:  Obese, soft with good bowel sounds. :  Normal male. EXTREMITIES:  Decreased range of motion on the left. NEUROLOGIC:  Alert and oriented. PSYCH:  Flat mood. LABS:  Which include BMP, sodium 144, potassium 4.4, chloride 109, CO2  of 23, BUN of 23 and a creatinine of 1.4. This creatinine marks a major  improvement from presentation creatinine which was 3.0. CBC with a white count of 8.3 with hemoglobin of 9.4 and hematocrit of  30.7. Radiology review of CAT scan done today showed large stone overlying the  distribution of the left proximal ureter causing obstruction. Also, of  significance, the patient has right renal mass. Renal cell carcinoma  until proven otherwise. SUGGESTIONS:  1. We will need stent placement to drain the left kidney. It is  surprising that the patient is not sick or uneasy in terms of fever and  chills. More unlikely is nausea and vomiting that he presented with was  related to obstructing left proximal ureteral calculus. The patient  with decreased sensation on that side. 2.  Right renal mass. Await full radiology report.   Strongly suspect  malignancy. SUGGESTIONS:  1. Will proceed with stent placement on left side. 2.  Will need extracorporeal shock wave lithotripsy of left renal  calculi. 3.  Will await formal reading of CAT scan before discussion with the patient and family - his  options in terms of treatment of right renal mass. FRANCIS A. Burnette Schlatter, MD    D: 06/09/2022 17:30:35      T: 06/09/2022 20:47:22     [id number]  Job#: [id number]     Doc#: [id number]    CC:  Kamille Leigh MD

## 2022-06-10 NOTE — PROGRESS NOTES
To recovery room with MIRACLE, Jayleen Ramirez and [address] [place] [address] Street. Connected to monitors. Patient received with simple mask and oral airway in place. Unresponsive at this time.

## 2022-06-10 NOTE — PROGRESS NOTES
Nephrology ([facility]) Progress Note    Patient:  Cesario Dancer  YOB: 1990  Date of Service: 6/10/2022  MRN: [id number]   Acct: [de-identified]   Primary Care Physician: Chico Burns MD  Advance Directive: Full Code  Admit Date: 6/7/2022       Hospital Day: 3  Referring Provider: Chico Burns MD    Patient Seen, Chart, Consults notes, Labs, Radiology studies reviewed. Subjective:     Patient is 78-year-old man with a past medical history of traumatic brain injury with previous intracranial hemorrhaging and CVA. He is typically not ambulatory and is a nursing home resident. His nursing home has reported decreased responsiveness and mobility. He was increasingly confused. He was subsequently transferred to [place] [address] emergency room where patient complained of weakness. His serum creatinine typically ranges 0.8-1.1 and it was as 3 on June 7. Patient was subsequently started on IV fluids and his output started to improve. He is incontinent to the urine. Renal service was consulted to manage his acute kidney injury. Patient is not a good historian. He then became more awake and responsive. His imaging now showed a left ureteral kidney stone with left hydronephrosis along with right renal mass. Seen by urology. He is going today for left ureteral stent placement. He was having some left flank pain.      Allergies:  Latex, Dilantin [phenytoin], Pcn [penicillins], and Sulfa antibiotics    Medicines:  Current Facility-Administered Medications   Medication Dose Route Frequency Provider Last Rate Last Admin    allopurinol (ZYLOPRIM) tablet 200 mg  200 mg Oral Daily Ella Lara MD   200 mg at 06/10/22 0955    amantadine (SYMMETREL) capsule 100 mg  100 mg Oral BID Chico Burns MD   100 mg at 06/10/22 0956    baclofen (LIORESAL) tablet 20 mg  20 mg Oral 4x Daily Chico Burns MD   20 mg at 06/10/22 0956    busPIRone (BUSPAR) tablet 10 mg  10 mg Oral BID Rahel Galdamez MD   10 mg at 06/10/22 3873    citalopram (CELEXA) tablet 20 mg  20 mg Oral Daily Rahel Galdamez MD   20 mg at 06/10/22 0956    pantoprazole (PROTONIX) tablet 40 mg  40 mg Oral QAM AC Rahel Galdamez MD   40 mg at 06/09/22 0535    gabapentin (NEURONTIN) capsule 400 mg  400 mg Oral TID Rahel Galdamez MD   400 mg at 06/10/22 0956    oxyCODONE-acetaminophen (PERCOCET)  MG per tablet 1 tablet  1 tablet Oral Q6H PRN Rahel Galdamez MD   1 tablet at 06/10/22 0754    risperiDONE (RISPERDAL) tablet 1 mg  1 mg Oral BID Rahel Galdamez MD   1 mg at 06/10/22 5606    traZODone (DESYREL) tablet 50 mg  50 mg Oral Nightly Rahel Galdamez MD   50 mg at 06/09/22 2050    sodium chloride flush 0.9 % injection 5-40 mL  5-40 mL IntraVENous 2 times per day Ilya Castle MD   10 mL at 06/09/22 1020    sodium chloride flush 0.9 % injection 5-40 mL  5-40 mL IntraVENous PRN Ilya Castle MD        0.9 % sodium chloride infusion   IntraVENous PRN Ilya Castle MD        enoxaparin (LOVENOX) injection 40 mg  40 mg SubCUTAneous Q24H Ilya Castle MD   40 mg at 06/09/22 2050    acetaminophen (TYLENOL) tablet 650 mg  650 mg Oral Q4H PRN Ilya Castle MD        ondansetron (ZOFRAN-ODT) disintegrating tablet 4 mg  4 mg Oral Q8H PRN Ilya Castle MD        Or    ondansetron [facility]) injection 4 mg  4 mg IntraVENous Q6H PRN Ilya Castle MD        0.9 % sodium chloride infusion   IntraVENous Continuous Ilya Castle  mL/hr at 06/10/22 0033 New Bag at 06/10/22 0033       Past Medical History:  Past Medical History:   Diagnosis Date    Arthritis     Cellulitis     Cerebral artery occlusion with cerebral infarction ([facility] [address]     Depression     Depression     Difficult intubation     GERD (gastroesophageal reflux disease)     Hemiplegia and hemiparesis 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 No pertinent family history. Social History  Social History     Socioeconomic History    Marital status: Single     Spouse name: Not on file    Number of children: Not on file    Years of education: Not on file    Highest education level: Not on file   Occupational History    Not on file   Tobacco Use    Smoking status: Never Smoker    Smokeless tobacco: Never Used   Vaping Use    Vaping Use: Never used   Substance and Sexual Activity    Alcohol use: No    Drug use: No    Sexual activity: Not on file   Other Topics Concern    Not on file   Social History Narrative    Not on file     Social Determinants of Health     Financial Resource Strain:     Difficulty of Paying Living Expenses: Not on file   Food Insecurity:     Worried About Running Out of Food in the Last Year: Not on file    Deena of Food in the Last Year: Not on file   Transportation Needs:     Lack of Transportation (Medical): Not on file    Lack of Transportation (Non-Medical):  Not on file   Physical Activity:     Days of Exercise per Week: Not on file    Minutes of Exercise per Session: Not on file   Stress:     Feeling of Stress : Not on file   Social Connections:     Frequency of Communication with Friends and Family: Not on file    Frequency of Social Gatherings with Friends and Family: Not on file    Attends Congregational Services: Not on file    Active Member of Clubs or Organizations: Not on file    Attends Club or Organization Meetings: Not on file    Marital Status: Not on file   Intimate Partner Violence:     Fear of Current or Ex-Partner: Not on file    Emotionally Abused: Not on file    Physically Abused: Not on file    Sexually Abused: Not on file   Housing Stability:     Unable to Pay for Housing in the Last Year: Not on file    Number of [place] in the Last Year: Not on file    Unstable Housing in the Last Year: Not on file         Review of Systems:  History obtained from chart review and the patient  General ROS: No fever or chills  Respiratory ROS: No cough, shortness of breath, wheezing  Cardiovascular ROS: no chest pain or dyspnea on exertion  Gastrointestinal ROS: No abdominal pain or melena  Genito-Urinary ROS: No dysuria or hematuria  Musculoskeletal ROS: No joint pain or swelling         Objective:  Blood pressure 103/78, pulse 86, temperature 97.5 °F (36.4 °C), temperature source Temporal, resp. rate 18, SpO2 96 %. Intake/Output Summary (Last 24 hours) at 6/10/2022 1022  Last data filed at 6/10/2022 0342  Gross per 24 hour   Intake 240 ml   Output 1000 ml   Net -760 ml     General:alert and awake  Chest:  clear to auscultation bilaterally without respiratory distress  CVS: regular rate and rhythm  Abdominal: soft, nontender, normal bowel sounds  Extremities: no cyanosis or edema  Skin: warm and dry without rash    Labs:  BMP:   Recent Labs     06/08/22  1035 06/09/22  0230 06/10/22  0215    144 143   K 4.7 4.4 4.4    109 108   CO2 26 23 23   BUN 32* 23* 17   CREATININE 1.9* 1.4* 1.4*   CALCIUM 8.9 8.9 8.6     CBC:   Recent Labs     06/07/22  1540   WBC 8.3   HGB 9.4*   HCT 30.7*   MCV 89.8        LIVER PROFILE:   Recent Labs     06/07/22  1540   AST 8   ALT 14   BILITOT <0.2   ALKPHOS 111     PT/INR: No results for input(s): PROTIME, INR in the last 72 hours. APTT: No results for input(s): APTT in the last 72 hours. BNP:  No results for input(s): BNP in the last 72 hours.   Ionized Calcium:No results for input(s): IONCA in the last 72 hours. Magnesium:No results for input(s): MG in the last 72 hours. Phosphorus:No results for input(s): PHOS in the last 72 hours. HgbA1C: No results for input(s): LABA1C in the last 72 hours. Hepatic:   Recent Labs     06/07/22  1540   ALKPHOS 111   ALT 14   AST 8   PROT 7.8   BILITOT <0.2   LABALBU 3.8     Lactic Acid:   Recent Labs     06/07/22  1936   LACTA 1.0     Troponin: No results for input(s): CKTOTAL, CKMB, TROPONINT in the last 72 hours. ABGs: No results for input(s): PH, PCO2, PO2, HCO3, O2SAT in the last 72 hours. CRP:  No results for input(s): CRP in the last 72 hours. Sed Rate:  No results for input(s): SEDRATE in the last 72 hours. Cultures:   No results for input(s): CULTURE in the last 72 hours. Radiology reports as per the Radiologist  Radiology: CT HEAD WO CONTRAST    Result Date: 6/7/2022  Exam: CT OF THE BRAIN WITHOUT CONTRAST Clinical data: Confusion. Technique: Contiguous axial images are obtained from the skull base to vertex without intravenous contrast. Reformatted/MPR images were performed. Radiation dose: CTDIvol =79.17 mGy, DLP =1593.72 mGy x cm. Prior studies: CT scan of brain dated 06/01/21. Findings: No acute intracranial abnormality is present. No evidence of acute cortical infarction, hemorrhage, mass or mass effect. Stable moderate dilatation of lateral ventricles, right chronic extra-axial fluid collection, right frontoparietal/temporal craniotomy cranioplasty, mild calcification of the right dura, gliotic changes and volume loss secondary to old ischemic events in the territory of the right middle, right occipital, left anterior cerebral arteries, old left ventricular valve catheter with tip in the third ventricle and moderately advanced periventricular and deep white matter changes; left greater than right. The posterior fossa is unremarkable. The skull base and rest of the calvarium are intact.  The included portions of the paranasal sinuses and mastoid air cells are clear. 1.  No acute intracranial bleed or mass. 2.  Multiple stable findings as above. 3.  No obvious interval change. Recommendation: Follow up as clinically indicated. All CT scans at this facility utilize dose modulation, iterative reconstruction, and/or weight based dosing when appropriate to reduce radiation dose to as low as reasonably achievable. Electronically Signed by Britt Arguelles MD at 07-Jun-2022 05:29:25 PM             US RENAL COMPLETE    Result Date: 6/8/2022  Exam: RENAL ULTRASOUND Clinical data:Acute kidney injury. Technique: Real-time grayscale imaging of the kidneys was performed. Images supplemented with color Doppler technique. Prior studies: No prior studies submitted. Findings: Limited exam due to poor acoustic penetration secondary to increased bowel gas which limits the diagnostic capability. The right kidney nettxggx93.5 x 6.3 x 6.0 cm. Ill-defined area of homogeneous increased echogenicity in the body of the right kidney (5.7 x 5.7 x 5.0 cm). Normal renal size, contour and cortical thickness. No discernible renal calculi or hydronephrosis. The left kidney rotsstuu96.4 x 5.4 x 5.5 cm. Technologist describes a hyperechoic focus with twinkle artifact which is not well demonstrated in the images. Normal renal size, contour, cortical thickness, and echogenicity are preserved. No evidence of renal masses or hydronephrosis. Limited evaluation of a not fully distended urinary bladder. 1.  Very limited exam as for reasons discussed above. 2. Ill-defined area of homogeneous increased echogenicity in the body of the right kidney (5.7 x 5.7 x 5.0 cm). Consider neoplastic process until proven otherwise. 3. Technologist describes a hyperechoic focus with twinkle artifact which is not well demonstrated in the images. Could represent a nonshadowing calculus. 4.  Limited evaluation of a not fully distended urinary bladder.  Recommendation: Further work-up with CT of the abdomen and pelvis is recommended. Electronically Signed by Lashell Tavarez MD at 08-Jun-2022 04:46:46 PM                Assessment   1. Acute kidney injury-dehydration  2. Traumatic brain injury  3. Morbid obesity  4. Anemia of chronic diseases  5. Right kidney mass  6. Hyperuricemia  7. Left ureteral kidney stone with left hydroneophrosis    Plan:  Continue gentle IV hydration. Continue allopurinol. Appreciate urology input. For left ureteral stent placement today. Follow up labs.

## 2022-06-10 NOTE — PROGRESS NOTES
Progress Note  Damien Whitley  6/10/2022 4:06 PM  Subjective:   Admit Date:   6/7/2022      CC/ADMIT DX:       Interval History:   Reviewed overnight events and nursing notes. He has had no new issues per Staff. I have reviewed all labs/diagnostics from the last 24hrs. ROS:   I have done a 10 point ROS and all are negative, except what is mentioned in the HPI. ADULT DIET; Dysphagia - Soft and Bite Sized    Medications:      sodium chloride      sodium chloride Stopped (06/10/22 1208)      sodium chloride flush  5-40 mL IntraVENous 2 times per day    bacitracin   Topical BID    neomycin-bacitracin-polymyxin   Topical BID    levofloxacin  750 mg IntraVENous Q24H    allopurinol  200 mg Oral Daily    amantadine  100 mg Oral BID    baclofen  20 mg Oral 4x Daily    busPIRone  10 mg Oral BID    citalopram  20 mg Oral Daily    pantoprazole  40 mg Oral QAM AC    gabapentin  400 mg Oral TID    risperiDONE  1 mg Oral BID    traZODone  50 mg Oral Nightly    enoxaparin  40 mg SubCUTAneous Q24H           Objective:   Vitals: /69   Pulse 67   Temp 97 °F (36.1 °C)   Resp 16   SpO2 97%      Intake/Output Summary (Last 24 hours) at 6/10/2022 1606  Last data filed at 6/10/2022 1448  Gross per 24 hour   Intake 1220 ml   Output 702 ml   Net 518 ml     General appearance: alert and cooperative with exam  Lungs: clear to auscultation bilaterally  Heart: regular rate and rhythm, S1, S2 normal, no murmur, click, rub or gallop  Abdomen: soft, non-tender; bowel sounds normal; no masses,  no organomegaly  Extremities: extremities normal, atraumatic, no cyanosis or edema  Neurologic:  No obvious focal neurologic deficits. Assessment and Plan:   Principal Problem:    SALVADOR (acute kidney injury) ([facility] [address]  Resolved Problems:    * No resolved hospital problems. *    Renal Mass    H/O CVAm    GERD    Chronic Pain    Right Renal Mass    Left Hydronephrosis, Nephrolithiasis    Plan:  1.   Continue present

## 2022-06-10 NOTE — ANESTHESIA PRE PROCEDURE
Department of Anesthesiology  Preprocedure Note       Name:  Rosalina Fields   Age:  27 y.o.  :  1990                                          MRN:  [id number]         Date:  6/10/2022      Surgeon: Brent Goldberg):  Ellis Rainey MD    Procedure: Procedure(s):  CYSTOSCOPY LEFT URETEROSCOPY LASER LITHOTRIPSY  LEFT URETERAL STENT INSERTION    Medications prior to admission:   Prior to Admission medications    Medication Sig Start Date End Date Taking? Authorizing Provider   traZODone (DESYREL) 50 MG tablet Take 50 mg by mouth nightly   Yes Historical Provider, MD   medroxyPROGESTERone (PROVERA) 5 MG tablet Take 5 mg by mouth daily   Yes Historical Provider, MD   citalopram (CELEXA) 20 mg tablet Take 20 mg by mouth daily   Yes Historical Provider, MD   aspirin 325 MG tablet Take 325 mg by mouth daily    Historical Provider, MD   Cholecalciferol (VITAMIN D3 PO) Take 2,000 Units by mouth daily    Historical Provider, MD   Amantadine (SYMMETREL) 100 MG TABS tablet Take 100 mg by mouth 2 times daily    Historical Provider, MD   azelastine HCl 0.15 % SOLN 1 spray by Nasal route 2 times daily     Historical Provider, MD   baclofen (LIORESAL) 20 MG tablet Take 20 mg by mouth 4 times daily     Historical Provider, MD   busPIRone (BUSPAR) 10 MG tablet Take 10 mg by mouth 2 times daily     Historical Provider, MD   loratadine (CLARITIN) 10 MG capsule Take 10 mg by mouth daily    Historical Provider, MD   fluticasone (FLONASE) 50 MCG/ACT nasal spray 1 spray by Each Nare route 2 times daily    Historical Provider, MD   melatonin 5 MG TABS tablet Take 5 mg by mouth nightly   Patient not taking: Reported on 2022    Historical Provider, MD   gabapentin (NEURONTIN) 400 MG capsule Take 400 mg by mouth 3 times daily.     Historical Provider, MD   lisinopril (PRINIVIL;ZESTRIL) 10 MG tablet Take 10 mg by mouth daily    Historical Provider, MD   oxyCODONE-acetaminophen (PERCOCET)  MG per tablet Take 1 tablet by mouth every 6 hours as needed for Pain. Historical Provider, MD   tiZANidine (ZANAFLEX) 2 MG tablet Take 2 mg by mouth 2 times daily     Historical Provider, MD   polyethylene glycol (GLYCOLAX) powder Take 17 g by mouth 2 times daily  1/26/17   Historical Provider, MD   dexlansoprazole (DEXILANT) 60 MG CPDR delayed release capsule Take 60 mg by mouth daily    Historical Provider, MD   DULoxetine (CYMBALTA) 60 MG capsule Take 60 mg by mouth daily Not on med list from SNF    Historical Provider, MD   Multiple Vitamins-Minerals (THERAPEUTIC MULTIVITAMIN-MINERALS) tablet Take 1 tablet by mouth daily    Historical Provider, MD   Calcium Carbonate-Vitamin D (CALCIUM-VITAMIN D) 500-200 MG-UNIT per tablet Take 1 tablet by mouth 2 times daily (with meals)    Historical Provider, MD   docusate sodium (COLACE) 100 MG capsule Take 200 mg by mouth 2 times daily    Historical Provider, MD   Omega-3 Fatty Acids (FISH OIL) 1000 MG CAPS Take 3,000 mg by mouth 2 times daily    Historical Provider, MD   risperiDONE (RISPERDAL) 1 MG tablet Take 1 mg by mouth 2 times daily    Historical Provider, MD   Ascorbic Acid (VITAMIN C) 500 MG CAPS Take 500 mg by mouth 2 times daily    Historical Provider, MD   propranolol (INDERAL) 20 MG tablet Take 20 mg by mouth 2 times daily as needed Hold for /70 or lower    Historical Provider, MD   ketoconazole (NIZORAL) 2 % shampoo Apply topically daily as needed for Itching Apply topically daily as needed.     Historical Provider, MD       Current medications:    Current Facility-Administered Medications   Medication Dose Route Frequency Provider Last Rate Last Admin    [MAR Hold] allopurinol (ZYLOPRIM) tablet 200 mg  200 mg Oral Daily Lorenzo Duran MD   200 mg at 06/10/22 0955    [MAR Hold] amantadine (SYMMETREL) capsule 100 mg  100 mg Oral BID Mary Fink MD   100 mg at 06/10/22 0956    [MAR Hold] baclofen (LIORESAL) tablet 20 mg  20 mg Oral 4x Daily Mary Fink MD   20 mg at 06/10/22 1704  [MAR Hold] busPIRone (BUSPAR) tablet 10 mg  10 mg Oral BID Geronimo Robles MD   10 mg at 06/10/22 0956    [MAR Hold] citalopram (CELEXA) tablet 20 mg  20 mg Oral Daily Geronimo Robles MD   20 mg at 06/10/22 0956    [MAR Hold] pantoprazole (PROTONIX) tablet 40 mg  40 mg Oral QAM AC Geronimo Robles MD   40 mg at 06/09/22 0535    [MAR Hold] gabapentin (NEURONTIN) capsule 400 mg  400 mg Oral TID Geronimo Robles MD   400 mg at 06/10/22 0956    [MAR Hold] oxyCODONE-acetaminophen (PERCOCET)  MG per tablet 1 tablet  1 tablet Oral Q6H PRN Geronimo Robles MD   1 tablet at 06/10/22 0754    [MAR Hold] risperiDONE (RISPERDAL) tablet 1 mg  1 mg Oral BID Geronimo Robles MD   1 mg at 06/10/22 0956    [MAR Hold] traZODone (DESYREL) tablet 50 mg  50 mg Oral Nightly Geronimo Robles MD   50 mg at 06/09/22 2050    [MAR Hold] sodium chloride flush 0.9 % injection 5-40 mL  5-40 mL IntraVENous 2 times per day Cordell Chaidez MD   10 mL at 06/09/22 1020    [MAR Hold] sodium chloride flush 0.9 % injection 5-40 mL  5-40 mL IntraVENous PRN Cordell Chaidez MD        [facility] Hold] 0.9 % sodium chloride infusion   IntraVENous PRN Cordell Chaidez MD        [facility] Hold] enoxaparin (LOVENOX) injection 40 mg  40 mg SubCUTAneous Q24H Cordell Chaidez MD   40 mg at 06/09/22 2050    [MAR Hold] acetaminophen (TYLENOL) tablet 650 mg  650 mg Oral Q4H PRN Cordell Chaidez MD        [facility] Hold] ondansetron (ZOFRAN-ODT) disintegrating tablet 4 mg  4 mg Oral Q8H PRN Cordell Chaidez MD        Or   [employer] [facility] Hold] ondansetron [facility]) injection 4 mg  4 mg IntraVENous Q6H PRN Cordell Chaidez MD        [facility] Hold] 0.9 % sodium chloride infusion   IntraVENous Continuous Cordell Chaidez  mL/hr at 06/10/22 1037 NoRateChange at 06/10/22 1037       Allergies:     Allergies   Allergen Reactions    Latex     Dilantin [Phenytoin]     Pcn [Penicillins]     Sulfa Antibiotics        Problem List:    Patient Active Problem List   Diagnosis Code    Muscle spasticity M62.838    Post-traumatic spasticity R25.2    Decubitus ulcer of right buttock, stage 3 (HCC) L89.313    Dermatitis associated with moisture L30.8    History of cerebrovascular accident (CVA) with residual deficit I69.30    Renal calculus, right N20.0    Pressure injury of left buttock, stage 3 (HCC) L89.323    Class 3 severe obesity due to excess calories without serious comorbidity in adult [facility]) E66.01    Sacral decubitus ulcer, stage III (HCC) L89.153    Cellulitis L03.90    Hypertension I10    Cognitive deficit due to old cerebrovascular accident (CVA) I69.319    SALVADOR (acute kidney injury) ([facility] [address] N17.9       Past Medical History:        Diagnosis Date    Arthritis     Cellulitis     Cerebral artery occlusion with cerebral infarction (HCC)     Depression     Depression     Difficult intubation     GERD (gastroesophageal reflux disease)     Hemiplegia and hemiparesis following cerebral infarction affecting left non-dominant side (HCC)     Hemorrhoids     History of blood transfusion     Hypertension     Kidney stone     Muscle spasticity     Pressure ulcer     Prolonged emergence from general anesthesia     Retention of urine        Past Surgical History:        Procedure Laterality Date    BACK SURGERY      BACLOFEN PUMP IMPLANTATION      BRAIN SURGERY      KIDNEY STONE SURGERY      OH FRAGMENT KIDNEY STONE/ ESWL Right 10/2/2018    ESWL EXTRACORPEAL SHOCK WAVE LITHOTRIPSY performed by Mauri Mayorga MD at [facility] OR       Social History:    Social History     Tobacco Use    Smoking status: Never Smoker    Smokeless tobacco: Never Used   Substance Use Topics    Alcohol use:  No                                Counseling given: Not Answered      Vital Signs (Current):   Vitals:    06/09/22 1229 06/09/22 1550 06/09/22 1721 06/10/22 0729   BP: 102/67  111/70 103/78   Pulse: 81  84 86   Resp: 20 18 18 18   Temp: 97.2 °F (36.2 °C)  97 °F (36.1 °C) 97.5 °F (36.4 °C)   TempSrc: Temporal  Temporal Temporal   SpO2: 98%  97% 96%                                              BP Readings from Last 3 Encounters:   06/10/22 103/78   04/12/22 138/85   04/05/22 (!) 139/92       NPO Status:                                                                                 BMI:   Wt Readings from Last 3 Encounters:   04/10/22 (!) 303 lb 7 oz (137.6 kg)   04/05/22 (!) 324 lb (147 kg)   03/15/22 (S) (!) 324 lb (147 kg)     There is no height or weight on file to calculate BMI.    CBC:   Lab Results   Component Value Date    WBC 8.3 06/07/2022    RBC 3.42 06/07/2022    HGB 9.4 06/07/2022    HCT 30.7 06/07/2022    MCV 89.8 06/07/2022    RDW 15.4 06/07/2022     06/07/2022       CMP:   Lab Results   Component Value Date     06/10/2022    K 4.4 06/10/2022     06/10/2022    CO2 23 06/10/2022    BUN 17 06/10/2022    CREATININE 1.4 06/10/2022    GFRAA >59 06/10/2022    LABGLOM 58 06/10/2022    GLUCOSE 118 06/10/2022    PROT 7.8 06/07/2022    PROT 8.5 12/17/2012    CALCIUM 8.6 06/10/2022    BILITOT <0.2 06/07/2022    ALKPHOS 111 06/07/2022    AST 8 06/07/2022    ALT 14 06/07/2022       POC Tests: No results for input(s): POCGLU, POCNA, POCK, POCCL, POCBUN, POCHEMO, POCHCT in the last 72 hours.     Coags:   Lab Results   Component Value Date    PROTIME 13.2 09/28/2018    INR 1.01 09/28/2018    APTT 30.0 09/28/2018       HCG (If Applicable): No results found for: PREGTESTUR, PREGSERUM, HCG, HCGQUANT     ABGs: No results found for: PHART, PO2ART, JEB8QBQ, RKX9AYK, BEART, K2XIVLTP     Type & Screen (If Applicable):  No results found for: LABABO, LABRH    Drug/Infectious Status (If Applicable):  No results found for: HIV, HEPCAB    COVID-19 Screening (If Applicable):   Lab Results   Component Value Date    COVID19 Not Detected 06/07/2022           Anesthesia Evaluation  Patient summary reviewed no history of anesthetic complications:   Airway: Mallampati: III  TM distance: >3 FB   Neck ROM: limited  Comment: Large neck circumference  Mouth opening: > = 3 FB   Dental:          Pulmonary:normal exam  breath sounds clear to auscultation      (-) asthma, recent URI, sleep apnea and not a current smoker                           Cardiovascular:  Exercise tolerance: good (>4 METS),   (+) hypertension:,     (-) pacemaker, past MI, CABG/stent and  angina      Rhythm: regular  Rate: normal                    Neuro/Psych:   (+) CVA (2005, left-sided paralysis):, psychiatric history:   (-) seizures and TIA           GI/Hepatic/Renal:   (+) GERD:, renal disease: kidney stones, morbid obesity     (-) liver disease       Endo/Other:        (-) diabetes mellitus, hypothyroidism, hyperthyroidism               Abdominal:             Vascular: Other Findings:           Anesthesia Plan      general     ASA 4     (Preop famotidine  Will have video laryngoscopy available)  Induction: intravenous. MIPS: Postoperative opioids intended and Prophylactic antiemetics administered. Anesthetic plan and risks discussed with patient. Use of blood products discussed with patient whom consented to blood products.                      Sedrick Hauser MD   6/10/2022

## 2022-06-10 NOTE — BRIEF OP NOTE
Brief Postoperative Note      Patient: William Almazan  YOB: 1990  MRN: [id number]    Date of Procedure: 6/10/2022    Pre-Op Diagnosis: 1. Left ureteropelvic junction calculus 2. Left hydronephrosis    Post-Op Diagnosis: Same as preoperative diagnosis       Procedure: Cystoscopy, left retrograde pyelogram, insertion of left ureteral stent, Lamar catheterization    Surgeon(s):  Ziyad Brown MD    Assistant:  None    Anesthesia: General    Estimated Blood Loss (mL): 0 ml    Complications: None    Specimens:   None    Implants:  Implant Name Type Inv. Item Serial No.  Lot No. LRB No. Used Action   STENT URET 6FR L26CM PERCFLX HYDR+ DBL PGTL THRD 2 - [id number]  STENT URET 6FR L26CM PERCFLX HYDR+ DBL PGTL THRD 2  [employer] [facility]- [id number] Left 1 Implanted         Drains: None    Findings:.  1 proximal and left ureteropelvic junction calculus 2.   Purulent drainage from left kidney    Electronically signed by Ziyad Brown MD on 6/10/2022 at 12:10 PM

## 2022-06-11 LAB
ANION GAP SERPL CALCULATED.3IONS-SCNC: 14 MMOL/L (ref 7–19)
BASOPHILS ABSOLUTE: 0 K/UL (ref 0–0.2)
BASOPHILS RELATIVE PERCENT: 0.4 % (ref 0–1)
BUN BLDV-MCNC: 14 MG/DL (ref 6–20)
CALCIUM SERPL-MCNC: 8.9 MG/DL (ref 8.6–10)
CHLORIDE BLD-SCNC: 103 MMOL/L (ref 98–111)
CO2: 21 MMOL/L (ref 22–29)
CREAT SERPL-MCNC: 1.3 MG/DL (ref 0.5–1.2)
EOSINOPHILS ABSOLUTE: 0 K/UL (ref 0–0.6)
EOSINOPHILS RELATIVE PERCENT: 0.1 % (ref 0–5)
GFR AFRICAN AMERICAN: >59
GFR NON-AFRICAN AMERICAN: >60
GLUCOSE BLD-MCNC: 123 MG/DL (ref 74–109)
HCT VFR BLD CALC: 29.5 % (ref 42–52)
HEMOGLOBIN: 8.8 G/DL (ref 14–18)
IMMATURE GRANULOCYTES #: 0.2 K/UL
LYMPHOCYTES ABSOLUTE: 1 K/UL (ref 1.1–4.5)
LYMPHOCYTES RELATIVE PERCENT: 14.4 % (ref 20–40)
MCH RBC QN AUTO: 27.8 PG (ref 27–31)
MCHC RBC AUTO-ENTMCNC: 29.8 G/DL (ref 33–37)
MCV RBC AUTO: 93.1 FL (ref 80–94)
MONOCYTES ABSOLUTE: 0.5 K/UL (ref 0–0.9)
MONOCYTES RELATIVE PERCENT: 7.2 % (ref 0–10)
NEUTROPHILS ABSOLUTE: 5.5 K/UL (ref 1.5–7.5)
NEUTROPHILS RELATIVE PERCENT: 75.7 % (ref 50–65)
PDW BLD-RTO: 14 % (ref 11.5–14.5)
PLATELET # BLD: 234 K/UL (ref 130–400)
PMV BLD AUTO: 11 FL (ref 9.4–12.4)
POTASSIUM REFLEX MAGNESIUM: 4.7 MMOL/L (ref 3.5–5)
RBC # BLD: 3.17 M/UL (ref 4.7–6.1)
SODIUM BLD-SCNC: 138 MMOL/L (ref 136–145)
WBC # BLD: 7.2 K/UL (ref 4.8–10.8)

## 2022-06-11 PROCEDURE — 6370000000 HC RX 637 (ALT 250 FOR IP): Performed by: UROLOGY

## 2022-06-11 PROCEDURE — 80048 BASIC METABOLIC PNL TOTAL CA: CPT

## 2022-06-11 PROCEDURE — 36415 COLL VENOUS BLD VENIPUNCTURE: CPT

## 2022-06-11 PROCEDURE — 85025 COMPLETE CBC W/AUTO DIFF WBC: CPT

## 2022-06-11 PROCEDURE — 51702 INSERT TEMP BLADDER CATH: CPT

## 2022-06-11 PROCEDURE — 6360000002 HC RX W HCPCS: Performed by: UROLOGY

## 2022-06-11 PROCEDURE — 1210000000 HC MED SURG R&B

## 2022-06-11 PROCEDURE — 2580000003 HC RX 258: Performed by: UROLOGY

## 2022-06-11 PROCEDURE — 99233 SBSQ HOSP IP/OBS HIGH 50: CPT | Performed by: UROLOGY

## 2022-06-11 RX ORDER — ENOXAPARIN SODIUM 100 MG/ML
30 INJECTION SUBCUTANEOUS 2 TIMES DAILY
Status: DISCONTINUED | OUTPATIENT
Start: 2022-06-11 | End: 2022-06-12 | Stop reason: HOSPADM

## 2022-06-11 RX ADMIN — SODIUM CHLORIDE, PRESERVATIVE FREE 10 ML: 5 INJECTION INTRAVENOUS at 20:00

## 2022-06-11 RX ADMIN — BUSPIRONE HYDROCHLORIDE 10 MG: 10 TABLET ORAL at 07:46

## 2022-06-11 RX ADMIN — RISPERIDONE 1 MG: 1 TABLET, FILM COATED ORAL at 07:47

## 2022-06-11 RX ADMIN — ONDANSETRON HYDROCHLORIDE 4 MG: 2 SOLUTION INTRAMUSCULAR; INTRAVENOUS at 14:10

## 2022-06-11 RX ADMIN — BACLOFEN 20 MG: 10 TABLET ORAL at 14:11

## 2022-06-11 RX ADMIN — GABAPENTIN 400 MG: 400 CAPSULE ORAL at 14:10

## 2022-06-11 RX ADMIN — GABAPENTIN 400 MG: 400 CAPSULE ORAL at 20:12

## 2022-06-11 RX ADMIN — ALLOPURINOL 200 MG: 100 TABLET ORAL at 07:46

## 2022-06-11 RX ADMIN — PHENAZOPYRIDINE HYDROCHLORIDE 200 MG: 100 TABLET ORAL at 19:59

## 2022-06-11 RX ADMIN — BACLOFEN 20 MG: 10 TABLET ORAL at 16:51

## 2022-06-11 RX ADMIN — GABAPENTIN 400 MG: 400 CAPSULE ORAL at 07:46

## 2022-06-11 RX ADMIN — AMANTADINE HYDROCHLORIDE 100 MG: 100 CAPSULE, LIQUID FILLED ORAL at 07:46

## 2022-06-11 RX ADMIN — PANTOPRAZOLE SODIUM 40 MG: 40 TABLET, DELAYED RELEASE ORAL at 07:46

## 2022-06-11 RX ADMIN — ENOXAPARIN SODIUM 30 MG: 100 INJECTION SUBCUTANEOUS at 07:47

## 2022-06-11 RX ADMIN — CITALOPRAM HYDROBROMIDE 20 MG: 20 TABLET ORAL at 07:47

## 2022-06-11 RX ADMIN — RISPERIDONE 1 MG: 1 TABLET, FILM COATED ORAL at 19:58

## 2022-06-11 RX ADMIN — LEVOFLOXACIN 750 MG: 5 INJECTION, SOLUTION INTRAVENOUS at 14:15

## 2022-06-11 RX ADMIN — BUSPIRONE HYDROCHLORIDE 10 MG: 10 TABLET ORAL at 19:59

## 2022-06-11 RX ADMIN — BACLOFEN 20 MG: 10 TABLET ORAL at 07:46

## 2022-06-11 RX ADMIN — TRAZODONE HYDROCHLORIDE 50 MG: 50 TABLET ORAL at 19:59

## 2022-06-11 RX ADMIN — ENOXAPARIN SODIUM 30 MG: 100 INJECTION SUBCUTANEOUS at 20:00

## 2022-06-11 RX ADMIN — OXYCODONE HYDROCHLORIDE AND ACETAMINOPHEN 1 TABLET: 10; 325 TABLET ORAL at 19:59

## 2022-06-11 RX ADMIN — AMANTADINE HYDROCHLORIDE 100 MG: 100 CAPSULE, LIQUID FILLED ORAL at 19:58

## 2022-06-11 RX ADMIN — BACITRACIN ZINC, NEOMYCIN, POLYMYXIN B: 400; 3.5; 5 OINTMENT TOPICAL at 20:08

## 2022-06-11 RX ADMIN — SODIUM CHLORIDE, PRESERVATIVE FREE 10 ML: 5 INJECTION INTRAVENOUS at 07:47

## 2022-06-11 RX ADMIN — BACLOFEN 20 MG: 10 TABLET ORAL at 20:00

## 2022-06-11 RX ADMIN — BACITRACIN: 500 OINTMENT TOPICAL at 07:54

## 2022-06-11 RX ADMIN — BACITRACIN: 500 OINTMENT TOPICAL at 20:08

## 2022-06-11 RX ADMIN — BACITRACIN ZINC, NEOMYCIN, POLYMYXIN B: 400; 3.5; 5 OINTMENT TOPICAL at 07:54

## 2022-06-11 ASSESSMENT — PAIN DESCRIPTION - ONSET: ONSET: ON-GOING

## 2022-06-11 ASSESSMENT — PAIN DESCRIPTION - ORIENTATION: ORIENTATION: INNER

## 2022-06-11 ASSESSMENT — PAIN SCALES - GENERAL
PAINLEVEL_OUTOF10: 0
PAINLEVEL_OUTOF10: 5

## 2022-06-11 ASSESSMENT — PAIN DESCRIPTION - PAIN TYPE: TYPE: CHRONIC PAIN

## 2022-06-11 ASSESSMENT — PAIN - FUNCTIONAL ASSESSMENT: PAIN_FUNCTIONAL_ASSESSMENT: PREVENTS OR INTERFERES SOME ACTIVE ACTIVITIES AND ADLS

## 2022-06-11 ASSESSMENT — PAIN DESCRIPTION - DESCRIPTORS: DESCRIPTORS: ACHING

## 2022-06-11 ASSESSMENT — PAIN DESCRIPTION - LOCATION: LOCATION: GENERALIZED

## 2022-06-11 ASSESSMENT — PAIN DESCRIPTION - FREQUENCY: FREQUENCY: CONTINUOUS

## 2022-06-11 NOTE — PROGRESS NOTES
Progress Note  Date:2022       Room:[facility]-01  Patient [place] Screen     YOB: 1990     Age:32 y.o. Inquires about when he can return to skilled nursing facility. Urology operative note reviewed from yesterday. Currently on IV Levaquin after procedure. Subjective    Subjective:  Symptoms:  Stable. Diet:  Adequate intake. Activity level: Impaired due to weakness. Pain:  He reports no pain. Review of Systems  Objective         Vitals Last 24 Hours:  TEMPERATURE:  Temp  Av.4 °F (36.3 °C)  Min: 96.1 °F (35.6 °C)  Max: 98.9 °F (37.2 °C)  RESPIRATIONS RANGE: Resp  Av.5  Min: 7  Max: 21  PULSE OXIMETRY RANGE: SpO2  Av.7 %  Min: 94 %  Max: 99 %  PULSE RANGE: Pulse  Av.9  Min: 66  Max: 83  BLOOD PRESSURE RANGE: Systolic (86FJP), AUN:136 , Min:101 , PVP:279   ; Diastolic (83DVV), PRN:53, Min:52, Max:78    I/O (24Hr): Intake/Output Summary (Last 24 hours) at 2022 0844  Last data filed at 2022 6008  Gross per 24 hour   Intake 5261 ml   Output 1802 ml   Net 3459 ml     Objective:  General Appearance:  Comfortable and well-appearing. Vital signs: (most recent): Blood pressure 115/70, pulse 69, temperature 97.2 °F (36.2 °C), temperature source Temporal, resp. rate 16, SpO2 97 %. Vital signs are normal.    Output: Producing urine and minimal stool output. HEENT: Normal HEENT exam.    Lungs:  Normal effort and normal respiratory rate. Heart: Normal rate. Regular rhythm. Abdomen: Abdomen is soft. Bowel sounds are normal.     Pulses: Distal pulses are intact. Neurological: Patient is alert. (At baseline). Skin:  Warm and dry.       Labs/Imaging/Diagnostics    Labs:  CBC:  Recent Labs     22  0256   WBC 7.2   RBC 3.17*   HGB 8.8*   HCT 29.5*   MCV 93.1   RDW 14.0        CHEMISTRIES:  Recent Labs     22  0230 06/10/22  0215 22  0256    143 138   K 4.4 4.4 4.7    108 103   CO2 23 23 21*   BUN 23* 17 14 CREATININE 1.4* 1.4* 1.3*   GLUCOSE 94 118* 123*     PT/INR:No results for input(s): PROTIME, INR in the last 72 hours. APTT:No results for input(s): APTT in the last 72 hours. LIVER PROFILE:No results for input(s): AST, ALT, BILIDIR, BILITOT, ALKPHOS in the last 72 hours. Imaging Last 24 Hours:  CT ABDOMEN PELVIS W IV CONTRAST Additional Contrast? None    Result Date: 6/9/2022  Exam: CT OF THE ABDOMEN/PELVIS WITH IV CONTRAST Clinical data: Right renal mass. Technique:Direct contiguousaxial CT images were acquired through the abdomen and pelvis with intravenouscontrastusing soft tissue and bone algorithms. Oral contrast was not administered. Reformatted/MPR images were performed. Radiation dose: CTDIvol = 91.27mGy, DLP =3524 mGy x cm. Limitations: Lack of oral contrast limits evaluation of the bowel loops. Prior Studies: Renal ultrasound dated 06/08/22. Radiograph of abdomen dated 10/02/18. Findings: Lung bases: Clear. Liver:Enlarged in size, measures 20.8 cm (craniocaudal). Unremarkable contour. Normal density. No evidence of mass. No evidence of dilated ducts. Gallbladder Fossa: Unremarkable. Spleen: Grossly unremarkable. Pancreas:  Grossly unremarkable. Adrenal glands: Grossly unremarkable size, contour and density. Right Kidney:In anatomic position. Grossly unremarkablerenal size, contour and density. Well defined mildly enhancing soft tissue density lesion is noted at mid pole of right kidney. It measures 56 x 56 mm. Few radiodense calculi are noted in right kidney, largest measures 14.8 mm in inferior calyx. Left Kidney:In anatomic position. Grossly unremarkablerenal size, contour and density. No evidence of a renal mass or cyst. Pelvicalyceal system and upper ureter are moderate to grossly dilated in the left side with radiodense calculus of 9.2 mm in upper  ureter. Mild parenchymal thinning is noted in left side.  Multiple radiodense calculi are noted in middle calyx and lower calyx of left kidney largest measures 11 mm in middle calyx. 5.9 x 4.2 cm well-circumscribed hypodense lesion of the upper to mid pole right kidney. Retroperitoneum: No enlarged retroperitoneal lymphadenopathy. The aorta and IVC appear unremarkable. Peritoneal cavity: No evidence of free air or ascites. Gastrointestinal tract: No obstruction. Appendix: Unremarkable. Pelvis: Solid and hollow viscera grossly unremarkable. Osseous structures: No acute or destructive bony process identified. Small fat containing umbilical and supraumbilical hernia are noted. Size of defect in supraumbilical region is 28 mm and in umbilical region this 15 mm. 1. Left moderate to gross hydroureteronephrosis with larger calculus in upper ureter and mild parenchymal thinning. 2. Bilateral nephrolithiasis. 3. Well defined non-enhancing 5.9 x 4.2 cm soft tissue density lesion at mid pole of right kidney-ultrasound examination or MRI as renal mass protocol is recommended to differentiate between hyperdense cyst versus mass. 4. Moderate hepatomegaly. 5. Supraumbilical and umbilical hernia. Recommendation: Follow up as clinically indicated. All CT scans at this facility utilize dose modulation, iterative reconstruction, and/or weight based dosing when appropriate to reduce radiation dose to as low as reasonably achievable. Electronically Signed by Kandis Masterson MD at 09-Jun-2022 11:36:46 PM             FLUORO FOR SURGICAL PROCEDURES    Result Date: 6/10/2022  Radiology exam is complete. No Radiologist dictation. Please follow up with ordering provider. Assessment//Plan           Hospital Problems           Last Modified POA    * (Principal) SALVADOR (acute kidney injury) ([facility] [address] 6/7/2022 Yes        Assessment:    Condition: In stable condition. Improving. Plan:   Transfer Plan: Back to skilled nursing on Sunday if available. Out of bed and up to chair. Consults: urology. Regular diet. (    Acute kidney injury-creatinine back to baseline.   Status post

## 2022-06-11 NOTE — PROGRESS NOTES
Progress Note  Date:2022       Room:[address] [place] [address]-  Patient Janneth Findshayne     YOB: 1990     Age:32 y.o. Subjective    Subjective: No new complaints. Spoke to patient's mother. Urological status updated. Review of Systems: See admission history and physical.  Objective         Vitals Last 24 Hours:  TEMPERATURE:  Temp  Av.1 °F (36.2 °C)  Min: 96.5 °F (35.8 °C)  Max: 98.1 °F (36.7 °C)  RESPIRATIONS RANGE: Resp  Av.4  Min: 16  Max: 18  PULSE OXIMETRY RANGE: SpO2  Av.7 %  Min: 96 %  Max: 100 %  PULSE RANGE: Pulse  Av.5  Min: 66  Max: 81  BLOOD PRESSURE RANGE: Systolic (41HWN), SXD:270 , Min:108 , JKP:704   ; Diastolic (47DSB), WYX:62, Min:65, Max:77    I/O (24Hr): Intake/Output Summary (Last 24 hours) at 2022 1342  Last data filed at 2022 1123  Gross per 24 hour   Intake 5241 ml   Output 1700 ml   Net 3541 ml     Objective  Lungs: Diminished expansion excursion of chest wall. Heart: Regular rate and rhythm. Abdomen: Obese. : Lamar cath in position. Extremities: Without edema  Neurologically: Alert and oriented  Psych: Appropriate. .    Labs/Imaging/Diagnostics    Labs:  CBC:  Recent Labs     22  0256   WBC 7.2   RBC 3.17*   HGB 8.8*   HCT 29.5*   MCV 93.1   RDW 14.0        CHEMISTRIES:  Recent Labs     22  0230 06/10/22  0215 22  0256    143 138   K 4.4 4.4 4.7    108 103   CO2 23 23 21*   BUN 23* 17 14   CREATININE 1.4* 1.4* 1.3*   GLUCOSE 94 118* 123*     PT/INR:No results for input(s): PROTIME, INR in the last 72 hours. APTT:No results for input(s): APTT in the last 72 hours. LIVER PROFILE:No results for input(s): AST, ALT, BILIDIR, BILITOT, ALKPHOS in the last 72 hours. Imaging Last 24 Hours:  FLUORO FOR SURGICAL PROCEDURES    Result Date: 6/10/2022  Radiology exam is complete. No Radiologist dictation. Please follow up with ordering provider.      Assessment//Plan           Hospital Problems           Last

## 2022-06-11 NOTE — PLAN OF CARE
Problem: Discharge Planning  Goal: Discharge to home or other facility with appropriate resources  Outcome: Progressing  Flowsheets (Taken 6/11/2022 0758)  Discharge to home or other facility with appropriate resources: Identify barriers to discharge with patient and caregiver     Problem: Safety - Adult  Goal: Free from fall injury  Outcome: Progressing  Flowsheets (Taken 6/11/2022 1040)  Free From Fall Injury: Instruct family/caregiver on patient safety     Problem: ABCDS Injury Assessment  Goal: Absence of physical injury  Outcome: Progressing  Flowsheets (Taken 6/11/2022 1040)  Absence of Physical Injury: Implement safety measures based on patient assessment     Problem: Skin/Tissue Integrity  Goal: Absence of new skin breakdown  Description: 1. Monitor for areas of redness and/or skin breakdown  2. Assess vascular access sites hourly  3. Every 4-6 hours minimum:  Change oxygen saturation probe site  4. Every 4-6 hours:  If on nasal continuous positive airway pressure, respiratory therapy assess nares and determine need for appliance change or resting period.   Outcome: Progressing     Problem: Pain  Goal: Verbalizes/displays adequate comfort level or baseline comfort level  Outcome: Progressing     Problem: Chronic Conditions and Co-morbidities  Goal: Patient's chronic conditions and co-morbidity symptoms are monitored and maintained or improved  Outcome: Progressing  Flowsheets (Taken 6/11/2022 0758)  Care Plan - Patient's Chronic Conditions and Co-Morbidity Symptoms are Monitored and Maintained or Improved: Monitor and assess patient's chronic conditions and comorbid symptoms for stability, deterioration, or improvement

## 2022-06-11 NOTE — PROGRESS NOTES
Automatic Dose Adjustment of                Subcutaneous Anticoagulant for Prophylaxis    Bulmaro Holly is a 27 y.o. male. Recent Labs     06/10/22  0215 06/11/22  0256   CREATININE 1.4* 1.3*       Estimated Creatinine Clearance: 120 mL/min (A) (based on SCr of 1.3 mg/dL (H)). Weight:  Wt Readings from Last 1 Encounters:   06/10/22 (!) 303 lb (137.4 kg)           Pharmacy has adjusted subcutaneous anticoagulant for prophylaxis to Enoxaparin 30 mg SC twice daily based on the patient's weight and estimated CrCl per [facility] policy.                Electronically signed by Elizabeth Ceballos [facility] on 6/11/2022 at 4:08 AM

## 2022-06-11 NOTE — PROGRESS NOTES
Nephrology ([facility]) Progress Note    Patient:  Kylah Del Castillo  YOB: 1990  Date of Service: 6/11/2022  MRN: [id number]   Acct: [de-identified]   Primary Care Physician: Ashly Cantor MD  Advance Directive: Full Code  Admit Date: 6/7/2022       Hospital Day: 4  Referring Provider: Ashly Cantor MD    Patient Seen, Chart, Consults notes, Labs, Radiology studies reviewed. Subjective:     Patient is 75-year-old man with a past medical history of traumatic brain injury with previous intracranial hemorrhaging and CVA. He is typically not ambulatory and is a nursing home resident. His nursing home has reported decreased responsiveness and mobility. He was increasingly confused. He was subsequently transferred to [place] [address] emergency room where patient complained of weakness. His serum creatinine typically ranges 0.8-1.1 and it was as 3 on June 7. Patient was subsequently started on IV fluids and his output started to improve. He is incontinent to the urine. Renal service was consulted to manage his acute kidney injury. Patient is not a good historian. He then became more awake and responsive. His imaging now showed a left ureteral kidney stone with left hydronephrosis along with right renal mass. Seen by urology. He is s/p left ureteral stent placement on 6/10. Today, he continues to have mild abdominal pain. Has a Lamar catheter and his urine is clear.      Allergies:  Latex, Dilantin [phenytoin], Pcn [penicillins], and Sulfa antibiotics    Medicines:  Current Facility-Administered Medications   Medication Dose Route Frequency Provider Last Rate Last Admin    enoxaparin Sodium (LOVENOX) injection 30 mg  30 mg SubCUTAneous BID Suhas Vegas MD   30 mg at 06/11/22 0747    sodium chloride flush 0.9 % injection 5-40 mL  5-40 mL IntraVENous 2 times per day Suhas Vegas MD   10 mL at 06/11/22 0747    sodium chloride flush 0.9 % injection 5-40 mL  5-40 mL IntraVENous PRN Beverly Ferguson MD        0.9 % sodium chloride infusion   IntraVENous PRN Beverly Ferguson MD        bacitracin ointment   Topical BID Beverly Ferguson MD   Given at 06/11/22 0754    neomycin-bacitracin-polymyxin (NEOSPORIN) ointment   Topical BID Beverly Ferguson MD   Given at 06/11/22 0754    phenazopyridine (PYRIDIUM) tablet 200 mg  200 mg Oral TID PRN Beverly Ferguson MD        levoFLOXacin (LEVAQUIN) 750 MG/150ML infusion 750 mg  750 mg IntraVENous Q24H Beverly Ferguson  mL/hr at 06/11/22 1415 750 mg at 06/11/22 1415    allopurinol (ZYLOPRIM) tablet 200 mg  200 mg Oral Daily Beverly Ferguson MD   200 mg at 06/11/22 0746    amantadine (SYMMETREL) capsule 100 mg  100 mg Oral BID Beverly Ferguson MD   100 mg at 06/11/22 0746    baclofen (LIORESAL) tablet 20 mg  20 mg Oral 4x Daily Beverly Ferguson MD   20 mg at 06/11/22 1411    busPIRone (BUSPAR) tablet 10 mg  10 mg Oral BID Beverly Ferguson MD   10 mg at 06/11/22 0746    citalopram (CELEXA) tablet 20 mg  20 mg Oral Daily Beverly Ferguson MD   20 mg at 06/11/22 0747    pantoprazole (PROTONIX) tablet 40 mg  40 mg Oral QAM AC Beverly Ferguson MD   40 mg at 06/11/22 0746    gabapentin (NEURONTIN) capsule 400 mg  400 mg Oral TID Beverly Ferguson MD   400 mg at 06/11/22 1410    oxyCODONE-acetaminophen (PERCOCET)  MG per tablet 1 tablet  1 tablet Oral Q6H PRN Beverly Ferguson MD   1 tablet at 06/10/22 2039    risperiDONE (RISPERDAL) tablet 1 mg  1 mg Oral BID Beverly Ferguson MD   1 mg at 06/11/22 0747    traZODone (DESYREL) tablet 50 mg  50 mg Oral Nightly Beverly Ferguson MD   50 mg at 06/10/22 2040    acetaminophen (TYLENOL) tablet 650 mg  650 mg Oral Q4H PRN Beverly Ferguson MD        ondansetron (ZOFRAN-ODT) disintegrating tablet 4 mg  4 mg Oral Q8H PRN Beverly Ferguson MD        Or    ondansetron [facility]) injection 4 mg  4 mg IntraVENous Q6H PRN Beverly Ferguson MD   4 mg at 06/11/22 1410 Past Medical History:  Past Medical History:   Diagnosis Date    Arthritis     Cellulitis     Cerebral artery occlusion with cerebral infarction ([address]     Depression     Depression     Difficult intubation     GERD (gastroesophageal reflux disease)     Hemiplegia and hemiparesis following cerebral infarction affecting left non-dominant side (HCC)     Hemorrhoids     History of blood transfusion     Hypertension     Kidney stone     Muscle spasticity     Pressure ulcer     Prolonged emergence from general anesthesia     Retention of urine        Past Surgical History:  Past Surgical History:   Procedure Laterality Date    BACK SURGERY      BACLOFEN PUMP IMPLANTATION      BRAIN SURGERY      CYSTOSCOPY Left 6/10/2022    CYSTOSCOPY LEFT PYLOGRAM performed by Suhas Vegas MD at [facility] Left 6/10/2022    LEFT URETERAL STENT INSERTION performed by Suhas eVgas MD at [address] [facility] Right 10/2/2018    ESWL EXTRACORPEAL SHOCK WAVE LITHOTRIPSY performed by Nichelle Turner MD at [facility] History  History reviewed. No pertinent family history.     Social History  Social History     Socioeconomic History    Marital status: Single     Spouse name: Not on file    Number of children: Not on file    Years of education: Not on file    Highest education level: Not on file   Occupational History    Not on file   Tobacco Use    Smoking status: Never Smoker    Smokeless tobacco: Never Used   Vaping Use    Vaping Use: Never used   Substance and Sexual Activity    Alcohol use: No    Drug use: No    Sexual activity: Not on file   Other Topics Concern    Not on file   Social History Narrative    Not on file     Social Determinants of Health     Financial Resource Strain:     Difficulty of Paying Living Expenses: Not on file   Food Insecurity:     Worried About Running Out of Food in the Last Year: Not on file   [place] T[place] Ran Out of Food in the Last Year: Not on file   Transportation Needs:     Lack of Transportation (Medical): Not on file    Lack of Transportation (Non-Medical): Not on file   Physical Activity:     Days of Exercise per Week: Not on file    Minutes of Exercise per Session: Not on file   Stress:     Feeling of Stress : Not on file   Social Connections:     Frequency of Communication with Friends and Family: Not on file    Frequency of Social Gatherings with Friends and Family: Not on file    Attends Jainism Services: Not on file    Active Member of [address] [place] [address] or Organizations: Not on file    Attends Club or Organization Meetings: Not on file    Marital Status: Not on file   Intimate Partner Violence:     Fear of Current or Ex-Partner: Not on file    Emotionally Abused: Not on file    Physically Abused: Not on file    Sexually Abused: Not on file   Housing Stability:     Unable to Pay for Housing in the Last Year: Not on file    Number of [place] in the Last Year: Not on file    Unstable Housing in the Last Year: Not on file         Review of Systems:  History obtained from chart review and the patient  General ROS: No fever or chills  Respiratory ROS: No cough, shortness of breath, wheezing  Cardiovascular ROS: no chest pain or dyspnea on exertion  Gastrointestinal ROS: No abdominal pain or melena  Genito-Urinary ROS: No dysuria or hematuria  Musculoskeletal ROS: No joint pain or swelling         Objective:  Blood pressure 111/69, pulse 81, temperature 98.1 °F (36.7 °C), resp. rate 18, SpO2 100 %.     Intake/Output Summary (Last 24 hours) at 6/11/2022 1537  Last data filed at 6/11/2022 1123  Gross per 24 hour   Intake 4761 ml   Output 1700 ml   Net 3061 ml     General:alert and awake  Chest:  clear to auscultation bilaterally without respiratory distress  CVS: regular rate and rhythm  Abdominal: soft, nontender, normal bowel sounds  Extremities: no cyanosis or edema  Skin: warm and dry without rash    Labs:  BMP:   Recent Labs     06/09/22  0230 06/10/22  0215 06/11/22  0256    143 138   K 4.4 4.4 4.7    108 103   CO2 23 23 21*   BUN 23* 17 14   CREATININE 1.4* 1.4* 1.3*   CALCIUM 8.9 8.6 8.9     CBC:   Recent Labs     06/11/22  0256   WBC 7.2   HGB 8.8*   HCT 29.5*   MCV 93.1        LIVER PROFILE:   No results for input(s): AST, ALT, LIPASE, BILIDIR, BILITOT, ALKPHOS in the last 72 hours. Invalid input(s): AMYLASE,  ALB  PT/INR: No results for input(s): PROTIME, INR in the last 72 hours. APTT: No results for input(s): APTT in the last 72 hours. BNP:  No results for input(s): BNP in the last 72 hours. Ionized Calcium:No results for input(s): IONCA in the last 72 hours. Magnesium:No results for input(s): MG in the last 72 hours. Phosphorus:No results for input(s): PHOS in the last 72 hours. HgbA1C: No results for input(s): LABA1C in the last 72 hours. Hepatic:   No results for input(s): ALKPHOS, ALT, AST, PROT, BILITOT, BILIDIR, LABALBU in the last 72 hours. Lactic Acid:   No results for input(s): LACTA in the last 72 hours. Troponin: No results for input(s): CKTOTAL, CKMB, TROPONINT in the last 72 hours. ABGs: No results for input(s): PH, PCO2, PO2, HCO3, O2SAT in the last 72 hours. CRP:  No results for input(s): CRP in the last 72 hours. Sed Rate:  No results for input(s): SEDRATE in the last 72 hours. Cultures:   No results for input(s): CULTURE in the last 72 hours. Radiology reports as per the Radiologist  Radiology: CT HEAD WO CONTRAST    Result Date: 6/7/2022  Exam: CT OF THE BRAIN WITHOUT CONTRAST Clinical data: Confusion. Technique: Contiguous axial images are obtained from the skull base to vertex without intravenous contrast. Reformatted/MPR images were performed. Radiation dose: CTDIvol =79.17 mGy, DLP =1593.72 mGy x cm. Prior studies: CT scan of brain dated 06/01/21. Findings: No acute intracranial abnormality is present.  No evidence of acute cortical infarction, hemorrhage, mass or mass effect. Stable moderate dilatation of lateral ventricles, right chronic extra-axial fluid collection, right frontoparietal/temporal craniotomy cranioplasty, mild calcification of the right dura, gliotic changes and volume loss secondary to old ischemic events in the territory of the right middle, right occipital, left anterior cerebral arteries, old left ventricular valve catheter with tip in the third ventricle and moderately advanced periventricular and deep white matter changes; left greater than right. The posterior fossa is unremarkable. The skull base and rest of the calvarium are intact. The included portions of the paranasal sinuses and mastoid air cells are clear. 1.  No acute intracranial bleed or mass. 2.  Multiple stable findings as above. 3.  No obvious interval change. Recommendation: Follow up as clinically indicated. All CT scans at this facility utilize dose modulation, iterative reconstruction, and/or weight based dosing when appropriate to reduce radiation dose to as low as reasonably achievable. Electronically Signed by Mj Cerda MD at 07-Jun-2022 05:29:25 PM             US RENAL COMPLETE    Result Date: 6/8/2022  Exam: RENAL ULTRASOUND Clinical data:Acute kidney injury. Technique: Real-time grayscale imaging of the kidneys was performed. Images supplemented with color Doppler technique. Prior studies: No prior studies submitted. Findings: Limited exam due to poor acoustic penetration secondary to increased bowel gas which limits the diagnostic capability. The right kidney [id number] x 6.3 x 6.0 cm. Ill-defined area of homogeneous increased echogenicity in the body of the right kidney (5.7 x 5.7 x 5.0 cm). Normal renal size, contour and cortical thickness. No discernible renal calculi or hydronephrosis. The left kidney [id number] x 5.4 x 5.5 cm.   Technologist describes a hyperechoic focus with twinkle artifact which is not well demonstrated in the images. Normal renal size, contour, cortical thickness, and echogenicity are preserved. No evidence of renal masses or hydronephrosis. Limited evaluation of a not fully distended urinary bladder. 1.  Very limited exam as for reasons discussed above. 2. Ill-defined area of homogeneous increased echogenicity in the body of the right kidney (5.7 x 5.7 x 5.0 cm). Consider neoplastic process until proven otherwise. 3. Technologist describes a hyperechoic focus with twinkle artifact which is not well demonstrated in the images. Could represent a nonshadowing calculus. 4.  Limited evaluation of a not fully distended urinary bladder. Recommendation: Further work-up with CT of the abdomen and pelvis is recommended. Electronically Signed by Melinda Stockton MD at 08-Jun-2022 04:46:46 PM                Assessment   1. Acute kidney injury-dehydration  2. Traumatic brain injury  3. Morbid obesity  4. Anemia of chronic diseases  5. Right kidney mass  6. Hyperuricemia  7. Left ureteral kidney stone with left hydroneophrosis- s/p left ureteral stent placement    Plan:  Continue hydration by mouth. Continue allopurinol. Follow up labs. Follow up MRI for right renal mass.

## 2022-06-12 VITALS
TEMPERATURE: 97.2 F | HEART RATE: 77 BPM | HEIGHT: 75 IN | SYSTOLIC BLOOD PRESSURE: 105 MMHG | RESPIRATION RATE: 19 BRPM | OXYGEN SATURATION: 96 % | DIASTOLIC BLOOD PRESSURE: 62 MMHG | WEIGHT: 303 LBS | BODY MASS INDEX: 37.67 KG/M2

## 2022-06-12 PROCEDURE — 2580000003 HC RX 258: Performed by: UROLOGY

## 2022-06-12 PROCEDURE — 6370000000 HC RX 637 (ALT 250 FOR IP): Performed by: UROLOGY

## 2022-06-12 PROCEDURE — 6360000002 HC RX W HCPCS: Performed by: UROLOGY

## 2022-06-12 PROCEDURE — 99233 SBSQ HOSP IP/OBS HIGH 50: CPT | Performed by: UROLOGY

## 2022-06-12 RX ORDER — LEVOFLOXACIN 750 MG/1
750 TABLET ORAL DAILY
Qty: 3 TABLET | Refills: 0 | Status: SHIPPED | OUTPATIENT
Start: 2022-06-12 | End: 2022-06-15

## 2022-06-12 RX ORDER — ALLOPURINOL 100 MG/1
200 TABLET ORAL DAILY
Qty: 30 TABLET | Refills: 3 | Status: SHIPPED | OUTPATIENT
Start: 2022-06-12

## 2022-06-12 RX ORDER — PHENAZOPYRIDINE HYDROCHLORIDE 100 MG/1
100 TABLET, FILM COATED ORAL 3 TIMES DAILY PRN
Qty: 10 TABLET | Refills: 0 | Status: SHIPPED | OUTPATIENT
Start: 2022-06-12 | End: 2023-06-12

## 2022-06-12 RX ORDER — TAMSULOSIN HYDROCHLORIDE 0.4 MG/1
0.4 CAPSULE ORAL DAILY
Qty: 30 CAPSULE | Refills: 0 | Status: SHIPPED | OUTPATIENT
Start: 2022-06-12

## 2022-06-12 RX ADMIN — BUSPIRONE HYDROCHLORIDE 10 MG: 10 TABLET ORAL at 10:22

## 2022-06-12 RX ADMIN — BACLOFEN 20 MG: 10 TABLET ORAL at 14:44

## 2022-06-12 RX ADMIN — LEVOFLOXACIN 750 MG: 5 INJECTION, SOLUTION INTRAVENOUS at 14:55

## 2022-06-12 RX ADMIN — ALLOPURINOL 200 MG: 100 TABLET ORAL at 10:22

## 2022-06-12 RX ADMIN — PANTOPRAZOLE SODIUM 40 MG: 40 TABLET, DELAYED RELEASE ORAL at 06:13

## 2022-06-12 RX ADMIN — BACLOFEN 20 MG: 10 TABLET ORAL at 10:22

## 2022-06-12 RX ADMIN — CITALOPRAM HYDROBROMIDE 20 MG: 20 TABLET ORAL at 10:22

## 2022-06-12 RX ADMIN — BACITRACIN: 500 OINTMENT TOPICAL at 10:21

## 2022-06-12 RX ADMIN — GABAPENTIN 400 MG: 400 CAPSULE ORAL at 10:22

## 2022-06-12 RX ADMIN — RISPERIDONE 1 MG: 1 TABLET, FILM COATED ORAL at 10:22

## 2022-06-12 RX ADMIN — OXYCODONE HYDROCHLORIDE AND ACETAMINOPHEN 1 TABLET: 10; 325 TABLET ORAL at 02:37

## 2022-06-12 RX ADMIN — AMANTADINE HYDROCHLORIDE 100 MG: 100 CAPSULE, LIQUID FILLED ORAL at 10:22

## 2022-06-12 RX ADMIN — SODIUM CHLORIDE 100 ML: 9 INJECTION, SOLUTION INTRAVENOUS at 14:52

## 2022-06-12 RX ADMIN — GABAPENTIN 400 MG: 400 CAPSULE ORAL at 14:44

## 2022-06-12 RX ADMIN — BACITRACIN ZINC, NEOMYCIN, POLYMYXIN B: 400; 3.5; 5 OINTMENT TOPICAL at 10:20

## 2022-06-12 RX ADMIN — PHENAZOPYRIDINE HYDROCHLORIDE 200 MG: 100 TABLET ORAL at 06:13

## 2022-06-12 RX ADMIN — ENOXAPARIN SODIUM 30 MG: 100 INJECTION SUBCUTANEOUS at 10:21

## 2022-06-12 ASSESSMENT — PAIN DESCRIPTION - LOCATION
LOCATION: GENERALIZED
LOCATION: PENIS

## 2022-06-12 ASSESSMENT — PAIN - FUNCTIONAL ASSESSMENT
PAIN_FUNCTIONAL_ASSESSMENT: PREVENTS OR INTERFERES SOME ACTIVE ACTIVITIES AND ADLS
PAIN_FUNCTIONAL_ASSESSMENT: PREVENTS OR INTERFERES SOME ACTIVE ACTIVITIES AND ADLS

## 2022-06-12 ASSESSMENT — PAIN SCALES - GENERAL
PAINLEVEL_OUTOF10: 0
PAINLEVEL_OUTOF10: 6
PAINLEVEL_OUTOF10: 0
PAINLEVEL_OUTOF10: 3
PAINLEVEL_OUTOF10: 0

## 2022-06-12 ASSESSMENT — PAIN DESCRIPTION - DESCRIPTORS
DESCRIPTORS: ACHING;DISCOMFORT
DESCRIPTORS: DISCOMFORT

## 2022-06-12 ASSESSMENT — PAIN DESCRIPTION - PAIN TYPE: TYPE: CHRONIC PAIN

## 2022-06-12 ASSESSMENT — PAIN DESCRIPTION - ONSET: ONSET: ON-GOING

## 2022-06-12 ASSESSMENT — PAIN DESCRIPTION - ORIENTATION: ORIENTATION: INNER

## 2022-06-12 ASSESSMENT — PAIN DESCRIPTION - FREQUENCY: FREQUENCY: CONTINUOUS

## 2022-06-12 NOTE — PROGRESS NOTES
Progress Note  Date:2022       Room:[id number]  Patient Brett Thomas     YOB: 1990     Age:32 y.o. Subjective    Subjective: No new complaints  Review of Systems: See admission history and physical  Objective         Vitals Last 24 Hours:  TEMPERATURE:  Temp  Av.3 °F (36.3 °C)  Min: 96.4 °F (35.8 °C)  Max: 98.1 °F (36.7 °C)  RESPIRATIONS RANGE: Resp  Av.1  Min: 16  Max: 20  PULSE OXIMETRY RANGE: SpO2  Av.7 %  Min: 95 %  Max: 100 %  PULSE RANGE: Pulse  Av.4  Min: 69  Max: 85  BLOOD PRESSURE RANGE: Systolic (58UHJ), LBM:308 , Min:111 , ZPS:109   ; Diastolic (96BTV), VXH:38, Min:69, Max:89    I/O (24Hr): Intake/Output Summary (Last 24 hours) at 2022 0650  Last data filed at 2022 0330  Gross per 24 hour   Intake 648 ml   Output 3500 ml   Net -2852 ml     Objective  Physical examination:  Lungs: Poor expansion excursion of chest wall. Heart: Regular rate and rhythm. Abdomen: Soft obese. : Both testicles down. Lamar catheter removed. Extremities: Compromised the left side in terms of movement. Neuro: Alert and oriented. Psych: Appropriate. Labs/Imaging/Diagnostics    Labs:  CBC:  Recent Labs     22  0256   WBC 7.2   RBC 3.17*   HGB 8.8*   HCT 29.5*   MCV 93.1   RDW 14.0        CHEMISTRIES:  Recent Labs     06/10/22  0215 22  0256    138   K 4.4 4.7    103   CO2 23 21*   BUN 17 14   CREATININE 1.4* 1.3*   GLUCOSE 118* 123*     PT/INR:No results for input(s): PROTIME, INR in the last 72 hours. APTT:No results for input(s): APTT in the last 72 hours. LIVER PROFILE:No results for input(s): AST, ALT, BILIDIR, BILITOT, ALKPHOS in the last 72 hours. Imaging Last 24 Hours:  MRI ABDOMEN W WO CONTRAST    Result Date: 2022  NO PRIOR REPORT AVAILABLE Exam: MRI ABDOMEN [facility] WITHCONTRAST Clinical data: Indeterminate right renal mass.  Technique: Multiplanar/multisequence MRI of the abdomen was performed without and with the use of gadolinium. Prior studies:CT scan of the abdomen and pelvis dated 06/09/2022. Renal ultrasound dated 06/08/2022. Radiograph of the abdomen dated 10/02/2018 images. Findings: Mild to moderate hepatomegaly is seen. Liver length measures 20.9 cm in midclavicular line. No evidence of any space occupying lesion within the liver parenchyma. No evidence of intrahepatic biliary ductal dilatation. The gallbladder is unremarkable. Limited evaluation of the bowel secondary to peristalsis, however, demonstrates no definitive abnormality. The adrenal glands demonstrate no gross mass. The pancreas is unremarkable. Right kidney is bulky in size. Right renal length measures 12.7 cm. T2 heterogeneously iso to hyperintense well defined lesion is seen arising from anterior cortex of interpolar region of right kidney with small exophytic component. The lesion measures 5.1 x 5.6 cm in axial dimensions. Central irregular  T2 hyperintense scar is seen. Eccentric linear T1 hyperintensity is seen at the superior aspect of the lesion. The lesion shows strong diffusion restriction without diffusion restriction in the central scar. Mild smooth peripheral post-contrast enhancement is seen. Poor enhancement is seen within the central part of the lesion. Left kidney measures 9.7 cm. Reduced parenchymal thickness is seen. Multiple T2 hyperintense cortical cysts are seen scattered in left kidney, largest measuring 2.2 x 2.3 cm at lower pole of left kidney. No evidence of upper abdominal ascites or mesenteric mass. 1. Mild to moderate hepatomegaly. 2. Bulky right kidney. 3. Well-defined T2 heterogeneously iso to hyperintense lesion arising from anterior cortex of interpolar region of right kidney showing strong diffusion restriction and smooth mild peripheral wall enhancement with poor central enhancement.  Imaging findings are likely suggestive of neoplastic right renal lesion with central T2 hyperintense scar, highly suspicious for oncocytoma. 4. Parenchymal thinning of left kidney with multiple cortical cysts, likely suggestive of medical renal disease Recommendation: Follow up as clinically indicated. USG guided tissue sampling Electronically Signed by Rosalia Ogden MD at 11-Jun-2022 05:09:44 PM             FLUORO FOR SURGICAL PROCEDURES    Result Date: 6/10/2022  Radiology exam is complete. No Radiologist dictation. Please follow up with ordering provider. Assessment//Plan           Hospital Problems           Last Modified POA    * (Principal) SALVADOR (acute kidney injury) ([address] 6/7/2022 Yes        Assessment & Plan  1. Left proximal ureteral calculus (UPJ)  2. Bilateral nonobstructing renal calculi  3. Indwelling left ureteral stent. 4.  Right renal mass suggestive of oncocytoma by MRI. 5.  Indwellant of skilled care facility. (Cerebrovascular accident and cerebral bleed in the past)    Plan  1. Okay for patient to be discharged from a urological standpoint   2. Patient patient will need follow-up with urology  3. Further work-up and definitive treatment for right renal mass and new ureteric/nephrolithiasis to be managed as an outpatient. 4.   discharge medications should include 1 Pyridium 2 Flomax 3 analgesic of choice.   4 Levaquin      Electronically signed by Eric Joe MD on 6/12/22 at 6:50 AM CDT

## 2022-06-12 NOTE — DISCHARGE SUMMARY
MG tablet  Commonly known as: ZYLOPRIM  Take 2 tablets by mouth daily     levoFLOXacin 750 MG tablet  Commonly known as: LEVAQUIN  Take 1 tablet by mouth daily for 3 days     phenazopyridine 100 MG tablet  Commonly known as: Pyridium  Take 1 tablet by mouth 3 times daily as needed for Pain     tamsulosin 0.4 mg capsule  Commonly known as: FLOMAX  Take 1 capsule by mouth daily        CONTINUE taking these medications    Amantadine 100 MG Tabs tablet  Commonly known as: SYMMETREL     aspirin 325 mg tablet     azelastine HCl 0.15 % Soln     baclofen 20 MG tablet  Commonly known as: LIORESAL     busPIRone 10 MG tablet  Commonly known as: BUSPAR     calcium-vitamin D 500-200 MG-UNIT per tablet     citalopram 20 mg tablet  Commonly known as: CELEXA     Dexilant 60 MG Cpdr delayed release capsule  Generic drug: dexlansoprazole     docusate sodium 100 mg capsule  Commonly known as: COLACE     DULoxetine 60 MG extended release capsule  Commonly known as: CYMBALTA     fish oil 1000 MG Caps     fluticasone 50 MCG/ACT nasal spray  Commonly known as: FLONASE     gabapentin 400 MG capsule  Commonly known as: NEURONTIN     ketoconazole 2 % shampoo  Commonly known as: NIZORAL     lisinopril 10 MG tablet  Commonly known as: PRINIVIL;ZESTRIL     loratadine 10 MG capsule  Commonly known as: CLARITIN     medroxyPROGESTERone 5 MG tablet  Commonly known as: PROVERA     melatonin 5 mg Tabs tablet     oxyCODONE-acetaminophen  MG per tablet  Commonly known as: PERCOCET     polyethylene glycol 17 GM/SCOOP powder  Commonly known as: GLYCOLAX     propranolol 20 MG tablet  Commonly known as: INDERAL     risperiDONE 1 MG tablet  Commonly known as: RISPERDAL     therapeutic multivitamin-minerals tablet     tiZANidine 2 MG tablet  Commonly known as: ZANAFLEX     traZODone 50 MG tablet  Commonly known as: DESYREL     Vitamin C 500 MG Caps     VITAMIN D3 PO           Where to Get Your Medications      These medications were sent to [facility] [address] [facility], [address] [facility] [phone] Jurgen Grayx [phone] [address], [address] [place] [address]    Phone: [phone]   · allopurinol 100 MG tablet  · levoFLOXacin 750 MG tablet  · phenazopyridine 100 MG tablet  · tamsulosin 0.4 mg capsule         Consults:   Nephrology  Urology     Significant Diagnostic Studies:    See summary of labs/x-rays/path report for further details      Treatments:   IVF fluids, stent placement    Disposition:   stable  Follow up with Chico Burns MD in 1 week.     Signed:  ANGELA Galicia CNP   6/12/2022, 7:19 AM

## 2022-06-12 NOTE — PROGRESS NOTES
Nephrology ([facility]) Progress Note    Patient:  Oumou Hemphill  YOB: 1990  Date of Service: 6/12/2022  MRN: [id number]   Acct: [de-identified]   Primary Care Physician: Tiffany Weller MD  Advance Directive: Full Code  Admit Date: 6/7/2022       Hospital Day: 5  Referring Provider: Tiffany Weller MD    Patient Seen, Chart, Consults notes, Labs, Radiology studies reviewed. Subjective:     Patient is 26-year-old man with a past medical history of traumatic brain injury with previous intracranial hemorrhaging and CVA. He is typically not ambulatory and is a nursing home resident. His nursing home has reported decreased responsiveness and mobility. He was increasingly confused. He was subsequently transferred to [place] [address] emergency room where patient complained of weakness. His serum creatinine typically ranges 0.8-1.1 and it was as 3 on June 7. Patient was subsequently started on IV fluids and his output started to improve. He is incontinent to the urine. Renal service was consulted to manage his acute kidney injury. Patient is not a good historian. He then became more awake and responsive. His imaging now showed a left ureteral kidney stone with left hydronephrosis along with right renal mass. Seen by urology. He is s/p left ureteral stent placement. Patient was complaining today of severe diffuse body aches.      Allergies:  Latex, Dilantin [phenytoin], Pcn [penicillins], and Sulfa antibiotics    Medicines:  Current Facility-Administered Medications   Medication Dose Route Frequency Provider Last Rate Last Admin    enoxaparin Sodium (LOVENOX) injection 30 mg  30 mg SubCUTAneous BID Courtney Caba MD   30 mg at 06/12/22 1021    sodium chloride flush 0.9 % injection 5-40 mL  5-40 mL IntraVENous 2 times per day Courtney Caba MD   10 mL at 06/11/22 2000    sodium chloride flush 0.9 % injection 5-40 mL  5-40 mL IntraVENous PRN Courtney Caba MD        0.9 % sodium chloride infusion   IntraVENous PRN Tucker Cuellar MD        bacitracin ointment   Topical BID Tucker Cuellar MD   Given at 06/12/22 1021    neomycin-bacitracin-polymyxin (NEOSPORIN) ointment   Topical BID Tucker Cuellar MD   Given at 06/12/22 1020    phenazopyridine (PYRIDIUM) tablet 200 mg  200 mg Oral TID PRN Tucker Cuellar MD   200 mg at 06/12/22 0613    levoFLOXacin (LEVAQUIN) 750 MG/150ML infusion 750 mg  750 mg IntraVENous Q24H Tucker Cuellar MD   Stopped at 06/11/22 1652    allopurinol (ZYLOPRIM) tablet 200 mg  200 mg Oral Daily Tucker Cuellar MD   200 mg at 06/12/22 1022    amantadine (SYMMETREL) capsule 100 mg  100 mg Oral BID Tucker Cuellar MD   100 mg at 06/12/22 1022    baclofen (LIORESAL) tablet 20 mg  20 mg Oral 4x Daily Tucker Cuellar MD   20 mg at 06/12/22 1022    busPIRone (BUSPAR) tablet 10 mg  10 mg Oral BID Tucker Cuellar MD   10 mg at 06/12/22 1022    citalopram (CELEXA) tablet 20 mg  20 mg Oral Daily Tucker Cuellar MD   20 mg at 06/12/22 1022    pantoprazole (PROTONIX) tablet 40 mg  40 mg Oral QAM AC Tucker Cuellar MD   40 mg at 06/12/22 8981    gabapentin (NEURONTIN) capsule 400 mg  400 mg Oral TID Tucker Cuellar MD   400 mg at 06/12/22 1022    oxyCODONE-acetaminophen (PERCOCET)  MG per tablet 1 tablet  1 tablet Oral Q6H PRN Tucker Cuellar MD   1 tablet at 06/12/22 0237    risperiDONE (RISPERDAL) tablet 1 mg  1 mg Oral BID Tucker Cuellar MD   1 mg at 06/12/22 1022    traZODone (DESYREL) tablet 50 mg  50 mg Oral Nightly Tucker Cuellar MD   50 mg at 06/11/22 1959    acetaminophen (TYLENOL) tablet 650 mg  650 mg Oral Q4H PRN Tucker Cuellar MD        ondansetron (ZOFRAN-ODT) disintegrating tablet 4 mg  4 mg Oral Q8H PRN Tucker Cuellar MD        Or    ondansetron [facility]) injection 4 mg  4 mg IntraVENous Q6H PRN Tucker Cuellar MD   4 mg at 06/11/22 1410       Past Medical History:  Past Medical History: Diagnosis Date    Arthritis     Cellulitis     Cerebral artery occlusion with cerebral infarction ([facility] [address]     Depression     Depression     Difficult intubation     GERD (gastroesophageal reflux disease)     Hemiplegia and hemiparesis following cerebral infarction affecting left non-dominant side (HCC)     Hemorrhoids     History of blood transfusion     Hypertension     Kidney stone     Muscle spasticity     Pressure ulcer     Prolonged emergence from general anesthesia     Retention of urine        Past Surgical History:  Past Surgical History:   Procedure Laterality Date    BACK SURGERY      BACLOFEN PUMP IMPLANTATION      BRAIN SURGERY      CYSTOSCOPY Left 6/10/2022    CYSTOSCOPY LEFT PYLOGRAM performed by Kesha Ly MD at [facility] Left 6/10/2022    LEFT URETERAL STENT INSERTION performed by Kesha Ly MD at [address] ESWL Right 10/2/2018    ESWL EXTRACORPEAL SHOCK WAVE LITHOTRIPSY performed by Emanuel Miller MD at [facility] History  History reviewed. No pertinent family history.     Social History  Social History     Socioeconomic History    Marital status: Single     Spouse name: Not on file    Number of children: Not on file    Years of education: Not on file    Highest education level: Not on file   Occupational History    Not on file   Tobacco Use    Smoking status: Never Smoker    Smokeless tobacco: Never Used   Vaping Use    Vaping Use: Never used   Substance and Sexual Activity    Alcohol use: No    Drug use: No    Sexual activity: Not on file   Other Topics Concern    Not on file   Social History Narrative    Not on file     Social Determinants of Health     Financial Resource Strain:     Difficulty of Paying Living Expenses: Not on file   Food Insecurity:     Worried About Running Out of Food in the Last Year: Not on file    Deena of Food in the Last Year: Not on file Transportation Needs:     Lack of Transportation (Medical): Not on file    Lack of Transportation (Non-Medical): Not on file   Physical Activity:     Days of Exercise per Week: Not on file    Minutes of Exercise per Session: Not on file   Stress:     Feeling of Stress : Not on file   Social Connections:     Frequency of Communication with Friends and Family: Not on file    Frequency of Social Gatherings with Friends and Family: Not on file    Attends [employer] Services: Not on file    Active Member of [address] [place] [address] or Organizations: Not on file    Attends Club or Organization Meetings: Not on file    Marital Status: Not on file   Intimate Partner Violence:     Fear of Current or Ex-Partner: Not on file    Emotionally Abused: Not on file    Physically Abused: Not on file    Sexually Abused: Not on file   Housing Stability:     Unable to Pay for Housing in the Last Year: Not on file    Number of [place] in the Last Year: Not on file    Unstable Housing in the Last Year: Not on file         Review of Systems:  History obtained from chart review and the patient  General ROS: No fever or chills  Respiratory ROS: No cough, shortness of breath, wheezing  Cardiovascular ROS: no chest pain or dyspnea on exertion  Gastrointestinal ROS: No abdominal pain or melena  Genito-Urinary ROS: No dysuria or hematuria  Musculoskeletal ROS: + joint pain , no swelling         Objective:  Blood pressure 105/62, pulse 77, temperature 97.2 °F (36.2 °C), temperature source Temporal, resp. rate 19, height 6' 3\" (1.905 m), weight (!) 303 lb (137.4 kg), SpO2 96 %.     Intake/Output Summary (Last 24 hours) at 6/12/2022 1228  Last data filed at 6/12/2022 0815  Gross per 24 hour   Intake 408 ml   Output 2300 ml   Net -1892 ml     General:alert and awake  Chest:  clear to auscultation bilaterally without respiratory distress  CVS: regular rate and rhythm  Abdominal: soft, nontender, normal bowel sounds  Extremities: no cyanosis or edema  Skin: warm and dry without rash    Labs:  BMP:   Recent Labs     06/10/22  0215 06/11/22  0256    138   K 4.4 4.7    103   CO2 23 21*   BUN 17 14   CREATININE 1.4* 1.3*   CALCIUM 8.6 8.9     CBC:   Recent Labs     06/11/22  0256   WBC 7.2   HGB 8.8*   HCT 29.5*   MCV 93.1        LIVER PROFILE:   No results for input(s): AST, ALT, LIPASE, BILIDIR, BILITOT, ALKPHOS in the last 72 hours. Invalid input(s): AMYLASE,  ALB  PT/INR: No results for input(s): PROTIME, INR in the last 72 hours. APTT: No results for input(s): APTT in the last 72 hours. BNP:  No results for input(s): BNP in the last 72 hours. Ionized Calcium:No results for input(s): IONCA in the last 72 hours. Magnesium:No results for input(s): MG in the last 72 hours. Phosphorus:No results for input(s): PHOS in the last 72 hours. HgbA1C: No results for input(s): LABA1C in the last 72 hours. Hepatic:   No results for input(s): ALKPHOS, ALT, AST, PROT, BILITOT, BILIDIR, LABALBU in the last 72 hours. Lactic Acid:   No results for input(s): LACTA in the last 72 hours. Troponin: No results for input(s): CKTOTAL, CKMB, TROPONINT in the last 72 hours. ABGs: No results for input(s): PH, PCO2, PO2, HCO3, O2SAT in the last 72 hours. CRP:  No results for input(s): CRP in the last 72 hours. Sed Rate:  No results for input(s): SEDRATE in the last 72 hours. Cultures:   No results for input(s): CULTURE in the last 72 hours. Radiology reports as per the Radiologist  Radiology: CT HEAD WO CONTRAST    Result Date: 6/7/2022  Exam: CT OF THE BRAIN WITHOUT CONTRAST Clinical data: Confusion. Technique: Contiguous axial images are obtained from the skull base to vertex without intravenous contrast. Reformatted/MPR images were performed. Radiation dose: CTDIvol =79.17 mGy, DLP =1593.72 mGy x cm. Prior studies: CT scan of brain dated 06/01/21. Findings: No acute intracranial abnormality is present.  No evidence of acute cortical infarction, hemorrhage, mass or mass effect. Stable moderate dilatation of lateral ventricles, right chronic extra-axial fluid collection, right frontoparietal/temporal craniotomy cranioplasty, mild calcification of the right dura, gliotic changes and volume loss secondary to old ischemic events in the territory of the right middle, right occipital, left anterior cerebral arteries, old left ventricular valve catheter with tip in the third ventricle and moderately advanced periventricular and deep white matter changes; left greater than right. The posterior fossa is unremarkable. The skull base and rest of the calvarium are intact. The included portions of the paranasal sinuses and mastoid air cells are clear. 1.  No acute intracranial bleed or mass. 2.  Multiple stable findings as above. 3.  No obvious interval change. Recommendation: Follow up as clinically indicated. All CT scans at this facility utilize dose modulation, iterative reconstruction, and/or weight based dosing when appropriate to reduce radiation dose to as low as reasonably achievable. Electronically Signed by Elisha De La Cruz MD at 07-Jun-2022 05:29:25 PM             US RENAL COMPLETE    Result Date: 6/8/2022  Exam: RENAL ULTRASOUND Clinical data:Acute kidney injury. Technique: Real-time grayscale imaging of the kidneys was performed. Images supplemented with color Doppler technique. Prior studies: No prior studies submitted. Findings: Limited exam due to poor acoustic penetration secondary to increased bowel gas which limits the diagnostic capability. The right kidney [id number] x 6.3 x 6.0 cm. Ill-defined area of homogeneous increased echogenicity in the body of the right kidney (5.7 x 5.7 x 5.0 cm). Normal renal size, contour and cortical thickness. No discernible renal calculi or hydronephrosis. The left kidney [id number] x 5.4 x 5.5 cm.   Technologist describes a hyperechoic focus with twinkle artifact which is not well demonstrated in the images. Normal renal size, contour, cortical thickness, and echogenicity are preserved. No evidence of renal masses or hydronephrosis. Limited evaluation of a not fully distended urinary bladder. 1.  Very limited exam as for reasons discussed above. 2. Ill-defined area of homogeneous increased echogenicity in the body of the right kidney (5.7 x 5.7 x 5.0 cm). Consider neoplastic process until proven otherwise. 3. Technologist describes a hyperechoic focus with twinkle artifact which is not well demonstrated in the images. Could represent a nonshadowing calculus. 4.  Limited evaluation of a not fully distended urinary bladder. Recommendation: Further work-up with CT of the abdomen and pelvis is recommended. Electronically Signed by Melinda Stockton MD at 08-Jun-2022 04:46:46 PM                Assessment   1. Acute kidney injury-dehydration  2. Traumatic brain injury  3. Morbid obesity  4. Anemia of chronic diseases  5. Right kidney mass- possible oncoctoma  6. Hyperuricemia  7. Left ureteral kidney stone with left hydroneophrosis    Plan:  Continue hydration by [facility]. Continue allopurinol. Follow up with urology. Reviewed kidney MRI. [facility] for discharge from renal standpoint.

## 2022-06-13 NOTE — PROGRESS NOTES
Pt is no longer at facility on file to schedule pt. Need updated number or where pt is located to schedule appt.

## 2022-06-17 NOTE — PROGRESS NOTES
Physician Progress Note      PATIENT:               Sherie Sanchez  CSN #:                  [id number]  :                       1990  ADMIT DATE:       2022 3:36 PM  [address] [place] DATE:        2022 4:44 PM  RESPONDING  PROVIDER #:        Lindsay Figueroa MD          QUERY TEXT:    Pt admitted with altered mental status. Pt noted to have SALVADOR. If possible,   please document in the progress notes and discharge summary if you are   evaluating and / or treating any of the following: The medical record reflects the following:  Risk Factors: SALVADOR  Clinical Indicators:  presented from NH with AMS, noted confusion; crea 3.0;   H/P noted \"AMS suspect related to SALVADOR\"  Treatment: Nephrology consult, NS 1000 ml IV bolus, NS @ 125 ml/hr    Thank you,  Mathew Carpenter, CCDS  [phone]  Options provided:  -- Metabolic encephalopathy  -- Delirium due to, Please specify cause. -- Delirium  -- Other - I will add my own diagnosis  -- Disagree - Not applicable / Not valid  -- Disagree - Clinically unable to determine / Unknown  -- Refer to Clinical Documentation Reviewer    PROVIDER RESPONSE TEXT:    This patient has metabolic encephalopathy.     Query created by: Facundo Gamboa on 2022 8:29 AM      Electronically signed by:  Lindsay Figueroa MD 2022 12:12 AM

## 2022-06-21 ENCOUNTER — TELEPHONE (OUTPATIENT)
Dept: UROLOGY | Age: 32
End: 2022-06-21

## 2022-06-21 DIAGNOSIS — N20.0 KIDNEY STONE: Primary | ICD-10-CM

## 2022-06-21 NOTE — TELEPHONE ENCOUNTER
Spoke to  Cezar Ferrari at [employer] and got patient scheduled next week w dr Gary Carlos.  Jailene prior

## 2022-06-21 NOTE — TELEPHONE ENCOUNTER
lanesha w/ [facility] [address] [phone] called to schedule patients post op. Was seen by dr Richy Brennan. I see note mentions 1-2 week follow up with us.  Just wanted to confirm if he needs any prior imaging that may have not got mentioned

## 2022-06-21 NOTE — TELEPHONE ENCOUNTER
He can see one of us in clinic with [facility] prior to be set up for ESWL but he also has a 5x5cm renal mass that PE will have to address.

## 2022-06-24 ENCOUNTER — TELEPHONE (OUTPATIENT)
Dept: UROLOGY | Age: 32
End: 2022-06-24

## 2022-06-27 ENCOUNTER — HOSPITAL ENCOUNTER (OUTPATIENT)
Dept: GENERAL RADIOLOGY | Age: 32
Discharge: HOME | End: 2022-06-27
Payer: COMMERCIAL

## 2022-06-27 ENCOUNTER — OFFICE VISIT (OUTPATIENT)
Dept: UROLOGY | Age: 32
End: 2022-06-27
Payer: COMMERCIAL

## 2022-06-27 VITALS
WEIGHT: 310 LBS | HEIGHT: 75 IN | BODY MASS INDEX: 38.54 KG/M2 | HEART RATE: 96 BPM | SYSTOLIC BLOOD PRESSURE: 138 MMHG | DIASTOLIC BLOOD PRESSURE: 88 MMHG | OXYGEN SATURATION: 100 % | TEMPERATURE: 97.7 F

## 2022-06-27 DIAGNOSIS — N26.1 LEFT RENAL ATROPHY: Primary | ICD-10-CM

## 2022-06-27 DIAGNOSIS — N20.0 KIDNEY STONE: ICD-10-CM

## 2022-06-27 DIAGNOSIS — N20.2 RENAL AND URETERIC CALCULUS: ICD-10-CM

## 2022-06-27 DIAGNOSIS — N28.89 RIGHT RENAL MASS: ICD-10-CM

## 2022-06-27 PROCEDURE — G8427 DOCREV CUR MEDS BY ELIG CLIN: HCPCS | Performed by: UROLOGY

## 2022-06-27 PROCEDURE — 99214 OFFICE O/P EST MOD 30 MIN: CPT | Performed by: UROLOGY

## 2022-06-27 PROCEDURE — G8417 CALC BMI ABV UP PARAM F/U: HCPCS | Performed by: UROLOGY

## 2022-06-27 PROCEDURE — 74018 RADEX ABDOMEN 1 VIEW: CPT | Performed by: RADIOLOGY

## 2022-06-27 PROCEDURE — 74018 RADEX ABDOMEN 1 VIEW: CPT

## 2022-06-27 PROCEDURE — 1111F DSCHRG MED/CURRENT MED MERGE: CPT | Performed by: UROLOGY

## 2022-06-27 PROCEDURE — 1036F TOBACCO NON-USER: CPT | Performed by: UROLOGY

## 2022-06-27 ASSESSMENT — ENCOUNTER SYMPTOMS
NAUSEA: 0
SORE THROAT: 0
VOMITING: 0
FACIAL SWELLING: 0
CHEST TIGHTNESS: 0
EYE DISCHARGE: 0
EYE REDNESS: 0
WHEEZING: 0
BACK PAIN: 0
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
## 2022-07-06 ENCOUNTER — TELEPHONE (OUTPATIENT)
Dept: UROLOGY | Age: 32
End: 2022-07-06

## 2022-07-14 ENCOUNTER — HOSPITAL ENCOUNTER (OUTPATIENT)
Dept: NUCLEAR MEDICINE | Age: 32
Discharge: HOME | End: 2022-07-16
Payer: COMMERCIAL

## 2022-07-14 DIAGNOSIS — N26.1 LEFT RENAL ATROPHY: ICD-10-CM

## 2022-07-14 PROCEDURE — 3430000000 HC RX DIAGNOSTIC RADIOPHARMACEUTICAL: Performed by: UROLOGY

## 2022-07-14 PROCEDURE — A9562 TC99M MERTIATIDE: HCPCS | Performed by: UROLOGY

## 2022-07-14 PROCEDURE — 78707 K FLOW/FUNCT IMAGE W/O DRUG: CPT

## 2022-07-14 PROCEDURE — 78707 K FLOW/FUNCT IMAGE W/O DRUG: CPT | Performed by: RADIOLOGY

## 2022-07-14 RX ADMIN — Medication 5 MILLICURIE: at 14:20

## 2022-07-20 NOTE — TELEPHONE ENCOUNTER
Patient's mom called to get results of the kidney scan and to schedule procedure. Please return call to advise.

## 2022-07-20 NOTE — TELEPHONE ENCOUNTER
Pt was called 2x and sent a letter as well about making an appt with Tati Mcintosh and has had no contact back with office.

## 2022-07-20 NOTE — TELEPHONE ENCOUNTER
Pt was suppose to be referred out to [facility] or [facility]. Please fu to see if apt has been scheduled yet or not as well as faxing results from kidney flow and function to referring office. As far as results go we can read what reports says but we are unable to answer any questions. The doctor he ends up seeing we have to do that.

## 2022-08-01 ENCOUNTER — HOSPITAL ENCOUNTER (EMERGENCY)
Age: 32
Discharge: HOME | End: 2022-08-02
Attending: PEDIATRICS
Payer: COMMERCIAL

## 2022-08-01 VITALS
TEMPERATURE: 98.8 F | RESPIRATION RATE: 18 BRPM | DIASTOLIC BLOOD PRESSURE: 91 MMHG | HEART RATE: 87 BPM | SYSTOLIC BLOOD PRESSURE: 155 MMHG | OXYGEN SATURATION: 96 %

## 2022-08-01 DIAGNOSIS — Z72.89 INAPPROPRIATE SEXUAL BEHAVIOR: Primary | ICD-10-CM

## 2022-08-01 LAB
ACETAMINOPHEN LEVEL: <15 UG/ML
ALBUMIN SERPL-MCNC: 4.2 G/DL (ref 3.5–5.2)
ALP BLD-CCNC: 111 U/L (ref 40–130)
ALT SERPL-CCNC: 20 U/L (ref 5–41)
AMPHETAMINE SCREEN, URINE: NEGATIVE
ANION GAP SERPL CALCULATED.3IONS-SCNC: 10 MMOL/L (ref 7–19)
AST SERPL-CCNC: 11 U/L (ref 5–40)
BACTERIA: NEGATIVE /HPF
BARBITURATE SCREEN URINE: NEGATIVE
BASOPHILS ABSOLUTE: 0.1 K/UL (ref 0–0.2)
BASOPHILS RELATIVE PERCENT: 1.3 % (ref 0–1)
BENZODIAZEPINE SCREEN, URINE: NEGATIVE
BILIRUB SERPL-MCNC: <0.2 MG/DL (ref 0.2–1.2)
BILIRUBIN URINE: NEGATIVE
BLOOD, URINE: ABNORMAL
BUN BLDV-MCNC: 14 MG/DL (ref 6–20)
BUPRENORPHINE URINE: NEGATIVE
CALCIUM SERPL-MCNC: 9 MG/DL (ref 8.6–10)
CANNABINOID SCREEN URINE: NEGATIVE
CHLORIDE BLD-SCNC: 105 MMOL/L (ref 98–111)
CLARITY: CLEAR
CO2: 23 MMOL/L (ref 22–29)
COCAINE METABOLITE SCREEN URINE: NEGATIVE
COLOR: YELLOW
CREAT SERPL-MCNC: 1 MG/DL (ref 0.5–1.2)
CRYSTALS, UA: ABNORMAL /HPF
EOSINOPHILS ABSOLUTE: 0.3 K/UL (ref 0–0.6)
EOSINOPHILS RELATIVE PERCENT: 3.3 % (ref 0–5)
EPITHELIAL CELLS, UA: 0 /HPF (ref 0–5)
ETHANOL: <10 MG/DL (ref 0–0.08)
GFR AFRICAN AMERICAN: >59
GFR NON-AFRICAN AMERICAN: >60
GLUCOSE BLD-MCNC: 104 MG/DL (ref 74–109)
GLUCOSE URINE: NEGATIVE MG/DL
HCT VFR BLD CALC: 36.1 % (ref 42–52)
HEMOGLOBIN: 11.6 G/DL (ref 14–18)
HYALINE CASTS: 10 /HPF (ref 0–8)
IMMATURE GRANULOCYTES #: 0 K/UL
KETONES, URINE: NEGATIVE MG/DL
LEUKOCYTE ESTERASE, URINE: ABNORMAL
LYMPHOCYTES ABSOLUTE: 2.1 K/UL (ref 1.1–4.5)
LYMPHOCYTES RELATIVE PERCENT: 27.4 % (ref 20–40)
Lab: ABNORMAL
MCH RBC QN AUTO: 28.5 PG (ref 27–31)
MCHC RBC AUTO-ENTMCNC: 32.1 G/DL (ref 33–37)
MCV RBC AUTO: 88.7 FL (ref 80–94)
METHADONE SCREEN, URINE: NEGATIVE
METHAMPHETAMINE, URINE: NEGATIVE
MONOCYTES ABSOLUTE: 0.7 K/UL (ref 0–0.9)
MONOCYTES RELATIVE PERCENT: 9.3 % (ref 0–10)
NEUTROPHILS ABSOLUTE: 4.5 K/UL (ref 1.5–7.5)
NEUTROPHILS RELATIVE PERCENT: 58.2 % (ref 50–65)
NITRITE, URINE: NEGATIVE
OPIATE SCREEN URINE: NEGATIVE
OXYCODONE URINE: POSITIVE
PDW BLD-RTO: 14.8 % (ref 11.5–14.5)
PH UA: 8 (ref 5–8)
PHENCYCLIDINE SCREEN URINE: NEGATIVE
PLATELET # BLD: 255 K/UL (ref 130–400)
PMV BLD AUTO: 10.3 FL (ref 9.4–12.4)
POTASSIUM REFLEX MAGNESIUM: 4.1 MMOL/L (ref 3.5–5)
PROPOXYPHENE SCREEN: NEGATIVE
PROTEIN UA: ABNORMAL MG/DL
RBC # BLD: 4.07 M/UL (ref 4.7–6.1)
RBC UA: 10 /HPF (ref 0–4)
SALICYLATE, SERUM: <3 MG/DL (ref 3–10)
SARS-COV-2, NAAT: NOT DETECTED
SODIUM BLD-SCNC: 138 MMOL/L (ref 136–145)
SPECIFIC GRAVITY UA: 1.01 (ref 1–1.03)
TOTAL PROTEIN: 7.8 G/DL (ref 6.6–8.7)
TRICYCLIC, URINE: NEGATIVE
UROBILINOGEN, URINE: 0.2 E.U./DL
WBC # BLD: 7.7 K/UL (ref 4.8–10.8)
WBC UA: 108 /HPF (ref 0–5)

## 2022-08-01 PROCEDURE — 99283 EMERGENCY DEPT VISIT LOW MDM: CPT

## 2022-08-01 PROCEDURE — 36415 COLL VENOUS BLD VENIPUNCTURE: CPT

## 2022-08-01 PROCEDURE — 80306 DRUG TEST PRSMV INSTRMNT: CPT

## 2022-08-01 PROCEDURE — 81001 URINALYSIS AUTO W/SCOPE: CPT

## 2022-08-01 PROCEDURE — 80053 COMPREHEN METABOLIC PANEL: CPT

## 2022-08-01 PROCEDURE — 87635 SARS-COV-2 COVID-19 AMP PRB: CPT

## 2022-08-01 PROCEDURE — 80143 DRUG ASSAY ACETAMINOPHEN: CPT

## 2022-08-01 PROCEDURE — 82077 ASSAY SPEC XCP UR&BREATH IA: CPT

## 2022-08-01 PROCEDURE — 87086 URINE CULTURE/COLONY COUNT: CPT

## 2022-08-01 PROCEDURE — 85025 COMPLETE CBC W/AUTO DIFF WBC: CPT

## 2022-08-01 PROCEDURE — 80179 DRUG ASSAY SALICYLATE: CPT

## 2022-08-02 NOTE — DISCHARGE INSTRUCTIONS
Return with thoughts of harming yourself, harming others, or other concerns. Follow-up with your counselor as soon as possible to talk about changing behaviors.

## 2022-08-02 NOTE — ED NOTES
Removed patient's call light. Patient is making sexual commands on call light system. As soon as you answer call light, he immediately calls out again asking inapporiate questions and comments. Patient is keeping the call light system tied up so other patients can't call out in a timely manner for their needs. Patient has a sitter at bedside for all medical needs.        Nasrin Lopez  08/01/22 2016       Nasrin Lopez  08/01/22 2021
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
## 2022-08-03 LAB
ORGANISM: ABNORMAL
URINE CULTURE, ROUTINE: ABNORMAL
URINE CULTURE, ROUTINE: ABNORMAL

## 2022-08-03 ASSESSMENT — ENCOUNTER SYMPTOMS
EYE DISCHARGE: 0
SHORTNESS OF BREATH: 0
NAUSEA: 0
RHINORRHEA: 0
ABDOMINAL PAIN: 0
COLOR CHANGE: 0
VOMITING: 0
BACK PAIN: 0
COUGH: 0

## 2022-08-03 NOTE — ED PROVIDER NOTES
[facility] EMERGENCY DEPT  eMERGENCY dEPARTMENT eNCOUnter      Pt Name: Tabatha Rico  MRN: [id number]  [place] 1990  Date of evaluation: 8/1/2022  Provider: Kelly Jaime MD    CHIEF COMPLAINT       Chief Complaint   Patient presents with    Mental Health Problem     Patient brought in by EMS after NH staff stated he has been overly sexual and masterbating continuously. HISTORY OF PRESENT ILLNESS   (Location/Symptom, Timing/Onset,Context/Setting, Quality, Duration, Modifying Factors, Severity)  Note limiting factors. Tabatha Rico is a 27 y.o. male who presents to the emergency department with behavioral concerns. Patient has been noted to be sexually stimulating himself frequently and in areas other than his room. Patient has been making sexual comments to nursing staff at [facility]. Patient states \"I know I shouldn't but it sucks not being able to have sex in my age. \"  Patient states that he is in a nursing home after having a stroke due to a brain aneurysm. Patient believes that he has difficulty controlling his impulses secondary to brain injury. Patient states that \"I cannot control it and I will do better. \"  Patient denies suicidal ideation, homicidal ideation, auditory hallucinations, or visual hallucinations. Patient states that he \"sometimes get depressed but I have been doing much better recently. \"    HPI    NursingNotes were reviewed. REVIEW OF SYSTEMS    (2-9 systems for level 4, 10 or more for level 5)     Review of Systems   Constitutional:  Negative for chills and fever. HENT:  Negative for congestion and rhinorrhea. Eyes:  Negative for discharge. Respiratory:  Negative for cough and shortness of breath. Cardiovascular:  Negative for chest pain and palpitations. Gastrointestinal:  Negative for abdominal pain, nausea and vomiting. Genitourinary:  Negative for difficulty urinating and dysuria. Musculoskeletal:  Negative for back pain and neck pain.    Skin: Negative for color change and pallor. Neurological:  Negative for syncope and light-headedness. Psychiatric/Behavioral:  Positive for behavioral problems. Negative for agitation, confusion and suicidal ideas. All other systems reviewed and are negative.          PAST MEDICALHISTORY     Past Medical History:   Diagnosis Date    Arthritis     Cellulitis     Cerebral artery occlusion with cerebral infarction [facility])     Cognitive communication deficit     Depression     Depression     Difficult intubation     GERD (gastroesophageal reflux disease)     Hemiplegia and hemiparesis following cerebral infarction affecting left non-dominant side (HCC)     Hemorrhoids     History of blood transfusion     Hypertension     Kidney stone     Muscle spasticity     Personal history of transient ischemic attack (TIA), and cerebral infarction without residual deficits     Pressure ulcer     Prolonged emergence from general anesthesia     Retention of urine          SURGICAL HISTORY       Past Surgical History:   Procedure Laterality Date    BACK SURGERY      BACLOFEN PUMP IMPLANTATION      BRAIN SURGERY      CYSTOSCOPY Left 6/10/2022    [facility] 6/10/2022    LEFT URETERAL STENT INSERTION performed by Kerline Perez MD at [facility] Right 10/2/2018    ESWL EXTRACORPEAL SHOCK WAVE LITHOTRIPSY performed by Deborah Keith MD at [place] [address]     Discharge Medication List as of 8/1/2022 11:50 PM        CONTINUE these medications which have NOT CHANGED    Details   allopurinol (ZYLOPRIM) 100 MG tablet Take 2 tablets by mouth daily, Disp-30 tablet, R-3Normal      tamsulosin (FLOMAX) 0.4 mg capsule Take 1 capsule by mouth daily, Disp-30 capsule, R-0Normal      phenazopyridine (PYRIDIUM) 100 MG tablet Take 1 tablet by mouth 3 times daily as needed for Pain, Disp-10 tablet, R-0Normal      traZODone (DESYREL) 50 MG tablet Take 50 mg by mouth nightlyHistorical Med      medroxyPROGESTERone (PROVERA) 5 MG tablet Take 5 mg by mouth dailyHistorical Med      citalopram (CELEXA) 20 mg tablet Take 20 mg by mouth dailyHistorical Med      aspirin 325 MG tablet Take 325 mg by mouth dailyHistorical Med      Cholecalciferol (VITAMIN D3 PO) Take 2,000 Units by mouth dailyHistorical Med      Amantadine (SYMMETREL) 100 MG TABS tablet Take 100 mg by mouth 2 times dailyHistorical Med      azelastine HCl 0.15 % SOLN 1 spray by Nasal route 2 times daily Historical Med      baclofen (LIORESAL) 20 MG tablet Take 20 mg by mouth 4 times daily Historical Med      busPIRone (BUSPAR) 10 MG tablet Take 10 mg by mouth 2 times daily Historical Med      loratadine (CLARITIN) 10 MG capsule Take 10 mg by mouth dailyHistorical Med      fluticasone (FLONASE) 50 MCG/ACT nasal spray 1 spray by Each Nare route 2 times dailyHistorical Med      melatonin 5 MG TABS tablet Take 5 mg by mouth nightly Historical Med      gabapentin (NEURONTIN) 400 MG capsule Take 400 mg by mouth 3 times daily. Historical Med      lisinopril (PRINIVIL;ZESTRIL) 10 MG tablet Take 10 mg by mouth dailyHistorical Med      oxyCODONE-acetaminophen (PERCOCET)  MG per tablet Take 1 tablet by mouth every 6 hours as needed for Pain. Historical Med      tiZANidine (ZANAFLEX) 2 MG tablet Take 2 mg by mouth 2 times daily Historical Med      polyethylene glycol (GLYCOLAX) powder Take 17 g by mouth 2 times daily Historical Med      dexlansoprazole (DEXILANT) 60 MG CPDR delayed release capsule Take 60 mg by mouth daily      DULoxetine (CYMBALTA) 60 MG capsule Take 60 mg by mouth daily Not on med list from SNFHistorical Med      Multiple Vitamins-Minerals (THERAPEUTIC MULTIVITAMIN-MINERALS) tablet Take 1 tablet by mouth daily      Calcium Carbonate-Vitamin D (CALCIUM-VITAMIN D) 500-200 MG-UNIT per tablet Take 1 tablet by mouth 2 times daily (with meals) docusate sodium (COLACE) 100 MG capsule Take 200 mg by mouth 2 times daily      Omega-3 Fatty Acids (FISH OIL) 1000 MG CAPS Take 3,000 mg by mouth 2 times daily      risperiDONE (RISPERDAL) 1 MG tablet Take 1 mg by mouth 2 times daily      Ascorbic Acid (VITAMIN C) 500 MG CAPS Take 500 mg by mouth 2 times daily      propranolol (INDERAL) 20 MG tablet Take 20 mg by mouth 2 times daily as needed Hold for /70 or lowerHistorical Med      ketoconazole (NIZORAL) 2 % shampoo Apply topically daily as needed for Itching Apply topically daily as needed., Topical, Historical Med             ALLERGIES     Latex, Dilantin [phenytoin], Pcn [penicillins], and Sulfa antibiotics    FAMILY HISTORY     No family history on file. SOCIAL HISTORY       Social History     Socioeconomic History    Marital status: Single   Tobacco Use    Smoking status: Never    Smokeless tobacco: Never   Vaping Use    Vaping Use: Never used   Substance and Sexual Activity    Alcohol use: No    Drug use: No       SCREENINGS    Pettus Coma Scale  Eye Opening: Spontaneous  Best Verbal Response: Oriented  Best Motor Response: Obeys commands  Lito Coma Scale Score: 15        PHYSICAL EXAM    (up to 7 for level 4, 8 or more for level 5)     ED Triage Vitals [08/01/22 1602]   BP Temp Temp src Heart Rate Resp SpO2 Height Weight   111/66 98.8 °F (37.1 °C) -- 87 18 97 % -- --       Physical Exam  Vitals and nursing note reviewed. Constitutional:       General: He is not in acute distress. Appearance: Normal appearance. Comments: Patient is stimulating his left nipple and continues to do so throughout history and physical examination. HENT:      Head: Normocephalic and atraumatic. Right Ear: External ear normal.      Left Ear: External ear normal.      Nose: Nose normal.      Mouth/Throat:      Mouth: Mucous membranes are moist.      Pharynx: Oropharynx is clear. No oropharyngeal exudate. Eyes:      General: No scleral icterus. Conjunctiva/sclera: Conjunctivae normal.      Pupils: Pupils are equal, round, and reactive to light. Cardiovascular:      Rate and Rhythm: Normal rate and regular rhythm. Pulses: Normal pulses. Heart sounds: Normal heart sounds. Pulmonary:      Effort: Pulmonary effort is normal. No respiratory distress. Breath sounds: Normal breath sounds. No wheezing, rhonchi or rales. Abdominal:      General: Bowel sounds are normal. There is no distension. Palpations: Abdomen is soft. Tenderness: There is no abdominal tenderness. There is no guarding or rebound. Musculoskeletal:         General: No tenderness or deformity. Cervical back: Neck supple. No rigidity. Skin:     General: Skin is warm and dry. Capillary Refill: Capillary refill takes less than 2 seconds. Coloration: Skin is not jaundiced. Neurological:      General: No focal deficit present. Mental Status: He is alert and oriented to person, place, and time. Mental status is at baseline. GCS: GCS eye subscore is 4. GCS verbal subscore is 5. GCS motor subscore is 6. Cranial Nerves: Dysarthria and facial asymmetry present. No cranial nerve deficit. Motor: No tremor or seizure activity.       Coordination: Coordination normal.   Psychiatric:         Mood and Affect: Mood normal.         Behavior: Behavior normal.       DIAGNOSTIC RESULTS       No orders to display           LABS:  Labs Reviewed   CBC WITH AUTO DIFFERENTIAL - Abnormal; Notable for the following components:       Result Value    RBC 4.07 (*)     Hemoglobin 11.6 (*)     Hematocrit 36.1 (*)     MCHC 32.1 (*)     RDW 14.8 (*)     Basophils % 1.3 (*)     All other components within normal limits   URINALYSIS WITH REFLEX TO CULTURE - Abnormal; Notable for the following components:    Blood, Urine TRACE (*)     Protein, UA TRACE (*)     Leukocyte Esterase, Urine LARGE (*)     All other components within normal limits   DRUG SCRN, BUPRENORPHINE - Abnormal; Notable for the following components:    Oxycodone Urine POSITIVE (*)     All other components within normal limits   MICROSCOPIC URINALYSIS - Abnormal; Notable for the following components:    Bacteria, UA NEGATIVE (*)     Crystals, UA NEG (*)     Hyaline Casts, UA 10 (*)     WBC,  (*)     RBC, UA 10 (*)     All other components within normal limits   COVID-19, RAPID   CULTURE, URINE    Narrative:     ORDER#: [id number]                          ORDERED BY: ANA Abarca  SOURCE: Urine Clean Catch                  COLLECTED:  08/01/22 16:54  ANTIBIOTICS AT JAIME.:                      RECEIVED :  08/01/22 17:00   COMPREHENSIVE METABOLIC PANEL W/ REFLEX TO MG FOR LOW K   ETHANOL   ACETAMINOPHEN LEVEL   SALICYLATE LEVEL       All other labs were within normal range or not returned as of this dictation. EMERGENCY DEPARTMENT COURSE and DIFFERENTIAL DIAGNOSIS/MDM:   Vitals:    Vitals:    08/01/22 1602 08/01/22 2346   BP: 111/66 (!) 155/91   Pulse: 87 87   Resp: 18 18   Temp: 98.8 °F (37.1 °C)    SpO2: 97% 96%       MDM  78-year-old male with history of hemorrhagic stroke and brain injury presents to the emergency department with behavioral difficulties. Lab results reviewed. Domi, psychiatric ARSEN, evaluates patient in the emergency department and discussed this patient with psychiatry. Patient will be discharged to nursing home to follow-up with Dr. Axel Daniels. Patient will return with thoughts of harming himself, thoughts of harming others, or other concerns. CONSULTS:  None    PROCEDURES:  Unless otherwise noted below, none     Procedures    FINAL IMPRESSION      1. Inappropriate sexual behavior          DISPOSITION/PLAN   DISPOSITION Decision To Discharge 08/01/2022 11:12:15 PM      PATIENT REFERRED TO:  Marty Carmichael  [address] [place] [address].  [address] [place] [address]    Schedule an appointment as soon as possible for a visit         DISCHARGE MEDICATIONS:  Discharge Medication List as of 8/1/2022 11:50 PM             (Please note that portions of this note were completed with a voice recognition program.  Efforts were made to edit thedictations but occasionally words are mis-transcribed.)    Terence Haywood MD (electronically signed)  Attending Emergency Physician          Terence Haywood MD  08/03/22 9061

## 2022-08-12 ENCOUNTER — TELEPHONE (OUTPATIENT)
Dept: NEUROSURGERY | Age: 32
End: 2022-08-12

## 2022-08-12 NOTE — TELEPHONE ENCOUNTER
[facility] New Patient Questionnaire    Diagnosis/Reason for Referral?    pain pump replacement end of battery life    2. Who is completing questionnaire? Patient  x Caregiver Family      3. Has the patient had any previous spinal/brain surgeries? Pain pump placement        A. If yes, what is the name of the facility in which the surgery was performed? B. Procedure/Surgery performed? C. Who was the surgeon? D. When was the surgery? MM/YY       E. Did the patient improve after the surgery? 4. Is this a second opinion? If yes, Dr. Brenna Turner would like to review patient first before making the appointment. 5. Have MRI Images been obtain within the last year? Yes X No      XR  CT     If yes, where was the imaging performed? [facility]     If yes, what part of the body? Lumbar  Cervical  Thoracic  Brain     If yes, when was it obtained? MM/YY    Note: if the scan was performed at a facility other than [facility], the disc will need to be brought to the appointment or we need to reach out to obtain the disc. A. Was the patient instructed to provide the disc? Yes   No X      8. Has the patient had a NCV/EMG within the last year? Yes  No x     If yes, where was it performed and date? MM/YY  Location:      9. Has the patient been to Physical Therapy? Yes  No x     If yes, what location, how long attended, and last visit? Location:        Therapy Lasted:    Date of Last Visit:      10. Has the patient been to Pain Management? Yes x No     If yes, what location and last visit     Location: DR. Chelsy Koroma   Last Visit:   Is it helping?

## 2022-08-15 ENCOUNTER — TELEPHONE (OUTPATIENT)
Dept: NEUROSURGERY | Age: 32
End: 2022-08-15

## 2022-08-15 NOTE — TELEPHONE ENCOUNTER
Office had to reschedule dr Ariel Pearce appointment for today . Notified [facility] however facility was unable to reschedule stated transportation department would call office to reschedule .

## 2022-08-18 ENCOUNTER — TELEPHONE (OUTPATIENT)
Dept: NEUROLOGY | Age: 32
End: 2022-08-18

## 2022-08-18 NOTE — TELEPHONE ENCOUNTER
Jose R from Dr. [facility]'s office called to check status of referral for pt.  I informed her pt has appt with Dr. Keila Patel on 8/22/22 at 10 am.

## 2022-08-22 ENCOUNTER — OFFICE VISIT (OUTPATIENT)
Dept: NEUROSURGERY | Age: 32
End: 2022-08-22
Payer: COMMERCIAL

## 2022-08-22 VITALS
WEIGHT: 294 LBS | DIASTOLIC BLOOD PRESSURE: 80 MMHG | SYSTOLIC BLOOD PRESSURE: 132 MMHG | BODY MASS INDEX: 38.97 KG/M2 | HEIGHT: 73 IN

## 2022-08-22 DIAGNOSIS — Z46.2 END OF BATTERY LIFE OF INTRATHECAL INFUSION PUMP: Primary | ICD-10-CM

## 2022-08-22 DIAGNOSIS — Z87.820 HISTORY OF TRAUMATIC BRAIN INJURY: ICD-10-CM

## 2022-08-22 DIAGNOSIS — R25.2 POST-TRAUMATIC SPASTICITY: ICD-10-CM

## 2022-08-22 DIAGNOSIS — Z01.818 PRE-OP TESTING: Primary | ICD-10-CM

## 2022-08-22 PROCEDURE — G8427 DOCREV CUR MEDS BY ELIG CLIN: HCPCS | Performed by: NEUROLOGICAL SURGERY

## 2022-08-22 PROCEDURE — G8417 CALC BMI ABV UP PARAM F/U: HCPCS | Performed by: NEUROLOGICAL SURGERY

## 2022-08-22 PROCEDURE — 1036F TOBACCO NON-USER: CPT | Performed by: NEUROLOGICAL SURGERY

## 2022-08-22 PROCEDURE — 99205 OFFICE O/P NEW HI 60 MIN: CPT | Performed by: NEUROLOGICAL SURGERY

## 2022-08-22 ASSESSMENT — ENCOUNTER SYMPTOMS
EYES NEGATIVE: 1
RESPIRATORY NEGATIVE: 1
GASTROINTESTINAL NEGATIVE: 1

## 2022-08-22 NOTE — PROGRESS NOTES
[facility] Neurosurgery  Office Visit        Chief Complaint   Patient presents with    New Patient     Pain pump/end of battery        HISTORY OF PRESENT ILLNESS:      The patient is a 27 y.o. male with a history of a severe TBI in 12/2005 in [place], [place]. He required surgery for a subdural hematoma and medical records report he had a stroke in the perioperative period. During his convalescence, he required  shunt placement and ultimately placement of a baclofen pump due to left-sided spasticity. He is somewhat cognitively-impaired following his injury but does act as his own guardian and makes his own medical decisions. He reports the pump was placed \"several years ago\" and it has never been revised. He is followed by Dr. Belen Fried in pain management and he is reaching the [id number]-life for his pump in 1/2023. He reports that the pump has helped with his spasticity. He recently underwent a dye-study with Dr. Belen Fried that demonstrated patency of his catheter. Of note, he takes ASA 325mg daily due to his history of stroke.       MEDICAL HISTORY:             Past Medical History:   Diagnosis Date    Arthritis     Cellulitis     Cerebral artery occlusion with cerebral infarction [facility])     Cognitive communication deficit     Depression     Depression     Difficult intubation     GERD (gastroesophageal reflux disease)     Hemiplegia and hemiparesis following cerebral infarction affecting left non-dominant side (HCC)     Hemorrhoids     History of blood transfusion     Hypertension     Kidney stone     Muscle spasticity     Personal history of transient ischemic attack (TIA), and cerebral infarction without residual deficits     Pressure ulcer     Prolonged emergence from general anesthesia     Retention of urine        Past Surgical History:   Procedure Laterality Date    BACK SURGERY      BACLOFEN PUMP IMPLANTATION      BRAIN SURGERY      CYSTOSCOPY Left 6/10/2022    CYSTOSCOPY LEFT PYLOGRAM performed by Kaveh Leslie Beverley Goldsmith MD at [address] [place] [address] Left 6/10/2022    LEFT URETERAL STENT INSERTION performed by Mounika Mcqueen MD at [facility] Right 10/2/2018    ESWL EXTRACORPEAL SHOCK WAVE LITHOTRIPSY performed by Heron Ness MD at [facility]         Current Outpatient Medications:     allopurinol (ZYLOPRIM) 100 MG tablet, Take 2 tablets by mouth daily, Disp: 30 tablet, Rfl: 3    tamsulosin (FLOMAX) 0.4 mg capsule, Take 1 capsule by mouth daily, Disp: 30 capsule, Rfl: 0    phenazopyridine (PYRIDIUM) 100 MG tablet, Take 1 tablet by mouth 3 times daily as needed for Pain, Disp: 10 tablet, Rfl: 0    traZODone (DESYREL) 50 MG tablet, Take 50 mg by mouth nightly, Disp: , Rfl:     medroxyPROGESTERone (PROVERA) 5 MG tablet, Take 5 mg by mouth daily, Disp: , Rfl:     citalopram (CELEXA) 20 mg tablet, Take 20 mg by mouth daily, Disp: , Rfl:     aspirin 325 MG tablet, Take 325 mg by mouth daily, Disp: , Rfl:     Cholecalciferol (VITAMIN D3 PO), Take 2,000 Units by mouth daily, Disp: , Rfl:     Amantadine (SYMMETREL) 100 MG TABS tablet, Take 100 mg by mouth 2 times daily, Disp: , Rfl:     azelastine HCl 0.15 % SOLN, 1 spray by Nasal route 2 times daily , Disp: , Rfl:     baclofen (LIORESAL) 20 MG tablet, Take 20 mg by mouth 4 times daily , Disp: , Rfl:     busPIRone (BUSPAR) 10 MG tablet, Take 10 mg by mouth 2 times daily , Disp: , Rfl:     loratadine (CLARITIN) 10 MG capsule, Take 10 mg by mouth daily, Disp: , Rfl:     fluticasone (FLONASE) 50 MCG/ACT nasal spray, 1 spray by Each Nare route 2 times daily, Disp: , Rfl:     melatonin 5 MG TABS tablet, Take 5 mg by mouth nightly , Disp: , Rfl:     gabapentin (NEURONTIN) 400 MG capsule, Take 400 mg by mouth 3 times daily. , Disp: , Rfl:     lisinopril (PRINIVIL;ZESTRIL) 10 MG tablet, Take 10 mg by mouth daily, Disp: , Rfl:     oxyCODONE-acetaminophen (PERCOCET)  MG per tablet, Take 1 tablet by mouth every 6 hours as needed for Pain., Disp: , Rfl:     tiZANidine (ZANAFLEX) 2 MG tablet, Take 2 mg by mouth 2 times daily , Disp: , Rfl:     polyethylene glycol (GLYCOLAX) powder, Take 17 g by mouth 2 times daily , Disp: , Rfl:     dexlansoprazole (DEXILANT) 60 MG CPDR delayed release capsule, Take 60 mg by mouth daily, Disp: , Rfl:     DULoxetine (CYMBALTA) 60 MG capsule, Take 60 mg by mouth daily Not on med list from [facility], Disp: , Rfl:     Multiple Vitamins-Minerals (THERAPEUTIC MULTIVITAMIN-MINERALS) tablet, Take 1 tablet by mouth daily, Disp: , Rfl:     Calcium Carbonate-Vitamin D (CALCIUM-VITAMIN D) 500-200 MG-UNIT per tablet, Take 1 tablet by mouth 2 times daily (with meals), Disp: , Rfl:     docusate sodium (COLACE) 100 MG capsule, Take 200 mg by mouth 2 times daily, Disp: , Rfl:     Omega-3 Fatty Acids (FISH OIL) 1000 MG CAPS, Take 3,000 mg by mouth 2 times daily, Disp: , Rfl:     risperiDONE (RISPERDAL) 1 MG tablet, Take 1 mg by mouth 2 times daily, Disp: , Rfl:     Ascorbic Acid (VITAMIN C) 500 MG CAPS, Take 500 mg by mouth 2 times daily, Disp: , Rfl:     propranolol (INDERAL) 20 MG tablet, Take 20 mg by mouth 2 times daily as needed Hold for /70 or lower, Disp: , Rfl:     ketoconazole (NIZORAL) 2 % shampoo, Apply topically daily as needed for Itching Apply topically daily as needed. , Disp: , Rfl:     Allergies:  Latex, Dilantin [phenytoin], Pcn [penicillins], and Sulfa antibiotics    Social History:   Social History     Tobacco Use   Smoking Status Never   Smokeless Tobacco Never     Social History     Substance and Sexual Activity   Alcohol Use No         Family History:   History reviewed. No pertinent family history. REVIEW OF SYSTEMS:    Constitutional: Negative. HENT: Negative. Eyes: Negative. Respiratory: Negative. Cardiovascular: Negative. Gastrointestinal: Negative. Genitourinary: Negative. Musculoskeletal: Negative. Skin: Negative. Neurological:  Positive for tremors. Endo/Heme/Allergies: Negative. Psychiatric/Behavioral:  Positive for depression. The patient has insomnia. Review of Systems was obtained by the medical assistant and reviewed by myself. PHYSICAL EXAM:    Vitals:    08/22/22 1007   BP: 132/80       Constitutional: Obese, seated in wheelchair, no apparent distress. Eyes - conjunctiva normal.  Pupils react to light  Ear, nose,throat -Normal pinna and tragus, No scars, or lesions over external nose or ears, no obvious atrophy of tongue  Neck- symmetric, trachea midline, no jugular vein distension  Respiration- chest wall symmetric, normal effort without use of accessory muscles  Musculoskeletal - no significant wasting of muscles noted, no bony deformities, symmetric bulk  Extremities- no clubbing, cyanosis or edema  Skin - warm, dry, and intact. No rash,erythema, or pallor.   Psychiatric - mood, affect, and behavior appear normal.       NEUROLOGIC EXAM:    MENTAL STATUS: Awake, alert, appropriate    CRANIAL NERVES: PERRL, R esotropia, tracks, mild L facial weakness    MOTOR:     Right Upper Extremity:    Deltoid: 5/5  Biceps: 5/5  Triceps: 5/5  Wrist Extension: 5/5  Finger Abduction: 5/5      Left Upper Extremity:    Spastic hemiplegia    Right Lower Extremity:    Hip Flexion: 5/5  Knee Extension: 5/5  Ankle Plantarflexion: 5/5  Ankle Dorsiflexion: 5/5  EHL: 5/5      Left Lower Extremity:    Spastic hemiplegia      SOMATOSENSORY:     Right Upper Extremity: normal light touch sensation  Left Upper Extremity: normal light touch sensation  Right Lower Extremity: normal light touch sensation  Left Lower Extremity: normal light touch sensation      REFLEXES:    Biceps: 2+  Patella: 2+      Green's: Negative      COORDINATION and GAIT:    Gait: Wheelchair bound, not tested      DATA:    Lab Results   Component Value Date    WBC 7.7 08/01/2022    HGB 11.6 (L) 08/01/2022    HCT 36.1 (L) 08/01/2022    MCV 88.7 08/01/2022     08/01/2022     Lab Results Component Value Date     08/01/2022    K 4.1 08/01/2022     08/01/2022    CO2 23 08/01/2022    BUN 14 08/01/2022    CREATININE 1.0 08/01/2022    GLUCOSE 104 08/01/2022    CALCIUM 9.0 08/01/2022    PROT 7.8 08/01/2022    LABALBU 4.2 08/01/2022    BILITOT <0.2 08/01/2022    ALKPHOS 111 08/01/2022    AST 11 08/01/2022    ALT 20 08/01/2022    LABGLOM >60 08/01/2022    GFRAA >59 08/01/2022    GLOB 3.8 12/28/2016             ASSESSMENT AND PLAN:  This is a 27 y.o. male with a history of severe TBI and possible stroke in 2005 s/p craniotomy and  shunt placement with chronic left hemiplegia and spasticity. He has an implanted intrathecal infusion pump ([employer] Synchromed II - 40mL) that is delivering baclofen by simple-continuous infusion. The pump will reach end-of-life in 1/2023 and he has been referred for pump replacement. He notes a significant decrease in spasticity as a result of the pump and wishes to proceed with replacement. He has undergone a recent dye study with Dr. Elle Pereira that confirms patency of the catheter. Surgery (replacement of intrathecal baclofen pump) was explained to him in detail. Specific risks of bleeding, infection, device failure, baclofen withdrawal and anesthetic-related complications were all discussed. He voiced understanding and requested that we proceed. Prior to surgery, I will obtain AP/lateral abdominal x-rays to assess the location of the pump and catheter, as well as the proximity to his existing  shunt. We will also request clearance from his PCP, Dr. Baron Bhatti, to hold ASA in the perioperative period.             Armani Holder MD

## 2022-08-22 NOTE — PROGRESS NOTES
Review of Systems   Constitutional: Negative. HENT: Negative. Eyes: Negative. Respiratory: Negative. Cardiovascular: Negative. Gastrointestinal: Negative. Genitourinary: Negative. Musculoskeletal: Negative. Skin: Negative. Neurological:  Positive for tremors. Endo/Heme/Allergies: Negative. Psychiatric/Behavioral:  Positive for depression. The patient has insomnia.

## 2022-08-22 NOTE — H&P
[facility] Neurosurgery  History and Physical        Chief Complaint   Patient presents with    New Patient     Pain pump/end of battery        HISTORY OF PRESENT ILLNESS:      The patient is a 27 y.o. male with a history of a severe TBI in 12/2005 in [place], [place]. He required surgery for a subdural hematoma and medical records report he had a stroke in the perioperative period. During his convalescence, he required  shunt placement and ultimately placement of a baclofen pump due to left-sided spasticity. He is somewhat cognitively-impaired following his injury but does act as his own guardian and makes his own medical decisions. He reports the pump was placed \"several years ago\" and it has never been revised. He is followed by Dr. Quinten Harper in pain management and he is reaching the [id number]-life for his pump in 1/2023. He reports that the pump has helped with his spasticity. He recently underwent a dye-study with Dr. Quinten Harper that demonstrated patency of his catheter. Of note, he takes ASA 325mg daily due to his history of stroke.       MEDICAL HISTORY:             Past Medical History:   Diagnosis Date    Arthritis     Cellulitis     Cerebral artery occlusion with cerebral infarction [facility])     Cognitive communication deficit     Depression     Depression     Difficult intubation     GERD (gastroesophageal reflux disease)     Hemiplegia and hemiparesis following cerebral infarction affecting left non-dominant side (HCC)     Hemorrhoids     History of blood transfusion     Hypertension     Kidney stone     Muscle spasticity     Personal history of transient ischemic attack (TIA), and cerebral infarction without residual deficits     Pressure ulcer     Prolonged emergence from general anesthesia     Retention of urine        Past Surgical History:   Procedure Laterality Date    BACK SURGERY      BACLOFEN PUMP IMPLANTATION      BRAIN SURGERY      CYSTOSCOPY Left 6/10/2022    CYSTOSCOPY LEFT PYLOGRAM performed by Kerline Perez MD at [facility] Left 6/10/2022    LEFT URETERAL STENT INSERTION performed by Kerline Perez MD at [address] [place] [address] ESWL Right 10/2/2018    ESWL EXTRACORPEAL SHOCK WAVE LITHOTRIPSY performed by Deborah Keith MD at [address] [place] [address]         Current Outpatient Medications:     allopurinol (ZYLOPRIM) 100 MG tablet, Take 2 tablets by mouth daily, Disp: 30 tablet, Rfl: 3    tamsulosin (FLOMAX) 0.4 mg capsule, Take 1 capsule by mouth daily, Disp: 30 capsule, Rfl: 0    phenazopyridine (PYRIDIUM) 100 MG tablet, Take 1 tablet by mouth 3 times daily as needed for Pain, Disp: 10 tablet, Rfl: 0    traZODone (DESYREL) 50 MG tablet, Take 50 mg by mouth nightly, Disp: , Rfl:     medroxyPROGESTERone (PROVERA) 5 MG tablet, Take 5 mg by mouth daily, Disp: , Rfl:     citalopram (CELEXA) 20 mg tablet, Take 20 mg by mouth daily, Disp: , Rfl:     aspirin 325 MG tablet, Take 325 mg by mouth daily, Disp: , Rfl:     Cholecalciferol (VITAMIN D3 PO), Take 2,000 Units by mouth daily, Disp: , Rfl:     Amantadine (SYMMETREL) 100 MG TABS tablet, Take 100 mg by mouth 2 times daily, Disp: , Rfl:     azelastine HCl 0.15 % SOLN, 1 spray by Nasal route 2 times daily , Disp: , Rfl:     baclofen (LIORESAL) 20 MG tablet, Take 20 mg by mouth 4 times daily , Disp: , Rfl:     busPIRone (BUSPAR) 10 MG tablet, Take 10 mg by mouth 2 times daily , Disp: , Rfl:     loratadine (CLARITIN) 10 MG capsule, Take 10 mg by mouth daily, Disp: , Rfl:     fluticasone (FLONASE) 50 MCG/ACT nasal spray, 1 spray by Each Nare route 2 times daily, Disp: , Rfl:     melatonin 5 MG TABS tablet, Take 5 mg by mouth nightly , Disp: , Rfl:     gabapentin (NEURONTIN) 400 MG capsule, Take 400 mg by mouth 3 times daily. , Disp: , Rfl:     lisinopril (PRINIVIL;ZESTRIL) 10 MG tablet, Take 10 mg by mouth daily, Disp: , Rfl:     oxyCODONE-acetaminophen (PERCOCET)  MG per tablet, Take 1 tablet by mouth every 6 hours as needed for Pain., Disp: , Rfl:     tiZANidine (ZANAFLEX) 2 MG tablet, Take 2 mg by mouth 2 times daily , Disp: , Rfl:     polyethylene glycol (GLYCOLAX) powder, Take 17 g by mouth 2 times daily , Disp: , Rfl:     dexlansoprazole (DEXILANT) 60 MG CPDR delayed release capsule, Take 60 mg by mouth daily, Disp: , Rfl:     DULoxetine (CYMBALTA) 60 MG capsule, Take 60 mg by mouth daily Not on med list from [facility], Disp: , Rfl:     Multiple Vitamins-Minerals (THERAPEUTIC MULTIVITAMIN-MINERALS) tablet, Take 1 tablet by mouth daily, Disp: , Rfl:     Calcium Carbonate-Vitamin D (CALCIUM-VITAMIN D) 500-200 MG-UNIT per tablet, Take 1 tablet by mouth 2 times daily (with meals), Disp: , Rfl:     docusate sodium (COLACE) 100 MG capsule, Take 200 mg by mouth 2 times daily, Disp: , Rfl:     Omega-3 Fatty Acids (FISH OIL) 1000 MG CAPS, Take 3,000 mg by mouth 2 times daily, Disp: , Rfl:     risperiDONE (RISPERDAL) 1 MG tablet, Take 1 mg by mouth 2 times daily, Disp: , Rfl:     Ascorbic Acid (VITAMIN C) 500 MG CAPS, Take 500 mg by mouth 2 times daily, Disp: , Rfl:     propranolol (INDERAL) 20 MG tablet, Take 20 mg by mouth 2 times daily as needed Hold for /70 or lower, Disp: , Rfl:     ketoconazole (NIZORAL) 2 % shampoo, Apply topically daily as needed for Itching Apply topically daily as needed. , Disp: , Rfl:     Allergies:  Latex, Dilantin [phenytoin], Pcn [penicillins], and Sulfa antibiotics    Social History:   Social History     Tobacco Use   Smoking Status Never   Smokeless Tobacco Never     Social History     Substance and Sexual Activity   Alcohol Use No         Family History:   History reviewed. No pertinent family history. REVIEW OF SYSTEMS:    Constitutional: Negative. HENT: Negative. Eyes: Negative. Respiratory: Negative. Cardiovascular: Negative. Gastrointestinal: Negative. Genitourinary: Negative. Musculoskeletal: Negative. Skin: Negative. Neurological:  Positive for tremors. Component Value Date     08/01/2022    K 4.1 08/01/2022     08/01/2022    CO2 23 08/01/2022    BUN 14 08/01/2022    CREATININE 1.0 08/01/2022    GLUCOSE 104 08/01/2022    CALCIUM 9.0 08/01/2022    PROT 7.8 08/01/2022    LABALBU 4.2 08/01/2022    BILITOT <0.2 08/01/2022    ALKPHOS 111 08/01/2022    AST 11 08/01/2022    ALT 20 08/01/2022    LABGLOM >60 08/01/2022    GFRAA >59 08/01/2022    GLOB 3.8 12/28/2016             ASSESSMENT AND PLAN:  This is a 27 y.o. male with a history of severe TBI and possible stroke in 2005 s/p craniotomy and  shunt placement with chronic left hemiplegia and spasticity. He has an implanted intrathecal infusion pump ([employer] Synchromed II - 40mL) that is delivering baclofen by simple-continuous infusion. The pump will reach end-of-life in 1/2023 and he has been referred for pump replacement. He notes a significant decrease in spasticity as a result of the pump and wishes to proceed with replacement. He has undergone a recent dye study with Dr. Rocio Bran that confirms patency of the catheter. Surgery (replacement of intrathecal baclofen pump) was explained to him in detail. Specific risks of bleeding, infection, device failure, baclofen withdrawal and anesthetic-related complications were all discussed. He voiced understanding and requested that we proceed. Prior to surgery, I will obtain AP/lateral abdominal x-rays to assess the location of the pump and catheter, as well as the proximity to his existing  shunt. We will also request clearance from his PCP, Dr. Fidelia Negron, to hold ASA in the perioperative period.             Shanice Pearson MD

## 2022-09-02 ENCOUNTER — HOSPITAL ENCOUNTER (EMERGENCY)
Age: 32
Discharge: OTHER INSTITUTION | End: 2022-09-02
Attending: EMERGENCY MEDICINE
Payer: COMMERCIAL

## 2022-09-02 VITALS
DIASTOLIC BLOOD PRESSURE: 67 MMHG | SYSTOLIC BLOOD PRESSURE: 116 MMHG | HEIGHT: 74 IN | RESPIRATION RATE: 20 BRPM | BODY MASS INDEX: 37.17 KG/M2 | HEART RATE: 94 BPM | OXYGEN SATURATION: 95 % | TEMPERATURE: 98.2 F | WEIGHT: 289.6 LBS

## 2022-09-02 DIAGNOSIS — N39.0 URINARY TRACT INFECTION WITHOUT HEMATURIA, SITE UNSPECIFIED: ICD-10-CM

## 2022-09-02 DIAGNOSIS — F52.8 HYPERSEXUALITY: Primary | ICD-10-CM

## 2022-09-02 DIAGNOSIS — Z87.820 HISTORY OF TRAUMATIC BRAIN INJURY: ICD-10-CM

## 2022-09-02 DIAGNOSIS — Z72.51 HIGH RISK SEXUAL BEHAVIOR, UNSPECIFIED TYPE: ICD-10-CM

## 2022-09-02 DIAGNOSIS — Z00.8 MEDICAL CLEARANCE FOR PSYCHIATRIC ADMISSION: ICD-10-CM

## 2022-09-02 LAB
ALBUMIN SERPL-MCNC: 3.9 G/DL (ref 3.5–5.2)
ALP BLD-CCNC: 123 U/L (ref 40–130)
ALT SERPL-CCNC: 26 U/L (ref 5–41)
AMPHETAMINE SCREEN, URINE: NEGATIVE
ANION GAP SERPL CALCULATED.3IONS-SCNC: 11 MMOL/L (ref 7–19)
AST SERPL-CCNC: 15 U/L (ref 5–40)
BACTERIA: ABNORMAL /HPF
BARBITURATE SCREEN URINE: NEGATIVE
BASOPHILS ABSOLUTE: 0.1 K/UL (ref 0–0.2)
BASOPHILS RELATIVE PERCENT: 1.3 % (ref 0–1)
BENZODIAZEPINE SCREEN, URINE: NEGATIVE
BILIRUB SERPL-MCNC: 0.3 MG/DL (ref 0.2–1.2)
BILIRUBIN URINE: NEGATIVE
BLOOD, URINE: ABNORMAL
BUN BLDV-MCNC: 10 MG/DL (ref 6–20)
BUPRENORPHINE URINE: NEGATIVE
CALCIUM SERPL-MCNC: 9.1 MG/DL (ref 8.6–10)
CANNABINOID SCREEN URINE: NEGATIVE
CHLORIDE BLD-SCNC: 100 MMOL/L (ref 98–111)
CLARITY: CLEAR
CO2: 24 MMOL/L (ref 22–29)
COCAINE METABOLITE SCREEN URINE: NEGATIVE
COLOR: YELLOW
CREAT SERPL-MCNC: 0.8 MG/DL (ref 0.5–1.2)
CRYSTALS, UA: ABNORMAL /HPF
EOSINOPHILS ABSOLUTE: 0.2 K/UL (ref 0–0.6)
EOSINOPHILS RELATIVE PERCENT: 2.7 % (ref 0–5)
EPITHELIAL CELLS, UA: 1 /HPF (ref 0–5)
ETHANOL: <10 MG/DL (ref 0–0.08)
GFR AFRICAN AMERICAN: >59
GFR NON-AFRICAN AMERICAN: >60
GLUCOSE BLD-MCNC: 109 MG/DL (ref 74–109)
GLUCOSE URINE: NEGATIVE MG/DL
HCT VFR BLD CALC: 38.8 % (ref 42–52)
HEMOGLOBIN: 12.6 G/DL (ref 14–18)
HYALINE CASTS: 3 /HPF (ref 0–8)
IMMATURE GRANULOCYTES #: 0 K/UL
KETONES, URINE: NEGATIVE MG/DL
LEUKOCYTE ESTERASE, URINE: ABNORMAL
LYMPHOCYTES ABSOLUTE: 2 K/UL (ref 1.1–4.5)
LYMPHOCYTES RELATIVE PERCENT: 28.2 % (ref 20–40)
Lab: ABNORMAL
MCH RBC QN AUTO: 28 PG (ref 27–31)
MCHC RBC AUTO-ENTMCNC: 32.5 G/DL (ref 33–37)
MCV RBC AUTO: 86.2 FL (ref 80–94)
METHADONE SCREEN, URINE: NEGATIVE
METHAMPHETAMINE, URINE: NEGATIVE
MONOCYTES ABSOLUTE: 0.6 K/UL (ref 0–0.9)
MONOCYTES RELATIVE PERCENT: 8.1 % (ref 0–10)
NEUTROPHILS ABSOLUTE: 4.3 K/UL (ref 1.5–7.5)
NEUTROPHILS RELATIVE PERCENT: 59.4 % (ref 50–65)
NITRITE, URINE: NEGATIVE
OPIATE SCREEN URINE: NEGATIVE
OXYCODONE URINE: POSITIVE
PDW BLD-RTO: 13.6 % (ref 11.5–14.5)
PH UA: 7 (ref 5–8)
PHENCYCLIDINE SCREEN URINE: NEGATIVE
PLATELET # BLD: 250 K/UL (ref 130–400)
PMV BLD AUTO: 10.2 FL (ref 9.4–12.4)
POTASSIUM REFLEX MAGNESIUM: 4.4 MMOL/L (ref 3.5–5)
PROLACTIN: 17.53 NG/ML (ref 4.04–15.2)
PROPOXYPHENE SCREEN: NEGATIVE
PROTEIN UA: ABNORMAL MG/DL
RBC # BLD: 4.5 M/UL (ref 4.7–6.1)
RBC UA: 45 /HPF (ref 0–4)
SARS-COV-2, NAAT: NOT DETECTED
SODIUM BLD-SCNC: 135 MMOL/L (ref 136–145)
SPECIFIC GRAVITY UA: 1.01 (ref 1–1.03)
TESTOSTERONE TOTAL: 110.8 NG/DL (ref 249–836)
TOTAL PROTEIN: 7.6 G/DL (ref 6.6–8.7)
TRICYCLIC, URINE: NEGATIVE
TSH SERPL DL<=0.05 MIU/L-ACNC: 1.02 UIU/ML (ref 0.27–4.2)
UROBILINOGEN, URINE: 0.2 E.U./DL
WBC # BLD: 7.1 K/UL (ref 4.8–10.8)
WBC UA: 308 /HPF (ref 0–5)

## 2022-09-02 PROCEDURE — 84443 ASSAY THYROID STIM HORMONE: CPT

## 2022-09-02 PROCEDURE — 84403 ASSAY OF TOTAL TESTOSTERONE: CPT

## 2022-09-02 PROCEDURE — 82077 ASSAY SPEC XCP UR&BREATH IA: CPT

## 2022-09-02 PROCEDURE — 85025 COMPLETE CBC W/AUTO DIFF WBC: CPT

## 2022-09-02 PROCEDURE — 6370000000 HC RX 637 (ALT 250 FOR IP): Performed by: EMERGENCY MEDICINE

## 2022-09-02 PROCEDURE — 99285 EMERGENCY DEPT VISIT HI MDM: CPT

## 2022-09-02 PROCEDURE — 81001 URINALYSIS AUTO W/SCOPE: CPT

## 2022-09-02 PROCEDURE — 87635 SARS-COV-2 COVID-19 AMP PRB: CPT

## 2022-09-02 PROCEDURE — 87086 URINE CULTURE/COLONY COUNT: CPT

## 2022-09-02 PROCEDURE — 80053 COMPREHEN METABOLIC PANEL: CPT

## 2022-09-02 PROCEDURE — 36415 COLL VENOUS BLD VENIPUNCTURE: CPT

## 2022-09-02 PROCEDURE — 80306 DRUG TEST PRSMV INSTRMNT: CPT

## 2022-09-02 PROCEDURE — 84146 ASSAY OF PROLACTIN: CPT

## 2022-09-02 RX ORDER — CIPROFLOXACIN 500 MG/1
500 TABLET, FILM COATED ORAL ONCE
Status: COMPLETED | OUTPATIENT
Start: 2022-09-02 | End: 2022-09-02

## 2022-09-02 RX ORDER — ACETAMINOPHEN 500 MG
1000 TABLET ORAL ONCE
Status: COMPLETED | OUTPATIENT
Start: 2022-09-02 | End: 2022-09-02

## 2022-09-02 RX ADMIN — CIPROFLOXACIN HYDROCHLORIDE 500 MG: 500 TABLET, FILM COATED ORAL at 15:49

## 2022-09-02 RX ADMIN — ACETAMINOPHEN 1000 MG: 500 TABLET ORAL at 15:50

## 2022-09-02 ASSESSMENT — PAIN DESCRIPTION - PAIN TYPE: TYPE: ACUTE PAIN

## 2022-09-02 ASSESSMENT — ENCOUNTER SYMPTOMS
SORE THROAT: 0
CHOKING: 0
VOICE CHANGE: 0
EYE DISCHARGE: 0
APNEA: 0
BLOOD IN STOOL: 0
NAUSEA: 0
CONSTIPATION: 0
SINUS PRESSURE: 0
FACIAL SWELLING: 0
DIARRHEA: 0

## 2022-09-02 ASSESSMENT — PAIN SCALES - GENERAL: PAINLEVEL_OUTOF10: 10

## 2022-09-02 ASSESSMENT — PAIN DESCRIPTION - FREQUENCY: FREQUENCY: CONTINUOUS

## 2022-09-02 ASSESSMENT — PAIN - FUNCTIONAL ASSESSMENT: PAIN_FUNCTIONAL_ASSESSMENT: 0-10

## 2022-09-02 ASSESSMENT — PAIN DESCRIPTION - LOCATION: LOCATION: BUTTOCKS
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
## 2022-09-02 NOTE — ED NOTES
While at bedside, pt requested someone named Tabatha Been \"come give me a show\" and began to touch himself.   I advised pt that behavior will not be tolerated, pt stopped and apologized      Rosalba Perez RN  09/02/22 [id number]

## 2022-09-02 NOTE — ED NOTES
Víctor Julien with [facility] called. She is getting a crew in early and hoping to send a truck over around 6:30 for transport.       Esme Sanchez  09/02/22 1815

## 2022-09-02 NOTE — ED NOTES
Viri (Assistant Nursing Director [address] [place] [address]) updated on plan for transport to [facility], awaiting transport     Ninfa Murguia RN  09/02/22 0210 Complex Repair And Bilobe Flap Text: The defect edges were debeveled with a #15 scalpel blade.  The primary defect was closed partially with a complex linear closure.  Given the location of the remaining defect, shape of the defect and the proximity to free margins a bilobe flap was deemed most appropriate for complete closure of the defect.  Using a sterile surgical marker, an appropriate advancement flap was drawn incorporating the defect and placing the expected incisions within the relaxed skin tension lines where possible.    The area thus outlined was incised deep to adipose tissue with a #15 scalpel blade.  The skin margins were undermined to an appropriate distance in all directions utilizing iris scissors.
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
## 2022-09-02 NOTE — ED NOTES
[facility] to take pt, ETA 2000.   [facility] updated on expected departure time     [place]  09/02/22 7888

## 2022-09-02 NOTE — ED NOTES
Attempted in&out catheter due to patient reports he cannot urinate, unsuccessful d/t pain for patient.   Water provided, pt to try and urinate in urinal      Radha Tinoco RN  09/02/22 8890

## 2022-09-03 NOTE — ED NOTES
[facility] here to transport patient, awaiting PD arrival     Melissa Abbasi, [facility]  09/02/22 2761

## 2022-09-03 NOTE — ED NOTES
Pd here at this time, Margaret Alford and PD are now taking patient to [address] [place] [address]     Jeancarlos Yañez RN  09/02/22 2017

## 2022-09-03 NOTE — ED NOTES
[employer] Police dispatch to notify them that EMS is here for transfer.       Sid Lee RN  09/02/22 9212

## 2022-09-04 LAB
ORGANISM: ABNORMAL
URINE CULTURE, ROUTINE: ABNORMAL
URINE CULTURE, ROUTINE: ABNORMAL

## 2022-09-12 ENCOUNTER — TELEPHONE (OUTPATIENT)
Dept: NEUROSURGERY | Age: 32
End: 2022-09-12

## 2022-09-12 NOTE — TELEPHONE ENCOUNTER
As I was starting pre cert for pt upcoming surgery on 9/20/22, I noticed that pt was in ED 9/2/22 and transferred to [facility] in [place]. He was previously residing at [facility]. I called [address] [place] [address] at [phone] and learned that pt has not been holding his ASA as he was told due to being transported to [address] [place] [address] and they did not have a hold order. I was transferred 4 times to finally get a voicemail of Tomasa Navarro  at [phone]. I stated in voicemail that I understood pt has not been holding asa and surgery will have to be pushed back, I requested a call back at [phone] with more information on pt condition and surgery.

## 2022-09-13 NOTE — TELEPHONE ENCOUNTER
Called and spoke with Cristi [employer], she stated that patient has NOT been holding his aspirin for 14 days so surgery will need to be pushed back. It was moved to 10/4/22 at 7:30am with arrival at [address] [place] [address] and provided a fax number of [phone] to send updated surgery instructions. Faxed and confirmed, in media. Fili thanked me and DARRIUS.

## 2022-09-26 ENCOUNTER — TELEPHONE (OUTPATIENT)
Dept: NEUROSURGERY | Age: 32
End: 2022-09-26

## 2022-09-26 NOTE — TELEPHONE ENCOUNTER
Shyanne at [facility] called about pre op ordered. I gave verbal orders, she VU and will fax us the results at [phone].

## 2022-10-04 ENCOUNTER — HOSPITAL ENCOUNTER (OUTPATIENT)
Age: 32
Discharge: PSYCHIATRIC OR SUBSTANCE USE FACILITY | End: 2022-10-04
Attending: NEUROLOGICAL SURGERY | Admitting: NEUROLOGICAL SURGERY
Payer: COMMERCIAL

## 2022-10-04 ENCOUNTER — ANESTHESIA EVENT (OUTPATIENT)
Dept: OPERATING ROOM | Age: 32
End: 2022-10-04
Payer: COMMERCIAL

## 2022-10-04 ENCOUNTER — ANESTHESIA (OUTPATIENT)
Dept: OPERATING ROOM | Age: 32
End: 2022-10-04
Payer: COMMERCIAL

## 2022-10-04 VITALS
HEIGHT: 74 IN | DIASTOLIC BLOOD PRESSURE: 89 MMHG | HEART RATE: 87 BPM | OXYGEN SATURATION: 100 % | RESPIRATION RATE: 16 BRPM | TEMPERATURE: 97 F | WEIGHT: 297 LBS | BODY MASS INDEX: 38.12 KG/M2 | SYSTOLIC BLOOD PRESSURE: 135 MMHG

## 2022-10-04 DIAGNOSIS — R25.2 SPASTICITY: Primary | ICD-10-CM

## 2022-10-04 DIAGNOSIS — Z46.2 END OF BATTERY LIFE OF INTRATHECAL INFUSION PUMP: ICD-10-CM

## 2022-10-04 PROCEDURE — 2580000003 HC RX 258: Performed by: NURSE ANESTHETIST, CERTIFIED REGISTERED

## 2022-10-04 PROCEDURE — 3700000000 HC ANESTHESIA ATTENDED CARE: Performed by: NEUROLOGICAL SURGERY

## 2022-10-04 PROCEDURE — 2500000003 HC RX 250 WO HCPCS: Performed by: NEUROLOGICAL SURGERY

## 2022-10-04 PROCEDURE — 2500000003 HC RX 250 WO HCPCS: Performed by: NURSE ANESTHETIST, CERTIFIED REGISTERED

## 2022-10-04 PROCEDURE — 7100000010 HC PHASE II RECOVERY - FIRST 15 MIN: Performed by: NEUROLOGICAL SURGERY

## 2022-10-04 PROCEDURE — 3600000014 HC SURGERY LEVEL 4 ADDTL 15MIN: Performed by: NEUROLOGICAL SURGERY

## 2022-10-04 PROCEDURE — 2709999900 HC NON-CHARGEABLE SUPPLY: Performed by: NEUROLOGICAL SURGERY

## 2022-10-04 PROCEDURE — 3600000004 HC SURGERY LEVEL 4 BASE: Performed by: NEUROLOGICAL SURGERY

## 2022-10-04 PROCEDURE — C1772 INFUSION PUMP, PROGRAMMABLE: HCPCS | Performed by: NEUROLOGICAL SURGERY

## 2022-10-04 PROCEDURE — 6360000002 HC RX W HCPCS: Performed by: NURSE ANESTHETIST, CERTIFIED REGISTERED

## 2022-10-04 PROCEDURE — 62361 IMPLANT SPINE INFUSION PUMP: CPT | Performed by: NEUROLOGICAL SURGERY

## 2022-10-04 PROCEDURE — 2580000003 HC RX 258: Performed by: NEUROLOGICAL SURGERY

## 2022-10-04 PROCEDURE — 7100000000 HC PACU RECOVERY - FIRST 15 MIN: Performed by: NEUROLOGICAL SURGERY

## 2022-10-04 PROCEDURE — 3700000001 HC ADD 15 MINUTES (ANESTHESIA): Performed by: NEUROLOGICAL SURGERY

## 2022-10-04 PROCEDURE — 6370000000 HC RX 637 (ALT 250 FOR IP): Performed by: NEUROLOGICAL SURGERY

## 2022-10-04 PROCEDURE — A4217 STERILE WATER/SALINE, 500 ML: HCPCS | Performed by: NEUROLOGICAL SURGERY

## 2022-10-04 PROCEDURE — 7100000011 HC PHASE II RECOVERY - ADDTL 15 MIN: Performed by: NEUROLOGICAL SURGERY

## 2022-10-04 PROCEDURE — 7100000001 HC PACU RECOVERY - ADDTL 15 MIN: Performed by: NEUROLOGICAL SURGERY

## 2022-10-04 DEVICE — PUMP INFUS 40ML 0.048ML/DAY DIA87.5MM THK19.5MM ITH TI: Type: IMPLANTABLE DEVICE | Site: ABDOMEN | Status: FUNCTIONAL

## 2022-10-04 RX ORDER — SODIUM CHLORIDE 0.9 % (FLUSH) 0.9 %
5-40 SYRINGE (ML) INJECTION EVERY 12 HOURS SCHEDULED
Status: DISCONTINUED | OUTPATIENT
Start: 2022-10-04 | End: 2022-10-04 | Stop reason: HOSPADM

## 2022-10-04 RX ORDER — METOCLOPRAMIDE HYDROCHLORIDE 5 MG/ML
10 INJECTION INTRAMUSCULAR; INTRAVENOUS
Status: DISCONTINUED | OUTPATIENT
Start: 2022-10-04 | End: 2022-10-04 | Stop reason: HOSPADM

## 2022-10-04 RX ORDER — HYDROMORPHONE HYDROCHLORIDE 1 MG/ML
0.25 INJECTION, SOLUTION INTRAMUSCULAR; INTRAVENOUS; SUBCUTANEOUS EVERY 5 MIN PRN
Status: DISCONTINUED | OUTPATIENT
Start: 2022-10-04 | End: 2022-10-04 | Stop reason: HOSPADM

## 2022-10-04 RX ORDER — MEPERIDINE HYDROCHLORIDE 25 MG/ML
12.5 INJECTION INTRAMUSCULAR; INTRAVENOUS; SUBCUTANEOUS EVERY 5 MIN PRN
Status: DISCONTINUED | OUTPATIENT
Start: 2022-10-04 | End: 2022-10-04 | Stop reason: HOSPADM

## 2022-10-04 RX ORDER — CHLORAL HYDRATE 500 MG
1000 CAPSULE ORAL 2 TIMES DAILY
Qty: 1 CAPSULE | Refills: 0
Start: 2022-10-07

## 2022-10-04 RX ORDER — FENTANYL CITRATE 50 UG/ML
INJECTION, SOLUTION INTRAMUSCULAR; INTRAVENOUS PRN
Status: DISCONTINUED | OUTPATIENT
Start: 2022-10-04 | End: 2022-10-04 | Stop reason: SDUPTHER

## 2022-10-04 RX ORDER — LIDOCAINE HYDROCHLORIDE 10 MG/ML
INJECTION, SOLUTION EPIDURAL; INFILTRATION; INTRACAUDAL; PERINEURAL PRN
Status: DISCONTINUED | OUTPATIENT
Start: 2022-10-04 | End: 2022-10-04 | Stop reason: SDUPTHER

## 2022-10-04 RX ORDER — OXYCODONE HYDROCHLORIDE AND ACETAMINOPHEN 5; 325 MG/1; MG/1
1-2 TABLET ORAL EVERY 6 HOURS PRN
Qty: 36 TABLET | Refills: 0 | Status: SHIPPED | OUTPATIENT
Start: 2022-10-04 | End: 2022-10-09

## 2022-10-04 RX ORDER — SODIUM CHLORIDE 0.9 % (FLUSH) 0.9 %
5-40 SYRINGE (ML) INJECTION PRN
Status: DISCONTINUED | OUTPATIENT
Start: 2022-10-04 | End: 2022-10-04 | Stop reason: HOSPADM

## 2022-10-04 RX ORDER — OXYCODONE HYDROCHLORIDE AND ACETAMINOPHEN 5; 325 MG/1; MG/1
1-2 TABLET ORAL EVERY 6 HOURS PRN
Qty: 36 TABLET | Refills: 0 | Status: SHIPPED | OUTPATIENT
Start: 2022-10-04 | End: 2022-10-04 | Stop reason: SDUPTHER

## 2022-10-04 RX ORDER — SODIUM CHLORIDE 9 MG/ML
INJECTION, SOLUTION INTRAVENOUS PRN
Status: DISCONTINUED | OUTPATIENT
Start: 2022-10-04 | End: 2022-10-04 | Stop reason: HOSPADM

## 2022-10-04 RX ORDER — DIPHENHYDRAMINE HYDROCHLORIDE 50 MG/ML
12.5 INJECTION INTRAMUSCULAR; INTRAVENOUS
Status: DISCONTINUED | OUTPATIENT
Start: 2022-10-04 | End: 2022-10-04 | Stop reason: HOSPADM

## 2022-10-04 RX ORDER — SODIUM CHLORIDE, SODIUM LACTATE, POTASSIUM CHLORIDE, CALCIUM CHLORIDE 600; 310; 30; 20 MG/100ML; MG/100ML; MG/100ML; MG/100ML
INJECTION, SOLUTION INTRAVENOUS CONTINUOUS PRN
Status: DISCONTINUED | OUTPATIENT
Start: 2022-10-04 | End: 2022-10-04 | Stop reason: SDUPTHER

## 2022-10-04 RX ORDER — ASPIRIN 325 MG
325 TABLET ORAL DAILY
Qty: 1 TABLET | Refills: 0
Start: 2022-10-07

## 2022-10-04 RX ORDER — CLINDAMYCIN PHOSPHATE 900 MG/50ML
900 INJECTION INTRAVENOUS
Status: COMPLETED | OUTPATIENT
Start: 2022-10-04 | End: 2022-10-04

## 2022-10-04 RX ORDER — ROCURONIUM BROMIDE 10 MG/ML
INJECTION, SOLUTION INTRAVENOUS PRN
Status: DISCONTINUED | OUTPATIENT
Start: 2022-10-04 | End: 2022-10-04 | Stop reason: SDUPTHER

## 2022-10-04 RX ORDER — DEXAMETHASONE SODIUM PHOSPHATE 10 MG/ML
INJECTION, SOLUTION INTRAMUSCULAR; INTRAVENOUS PRN
Status: DISCONTINUED | OUTPATIENT
Start: 2022-10-04 | End: 2022-10-04 | Stop reason: SDUPTHER

## 2022-10-04 RX ORDER — PROPOFOL 10 MG/ML
INJECTION, EMULSION INTRAVENOUS PRN
Status: DISCONTINUED | OUTPATIENT
Start: 2022-10-04 | End: 2022-10-04 | Stop reason: SDUPTHER

## 2022-10-04 RX ORDER — BUPIVACAINE HYDROCHLORIDE AND EPINEPHRINE 2.5; 5 MG/ML; UG/ML
INJECTION, SOLUTION EPIDURAL; INFILTRATION; INTRACAUDAL; PERINEURAL PRN
Status: DISCONTINUED | OUTPATIENT
Start: 2022-10-04 | End: 2022-10-04 | Stop reason: HOSPADM

## 2022-10-04 RX ORDER — ONDANSETRON 2 MG/ML
INJECTION INTRAMUSCULAR; INTRAVENOUS PRN
Status: DISCONTINUED | OUTPATIENT
Start: 2022-10-04 | End: 2022-10-04 | Stop reason: SDUPTHER

## 2022-10-04 RX ORDER — HYDROMORPHONE HYDROCHLORIDE 1 MG/ML
0.5 INJECTION, SOLUTION INTRAMUSCULAR; INTRAVENOUS; SUBCUTANEOUS EVERY 5 MIN PRN
Status: DISCONTINUED | OUTPATIENT
Start: 2022-10-04 | End: 2022-10-04 | Stop reason: HOSPADM

## 2022-10-04 RX ADMIN — ONDANSETRON 4 MG: 2 INJECTION INTRAMUSCULAR; INTRAVENOUS at 08:09

## 2022-10-04 RX ADMIN — CLINDAMYCIN PHOSPHATE 900 MG: 18 INJECTION, SOLUTION INTRAMUSCULAR; INTRAVENOUS at 08:09

## 2022-10-04 RX ADMIN — DEXAMETHASONE SODIUM PHOSPHATE 10 MG: 10 INJECTION, SOLUTION INTRAMUSCULAR; INTRAVENOUS at 08:09

## 2022-10-04 RX ADMIN — FENTANYL CITRATE 100 MCG: 50 INJECTION, SOLUTION INTRAMUSCULAR; INTRAVENOUS at 08:09

## 2022-10-04 RX ADMIN — PROPOFOL 150 MG: 10 INJECTION, EMULSION INTRAVENOUS at 08:09

## 2022-10-04 RX ADMIN — LIDOCAINE HYDROCHLORIDE 50 MG: 10 INJECTION, SOLUTION EPIDURAL; INFILTRATION; INTRACAUDAL; PERINEURAL at 08:09

## 2022-10-04 RX ADMIN — SUGAMMADEX 250 MG: 100 INJECTION, SOLUTION INTRAVENOUS at 09:18

## 2022-10-04 RX ADMIN — SODIUM CHLORIDE, SODIUM LACTATE, POTASSIUM CHLORIDE, AND CALCIUM CHLORIDE: 600; 310; 30; 20 INJECTION, SOLUTION INTRAVENOUS at 07:54

## 2022-10-04 RX ADMIN — SODIUM CHLORIDE, SODIUM LACTATE, POTASSIUM CHLORIDE, AND CALCIUM CHLORIDE: 600; 310; 30; 20 INJECTION, SOLUTION INTRAVENOUS at 09:02

## 2022-10-04 RX ADMIN — ROCURONIUM BROMIDE 50 MG: 10 INJECTION, SOLUTION INTRAVENOUS at 08:09

## 2022-10-04 ASSESSMENT — PAIN - FUNCTIONAL ASSESSMENT: PAIN_FUNCTIONAL_ASSESSMENT: 0-10

## 2022-10-04 ASSESSMENT — LIFESTYLE VARIABLES: SMOKING_STATUS: 0

## 2022-10-04 NOTE — PROGRESS NOTES
REPORT CALLED TO [facility] VIA MOTHER'S PHONE [address] REPORT TO [place], [address] [address]

## 2022-10-04 NOTE — BRIEF OP NOTE
Brief Postoperative Note      Patient: Eli Gonzales  YOB: 1990  MRN: [id number]    Date of Procedure: 10/4/2022    Pre-Op Diagnosis: End of battery life of intrathecal infusion pump [Z46.2]    Post-Op Diagnosis: Same       Procedure(s):  BACLOFEN PUMP REPLACEMENT (Medtronic Synchromed II 40mL)    Surgeon(s):  Kitty Rubinstein, MD      Anesthesia: General    Estimated Blood Loss (mL): less than 50     Complications: None    Specimens:   * No specimens in log *    Implants:  Implant Name Type Inv. Item Serial No.  Lot No. LRB No. Used Action   PUMP INFUS 40ML 0.048ML/DAY DIA87. 5MM THK19.5MM ITH TI - [id number]  PUMP INFUS 40ML 0.048ML/DAY DIA87. 5MM THK19.5MM ITH TI [id number] [employer]-  Right 1 Implanted         Drains: * No LDAs found *    Findings: Pump replaced. Clear CSF noted to drip from catheter prior to connection. System primed and infusion restarted at prior rate/settings.   See operative dictation, [id number]    Electronically signed by Mesfin Leon MD on 10/4/2022 at 9:35 AM

## 2022-10-04 NOTE — H&P
[id number] [facility] Neurosurgery  History and Physical        Chief Complaint   Patient presents with    New Patient     Pain pump/end of battery        HISTORY OF PRESENT ILLNESS:      The patient is a 27 y.o. male with a history of a severe TBI in 12/2005 in [place], [place]. He required surgery for a subdural hematoma and medical records report he had a stroke in the perioperative period. During his convalescence, he required  shunt placement and ultimately placement of a baclofen pump due to left-sided spasticity. He is somewhat cognitively-impaired following his injury but does act as his own guardian and makes his own medical decisions. He reports the pump was placed \"several years ago\" and it has never been revised. He is followed by Dr. Dario Fregoso in pain management and he is reaching the gvx-[id number]-life for his pump in 1/2023. He reports that the pump has helped with his spasticity. He recently underwent a dye-study with Dr. Dario Fregoso that demonstrated patency of his catheter. Of note, he takes ASA 325mg daily due to his history of stroke.       MEDICAL HISTORY:             Past Medical History:   Diagnosis Date    Arthritis     Cellulitis     Cerebral artery occlusion with cerebral infarction [facility])     Cognitive communication deficit     Depression     Depression     Difficult intubation     GERD (gastroesophageal reflux disease)     Hemiplegia and hemiparesis following cerebral infarction affecting left non-dominant side (HCC)     Hemorrhoids     History of blood transfusion     Hypertension     Kidney stone     Muscle spasticity     Personal history of transient ischemic attack (TIA), and cerebral infarction without residual deficits     Pressure ulcer     Prolonged emergence from general anesthesia     Retention of urine        Past Surgical History:   Procedure Laterality Date    BACK SURGERY      BACLOFEN PUMP IMPLANTATION      BRAIN SURGERY      CYSTOSCOPY Left 6/10/2022    CYSTOSCOPY LEFT PYLOGRAM performed by Bin Dumont MD at [facility] Left 6/10/2022    LEFT URETERAL STENT INSERTION performed by Bin Dumont MD at [address] [place] [address] ESWL Right 10/2/2018    ESWL EXTRACORPEAL SHOCK WAVE LITHOTRIPSY performed by Bonne Felty, MD at [address] [place] [address]         Current Outpatient Medications:     allopurinol (ZYLOPRIM) 100 MG tablet, Take 2 tablets by mouth daily, Disp: 30 tablet, Rfl: 3    tamsulosin (FLOMAX) 0.4 mg capsule, Take 1 capsule by mouth daily, Disp: 30 capsule, Rfl: 0    phenazopyridine (PYRIDIUM) 100 MG tablet, Take 1 tablet by mouth 3 times daily as needed for Pain, Disp: 10 tablet, Rfl: 0    traZODone (DESYREL) 50 MG tablet, Take 50 mg by mouth nightly, Disp: , Rfl:     medroxyPROGESTERone (PROVERA) 5 MG tablet, Take 5 mg by mouth daily, Disp: , Rfl:     citalopram (CELEXA) 20 mg tablet, Take 20 mg by mouth daily, Disp: , Rfl:     aspirin 325 MG tablet, Take 325 mg by mouth daily, Disp: , Rfl:     Cholecalciferol (VITAMIN D3 PO), Take 2,000 Units by mouth daily, Disp: , Rfl:     Amantadine (SYMMETREL) 100 MG TABS tablet, Take 100 mg by mouth 2 times daily, Disp: , Rfl:     azelastine HCl 0.15 % SOLN, 1 spray by Nasal route 2 times daily , Disp: , Rfl:     baclofen (LIORESAL) 20 MG tablet, Take 20 mg by mouth 4 times daily , Disp: , Rfl:     busPIRone (BUSPAR) 10 MG tablet, Take 10 mg by mouth 2 times daily , Disp: , Rfl:     loratadine (CLARITIN) 10 MG capsule, Take 10 mg by mouth daily, Disp: , Rfl:     fluticasone (FLONASE) 50 MCG/ACT nasal spray, 1 spray by Each Nare route 2 times daily, Disp: , Rfl:     melatonin 5 MG TABS tablet, Take 5 mg by mouth nightly , Disp: , Rfl:     gabapentin (NEURONTIN) 400 MG capsule, Take 400 mg by mouth 3 times daily. , Disp: , Rfl:     lisinopril (PRINIVIL;ZESTRIL) 10 MG tablet, Take 10 mg by mouth daily, Disp: , Rfl:     oxyCODONE-acetaminophen (PERCOCET)  MG per tablet, Take 1 tablet by mouth every 6 hours as needed for Pain., Disp: , Rfl:     tiZANidine (ZANAFLEX) 2 MG tablet, Take 2 mg by mouth 2 times daily , Disp: , Rfl:     polyethylene glycol (GLYCOLAX) powder, Take 17 g by mouth 2 times daily , Disp: , Rfl:     dexlansoprazole (DEXILANT) 60 MG CPDR delayed release capsule, Take 60 mg by mouth daily, Disp: , Rfl:     DULoxetine (CYMBALTA) 60 MG capsule, Take 60 mg by mouth daily Not on med list from [facility], Disp: , Rfl:     Multiple Vitamins-Minerals (THERAPEUTIC MULTIVITAMIN-MINERALS) tablet, Take 1 tablet by mouth daily, Disp: , Rfl:     Calcium Carbonate-Vitamin D (CALCIUM-VITAMIN D) 500-200 MG-UNIT per tablet, Take 1 tablet by mouth 2 times daily (with meals), Disp: , Rfl:     docusate sodium (COLACE) 100 MG capsule, Take 200 mg by mouth 2 times daily, Disp: , Rfl:     Omega-3 Fatty Acids (FISH OIL) 1000 MG CAPS, Take 3,000 mg by mouth 2 times daily, Disp: , Rfl:     risperiDONE (RISPERDAL) 1 MG tablet, Take 1 mg by mouth 2 times daily, Disp: , Rfl:     Ascorbic Acid (VITAMIN C) 500 MG CAPS, Take 500 mg by mouth 2 times daily, Disp: , Rfl:     propranolol (INDERAL) 20 MG tablet, Take 20 mg by mouth 2 times daily as needed Hold for /70 or lower, Disp: , Rfl:     ketoconazole (NIZORAL) 2 % shampoo, Apply topically daily as needed for Itching Apply topically daily as needed. , Disp: , Rfl:     Allergies:  Latex, Dilantin [phenytoin], Pcn [penicillins], and Sulfa antibiotics    Social History:   Social History     Tobacco Use   Smoking Status Never   Smokeless Tobacco Never     Social History     Substance and Sexual Activity   Alcohol Use No         Family History:   History reviewed. No pertinent family history. REVIEW OF SYSTEMS:    Constitutional: Negative. HENT: Negative. Eyes: Negative. Respiratory: Negative. Cardiovascular: Negative. Gastrointestinal: Negative. Genitourinary: Negative. Musculoskeletal: Negative. Skin: Negative. Neurological:  Positive for tremors. Endo/Heme/Allergies: Negative. Psychiatric/Behavioral:  Positive for depression. The patient has insomnia. Review of Systems was obtained by the medical assistant and reviewed by myself. PHYSICAL EXAM:    Vitals:    08/22/22 1007   BP: 132/80       Constitutional: Obese, seated in wheelchair, no apparent distress. Eyes - conjunctiva normal.  Pupils react to light  Ear, nose,throat -Normal pinna and tragus, No scars, or lesions over external nose or ears, no obvious atrophy of tongue  Neck- symmetric, trachea midline, no jugular vein distension  Respiration- chest wall symmetric, normal effort without use of accessory muscles  Musculoskeletal - no significant wasting of muscles noted, no bony deformities, symmetric bulk  Extremities- no clubbing, cyanosis or edema  Skin - warm, dry, and intact. No rash,erythema, or pallor.   Psychiatric - mood, affect, and behavior appear normal.       NEUROLOGIC EXAM:    MENTAL STATUS: Awake, alert, appropriate    CRANIAL NERVES: PERRL, R esotropia, tracks, mild L facial weakness    MOTOR:     Right Upper Extremity:    Deltoid: 5/5  Biceps: 5/5  Triceps: 5/5  Wrist Extension: 5/5  Finger Abduction: 5/5      Left Upper Extremity:    Spastic hemiplegia    Right Lower Extremity:    Hip Flexion: 5/5  Knee Extension: 5/5  Ankle Plantarflexion: 5/5  Ankle Dorsiflexion: 5/5  EHL: 5/5      Left Lower Extremity:    Spastic hemiplegia      SOMATOSENSORY:     Right Upper Extremity: normal light touch sensation  Left Upper Extremity: normal light touch sensation  Right Lower Extremity: normal light touch sensation  Left Lower Extremity: normal light touch sensation      REFLEXES:    Biceps: 2+  Patella: 2+      Green's: Negative      COORDINATION and GAIT:    Gait: Wheelchair bound, not tested      DATA:    Lab Results   Component Value Date    WBC 7.7 08/01/2022    HGB 11.6 (L) 08/01/2022    HCT 36.1 (L) 08/01/2022    MCV 88.7 08/01/2022     08/01/2022     Lab Results Component Value Date     08/01/2022    K 4.1 08/01/2022     08/01/2022    CO2 23 08/01/2022    BUN 14 08/01/2022    CREATININE 1.0 08/01/2022    GLUCOSE 104 08/01/2022    CALCIUM 9.0 08/01/2022    PROT 7.8 08/01/2022    LABALBU 4.2 08/01/2022    BILITOT <0.2 08/01/2022    ALKPHOS 111 08/01/2022    AST 11 08/01/2022    ALT 20 08/01/2022    LABGLOM >60 08/01/2022    GFRAA >59 08/01/2022    GLOB 3.8 12/28/2016             ASSESSMENT AND PLAN:  This is a 27 y.o. male with a history of severe TBI and possible stroke in 2005 s/p craniotomy and  shunt placement with chronic left hemiplegia and spasticity. He has an implanted intrathecal infusion pump (Trips n Salsa Synchromed II - 40mL) that is delivering baclofen by simple-continuous infusion. The pump will reach end-of-life in 1/2023 and he has been referred for pump replacement. He notes a significant decrease in spasticity as a result of the pump and wishes to proceed with replacement. He has undergone a recent dye study with Dr. Anali Garcia that confirms patency of the catheter. Surgery (replacement of intrathecal baclofen pump) was explained to him in detail. Specific risks of bleeding, infection, device failure, baclofen withdrawal and anesthetic-related complications were all discussed. He voiced understanding and requested that we proceed. Prior to surgery, I will obtain AP/lateral abdominal x-rays to assess the location of the pump and catheter, as well as the proximity to his existing  shunt. We will also request clearance from his PCP, Dr. Chun Frank, to hold ASA in the perioperative period.             David Carrillo MD

## 2022-10-04 NOTE — DISCHARGE INSTRUCTIONS
MONITOR INCISION SITE FOR SIGNS OF INFECTION LIKE ELEVATED TEMP GREATER THAN 100.4, REDNESS, SWELLING, RED STREAKS LEADING AWAY FROM INCISION SITE, PUS LIKE DRAINAGE OR IF THE INCISION OPENS UP PLEASE CALL MD    NO HEAVY LIFTING, KEEP INCISION SITE C/D/I, DO NOT REMOVE DRESSING, CALL MD IF DRESSING BECOMES SATURATED    CALL WITH ANY QUESTIONS, CONCERNS OR PROBLEMS THAT MIGHT ARISE.

## 2022-10-04 NOTE — ANESTHESIA PRE PROCEDURE
Department of Anesthesiology  Preprocedure Note       Name:  Rosalie Tejeda   Age:  27 y.o.  :  1990                                          MRN:  [id number]         Date:  10/4/2022      Surgeon: Oksana Pagan):  Joaquin Marie MD    Procedure: Procedure(s):  BACLOFEN PAIN PUMP REPLACEMENT    Medications prior to admission:   Prior to Admission medications    Medication Sig Start Date End Date Taking? Authorizing Provider   Ergocalciferol (VITAMIN D2) 10 MCG (400 UNIT) TABS Take 400 Units by mouth daily    Historical Provider, MD   acetaminophen (TYLENOL 8 HOUR) 650 MG extended release tablet Take 650 mg by mouth every 8 hours as needed for Pain    Historical Provider, MD   nystatin (MYCOSTATIN) 065401 UNIT/GM cream Apply topically 2 times daily Apply topically 2 times daily.     Historical Provider, MD   aluminum & magnesium hydroxide-simethicone (MAALOX) 200-200-20 MG/5ML SUSP suspension Take 5 mLs by mouth every 6 hours as needed for Indigestion    Historical Provider, MD   hydrOXYzine pamoate (VISTARIL) 25 MG capsule Take 25 mg by mouth 3 times daily as needed for Itching    Historical Provider, MD   allopurinol (ZYLOPRIM) 100 MG tablet Take 2 tablets by mouth daily 22   Chris Reeves, APRN - CNP   tamsulosin [facility]) 0.4 mg capsule Take 1 capsule by mouth daily 22   Chris Reeves APRN - CNP   phenazopyridine (PYRIDIUM) 100 MG tablet Take 1 tablet by mouth 3 times daily as needed for Pain 22  Chris Reeves APRN - CNP   traZODone (DESYREL) 50 MG tablet Take 50 mg by mouth nightly    Historical Provider, MD   medroxyPROGESTERone (PROVERA) 5 MG tablet Take 5 mg by mouth daily    Historical Provider, MD   citalopram (CELEXA) 20 mg tablet Take 20 mg by mouth daily    Historical Provider, MD   aspirin 325 MG tablet Take 325 mg by mouth daily    Historical Provider, MD   Amantadine (SYMMETREL) 100 MG TABS tablet Take 100 mg by mouth 2 times daily    Historical Provider, MD   azelastine HCl 0.15 % SOLN 1 spray by Nasal route 2 times daily     Historical Provider, MD   baclofen (LIORESAL) 20 MG tablet Take 10 mg by mouth 3 times daily as needed    Historical Provider, MD   busPIRone (BUSPAR) 10 MG tablet Take 10 mg by mouth 3 times daily    Historical Provider, MD   loratadine (CLARITIN) 10 MG capsule Take 10 mg by mouth daily    Historical Provider, MD   fluticasone (FLONASE) 50 MCG/ACT nasal spray 1 spray by Each Nare route 2 times daily    Historical Provider, MD   melatonin 5 MG TABS tablet Take 5 mg by mouth nightly     Historical Provider, MD   gabapentin (NEURONTIN) 400 MG capsule Take 400 mg by mouth 3 times daily. Historical Provider, MD   lisinopril (PRINIVIL;ZESTRIL) 10 MG tablet Take 10 mg by mouth daily    Historical Provider, MD   tiZANidine (ZANAFLEX) 2 MG tablet Take 2 mg by mouth 2 times daily     Historical Provider, MD   dexlansoprazole (DEXILANT) 60 MG CPDR delayed release capsule Take 60 mg by mouth daily    Historical Provider, MD   DULoxetine (CYMBALTA) 60 MG capsule Take 60 mg by mouth daily Not on med list from [facility]    Historical Provider, MD   docusate sodium (COLACE) 100 MG capsule Take 200 mg by mouth 2 times daily    Historical Provider, MD   Omega-3 Fatty Acids (FISH OIL) 1000 MG CAPS Take 1,000 mg by mouth 2 times daily    Historical Provider, MD   risperiDONE (RISPERDAL) 1 MG tablet Take 3 mg by mouth 2 times daily    Historical Provider, MD   propranolol (INDERAL) 20 MG tablet Take 20 mg by mouth 2 times daily Hold for /70 or lower    Historical Provider, MD       Current medications:    No current outpatient medications on file. No current facility-administered medications for this visit. Allergies:     Allergies   Allergen Reactions    Latex     Dilantin [Phenytoin]     Pcn [Penicillins]     Sulfa Antibiotics        Problem List:    Patient Active Problem List   Diagnosis Code    Muscle spasticity M62.838    Post-traumatic spasticity R25.2    Decubitus ulcer of right buttock, stage 3 ([facility]) L89.313    Dermatitis associated with moisture L30.8    History of cerebrovascular accident (CVA) with residual deficit I69.30    Renal calculus, right N20.0    Pressure injury of left buttock, stage 3 ([facility]) L89.323    Class 3 severe obesity due to excess calories without serious comorbidity in adult [facility]) E66.01    Sacral decubitus ulcer, stage III ([facility]) L89.153    Cellulitis L03.90    Hypertension I10    Cognitive deficit due to old cerebrovascular accident (CVA) I69.319    SALVADOR (acute kidney injury) ([address] N17.9    History of traumatic brain injury Z87.820    End of battery life of intrathecal infusion pump Z46.2       Past Medical History:        Diagnosis Date    Arthritis     Cellulitis     Cerebral artery occlusion with cerebral infarction ([address]     Cognitive communication deficit     Depression     Difficult intubation     GERD (gastroesophageal reflux disease)     Hemiplegia and hemiparesis following cerebral infarction affecting left non-dominant side (HCC)     Hemorrhoids     History of blood transfusion     Hypertension     Kidney stone     Muscle spasticity     Personal history of transient ischemic attack (TIA), and cerebral infarction without residual deficits     Pressure ulcer     Prolonged emergence from general anesthesia     Retention of urine     TBI [facility]traumatic [facility]) [facility] 2005       Past Surgical History:        Procedure Laterality Date    BACK SURGERY      BACLOFEN PUMP IMPLANTATION      BRAIN SURGERY  2005    subdural hematoma    CSF SHUNT      CYSTOSCOPY Left 06/10/2022    CYSTOSCOPY LEFT PYLOGRAM performed by Raza Tristan MD at [facility] Left 06/10/2022    LEFT URETERAL STENT INSERTION performed by Raza Tristan MD at [address] ESWL Right 10/02/2018    ESWL EXTRACORPEAL SHOCK WAVE LITHOTRIPSY performed by Micheal Gauthier MD at [facility] Social History:    Social History     Tobacco Use    Smoking status: Never    Smokeless tobacco: Never   Substance Use Topics    Alcohol use: No                                Counseling given: Not Answered      Vital Signs (Current): There were no vitals filed for this visit. BP Readings from Last 3 Encounters:   09/02/22 116/67   08/22/22 132/80   08/01/22 (!) 155/91       NPO Status:                                                                                 BMI:   Wt Readings from Last 3 Encounters:   09/02/22 289 lb 9.6 oz (131.4 kg)   08/22/22 294 lb (133.4 kg)   06/27/22 (!) 310 lb (140.6 kg)     There is no height or weight on file to calculate BMI.    CBC:   Lab Results   Component Value Date/Time    WBC 7.1 09/02/2022 01:10 PM    RBC 4.50 09/02/2022 01:10 PM    HGB 12.6 09/02/2022 01:10 PM    HCT 38.8 09/02/2022 01:10 PM    MCV 86.2 09/02/2022 01:10 PM    RDW 13.6 09/02/2022 01:10 PM     09/02/2022 01:10 PM       CMP:   Lab Results   Component Value Date/Time     09/02/2022 01:10 PM    K 4.4 09/02/2022 01:10 PM     09/02/2022 01:10 PM    CO2 24 09/02/2022 01:10 PM    BUN 10 09/02/2022 01:10 PM    CREATININE 0.8 09/02/2022 01:10 PM    GFRAA >59 09/02/2022 01:10 PM    LABGLOM >60 09/02/2022 01:10 PM    GLUCOSE 109 09/02/2022 01:10 PM    PROT 7.6 09/02/2022 01:10 PM    PROT 8.5 12/17/2012 03:18 PM    CALCIUM 9.1 09/02/2022 01:10 PM    BILITOT 0.3 09/02/2022 01:10 PM    ALKPHOS 123 09/02/2022 01:10 PM    AST 15 09/02/2022 01:10 PM    ALT 26 09/02/2022 01:10 PM       POC Tests: No results for input(s): POCGLU, POCNA, POCK, POCCL, POCBUN, POCHEMO, POCHCT in the last 72 hours.     Coags:   Lab Results   Component Value Date/Time    PROTIME 13.2 09/28/2018 01:15 PM    INR 1.01 09/28/2018 01:15 PM    APTT 30.0 09/28/2018 01:15 PM       HCG (If Applicable): No results found for: PREGTESTUR, PREGSERUM, HCG, HCGQUANT     ABGs: No results found for: PHART, PO2ART, HUX9KGJ, AMA2OAY, BEART, Q1MNGLDP     Type & Screen (If Applicable):  No results found for: LABABO, LABRH    Drug/Infectious Status (If Applicable):  No results found for: HIV, HEPCAB    COVID-19 Screening (If Applicable):   Lab Results   Component Value Date/Time    COVID19 Not Detected 09/02/2022 02:58 PM    COVID19 Not Detected 06/07/2022 04:30 PM           Anesthesia Evaluation  Patient summary reviewed and Nursing notes reviewed no history of anesthetic complications:   Airway: Mallampati: III  TM distance: >3 FB   Neck ROM: limited  Comment: Large neck circumference  Mouth opening: > = 3 FB   Dental:          Pulmonary:normal exam  breath sounds clear to auscultation      (-) asthma, recent URI, sleep apnea and not a current smoker                           Cardiovascular:  Exercise tolerance: good (>4 METS),   (+) hypertension:,     (-) pacemaker, past MI, CABG/stent and  angina      Rhythm: regular  Rate: normal           Beta Blocker:  Dose within 24 Hrs         Neuro/Psych:   (+) CVA (2005, left-sided paralysis):, psychiatric history:   (-) seizures and TIA           GI/Hepatic/Renal:   (+) GERD: well controlled, renal disease: kidney stones, morbid obesity     (-) liver disease       Endo/Other:        (-) diabetes mellitus, hypothyroidism, hyperthyroidism               Abdominal:             Vascular: negative vascular ROS. Other Findings:             Anesthesia Plan      general     ASA 4       Induction: intravenous. MIPS: Postoperative opioids intended and Prophylactic antiemetics administered. Anesthetic plan and risks discussed with patient. Use of blood products discussed with patient whom consented to blood products.                      Jesse Allen MD   10/4/2022

## 2022-10-04 NOTE — PROGRESS NOTES
DC'D PIV RIGHT UPPER ARM, TOLERATED WELL, SITE WNL, CANNULA INTACT, DRESSING APPLIED, PER JENNIFER RN

## 2022-10-04 NOTE — OP NOTE
[facility] [employer] [facility] JERRI Rodriguez 76, [address] [facility]                                OPERATIVE REPORT    PATIENT NAME: Mellissa Srivastava                       :        1990  MED REC NO:   [id number]                              ROOM:  ACCOUNT NO:   [de-identified]                           ADMIT DATE: 10/04/2022  PROVIDER:     Sergey Desir. Mitesh Smith MD, PhD    Sherren Fail:  10/04/2022    ATTENDING SURGEON:  Sergey Desir. Mitesh Smith MD, PhD    PREOPERATIVE DIAGNOSIS:  End of battery life of intrathecal infusion  pump. POSTOPERATIVE DIAGNOSIS:  End of battery life of intrathecal infusion  pump. PROCEDURE PERFORMED:  Removal and replacement of intrathecal infusion  pump (Medtronic SynchroMed II 40 mL). ESTIMATED BLOOD LOSS:  Less than 50 mL. INDICATIONS:  Please see the medical record for full details. Briefly,  the patient is a 24-year-old male with a previous history of severe  traumatic brain injury that was complicated by a stroke after which he  developed significant spasticity. He is followed by Pain Management and  as part of his rehabilitation underwent placement of a baclofen pump  which has been significantly reducing his spasticity. His pump is  approaching end of life and he was referred for pump replacement. The  procedure was discussed with the patient in detail including risks,  benefits, and alternatives and he consented to proceed with surgery. PROCEDURE IN DETAIL:  The patient was identified in the preoperative  area, consent for surgery was confirmed. He was transferred to the  operating room by the anesthesia team and general endotracheal  anesthesia was induced without difficulty. He was then placed in the  lateral position with his right side up. The right upper quadrant was  then prepped and draped in the usual aseptic fashion.   A team pause was  held according to the preformatted script, all were in agreement and the  decision was made to proceed with surgery. The incision from his previous pump placement was identified and  infiltrated with local anesthetic. Skin was opened sharply with a skin  knife and carried down to the subcutaneous tissues with electrocautery. Self-retaining retractors were placed and we used cautery to expose the  pump. The pump was surrounded by a Dacron mesh. The Dacron mesh was  opened with electrocautery. The pump was then delivered from the pocket  and the connection to the catheter was inspected and noted to be intact. A small amount of the Dacron mesh was restricting the movement of the  catheter and therefore, this was carefully dissected free and removed. Once we had adequate mobility of the catheter, the catheter was  disconnected. The pump was then handed off to the back table and the  existing drug within the pump reservoir was aspirated for replacement  into the new pump. The Anesthesia did perform a Valsalva maneuver and  we noted clear spinal fluid dripping from the catheter. We then  obtained a new Medtronic SynchroMed II 40 mL pump. The saline was  aspirated from this and then 3 mL of baclofen were used to rinse the  pump and removed. The remainder of the baclofen that was aspirated from  the prior pump was then used to fill the reservoir. Once the pump had  been filled with drug, the pump was brought into the field and connected  to the catheter and the catheter connection was noted to be secure. The  catheter access port was then accessed and approximately 0.5 mL of  spinal fluid was withdrawn. Once we had aspirated from the catheter, we  then replaced the pump in the pocket, no kinking was noted in the  catheter connection site. The pump was then secured to the abdominal  wall with a single 2-0 Vicryl stitch. Once the pump was secured, the  incision was copiously irrigated with antibiotic-impregnated saline.    The mesh and scar capsule surrounding the pocket was closed with  interrupted 0 Vicryl sutures. The dermis was then closed with inverted  interrupted 2-0 Vicryl sutures and the skin was closed with les. The wound was cleaned and dried and then Bactroban ointment and  Primapore dressing were applied. Once the dressing was in place, the  drapes were removed and control of the operation was returned to  Anesthesia for extubation and transport to recovery. All sponge,  needle, and instrument counts were correct on two iterations at the end  of the procedure and there were no apparent complications. Neela Bennett MD, PhD    D: 10/04/2022 10:57:16      T: 10/04/2022 10:59:57     [email or web address]  Job#: [id number]     Doc#: [id number]    CC:

## 2022-10-17 ENCOUNTER — OFFICE VISIT (OUTPATIENT)
Dept: NEUROSURGERY | Age: 32
End: 2022-10-17

## 2022-10-17 VITALS
RESPIRATION RATE: 18 BRPM | SYSTOLIC BLOOD PRESSURE: 125 MMHG | BODY MASS INDEX: 38.12 KG/M2 | WEIGHT: 297 LBS | HEIGHT: 74 IN | HEART RATE: 80 BPM | DIASTOLIC BLOOD PRESSURE: 85 MMHG

## 2022-10-17 DIAGNOSIS — Z46.2 END OF BATTERY LIFE OF INTRATHECAL INFUSION PUMP: ICD-10-CM

## 2022-10-17 DIAGNOSIS — R25.2 POST-TRAUMATIC SPASTICITY: Primary | ICD-10-CM

## 2022-10-17 PROCEDURE — 99024 POSTOP FOLLOW-UP VISIT: CPT | Performed by: NEUROLOGICAL SURGERY

## 2022-10-17 ASSESSMENT — ENCOUNTER SYMPTOMS
RESPIRATORY NEGATIVE: 1
BACK PAIN: 1
GASTROINTESTINAL NEGATIVE: 1
EYES NEGATIVE: 1

## 2022-10-17 NOTE — PROGRESS NOTES
NEUROSURGERY POSTOPERATIVE FOLLOW UP NOTE      Chief Complaint:   Chief Complaint   Patient presents with    Wound Check     Patient states that he is not feeling good today. He is very sore and hurting. He states that the pain is so bad that it is hard to breath. Date of Surgery: 10/4/2022    Procedure Performed: Replacement of intrathecal baclofen pump (Medtronic Synchromed II 40mL)      Interval Update:    First follow-up visit after surgery. 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 He complains of severe pain related to that surgery. He denies any swelling or drainage from his pump incision. HPI:     The patient is a 27 y.o. male with a history of a severe TBI in 12/2005 in [place], [place]. He required surgery for a subdural hematoma and medical records report he had a stroke in the perioperative period. During his convalescence, he required  shunt placement and ultimately placement of a baclofen pump due to left-sided spasticity. He is somewhat cognitively-impaired following his injury but does act as his own guardian and makes his own medical decisions. He reports the pump was placed \"several years ago\" and it has never been revised. He is followed by Dr. Elizabeth Monk in pain management and he is reaching the [id number]-life for his pump in 1/2023. He reports that the pump has helped with his spasticity. He recently underwent a dye-study with Dr. Elizabeth Monk that demonstrated patency of his catheter. Of note, he takes ASA 325mg daily due to his history of stroke. Objective:    /85   Pulse 80   Resp 18   Ht 6' 2\" (1.88 m)   Wt 297 lb (134.7 kg)   BMI 38.13 kg/m²         Physical Exam:    General: Awake, alert, in mild distress due to pain  Cardiorespiratory: unlabored breathing  Wound: RLQ pump incision C/D/I with staples, no swelling, fluctuance or drainage.       Neurologic Exam:    Mental Status: Awake, alert, appropriate  Cranial Nerves: PERRL, R esotropia, mild L facial weakness  Motor: 5/5 RUE/RLE, spastic hemiplegia LUE/LLE  Somatosensory: normal light touch sensation          Assessment and Plan:    [address] [place] [address] y.o. M returns for his first postoperative visit after replacement of his intrathecal baclofen pump on 10/04/2022. He appears to be at his neurologic baseline and his incision is healing well. His staples were removed. Much of his pain today appears to be related to his recent urologic procedure and he will need to follow up with [facility] to ensure he is recovering appropriately. I also advised the patient and his mother that he will need to contact Dr. Basilia Libman office to schedule his next Baclofen refill to avoid potential withdrawal.  I will plan to follow up with him in two weeks for another wound check.       Electronically signed by Spike Zacarias MD on 10/17/2022 at 11:38 AM

## 2022-10-31 ENCOUNTER — TELEPHONE (OUTPATIENT)
Dept: NEUROSURGERY | Age: 32
End: 2022-10-31

## 2022-10-31 NOTE — TELEPHONE ENCOUNTER
Located within [facility] called to cancel appt for today, almost an hour after appointment. She stated that a provider has evaluated him there and his incision is fine. She stated that she will call us with any issues. Appt canceled.

## 2022-11-11 ENCOUNTER — TRANSCRIBE ORDERS (OUTPATIENT)
Dept: ADMINISTRATIVE | Facility: HOSPITAL | Age: 32
End: 2022-11-11

## 2022-11-11 DIAGNOSIS — N28.89 RIGHT RENAL MASS: Primary | ICD-10-CM

## 2022-11-16 ENCOUNTER — TRANSCRIBE ORDERS (OUTPATIENT)
Dept: ADMINISTRATIVE | Facility: HOSPITAL | Age: 32
End: 2022-11-16

## 2022-11-16 DIAGNOSIS — N28.89 RIGHT RENAL MASS: Primary | ICD-10-CM

## 2022-11-18 ENCOUNTER — APPOINTMENT (OUTPATIENT)
Dept: ULTRASOUND IMAGING | Facility: HOSPITAL | Age: 32
End: 2022-11-18

## 2022-11-18 ENCOUNTER — APPOINTMENT (OUTPATIENT)
Dept: CT IMAGING | Facility: HOSPITAL | Age: 32
End: 2022-11-18

## 2022-12-26 ENCOUNTER — LAB REQUISITION (OUTPATIENT)
Dept: LAB | Facility: HOSPITAL | Age: 32
End: 2022-12-26
Payer: COMMERCIAL

## 2022-12-26 DIAGNOSIS — Z00.00 ENCOUNTER FOR GENERAL ADULT MEDICAL EXAMINATION WITHOUT ABNORMAL FINDINGS: ICD-10-CM

## 2022-12-26 LAB
25(OH)D3 SERPL-MCNC: 42.8 NG/ML (ref 30–100)
ALBUMIN SERPL-MCNC: 3.3 G/DL (ref 3.5–5.2)
ALP SERPL-CCNC: 134 U/L (ref 39–117)
ALT SERPL W P-5'-P-CCNC: 19 U/L (ref 1–41)
ANION GAP SERPL CALCULATED.3IONS-SCNC: 14 MMOL/L (ref 5–15)
AST SERPL-CCNC: 12 U/L (ref 1–40)
BILIRUB CONJ SERPL-MCNC: <0.2 MG/DL (ref 0–0.3)
BILIRUB INDIRECT SERPL-MCNC: ABNORMAL MG/DL
BILIRUB SERPL-MCNC: 0.4 MG/DL (ref 0–1.2)
BUN SERPL-MCNC: 26 MG/DL (ref 6–20)
BUN/CREAT SERPL: 7 (ref 7–25)
CALCIUM SPEC-SCNC: 9 MG/DL (ref 8.6–10.5)
CHLORIDE SERPL-SCNC: 101 MMOL/L (ref 98–107)
CHOLEST SERPL-MCNC: 176 MG/DL (ref 0–200)
CO2 SERPL-SCNC: 26 MMOL/L (ref 22–29)
CREAT SERPL-MCNC: 3.73 MG/DL (ref 0.76–1.27)
DEPRECATED RDW RBC AUTO: 55.3 FL (ref 37–54)
EGFRCR SERPLBLD CKD-EPI 2021: 20.9 ML/MIN/1.73
ERYTHROCYTE [DISTWIDTH] IN BLOOD BY AUTOMATED COUNT: 16.5 % (ref 12.3–15.4)
GLUCOSE SERPL-MCNC: 110 MG/DL (ref 65–99)
HBA1C MFR BLD: 4.7 % (ref 4.8–5.6)
HCT VFR BLD AUTO: 29.4 % (ref 37.5–51)
HDLC SERPL-MCNC: 23 MG/DL (ref 40–60)
HGB BLD-MCNC: 8.6 G/DL (ref 13–17.7)
LDLC SERPL CALC-MCNC: 104 MG/DL (ref 0–100)
LDLC/HDLC SERPL: 4.18 {RATIO}
MCH RBC QN AUTO: 26.8 PG (ref 26.6–33)
MCHC RBC AUTO-ENTMCNC: 29.3 G/DL (ref 31.5–35.7)
MCV RBC AUTO: 91.6 FL (ref 79–97)
PLATELET # BLD AUTO: 383 10*3/MM3 (ref 140–450)
PMV BLD AUTO: 11.1 FL (ref 6–12)
POTASSIUM SERPL-SCNC: 4 MMOL/L (ref 3.5–5.2)
PREALB SERPL-MCNC: 19.3 MG/DL (ref 20–40)
PROT SERPL-MCNC: 7 G/DL (ref 6–8.5)
RBC # BLD AUTO: 3.21 10*6/MM3 (ref 4.14–5.8)
SODIUM SERPL-SCNC: 141 MMOL/L (ref 136–145)
T3FREE SERPL-MCNC: 2.57 PG/ML (ref 2–4.4)
T4 FREE SERPL-MCNC: 1.38 NG/DL (ref 0.93–1.7)
TRIGL SERPL-MCNC: 284 MG/DL (ref 0–150)
TSH SERPL DL<=0.05 MIU/L-ACNC: 0.74 UIU/ML (ref 0.27–4.2)
VIT B12 BLD-MCNC: 1152 PG/ML (ref 211–946)
VLDLC SERPL-MCNC: 49 MG/DL (ref 5–40)
WBC NRBC COR # BLD: 9.76 10*3/MM3 (ref 3.4–10.8)

## 2022-12-26 PROCEDURE — 82306 VITAMIN D 25 HYDROXY: CPT | Performed by: NURSE PRACTITIONER

## 2022-12-26 PROCEDURE — 80076 HEPATIC FUNCTION PANEL: CPT | Performed by: NURSE PRACTITIONER

## 2022-12-26 PROCEDURE — 84443 ASSAY THYROID STIM HORMONE: CPT | Performed by: NURSE PRACTITIONER

## 2022-12-26 PROCEDURE — 83036 HEMOGLOBIN GLYCOSYLATED A1C: CPT | Performed by: NURSE PRACTITIONER

## 2022-12-26 PROCEDURE — 80048 BASIC METABOLIC PNL TOTAL CA: CPT | Performed by: NURSE PRACTITIONER

## 2022-12-26 PROCEDURE — 85027 COMPLETE CBC AUTOMATED: CPT | Performed by: NURSE PRACTITIONER

## 2022-12-26 PROCEDURE — 84134 ASSAY OF PREALBUMIN: CPT | Performed by: NURSE PRACTITIONER

## 2022-12-26 PROCEDURE — 84481 FREE ASSAY (FT-3): CPT | Performed by: NURSE PRACTITIONER

## 2022-12-26 PROCEDURE — 36415 COLL VENOUS BLD VENIPUNCTURE: CPT | Performed by: NURSE PRACTITIONER

## 2022-12-26 PROCEDURE — 82607 VITAMIN B-12: CPT | Performed by: NURSE PRACTITIONER

## 2022-12-26 PROCEDURE — 80061 LIPID PANEL: CPT | Performed by: NURSE PRACTITIONER

## 2022-12-26 PROCEDURE — 84439 ASSAY OF FREE THYROXINE: CPT | Performed by: NURSE PRACTITIONER

## 2022-12-30 ENCOUNTER — HOSPITAL ENCOUNTER (EMERGENCY)
Facility: HOSPITAL | Age: 32
Discharge: ANOTHER HOSPITAL | End: 2022-12-30
Attending: EMERGENCY MEDICINE | Admitting: EMERGENCY MEDICINE
Payer: COMMERCIAL

## 2022-12-30 ENCOUNTER — APPOINTMENT (OUTPATIENT)
Dept: CT IMAGING | Facility: HOSPITAL | Age: 32
End: 2022-12-30
Payer: COMMERCIAL

## 2022-12-30 VITALS
TEMPERATURE: 98.9 F | HEART RATE: 99 BPM | DIASTOLIC BLOOD PRESSURE: 50 MMHG | BODY MASS INDEX: 30.34 KG/M2 | HEIGHT: 75 IN | OXYGEN SATURATION: 98 % | RESPIRATION RATE: 20 BRPM | SYSTOLIC BLOOD PRESSURE: 99 MMHG | WEIGHT: 244 LBS

## 2022-12-30 DIAGNOSIS — D64.9 CHRONIC ANEMIA: ICD-10-CM

## 2022-12-30 DIAGNOSIS — N18.9 CHRONIC RENAL IMPAIRMENT, UNSPECIFIED CKD STAGE: ICD-10-CM

## 2022-12-30 DIAGNOSIS — N12 PYELONEPHRITIS: ICD-10-CM

## 2022-12-30 DIAGNOSIS — N15.1 RENAL ABSCESS: Primary | ICD-10-CM

## 2022-12-30 LAB
ALBUMIN SERPL-MCNC: 3.5 G/DL (ref 3.5–5.2)
ALBUMIN/GLOB SERPL: 0.8 G/DL
ALP SERPL-CCNC: 139 U/L (ref 39–117)
ALT SERPL W P-5'-P-CCNC: 15 U/L (ref 1–41)
ANION GAP SERPL CALCULATED.3IONS-SCNC: 11 MMOL/L (ref 5–15)
AST SERPL-CCNC: 15 U/L (ref 1–40)
BACTERIA UR QL AUTO: ABNORMAL /HPF
BASOPHILS # BLD AUTO: 0.05 10*3/MM3 (ref 0–0.2)
BASOPHILS NFR BLD AUTO: 0.6 % (ref 0–1.5)
BILIRUB SERPL-MCNC: 0.2 MG/DL (ref 0–1.2)
BILIRUB UR QL STRIP: NEGATIVE
BUN SERPL-MCNC: 35 MG/DL (ref 6–20)
BUN/CREAT SERPL: 20.8 (ref 7–25)
CALCIUM SPEC-SCNC: 9.8 MG/DL (ref 8.6–10.5)
CHLORIDE SERPL-SCNC: 106 MMOL/L (ref 98–107)
CLARITY UR: CLEAR
CO2 SERPL-SCNC: 26 MMOL/L (ref 22–29)
COLOR UR: YELLOW
CREAT SERPL-MCNC: 1.68 MG/DL (ref 0.76–1.27)
D-LACTATE SERPL-SCNC: 0.7 MMOL/L (ref 0.5–2)
DEPRECATED RDW RBC AUTO: 52.6 FL (ref 37–54)
EGFRCR SERPLBLD CKD-EPI 2021: 54.3 ML/MIN/1.73
EOSINOPHIL # BLD AUTO: 0.23 10*3/MM3 (ref 0–0.4)
EOSINOPHIL NFR BLD AUTO: 2.8 % (ref 0.3–6.2)
ERYTHROCYTE [DISTWIDTH] IN BLOOD BY AUTOMATED COUNT: 16.4 % (ref 12.3–15.4)
GLOBULIN UR ELPH-MCNC: 4.4 GM/DL
GLUCOSE SERPL-MCNC: 114 MG/DL (ref 65–99)
GLUCOSE UR STRIP-MCNC: NEGATIVE MG/DL
HCT VFR BLD AUTO: 29.8 % (ref 37.5–51)
HGB BLD-MCNC: 9 G/DL (ref 13–17.7)
HGB UR QL STRIP.AUTO: ABNORMAL
HYALINE CASTS UR QL AUTO: ABNORMAL /LPF
IMM GRANULOCYTES # BLD AUTO: 0.06 10*3/MM3 (ref 0–0.05)
IMM GRANULOCYTES NFR BLD AUTO: 0.7 % (ref 0–0.5)
KETONES UR QL STRIP: NEGATIVE
LEUKOCYTE ESTERASE UR QL STRIP.AUTO: ABNORMAL
LYMPHOCYTES # BLD AUTO: 2.15 10*3/MM3 (ref 0.7–3.1)
LYMPHOCYTES NFR BLD AUTO: 25.7 % (ref 19.6–45.3)
MCH RBC QN AUTO: 26.9 PG (ref 26.6–33)
MCHC RBC AUTO-ENTMCNC: 30.2 G/DL (ref 31.5–35.7)
MCV RBC AUTO: 89 FL (ref 79–97)
MONOCYTES # BLD AUTO: 0.78 10*3/MM3 (ref 0.1–0.9)
MONOCYTES NFR BLD AUTO: 9.3 % (ref 5–12)
NEUTROPHILS NFR BLD AUTO: 5.08 10*3/MM3 (ref 1.7–7)
NEUTROPHILS NFR BLD AUTO: 60.9 % (ref 42.7–76)
NITRITE UR QL STRIP: NEGATIVE
NRBC BLD AUTO-RTO: 0 /100 WBC (ref 0–0.2)
PH UR STRIP.AUTO: 6 [PH] (ref 5–8)
PLATELET # BLD AUTO: 314 10*3/MM3 (ref 140–450)
PMV BLD AUTO: 10.8 FL (ref 6–12)
POTASSIUM SERPL-SCNC: 4.4 MMOL/L (ref 3.5–5.2)
PROT SERPL-MCNC: 7.9 G/DL (ref 6–8.5)
PROT UR QL STRIP: ABNORMAL
RBC # BLD AUTO: 3.35 10*6/MM3 (ref 4.14–5.8)
RBC # UR STRIP: ABNORMAL /HPF
REF LAB TEST METHOD: ABNORMAL
SODIUM SERPL-SCNC: 143 MMOL/L (ref 136–145)
SP GR UR STRIP: 1.01 (ref 1–1.03)
SQUAMOUS #/AREA URNS HPF: ABNORMAL /HPF
UROBILINOGEN UR QL STRIP: ABNORMAL
WBC # UR STRIP: ABNORMAL /HPF
WBC NRBC COR # BLD: 8.35 10*3/MM3 (ref 3.4–10.8)

## 2022-12-30 PROCEDURE — 80053 COMPREHEN METABOLIC PANEL: CPT | Performed by: EMERGENCY MEDICINE

## 2022-12-30 PROCEDURE — 99284 EMERGENCY DEPT VISIT MOD MDM: CPT

## 2022-12-30 PROCEDURE — 25010000002 LEVOFLOXACIN PER 250 MG: Performed by: EMERGENCY MEDICINE

## 2022-12-30 PROCEDURE — 25010000002 MORPHINE PER 10 MG: Performed by: EMERGENCY MEDICINE

## 2022-12-30 PROCEDURE — 81001 URINALYSIS AUTO W/SCOPE: CPT | Performed by: EMERGENCY MEDICINE

## 2022-12-30 PROCEDURE — 74176 CT ABD & PELVIS W/O CONTRAST: CPT

## 2022-12-30 PROCEDURE — 96365 THER/PROPH/DIAG IV INF INIT: CPT

## 2022-12-30 PROCEDURE — 83605 ASSAY OF LACTIC ACID: CPT | Performed by: EMERGENCY MEDICINE

## 2022-12-30 PROCEDURE — 96375 TX/PRO/DX INJ NEW DRUG ADDON: CPT

## 2022-12-30 PROCEDURE — 87040 BLOOD CULTURE FOR BACTERIA: CPT | Performed by: EMERGENCY MEDICINE

## 2022-12-30 PROCEDURE — 85025 COMPLETE CBC W/AUTO DIFF WBC: CPT | Performed by: EMERGENCY MEDICINE

## 2022-12-30 PROCEDURE — 36415 COLL VENOUS BLD VENIPUNCTURE: CPT

## 2022-12-30 RX ORDER — SODIUM CHLORIDE 0.9 % (FLUSH) 0.9 %
10 SYRINGE (ML) INJECTION AS NEEDED
Status: DISCONTINUED | OUTPATIENT
Start: 2022-12-30 | End: 2022-12-30 | Stop reason: HOSPADM

## 2022-12-30 RX ORDER — LEVOFLOXACIN 5 MG/ML
750 INJECTION, SOLUTION INTRAVENOUS ONCE
Status: COMPLETED | OUTPATIENT
Start: 2022-12-30 | End: 2022-12-30

## 2022-12-30 RX ADMIN — MORPHINE SULFATE 4 MG: 4 INJECTION, SOLUTION INTRAMUSCULAR; INTRAVENOUS at 16:13

## 2022-12-30 RX ADMIN — LEVOFLOXACIN 750 MG: 750 INJECTION, SOLUTION INTRAVENOUS at 18:52

## 2022-12-30 NOTE — ED PROVIDER NOTES
Subjective   History of Present Illness  Patient is a 32-year-old male with a history of a CVA who presents to the ER with leaking from his nephrostomy tube.  Patient had a left nephrostomy tube placed at [facility] on October 12 of this year.  It was scheduled to be removed on January 9.  Patient lives at [facility].  They noticed some leaking from the nephrostomy tube today and sent the patient here.  He has had no fevers, chest pain, shortness of air, abdominal pain, nausea vomiting diarrhea, neurologic changes.        Review of Systems   Constitutional: Negative.    HENT: Negative.    Eyes: Negative.    Respiratory: Negative.    Cardiovascular: Negative.    Gastrointestinal: Negative.    Endocrine: Negative.    Genitourinary:        Nephrostomy tube leaking   Musculoskeletal: Negative.    Skin: Negative.    Allergic/Immunologic: Negative.    Neurological: Negative.    Hematological: Negative.    Psychiatric/Behavioral: Negative.    All other systems reviewed and are negative.      Past Medical History:   Diagnosis Date   • CVA (cerebral vascular accident) ([facility])    • Depression    • Hemiparesis ([facility])    • Hydrocephalus ([facility])    • Kidney stones    • Neurogenic bladder    • Neurogenic bowel    • Seizure ([facility])    • Sinusitis    • Sleep apnea    • Subdural hematoma    • TBI (traumatic brain injury)    • UTI (urinary tract infection)        Allergies   Allergen Reactions   • Dilantin [Phenytoin]    • Latex    • Penicillins    • Sulfa Antibiotics        Past Surgical History:   Procedure Laterality Date   • BACLOFEN PUMP IMPLANTATION     • CHEST TUBE INSERTION      REMOVED   • CRANIOTOMY      FOR EVACUATION OF SUBDURAL HEMATOMA & VENTRICULOSTOMY   • HAMSTRING RELEASE     • KIDNEY STONE SURGERY     • PEG TUBE INSERTION      REMOVED   • TRACHEOSTOMY      REMOVED   •  SHUNT INSERTION         Family History   Problem Relation Age of Onset   • No Known Problems Mother    • No Known Problems Father         Social History     Socioeconomic History   • Marital status: Single   Tobacco Use   • Smoking status: Never   • Smokeless tobacco: Never   Vaping Use   • Vaping Use: Never used   Substance and Sexual Activity   • Alcohol use: No   • Drug use: No   • Sexual activity: Defer           Objective   Physical Exam  Vitals and nursing note reviewed.   Constitutional:       Appearance: He is well-developed.   HENT:      Head: Normocephalic and atraumatic.   Eyes:      Conjunctiva/sclera: Conjunctivae normal.      Pupils: Pupils are equal, round, and reactive to light.   Cardiovascular:      Rate and Rhythm: Normal rate and regular rhythm.      Heart sounds: Normal heart sounds.   Pulmonary:      Effort: Pulmonary effort is normal.      Breath sounds: Normal breath sounds.   Abdominal:      Palpations: Abdomen is soft.      Tenderness: There is no abdominal tenderness.      Comments: Nephrostomy tube in the left flank with no obvious drainage   Musculoskeletal:         General: No deformity. Normal range of motion.      Cervical back: Normal range of motion.   Skin:     General: Skin is warm.   Neurological:      Mental Status: He is alert and oriented to person, place, and time.      Comments: Left hemiplegia which is chronic for the patient   Psychiatric:         Behavior: Behavior normal.         Procedures           ED Course      Lab Results (last 24 hours)     Procedure Component Value Units Date/Time    CBC & Differential [[id number]]  (Abnormal) Collected: 12/30/22 1230    Specimen: Blood Updated: 12/30/22 1233    Narrative:      The following orders were created for panel order CBC & Differential.  Procedure                               Abnormality         Status                     ---------                               -----------         ------                     CBC Auto Differential[[id number]]        Abnormal            Final result                 Please view results for these tests on the individual  orders.    Comprehensive Metabolic Panel [[id number]]  (Abnormal) Collected: 12/30/22 1230    Specimen: Blood Updated: 12/30/22 1253     Glucose 114 mg/dL      BUN 35 mg/dL      Creatinine 1.68 mg/dL      Sodium 143 mmol/L      Potassium 4.4 mmol/L      Chloride 106 mmol/L      CO2 26.0 mmol/L      Calcium 9.8 mg/dL      Total Protein 7.9 g/dL      Albumin 3.5 g/dL      ALT (SGPT) 15 U/L      AST (SGOT) 15 U/L      Alkaline Phosphatase 139 U/L      Total Bilirubin 0.2 mg/dL      Globulin 4.4 gm/dL      A/G Ratio 0.8 g/dL      BUN/Creatinine Ratio 20.8     Anion Gap 11.0 mmol/L      eGFR 54.3 mL/min/1.73      Comment: National Kidney Foundation and American Society of Nephrology (ASN) Task Force recommended calculation based on the Chronic Kidney Disease Epidemiology Collaboration (CKD-EPI) equation refit without adjustment for race.       Narrative:      GFR Normal >60  Chronic Kidney Disease <60  Kidney Failure <15      CBC Auto Differential [[id number]]  (Abnormal) Collected: 12/30/22 1230    Specimen: Blood Updated: 12/30/22 1233     WBC 8.35 10*3/mm3      RBC 3.35 10*6/mm3      Hemoglobin 9.0 g/dL      Hematocrit 29.8 %      MCV 89.0 fL      MCH 26.9 pg      MCHC 30.2 g/dL      RDW 16.4 %      RDW-SD 52.6 fl      MPV 10.8 fL      Platelets 314 10*3/mm3      Neutrophil % 60.9 %      Lymphocyte % 25.7 %      Monocyte % 9.3 %      Eosinophil % 2.8 %      Basophil % 0.6 %      Immature Grans % 0.7 %      Neutrophils, Absolute 5.08 10*3/mm3      Lymphocytes, Absolute 2.15 10*3/mm3      Monocytes, Absolute 0.78 10*3/mm3      Eosinophils, Absolute 0.23 10*3/mm3      Basophils, Absolute 0.05 10*3/mm3      Immature Grans, Absolute 0.06 10*3/mm3      nRBC 0.0 /100 WBC     Urinalysis With Culture If Indicated - Urine, Clean Catch [[id number]]  (Abnormal) Collected: 12/30/22 1539    Specimen: Urine, Clean Catch Updated: 12/30/22 1552     Color, UA Yellow     Appearance, UA Clear     pH, UA 6.0     Specific G[place], UA 1.013      Glucose, UA Negative     Ketones, UA Negative     Bilirubin, UA Negative     Blood, UA Large (3+)     Protein,  mg/dL (2+)     Leuk Esterase, UA Moderate (2+)     Nitrite, UA Negative     Urobilinogen, UA 0.2 E.U./dL    Narrative:      In absence of clinical symptoms, the presence of pyuria, bacteria, and/or nitrites on the urinalysis result does not correlate with infection.    Urinalysis, Microscopic Only - Urine, Clean Catch [[id number]]  (Abnormal) Collected: 12/30/22 1539    Specimen: Urine, Clean Catch Updated: 12/30/22 1611     RBC, UA 6-12 /HPF      WBC, UA 3-5 /HPF      Comment: Urine culture not indicated.        Bacteria, UA Trace /HPF      Squamous Epithelial Cells, UA 7-12 /HPF      Hyaline Casts, UA 0-2 /LPF      Methodology Manual Light Microscopy        CT Abdomen Pelvis Stone Protocol   Final Result   1. The changes in the upper pole of the right kidney may represent an   abscess?. This may to be further evaluated with contrast enhanced CT   scan of the abdomen if there are no contraindications. Right-sided   ureteral stent in place. Nonobstructing right renal calculi.   2. Moderate diffuse enlargement of the right renal contour with   perinephric fat infiltration may suggest acute or subacute   pyelonephritis and pyeloureteritis.   3. Left percutaneous nephrostomy tube in place. At least 2   nonobstructing left renal calculi. Incompletely visualized and evaluated   several low-density nodules in the left kidney.   4. Moderate splenomegaly.                               This report was finalized on 12/30/2022 12:55 by Dr. Anat Vera MD.           Labs showed an elevated BUN and creatinine consistent with patient's known chronic renal insufficiency.  Labs also showed chronic anemia, UTI and hematuria.  CT scan of the abdomen and pelvis showed changes in the upper pole the right kidney concerning for an abscess.  Patient also has moderate diffuse enlargement of the right renal contour with  perinephric fat infiltration consistent with acute pyelonephritis and pyeloureteritis.  Left percutaneous nephrostomy tube is in place and patient also had moderate splenomegaly.  Dr. Reyes with urology at [facility] was consulted.  His partner Dr. Kidd said they would accept the patient but wanted the hospitalist to admit the patient.  Ciera Alfonso nurse practitioner with the hospitalist service accepted the patient for transfer and admission.  We are still awaiting a bed at this time.  Patient was given Levaquin and cultures have been obtained.  He does have an allergy to penicillins and sulfa.  Patient ready for transfer.                                [facility]    Final diagnoses:   Renal abscess   Pyelonephritis   Chronic renal impairment, unspecified CKD stage   Chronic anemia       ED Disposition  ED Disposition     ED Disposition   Transfer to Another Facility     Condition   --    Comment   --             No follow-up provider specified.       Medication List      No changes were made to your prescriptions during this visit.          Maritza Martinez MD  12/30/22 2951

## 2023-01-04 LAB
BACTERIA SPEC AEROBE CULT: NORMAL
BACTERIA SPEC AEROBE CULT: NORMAL

## 2023-01-10 ENCOUNTER — LAB REQUISITION (OUTPATIENT)
Dept: LAB | Facility: HOSPITAL | Age: 33
End: 2023-01-10
Payer: COMMERCIAL

## 2023-01-10 DIAGNOSIS — Z00.00 ENCOUNTER FOR GENERAL ADULT MEDICAL EXAMINATION WITHOUT ABNORMAL FINDINGS: ICD-10-CM

## 2023-01-10 LAB
25(OH)D3 SERPL-MCNC: 40.8 NG/ML (ref 30–100)
ALBUMIN SERPL-MCNC: 3.6 G/DL (ref 3.5–5.2)
ALBUMIN SERPL-MCNC: 3.6 G/DL (ref 3.5–5.2)
ALBUMIN/GLOB SERPL: 1.1 G/DL
ALP SERPL-CCNC: 134 U/L (ref 39–117)
ALP SERPL-CCNC: 134 U/L (ref 39–117)
ALT SERPL W P-5'-P-CCNC: 19 U/L (ref 1–41)
ALT SERPL W P-5'-P-CCNC: 19 U/L (ref 1–41)
ANION GAP SERPL CALCULATED.3IONS-SCNC: 14 MMOL/L (ref 5–15)
AST SERPL-CCNC: 12 U/L (ref 1–40)
AST SERPL-CCNC: 12 U/L (ref 1–40)
BASOPHILS # BLD AUTO: 0.05 10*3/MM3 (ref 0–0.2)
BASOPHILS NFR BLD AUTO: 0.7 % (ref 0–1.5)
BILIRUB CONJ SERPL-MCNC: <0.2 MG/DL (ref 0–0.3)
BILIRUB CONJ SERPL-MCNC: <0.2 MG/DL (ref 0–0.3)
BILIRUB INDIRECT SERPL-MCNC: ABNORMAL MG/DL
BILIRUB SERPL-MCNC: 0.3 MG/DL (ref 0–1.2)
BILIRUB SERPL-MCNC: 0.3 MG/DL (ref 0–1.2)
BUN SERPL-MCNC: 17 MG/DL (ref 6–20)
BUN/CREAT SERPL: 19.5 (ref 7–25)
CALCIUM SPEC-SCNC: 9 MG/DL (ref 8.6–10.5)
CHLORIDE SERPL-SCNC: 99 MMOL/L (ref 98–107)
CHOLEST SERPL-MCNC: 178 MG/DL (ref 0–200)
CO2 SERPL-SCNC: 26 MMOL/L (ref 22–29)
CREAT SERPL-MCNC: 0.87 MG/DL (ref 0.76–1.27)
DEPRECATED RDW RBC AUTO: 56.5 FL (ref 37–54)
EGFRCR SERPLBLD CKD-EPI 2021: 116.1 ML/MIN/1.73
EOSINOPHIL # BLD AUTO: 0.26 10*3/MM3 (ref 0–0.4)
EOSINOPHIL NFR BLD AUTO: 3.6 % (ref 0.3–6.2)
ERYTHROCYTE [DISTWIDTH] IN BLOOD BY AUTOMATED COUNT: 17.6 % (ref 12.3–15.4)
GLOBULIN UR ELPH-MCNC: 3.3 GM/DL
GLUCOSE SERPL-MCNC: 104 MG/DL (ref 65–99)
HBA1C MFR BLD: 5.3 % (ref 4.8–5.6)
HCT VFR BLD AUTO: 28.4 % (ref 37.5–51)
HDLC SERPL-MCNC: 26 MG/DL (ref 40–60)
HGB BLD-MCNC: 8.7 G/DL (ref 13–17.7)
IMM GRANULOCYTES # BLD AUTO: 0.03 10*3/MM3 (ref 0–0.05)
IMM GRANULOCYTES NFR BLD AUTO: 0.4 % (ref 0–0.5)
LDLC SERPL CALC-MCNC: 106 MG/DL (ref 0–100)
LDLC/HDLC SERPL: 3.79 {RATIO}
LYMPHOCYTES # BLD AUTO: 1.52 10*3/MM3 (ref 0.7–3.1)
LYMPHOCYTES NFR BLD AUTO: 21.2 % (ref 19.6–45.3)
MCH RBC QN AUTO: 26.9 PG (ref 26.6–33)
MCHC RBC AUTO-ENTMCNC: 30.6 G/DL (ref 31.5–35.7)
MCV RBC AUTO: 87.9 FL (ref 79–97)
MONOCYTES # BLD AUTO: 0.7 10*3/MM3 (ref 0.1–0.9)
MONOCYTES NFR BLD AUTO: 9.8 % (ref 5–12)
NEUTROPHILS NFR BLD AUTO: 4.61 10*3/MM3 (ref 1.7–7)
NEUTROPHILS NFR BLD AUTO: 64.3 % (ref 42.7–76)
NRBC BLD AUTO-RTO: 0 /100 WBC (ref 0–0.2)
PLATELET # BLD AUTO: 239 10*3/MM3 (ref 140–450)
PMV BLD AUTO: 11.2 FL (ref 6–12)
POTASSIUM SERPL-SCNC: 3.5 MMOL/L (ref 3.5–5.2)
PREALB SERPL-MCNC: 23.7 MG/DL (ref 20–40)
PROT SERPL-MCNC: 6.9 G/DL (ref 6–8.5)
PROT SERPL-MCNC: 6.9 G/DL (ref 6–8.5)
RBC # BLD AUTO: 3.23 10*6/MM3 (ref 4.14–5.8)
SODIUM SERPL-SCNC: 139 MMOL/L (ref 136–145)
T3FREE SERPL-MCNC: 3 PG/ML (ref 2–4.4)
TRIGL SERPL-MCNC: 267 MG/DL (ref 0–150)
TSH SERPL DL<=0.05 MIU/L-ACNC: 1.25 UIU/ML (ref 0.27–4.2)
VIT B12 BLD-MCNC: 801 PG/ML (ref 211–946)
VLDLC SERPL-MCNC: 46 MG/DL (ref 5–40)
WBC NRBC COR # BLD: 7.17 10*3/MM3 (ref 3.4–10.8)

## 2023-01-10 PROCEDURE — 84481 FREE ASSAY (FT-3): CPT

## 2023-01-10 PROCEDURE — 84443 ASSAY THYROID STIM HORMONE: CPT

## 2023-01-10 PROCEDURE — 82607 VITAMIN B-12: CPT

## 2023-01-10 PROCEDURE — 82248 BILIRUBIN DIRECT: CPT

## 2023-01-10 PROCEDURE — 85025 COMPLETE CBC W/AUTO DIFF WBC: CPT

## 2023-01-10 PROCEDURE — 83036 HEMOGLOBIN GLYCOSYLATED A1C: CPT

## 2023-01-10 PROCEDURE — 36415 COLL VENOUS BLD VENIPUNCTURE: CPT

## 2023-01-10 PROCEDURE — 84134 ASSAY OF PREALBUMIN: CPT

## 2023-01-10 PROCEDURE — 80053 COMPREHEN METABOLIC PANEL: CPT

## 2023-01-10 PROCEDURE — 80076 HEPATIC FUNCTION PANEL: CPT

## 2023-01-10 PROCEDURE — 82306 VITAMIN D 25 HYDROXY: CPT

## 2023-01-10 PROCEDURE — 80061 LIPID PANEL: CPT

## 2023-01-10 PROCEDURE — 80050 GENERAL HEALTH PANEL: CPT

## 2023-01-15 ENCOUNTER — HOSPITAL ENCOUNTER (EMERGENCY)
Facility: HOSPITAL | Age: 33
Discharge: HOME | End: 2023-01-15
Attending: FAMILY MEDICINE | Admitting: FAMILY MEDICINE
Payer: COMMERCIAL

## 2023-01-15 VITALS
OXYGEN SATURATION: 97 % | SYSTOLIC BLOOD PRESSURE: 101 MMHG | WEIGHT: 216.9 LBS | TEMPERATURE: 98 F | HEART RATE: 109 BPM | HEIGHT: 78 IN | RESPIRATION RATE: 18 BRPM | DIASTOLIC BLOOD PRESSURE: 73 MMHG | BODY MASS INDEX: 25.09 KG/M2

## 2023-01-15 DIAGNOSIS — N17.9 ACUTE KIDNEY INJURY: Primary | ICD-10-CM

## 2023-01-15 DIAGNOSIS — L89.40 PRESSURE INJURY OF SKIN OF CONTIGUOUS REGION INVOLVING BACK AND BUTTOCK, UNSPECIFIED INJURY STAGE, UNSPECIFIED LATERALITY: ICD-10-CM

## 2023-01-15 DIAGNOSIS — I95.9 HYPOTENSION, UNSPECIFIED HYPOTENSION TYPE: ICD-10-CM

## 2023-01-15 LAB
ALBUMIN SERPL-MCNC: 3.3 G/DL (ref 3.5–5.2)
ALBUMIN/GLOB SERPL: 0.9 G/DL
ALP SERPL-CCNC: 130 U/L (ref 39–117)
ALT SERPL W P-5'-P-CCNC: 14 U/L (ref 1–41)
ANION GAP SERPL CALCULATED.3IONS-SCNC: 12 MMOL/L (ref 5–15)
AST SERPL-CCNC: 16 U/L (ref 1–40)
BASOPHILS # BLD AUTO: 0.04 10*3/MM3 (ref 0–0.2)
BASOPHILS NFR BLD AUTO: 0.5 % (ref 0–1.5)
BILIRUB SERPL-MCNC: 0.5 MG/DL (ref 0–1.2)
BUN SERPL-MCNC: 35 MG/DL (ref 6–20)
BUN/CREAT SERPL: 16.7 (ref 7–25)
CALCIUM SPEC-SCNC: 8.9 MG/DL (ref 8.6–10.5)
CHLORIDE SERPL-SCNC: 101 MMOL/L (ref 98–107)
CK SERPL-CCNC: 37 U/L (ref 20–200)
CO2 SERPL-SCNC: 22 MMOL/L (ref 22–29)
CREAT SERPL-MCNC: 2.1 MG/DL (ref 0.76–1.27)
CRP SERPL-MCNC: 11.76 MG/DL (ref 0–0.5)
D-LACTATE SERPL-SCNC: 1.8 MMOL/L (ref 0.5–2)
DEPRECATED RDW RBC AUTO: 56.2 FL (ref 37–54)
EGFRCR SERPLBLD CKD-EPI 2021: 41.6 ML/MIN/1.73
EOSINOPHIL # BLD AUTO: 0.32 10*3/MM3 (ref 0–0.4)
EOSINOPHIL NFR BLD AUTO: 4 % (ref 0.3–6.2)
ERYTHROCYTE [DISTWIDTH] IN BLOOD BY AUTOMATED COUNT: 17.2 % (ref 12.3–15.4)
ERYTHROCYTE [SEDIMENTATION RATE] IN BLOOD: 39 MM/HR (ref 0–15)
FLUAV RNA RESP QL NAA+PROBE: NOT DETECTED
FLUBV RNA RESP QL NAA+PROBE: NOT DETECTED
GLOBULIN UR ELPH-MCNC: 3.7 GM/DL
GLUCOSE SERPL-MCNC: 129 MG/DL (ref 65–99)
HCT VFR BLD AUTO: 25.5 % (ref 37.5–51)
HGB BLD-MCNC: 7.8 G/DL (ref 13–17.7)
IMM GRANULOCYTES # BLD AUTO: 0.02 10*3/MM3 (ref 0–0.05)
IMM GRANULOCYTES NFR BLD AUTO: 0.3 % (ref 0–0.5)
LYMPHOCYTES # BLD AUTO: 1.84 10*3/MM3 (ref 0.7–3.1)
LYMPHOCYTES NFR BLD AUTO: 23.3 % (ref 19.6–45.3)
MAGNESIUM SERPL-MCNC: 1.8 MG/DL (ref 1.6–2.6)
MCH RBC QN AUTO: 27 PG (ref 26.6–33)
MCHC RBC AUTO-ENTMCNC: 30.6 G/DL (ref 31.5–35.7)
MCV RBC AUTO: 88.2 FL (ref 79–97)
MONOCYTES # BLD AUTO: 0.81 10*3/MM3 (ref 0.1–0.9)
MONOCYTES NFR BLD AUTO: 10.2 % (ref 5–12)
NEUTROPHILS NFR BLD AUTO: 4.88 10*3/MM3 (ref 1.7–7)
NEUTROPHILS NFR BLD AUTO: 61.7 % (ref 42.7–76)
NRBC BLD AUTO-RTO: 0 /100 WBC (ref 0–0.2)
PLATELET # BLD AUTO: 257 10*3/MM3 (ref 140–450)
PMV BLD AUTO: 11.6 FL (ref 6–12)
POTASSIUM SERPL-SCNC: 4.4 MMOL/L (ref 3.5–5.2)
PROCALCITONIN SERPL-MCNC: 0.2 NG/ML (ref 0–0.25)
PROT SERPL-MCNC: 7 G/DL (ref 6–8.5)
RBC # BLD AUTO: 2.89 10*6/MM3 (ref 4.14–5.8)
SARS-COV-2 RNA RESP QL NAA+PROBE: NOT DETECTED
SODIUM SERPL-SCNC: 135 MMOL/L (ref 136–145)
WBC NRBC COR # BLD: 7.91 10*3/MM3 (ref 3.4–10.8)

## 2023-01-15 PROCEDURE — 80053 COMPREHEN METABOLIC PANEL: CPT | Performed by: FAMILY MEDICINE

## 2023-01-15 PROCEDURE — 85025 COMPLETE CBC W/AUTO DIFF WBC: CPT | Performed by: FAMILY MEDICINE

## 2023-01-15 PROCEDURE — 87040 BLOOD CULTURE FOR BACTERIA: CPT | Performed by: FAMILY MEDICINE

## 2023-01-15 PROCEDURE — 87147 CULTURE TYPE IMMUNOLOGIC: CPT | Performed by: FAMILY MEDICINE

## 2023-01-15 PROCEDURE — 86140 C-REACTIVE PROTEIN: CPT | Performed by: FAMILY MEDICINE

## 2023-01-15 PROCEDURE — 83735 ASSAY OF MAGNESIUM: CPT | Performed by: FAMILY MEDICINE

## 2023-01-15 PROCEDURE — 87205 SMEAR GRAM STAIN: CPT | Performed by: FAMILY MEDICINE

## 2023-01-15 PROCEDURE — 85652 RBC SED RATE AUTOMATED: CPT | Performed by: FAMILY MEDICINE

## 2023-01-15 PROCEDURE — 36415 COLL VENOUS BLD VENIPUNCTURE: CPT

## 2023-01-15 PROCEDURE — 99284 EMERGENCY DEPT VISIT MOD MDM: CPT

## 2023-01-15 PROCEDURE — 87186 SC STD MICRODIL/AGAR DIL: CPT | Performed by: FAMILY MEDICINE

## 2023-01-15 PROCEDURE — 87077 CULTURE AEROBIC IDENTIFY: CPT | Performed by: FAMILY MEDICINE

## 2023-01-15 PROCEDURE — 87636 SARSCOV2 & INF A&B AMP PRB: CPT | Performed by: FAMILY MEDICINE

## 2023-01-15 PROCEDURE — 82550 ASSAY OF CK (CPK): CPT | Performed by: FAMILY MEDICINE

## 2023-01-15 PROCEDURE — 87070 CULTURE OTHR SPECIMN AEROBIC: CPT | Performed by: FAMILY MEDICINE

## 2023-01-15 PROCEDURE — 87150 DNA/RNA AMPLIFIED PROBE: CPT | Performed by: FAMILY MEDICINE

## 2023-01-15 PROCEDURE — C9803 HOPD COVID-19 SPEC COLLECT: HCPCS

## 2023-01-15 PROCEDURE — 83605 ASSAY OF LACTIC ACID: CPT | Performed by: FAMILY MEDICINE

## 2023-01-15 PROCEDURE — 84145 PROCALCITONIN (PCT): CPT | Performed by: FAMILY MEDICINE

## 2023-01-15 RX ORDER — SODIUM CHLORIDE 0.9 % (FLUSH) 0.9 %
10 SYRINGE (ML) INJECTION AS NEEDED
Status: DISCONTINUED | OUTPATIENT
Start: 2023-01-15 | End: 2023-01-15 | Stop reason: HOSPADM

## 2023-01-15 RX ADMIN — SODIUM CHLORIDE, POTASSIUM CHLORIDE, SODIUM LACTATE AND CALCIUM CHLORIDE 1000 ML: 600; 310; 30; 20 INJECTION, SOLUTION INTRAVENOUS at 15:55

## 2023-01-15 RX ADMIN — SODIUM CHLORIDE, POTASSIUM CHLORIDE, SODIUM LACTATE AND CALCIUM CHLORIDE 1000 ML: 600; 310; 30; 20 INJECTION, SOLUTION INTRAVENOUS at 13:08

## 2023-01-15 NOTE — ED PROVIDER NOTES
Subjective   History of Present Illness  Patient presents emergency room today with hypotension.  Patient's hypotension began today.  Patient does have a history of a decubitus ulcer.  Patient's mother is present bedside who is the primary historian.  Patient's mother states that the bed ulcer has been worsening over the last few days.  Patient has been getting wound care on that.  Patient has not been on any antibiotics for that.  Patient has not had any fevers.  Patient also recently did have a renal abscess where he was transferred to [facility].  Patient was seen by his urologist and a nephrostomy tube was placed.  Patient is scheduled to get that removed on Tuesday.        Review of Systems   All other systems reviewed and are negative.      Past Medical History:   Diagnosis Date   • CVA (cerebral vascular accident) ([facility])    • Depression    • Hemiparesis ([facility])    • Hydrocephalus ([facility])    • Kidney stones    • Neurogenic bladder    • Neurogenic bowel    • Seizure ([facility])    • Sinusitis    • Sleep apnea    • Subdural hematoma    • TBI (traumatic brain injury)    • UTI (urinary tract infection)        Allergies   Allergen Reactions   • Dilantin [Phenytoin]    • Latex    • Penicillins    • Sulfa Antibiotics        Past Surgical History:   Procedure Laterality Date   • BACLOFEN PUMP IMPLANTATION     • CHEST TUBE INSERTION      REMOVED   • CRANIOTOMY      FOR EVACUATION OF SUBDURAL HEMATOMA & VENTRICULOSTOMY   • HAMSTRING RELEASE     • KIDNEY STONE SURGERY     • PEG TUBE INSERTION      REMOVED   • TRACHEOSTOMY      REMOVED   •  SHUNT INSERTION         Family History   Problem Relation Age of Onset   • No Known Problems Mother    • No Known Problems Father        Social History     Socioeconomic History   • Marital status: Single   Tobacco Use   • Smoking status: Never   • Smokeless tobacco: Never   Vaping Use   • Vaping Use: Never used   Substance and Sexual Activity   • Alcohol use: No   • Drug use: No   • Sexual  activity: Defer           Objective   Physical Exam  Vitals and nursing note reviewed.   Constitutional:       Appearance: Normal appearance.   HENT:      Head: Normocephalic and atraumatic.      Mouth/Throat:      Mouth: Mucous membranes are moist.   Eyes:      Extraocular Movements: Extraocular movements intact.      Pupils: Pupils are equal, round, and reactive to light.   Cardiovascular:      Rate and Rhythm: Normal rate and regular rhythm.      Heart sounds: Normal heart sounds.   Pulmonary:      Effort: Pulmonary effort is normal.      Breath sounds: Normal breath sounds.   Abdominal:      General: Bowel sounds are normal.      Palpations: Abdomen is soft.      Tenderness: There is no abdominal tenderness.   Skin:     Comments: Decubitus ulcer on the buttocks region with purulent drainage.   Neurological:      General: No focal deficit present.      Mental Status: He is alert and oriented to person, place, and time.   Psychiatric:         Mood and Affect: Mood normal.         Behavior: Behavior normal.         Procedures           ED Course                                           Lab Results (last 24 hours)     Procedure Component Value Units Date/Time    CBC & Differential [[id number]]  (Abnormal) Collected: 01/15/23 1153    Specimen: Blood Updated: 01/15/23 1208    Narrative:      The following orders were created for panel order CBC & Differential.  Procedure                               Abnormality         Status                     ---------                               -----------         ------                     CBC Auto Differential[[id number]]        Abnormal            Final result                 Please view results for these tests on the individual orders.    Comprehensive Metabolic Panel [[id number]]  (Abnormal) Collected: 01/15/23 1153    Specimen: Blood Updated: 01/15/23 1300     Glucose 129 mg/dL      BUN 35 mg/dL      Creatinine 2.10 mg/dL      Sodium 135 mmol/L      Potassium 4.4 mmol/L   "    Comment: Slight hemolysis detected by analyzer. Results may be affected.        Chloride 101 mmol/L      CO2 22.0 mmol/L      Calcium 8.9 mg/dL      Total Protein 7.0 g/dL      Albumin 3.3 g/dL      ALT (SGPT) 14 U/L      AST (SGOT) 16 U/L      Comment: Slight hemolysis detected by analyzer. Results may be affected.        Alkaline Phosphatase 130 U/L      Total Bilirubin 0.5 mg/dL      Globulin 3.7 gm/dL      A/G Ratio 0.9 g/dL      BUN/Creatinine Ratio 16.7     Anion Gap 12.0 mmol/L      eGFR 41.6 mL/min/1.73      Comment: National Kidney Foundation and American Society of Nephrology (ASN) Task Force recommended calculation based on the Chronic Kidney Disease Epidemiology Collaboration (CKD-EPI) equation refit without adjustment for race.       Narrative:      GFR Normal >60  Chronic Kidney Disease <60  Kidney Failure <15      Blood Culture - Blood, Arm, Right [[id number]] Collected: 01/15/23 1153    Specimen: Blood from Arm, Right Updated: 01/15/23 1206    Lactic Acid, Plasma [[id number]]  (Normal) Collected: 01/15/23 1153    Specimen: Blood Updated: 01/15/23 1256     Lactate 1.8 mmol/L     Procalcitonin [[id number]]  (Normal) Collected: 01/15/23 1153    Specimen: Blood Updated: 01/15/23 1307     Procalcitonin 0.20 ng/mL     Narrative:      As a Marker for Sepsis (Non-Neonates):    1. <0.5 ng/mL represents a low risk of severe sepsis and/or septic shock.  2. >2 ng/mL represents a high risk of severe sepsis and/or septic shock.    As a Marker for Lower Respiratory Tract Infections that require antibiotic therapy:    PCT on Admission    Antibiotic Therapy       6-12 Hrs later    >0.5                Strongly Recommended  >0.25 - <0.5        Recommended   0.1 - 0.25          Discouraged              Remeasure/reassess PCT  <0.1                Strongly Discouraged     Remeasure/reassess PCT    As 28 day mortality risk marker: \"Change in Procalcitonin Result\" (>80% or <=80%) if Day 0 (or Day 1) and Day 4 values are " available. Refer to http://www.[facility]-pct-calculator.com    Change in PCT <=80%  A decrease of PCT levels below or equal to 80% defines a positive change in PCT test result representing a higher risk for 28-day all-cause mortality of patients diagnosed with severe sepsis for septic shock.    Change in PCT >80%  A decrease of PCT levels of more than 80% defines a negative change in PCT result representing a lower risk for 28-day all-cause mortality of patients diagnosed with severe sepsis or septic shock.       Sedimentation Rate [[id number]]  (Abnormal) Collected: 01/15/23 1153    Specimen: Blood Updated: 01/15/23 1238     Sed Rate 39 mm/hr     C-reactive Protein [[id number]]  (Abnormal) Collected: 01/15/23 1153    Specimen: Blood Updated: 01/15/23 1300     C-Reactive Protein 11.76 mg/dL     CK [[id number]]  (Normal) Collected: 01/15/23 1153    Specimen: Blood Updated: 01/15/23 1300     Creatine Kinase 37 U/L     Magnesium [[id number]]  (Normal) Collected: 01/15/23 1153    Specimen: Blood Updated: 01/15/23 1256     Magnesium 1.8 mg/dL     CBC Auto Differential [[id number]]  (Abnormal) Collected: 01/15/23 1153    Specimen: Blood Updated: 01/15/23 1208     WBC 7.91 10*3/mm3      RBC 2.89 10*6/mm3      Hemoglobin 7.8 g/dL      Hematocrit 25.5 %      MCV 88.2 fL      MCH 27.0 pg      MCHC 30.6 g/dL      RDW 17.2 %      RDW-SD 56.2 fl      MPV 11.6 fL      Platelets 257 10*3/mm3      Neutrophil % 61.7 %      Lymphocyte % 23.3 %      Monocyte % 10.2 %      Eosinophil % 4.0 %      Basophil % 0.5 %      Immature Grans % 0.3 %      Neutrophils, Absolute 4.88 10*3/mm3      Lymphocytes, Absolute 1.84 10*3/mm3      Monocytes, Absolute 0.81 10*3/mm3      Eosinophils, Absolute 0.32 10*3/mm3      Basophils, Absolute 0.04 10*3/mm3      Immature Grans, Absolute 0.02 10*3/mm3      nRBC 0.0 /100 WBC     Wound Culture - Wound, Buttock [[id number]] Collected: 01/15/23 1156    Specimen: Wound from Buttock Updated: 01/15/23 1420     Gram Stain  Rare (1+) WBCs per low power field      Moderate (3+) Gram negative bacilli    Blood Culture - Blood, Arm, Right [[id number]] Collected: 01/15/23 1334    Specimen: Blood from Arm, Right Updated: 01/15/23 1339    COVID-19 and FLU A/B PCR - Swab, Nasopharynx [[id number]]  (Normal) Collected: 01/15/23 1509    Specimen: Swab from Nasopharynx Updated: 01/15/23 1646     COVID19 Not Detected     Influenza A PCR Not Detected     Influenza B PCR Not Detected    Narrative:      Fact sheet for providers: https://www.fda.gov/media/174091/download    Fact sheet for patients: https://www.fda.gov/media/220521/download    Test performed by PCR.            Medical Decision Making  This is a 32-year-old male who was brought in for hypotension.  Patient's blood pressure was improving with fluid boluses.  Patient did have a little acute kidney injury as well.  Patient has not been drinking fluids and the mom states.  Patient has not been eating the mom states.  I advised to the patient that he does need to be hydrating appropriately.  Patient states that he does not like the puréed diet that he is on.  I discussed with patient that he will need to have a swallow evaluation for any dietary changes.  Patient did have a decubitus ulcer.  Patient is getting wound care and his decubitus ulcer.  Patient was negative for COVID-19 and influenza.  Patient will be discharged home in stable condition.  Patient and mother verbalized understanding and were agreeable to plan as discussed.    Acute kidney injury (HCC): acute illness or injury  Hypotension, unspecified hypotension type: acute illness or injury  Pressure injury of skin of contiguous region involving back and buttock, unspecified injury stage, unspecified laterality: acute illness or injury  Amount and/or Complexity of Data Reviewed  Labs: ordered. Decision-making details documented in ED Course.      Risk  Prescription drug management.          Final diagnoses:   Acute kidney injury  (HCC)   Hypotension, unspecified hypotension type   Pressure injury of skin of contiguous region involving back and buttock, unspecified injury stage, unspecified laterality       ED Disposition  ED Disposition     ED Disposition   Discharge    Condition   Stable    Comment   --             [facility] Emergency Department  [address] [place] [id number] [phone]    As needed, If symptoms worsen         Medication List      No changes were made to your prescriptions during this visit.          Felipe Oconnell MD  01/15/23 4806

## 2023-01-15 NOTE — DISCHARGE INSTRUCTIONS
Follow up with one of the [facility] physician groups below to setup primary care. If you have trouble making an appointment, please call the [facility] Nurse Line at [phone]    [facility] - [address]  [id number] [phone]    [facility] - [address], [place], [place] [address] [phone]    [facility] - [address] [address], [address], [place] [address] [phone]     [facility] ([facility] in [place]) - [id number] [facility], [address], [place] [address] [phone]    [facility] - [address], [address] [phone]    [facility] - [address], [place] [address] [phone]    [facility] - [place]  [address] [place], [place], [address] [phone]    [facility] - [place]  [address], [address] [phone]    [facility] - [address] [address] [phone]    [facility] - [place]  [address] [place] [address], [place], [address] [phone]        PEDIATRIC:    [facility] - [address] [address], [address], [place] [address] [phone]

## 2023-01-16 LAB
BACTERIA BLD CULT: ABNORMAL
BOTTLE TYPE: ABNORMAL

## 2023-01-17 LAB
BACTERIA SPEC AEROBE CULT: ABNORMAL
GRAM STN SPEC: ABNORMAL
ISOLATED FROM: ABNORMAL

## 2023-01-18 ENCOUNTER — APPOINTMENT (OUTPATIENT)
Dept: GENERAL RADIOLOGY | Facility: HOSPITAL | Age: 33
DRG: 872 | End: 2023-01-18
Payer: COMMERCIAL

## 2023-01-18 ENCOUNTER — HOSPITAL ENCOUNTER (EMERGENCY)
Facility: HOSPITAL | Age: 33
Discharge: NURSING, REHAB OR LONG-TERM CARE | DRG: 872 | End: 2023-01-18
Attending: FAMILY MEDICINE | Admitting: FAMILY MEDICINE
Payer: COMMERCIAL

## 2023-01-18 VITALS
HEIGHT: 78 IN | TEMPERATURE: 98.7 F | HEART RATE: 97 BPM | SYSTOLIC BLOOD PRESSURE: 95 MMHG | OXYGEN SATURATION: 99 % | BODY MASS INDEX: 27.76 KG/M2 | WEIGHT: 239.9 LBS | DIASTOLIC BLOOD PRESSURE: 57 MMHG | RESPIRATION RATE: 15 BRPM

## 2023-01-18 DIAGNOSIS — R31.9 URINARY TRACT INFECTION WITH HEMATURIA, SITE UNSPECIFIED: ICD-10-CM

## 2023-01-18 DIAGNOSIS — N17.9 ACUTE RENAL FAILURE, UNSPECIFIED ACUTE RENAL FAILURE TYPE: Primary | ICD-10-CM

## 2023-01-18 DIAGNOSIS — L89.40 PRESSURE INJURY OF SKIN OF CONTIGUOUS REGION INVOLVING BACK AND BUTTOCK, UNSPECIFIED INJURY STAGE, UNSPECIFIED LATERALITY: ICD-10-CM

## 2023-01-18 DIAGNOSIS — A41.9 SEPSIS WITH ACUTE RENAL FAILURE AND SEPTIC SHOCK, DUE TO UNSPECIFIED ORGANISM, UNSPECIFIED ACUTE RENAL FAILURE TYPE: ICD-10-CM

## 2023-01-18 DIAGNOSIS — N39.0 URINARY TRACT INFECTION WITH HEMATURIA, SITE UNSPECIFIED: ICD-10-CM

## 2023-01-18 DIAGNOSIS — R65.21 SEPSIS WITH ACUTE RENAL FAILURE AND SEPTIC SHOCK, DUE TO UNSPECIFIED ORGANISM, UNSPECIFIED ACUTE RENAL FAILURE TYPE: ICD-10-CM

## 2023-01-18 DIAGNOSIS — R77.8 ELEVATED TROPONIN: ICD-10-CM

## 2023-01-18 DIAGNOSIS — N17.9 SEPSIS WITH ACUTE RENAL FAILURE AND SEPTIC SHOCK, DUE TO UNSPECIFIED ORGANISM, UNSPECIFIED ACUTE RENAL FAILURE TYPE: ICD-10-CM

## 2023-01-18 PROBLEM — R65.20 SEPSIS WITH ACUTE RENAL FAILURE, DUE TO UNSPECIFIED ORGANISM, UNSPECIFIED ACUTE RENAL FAILURE TYPE, UNSPECIFIED WHETHER SEPTIC SHOCK PRESENT: Status: ACTIVE | Noted: 2023-01-18

## 2023-01-18 LAB
ABO GROUP BLD: NORMAL
ALBUMIN SERPL-MCNC: 3.2 G/DL (ref 3.5–5.2)
ALBUMIN/GLOB SERPL: 0.8 G/DL
ALP SERPL-CCNC: 132 U/L (ref 39–117)
ALT SERPL W P-5'-P-CCNC: 9 U/L (ref 1–41)
ANION GAP SERPL CALCULATED.3IONS-SCNC: 11 MMOL/L (ref 5–15)
AST SERPL-CCNC: 9 U/L (ref 1–40)
BACTERIA UR QL AUTO: ABNORMAL /HPF
BASOPHILS # BLD AUTO: 0.04 10*3/MM3 (ref 0–0.2)
BASOPHILS NFR BLD AUTO: 0.3 % (ref 0–1.5)
BILIRUB SERPL-MCNC: 0.9 MG/DL (ref 0–1.2)
BILIRUB UR QL STRIP: NEGATIVE
BLD GP AB SCN SERPL QL: NEGATIVE
BUN SERPL-MCNC: 31 MG/DL (ref 6–20)
BUN/CREAT SERPL: 8.5 (ref 7–25)
CALCIUM SPEC-SCNC: 9 MG/DL (ref 8.6–10.5)
CHLORIDE SERPL-SCNC: 103 MMOL/L (ref 98–107)
CLARITY UR: ABNORMAL
CO2 SERPL-SCNC: 26 MMOL/L (ref 22–29)
COLOR UR: ABNORMAL
CREAT SERPL-MCNC: 3.65 MG/DL (ref 0.76–1.27)
CRP SERPL-MCNC: 33.56 MG/DL (ref 0–0.5)
D-LACTATE SERPL-SCNC: 1.5 MMOL/L (ref 0.5–2)
DEPRECATED RDW RBC AUTO: 57.1 FL (ref 37–54)
EGFRCR SERPLBLD CKD-EPI 2021: 21.4 ML/MIN/1.73
EOSINOPHIL # BLD AUTO: 0 10*3/MM3 (ref 0–0.4)
EOSINOPHIL NFR BLD AUTO: 0 % (ref 0.3–6.2)
ERYTHROCYTE [DISTWIDTH] IN BLOOD BY AUTOMATED COUNT: 17.5 % (ref 12.3–15.4)
ERYTHROCYTE [SEDIMENTATION RATE] IN BLOOD: 37 MM/HR (ref 0–15)
GLOBULIN UR ELPH-MCNC: 4.2 GM/DL
GLUCOSE SERPL-MCNC: 133 MG/DL (ref 65–99)
GLUCOSE UR STRIP-MCNC: NEGATIVE MG/DL
HCT VFR BLD AUTO: 26 % (ref 37.5–51)
HGB BLD-MCNC: 7.9 G/DL (ref 13–17.7)
HGB UR QL STRIP.AUTO: ABNORMAL
HYALINE CASTS UR QL AUTO: ABNORMAL /LPF
IMM GRANULOCYTES # BLD AUTO: 0.07 10*3/MM3 (ref 0–0.05)
IMM GRANULOCYTES NFR BLD AUTO: 0.5 % (ref 0–0.5)
KETONES UR QL STRIP: NEGATIVE
LEUKOCYTE ESTERASE UR QL STRIP.AUTO: ABNORMAL
LYMPHOCYTES # BLD AUTO: 0.81 10*3/MM3 (ref 0.7–3.1)
LYMPHOCYTES NFR BLD AUTO: 5.9 % (ref 19.6–45.3)
MAGNESIUM SERPL-MCNC: 1.6 MG/DL (ref 1.6–2.6)
MCH RBC QN AUTO: 27.1 PG (ref 26.6–33)
MCHC RBC AUTO-ENTMCNC: 30.4 G/DL (ref 31.5–35.7)
MCV RBC AUTO: 89.3 FL (ref 79–97)
MONOCYTES # BLD AUTO: 1.52 10*3/MM3 (ref 0.1–0.9)
MONOCYTES NFR BLD AUTO: 11.1 % (ref 5–12)
NEUTROPHILS NFR BLD AUTO: 11.29 10*3/MM3 (ref 1.7–7)
NEUTROPHILS NFR BLD AUTO: 82.2 % (ref 42.7–76)
NITRITE UR QL STRIP: POSITIVE
NRBC BLD AUTO-RTO: 0 /100 WBC (ref 0–0.2)
PH UR STRIP.AUTO: >=9 [PH] (ref 5–8)
PLATELET # BLD AUTO: 225 10*3/MM3 (ref 140–450)
PMV BLD AUTO: 10.9 FL (ref 6–12)
POTASSIUM SERPL-SCNC: 4.9 MMOL/L (ref 3.5–5.2)
PROCALCITONIN SERPL-MCNC: 5.41 NG/ML (ref 0–0.25)
PROT SERPL-MCNC: 7.4 G/DL (ref 6–8.5)
PROT UR QL STRIP: ABNORMAL
RBC # BLD AUTO: 2.91 10*6/MM3 (ref 4.14–5.8)
RBC # UR STRIP: ABNORMAL /HPF
REF LAB TEST METHOD: ABNORMAL
RH BLD: POSITIVE
SARS-COV-2 RNA RESP QL NAA+PROBE: NOT DETECTED
SODIUM SERPL-SCNC: 140 MMOL/L (ref 136–145)
SP GR UR STRIP: 1.01 (ref 1–1.03)
SQUAMOUS #/AREA URNS HPF: ABNORMAL /HPF
T&S EXPIRATION DATE: NORMAL
TROPONIN T SERPL-MCNC: 0.05 NG/ML (ref 0–0.03)
UROBILINOGEN UR QL STRIP: ABNORMAL
WBC # UR STRIP: ABNORMAL /HPF
WBC NRBC COR # BLD: 13.73 10*3/MM3 (ref 3.4–10.8)

## 2023-01-18 PROCEDURE — 85025 COMPLETE CBC W/AUTO DIFF WBC: CPT | Performed by: FAMILY MEDICINE

## 2023-01-18 PROCEDURE — 96366 THER/PROPH/DIAG IV INF ADDON: CPT

## 2023-01-18 PROCEDURE — 84484 ASSAY OF TROPONIN QUANT: CPT | Performed by: FAMILY MEDICINE

## 2023-01-18 PROCEDURE — 84145 PROCALCITONIN (PCT): CPT | Performed by: FAMILY MEDICINE

## 2023-01-18 PROCEDURE — 71045 X-RAY EXAM CHEST 1 VIEW: CPT

## 2023-01-18 PROCEDURE — 93010 ELECTROCARDIOGRAM REPORT: CPT | Performed by: INTERNAL MEDICINE

## 2023-01-18 PROCEDURE — 86901 BLOOD TYPING SEROLOGIC RH(D): CPT | Performed by: FAMILY MEDICINE

## 2023-01-18 PROCEDURE — 96367 TX/PROPH/DG ADDL SEQ IV INF: CPT

## 2023-01-18 PROCEDURE — 93005 ELECTROCARDIOGRAM TRACING: CPT | Performed by: FAMILY MEDICINE

## 2023-01-18 PROCEDURE — 81001 URINALYSIS AUTO W/SCOPE: CPT | Performed by: FAMILY MEDICINE

## 2023-01-18 PROCEDURE — 87635 SARS-COV-2 COVID-19 AMP PRB: CPT | Performed by: FAMILY MEDICINE

## 2023-01-18 PROCEDURE — 87040 BLOOD CULTURE FOR BACTERIA: CPT | Performed by: FAMILY MEDICINE

## 2023-01-18 PROCEDURE — 83605 ASSAY OF LACTIC ACID: CPT | Performed by: FAMILY MEDICINE

## 2023-01-18 PROCEDURE — C1751 CATH, INF, PER/CENT/MIDLINE: HCPCS

## 2023-01-18 PROCEDURE — 87186 SC STD MICRODIL/AGAR DIL: CPT | Performed by: FAMILY MEDICINE

## 2023-01-18 PROCEDURE — 85652 RBC SED RATE AUTOMATED: CPT | Performed by: FAMILY MEDICINE

## 2023-01-18 PROCEDURE — 25010000002 MEROPENEM PER 100 MG: Performed by: FAMILY MEDICINE

## 2023-01-18 PROCEDURE — 96365 THER/PROPH/DIAG IV INF INIT: CPT

## 2023-01-18 PROCEDURE — 87086 URINE CULTURE/COLONY COUNT: CPT | Performed by: FAMILY MEDICINE

## 2023-01-18 PROCEDURE — 83735 ASSAY OF MAGNESIUM: CPT | Performed by: FAMILY MEDICINE

## 2023-01-18 PROCEDURE — 99285 EMERGENCY DEPT VISIT HI MDM: CPT

## 2023-01-18 PROCEDURE — 80053 COMPREHEN METABOLIC PANEL: CPT | Performed by: FAMILY MEDICINE

## 2023-01-18 PROCEDURE — 86850 RBC ANTIBODY SCREEN: CPT | Performed by: FAMILY MEDICINE

## 2023-01-18 PROCEDURE — 86140 C-REACTIVE PROTEIN: CPT | Performed by: FAMILY MEDICINE

## 2023-01-18 PROCEDURE — 86900 BLOOD TYPING SEROLOGIC ABO: CPT | Performed by: FAMILY MEDICINE

## 2023-01-18 PROCEDURE — 36415 COLL VENOUS BLD VENIPUNCTURE: CPT

## 2023-01-18 RX ORDER — CITALOPRAM 20 MG/1
30 TABLET ORAL DAILY
COMMUNITY

## 2023-01-18 RX ORDER — RISPERIDONE 3 MG/1
3 TABLET ORAL 2 TIMES DAILY
COMMUNITY

## 2023-01-18 RX ORDER — CHLORAL HYDRATE 500 MG
1000 CAPSULE ORAL DAILY
COMMUNITY

## 2023-01-18 RX ORDER — NOREPINEPHRINE BIT/0.9 % NACL 8 MG/250ML
.02-.3 INFUSION BOTTLE (ML) INTRAVENOUS
Status: DISCONTINUED | OUTPATIENT
Start: 2023-01-18 | End: 2023-01-18 | Stop reason: HOSPADM

## 2023-01-18 RX ORDER — GABAPENTIN 300 MG/1
300 CAPSULE ORAL 2 TIMES DAILY
COMMUNITY

## 2023-01-18 RX ORDER — BUSPIRONE HYDROCHLORIDE 10 MG/1
10 TABLET ORAL 3 TIMES DAILY
COMMUNITY

## 2023-01-18 RX ORDER — ACETAMINOPHEN 500 MG
1000 TABLET ORAL ONCE
Status: COMPLETED | OUTPATIENT
Start: 2023-01-18 | End: 2023-01-18

## 2023-01-18 RX ORDER — TIZANIDINE HYDROCHLORIDE 2 MG/1
2 CAPSULE, GELATIN COATED ORAL 2 TIMES DAILY
COMMUNITY

## 2023-01-18 RX ORDER — SODIUM CHLORIDE, SODIUM LACTATE, POTASSIUM CHLORIDE, CALCIUM CHLORIDE 600; 310; 30; 20 MG/100ML; MG/100ML; MG/100ML; MG/100ML
200 INJECTION, SOLUTION INTRAVENOUS CONTINUOUS
Status: DISCONTINUED | OUTPATIENT
Start: 2023-01-18 | End: 2023-01-18 | Stop reason: HOSPADM

## 2023-01-18 RX ORDER — IBUPROFEN 800 MG/1
800 TABLET ORAL ONCE
Status: COMPLETED | OUTPATIENT
Start: 2023-01-18 | End: 2023-01-18

## 2023-01-18 RX ORDER — NITROFURANTOIN 25; 75 MG/1; MG/1
100 CAPSULE ORAL 2 TIMES DAILY
COMMUNITY

## 2023-01-18 RX ORDER — SODIUM CHLORIDE 0.9 % (FLUSH) 0.9 %
10 SYRINGE (ML) INJECTION AS NEEDED
Status: DISCONTINUED | OUTPATIENT
Start: 2023-01-18 | End: 2023-01-18 | Stop reason: HOSPADM

## 2023-01-18 RX ORDER — LIDOCAINE HYDROCHLORIDE 10 MG/ML
INJECTION, SOLUTION EPIDURAL; INFILTRATION; INTRACAUDAL; PERINEURAL
Status: DISCONTINUED
Start: 2023-01-18 | End: 2023-01-18 | Stop reason: HOSPADM

## 2023-01-18 RX ORDER — IBUPROFEN 800 MG
1 TABLET ORAL DAILY
COMMUNITY

## 2023-01-18 RX ORDER — HYDROCODONE BITARTRATE AND ACETAMINOPHEN 5; 325 MG/1; MG/1
1 TABLET ORAL EVERY 6 HOURS PRN
COMMUNITY

## 2023-01-18 RX ORDER — TAMSULOSIN HYDROCHLORIDE 0.4 MG/1
1 CAPSULE ORAL DAILY
COMMUNITY

## 2023-01-18 RX ADMIN — SODIUM CHLORIDE, POTASSIUM CHLORIDE, SODIUM LACTATE AND CALCIUM CHLORIDE 1000 ML: 600; 310; 30; 20 INJECTION, SOLUTION INTRAVENOUS at 11:53

## 2023-01-18 RX ADMIN — SODIUM CHLORIDE, POTASSIUM CHLORIDE, SODIUM LACTATE AND CALCIUM CHLORIDE 200 ML/HR: 600; 310; 30; 20 INJECTION, SOLUTION INTRAVENOUS at 16:02

## 2023-01-18 RX ADMIN — SODIUM CHLORIDE, POTASSIUM CHLORIDE, SODIUM LACTATE AND CALCIUM CHLORIDE 1000 ML: 600; 310; 30; 20 INJECTION, SOLUTION INTRAVENOUS at 13:07

## 2023-01-18 RX ADMIN — SODIUM CHLORIDE, POTASSIUM CHLORIDE, SODIUM LACTATE AND CALCIUM CHLORIDE 1000 ML: 600; 310; 30; 20 INJECTION, SOLUTION INTRAVENOUS at 14:52

## 2023-01-18 RX ADMIN — Medication 0.02 MCG/KG/MIN: at 16:08

## 2023-01-18 RX ADMIN — ACETAMINOPHEN 1000 MG: 500 TABLET, FILM COATED ORAL at 13:16

## 2023-01-18 RX ADMIN — IBUPROFEN 800 MG: 800 TABLET, FILM COATED ORAL at 15:07

## 2023-01-18 RX ADMIN — MEROPENEM 1 G: 1 INJECTION INTRAVENOUS at 13:09

## 2023-01-19 LAB
QT INTERVAL: 288 MS
QTC INTERVAL: 418 MS

## 2023-01-20 LAB
BACTERIA SPEC AEROBE CULT: ABNORMAL
BACTERIA SPEC AEROBE CULT: NORMAL

## 2023-01-23 LAB
BACTERIA SPEC AEROBE CULT: NORMAL
BACTERIA SPEC AEROBE CULT: NORMAL

## 2023-02-22 ENCOUNTER — OFFICE VISIT (OUTPATIENT)
Dept: WOUND CARE | Facility: HOSPITAL | Age: 33
End: 2023-02-22
Payer: COMMERCIAL

## 2023-02-22 DIAGNOSIS — A49.9 BACTERIAL INFECTION, UNSPECIFIED: ICD-10-CM

## 2023-02-22 DIAGNOSIS — Z48.89 ENCOUNTER FOR OTHER SPECIFIED SURGICAL AFTERCARE: ICD-10-CM

## 2023-02-22 DIAGNOSIS — S31.100D UNSPECIFIED OPEN WOUND OF ABDOMINAL WALL, RIGHT UPPER QUADRANT WITHOUT PENETRATION INTO PERITONEAL CAVITY, SUBSEQUENT ENCOUNTER: ICD-10-CM

## 2023-02-22 DIAGNOSIS — L89.154 PRESSURE ULCER OF SACRAL REGION, STAGE 4: ICD-10-CM

## 2023-02-22 PROCEDURE — 11045 DBRDMT SUBQ TISS EACH ADDL: CPT | Performed by: NURSE PRACTITIONER

## 2023-02-22 PROCEDURE — 99213 OFFICE O/P EST LOW 20 MIN: CPT | Performed by: NURSE PRACTITIONER

## 2023-02-22 PROCEDURE — 11042 DBRDMT SUBQ TIS 1ST 20SQCM/<: CPT | Performed by: NURSE PRACTITIONER

## 2023-02-22 PROCEDURE — G0463 HOSPITAL OUTPT CLINIC VISIT: HCPCS

## 2023-03-01 ENCOUNTER — OFFICE VISIT (OUTPATIENT)
Dept: WOUND CARE | Facility: HOSPITAL | Age: 33
End: 2023-03-01
Payer: COMMERCIAL

## 2023-03-01 ENCOUNTER — HOSPITAL ENCOUNTER (EMERGENCY)
Facility: HOSPITAL | Age: 33
Discharge: NURSING, REHAB OR LONG-TERM CARE | End: 2023-03-01
Admitting: FAMILY MEDICINE
Payer: COMMERCIAL

## 2023-03-01 VITALS
HEART RATE: 85 BPM | SYSTOLIC BLOOD PRESSURE: 138 MMHG | RESPIRATION RATE: 18 BRPM | DIASTOLIC BLOOD PRESSURE: 74 MMHG | HEIGHT: 78 IN | OXYGEN SATURATION: 98 % | TEMPERATURE: 98.4 F | WEIGHT: 239 LBS | BODY MASS INDEX: 27.65 KG/M2

## 2023-03-01 DIAGNOSIS — L89.154 PRESSURE ULCER OF SACRAL REGION, STAGE 4: ICD-10-CM

## 2023-03-01 DIAGNOSIS — Z48.89 ENCOUNTER FOR OTHER SPECIFIED SURGICAL AFTERCARE: ICD-10-CM

## 2023-03-01 DIAGNOSIS — T82.9XXA COMPLICATION OF CENTRAL VENOUS CATHETER, UNSPECIFIED COMPLICATION, INITIAL ENCOUNTER: Primary | ICD-10-CM

## 2023-03-01 DIAGNOSIS — A49.9 BACTERIAL INFECTION, UNSPECIFIED: ICD-10-CM

## 2023-03-01 PROCEDURE — 99213 OFFICE O/P EST LOW 20 MIN: CPT | Performed by: NURSE PRACTITIONER

## 2023-03-01 PROCEDURE — 11042 DBRDMT SUBQ TIS 1ST 20SQCM/<: CPT | Performed by: NURSE PRACTITIONER

## 2023-03-01 PROCEDURE — 36410 VNPNXR 3YR/> PHY/QHP DX/THER: CPT

## 2023-03-01 PROCEDURE — C1751 CATH, INF, PER/CENT/MIDLINE: HCPCS

## 2023-03-01 PROCEDURE — 99283 EMERGENCY DEPT VISIT LOW MDM: CPT

## 2023-03-01 RX ORDER — SODIUM CHLORIDE 0.9 % (FLUSH) 0.9 %
10 SYRINGE (ML) INJECTION AS NEEDED
Status: DISCONTINUED | OUTPATIENT
Start: 2023-03-01 | End: 2023-03-01 | Stop reason: HOSPADM

## 2023-03-01 RX ORDER — LIDOCAINE HYDROCHLORIDE 10 MG/ML
1 INJECTION, SOLUTION EPIDURAL; INFILTRATION; INTRACAUDAL; PERINEURAL ONCE
Status: COMPLETED | OUTPATIENT
Start: 2023-03-01 | End: 2023-03-01

## 2023-03-01 RX ADMIN — LIDOCAINE HYDROCHLORIDE ANHYDROUS 1 ML: 10 INJECTION, SOLUTION INFILTRATION at 14:58

## 2023-03-01 NOTE — ED PROVIDER NOTES
Subjective   History of Present Illness  Patient is a 32-year-old chronically ill white male presents emergency department per EMS from [facility] stating that his PICC line is coming out.  He has had the PICC line for about the past month for IV antibiotics.  He had sepsis and was admitted to [facility] in [place].  He is status postcraniotomy and  shunt placement with chronic left hemiplegia and spasticity, he had a left nephrostomy drain and Arnold, and baclofen pump.  He underwent left percutaneous nephrolithotomy and ureter stent which was in October.  In December he was seen here in emergency department and was transferred to [facility] for further evaluation and treatment.  He has been receiving ertapenen since he has been back at the nursing home and has been receiving every 24 hour infusions for uti. He is scheduled to finish his atbs on march 10th.  His main complaint today is that his PICC line is coming out.  The nursing home would like for us to replace the PICC line or place a midline for his infusions.  He denies any other complaints.    History provided by:  Patient and nursing home   used: No        Review of Systems   Constitutional:        Patient is a 32-year-old chronically ill white male presents emergency department per EMS from [facility] stating that his PICC line is coming out.  He has had the PICC line for about the past month for IV antibiotics.  He had sepsis and was admitted to [facility] in [place].  He is status postcraniotomy and  shunt placement with chronic left hemiplegia and spasticity, he had a left nephrostomy drain and Arnold, and baclofen pump.  He underwent left percutaneous nephrolithotomy and ureter stent which was in October.  In December he was seen here in emergency department and was transferred to [facility] for further evaluation and treatment.  He has been receiving ertapenen since he has been  back at the nursing home and has been receiving every 24 hour infusions for uti. He is scheduled to finish his atbs on march 10th.  His main complaint today is that his PICC line is coming out.  The nursing home would like for us to replace the PICC line or place a midline for his infusions.  He denies any other complaints.   HENT: Negative.    Eyes: Negative.    Respiratory: Negative.    Cardiovascular: Negative.    Gastrointestinal: Negative.    Endocrine: Negative.    Genitourinary: Negative.    Musculoskeletal: Negative.    Skin: Negative.    Allergic/Immunologic: Negative.    Neurological: Negative.    Hematological: Negative.    Psychiatric/Behavioral: Negative.    All other systems reviewed and are negative.      Past Medical History:   Diagnosis Date   • CVA (cerebral vascular accident) ([facility])    • Depression    • Hemiparesis ([facility])    • Hydrocephalus ([facility])    • Kidney stones    • Neurogenic bladder    • Neurogenic bowel    • Seizure ([facility])    • Sinusitis    • Sleep apnea    • Subdural hematoma    • TBI (traumatic brain injury)    • UTI (urinary tract infection)        Allergies   Allergen Reactions   • Dilantin [Phenytoin]    • Latex    • Penicillins    • Sulfa Antibiotics        Past Surgical History:   Procedure Laterality Date   • BACLOFEN PUMP IMPLANTATION     • CHEST TUBE INSERTION      REMOVED   • CRANIOTOMY      FOR EVACUATION OF SUBDURAL HEMATOMA & VENTRICULOSTOMY   • HAMSTRING RELEASE     • KIDNEY STONE SURGERY     • PEG TUBE INSERTION      REMOVED   • TRACHEOSTOMY      REMOVED   •  SHUNT INSERTION         Family History   Problem Relation Age of Onset   • No Known Problems Mother    • No Known Problems Father        Social History     Socioeconomic History   • Marital status: Single   Tobacco Use   • Smoking status: Never   • Smokeless tobacco: Never   Vaping Use   • Vaping Use: Never used   Substance and Sexual Activity   • Alcohol use: No   • Drug use: No   • Sexual activity: Defer       Prior to  "Admission medications    Medication Sig Start Date End Date Taking? Authorizing Provider   amantadine (SYMMETREL) 100 MG capsule Take 100 mg by mouth 2 (Two) Times a Day.    Bipin Zamora MD   busPIRone (BUSPAR) 10 MG tablet Take 10 mg by mouth 3 (Three) Times a Day.    Bipin Zamora MD   Cholecalciferol (Vitamin D3) 10 MCG (400 UNIT) capsule Take 1 capsule by mouth Daily.    Bipin Zamora MD   citalopram (CeleXA) 20 MG tablet Take 30 mg by mouth Daily.    Bipin Zamora MD   gabapentin (NEURONTIN) 300 MG capsule Take 300 mg by mouth 2 (Two) Times a Day.    Bipin Zamora MD   HYDROcodone-acetaminophen (NORCO) 5-325 MG per tablet Take 1 tablet by mouth Every 6 (Six) Hours As Needed.    Bipin Zamora MD   lisinopril (PRINIVIL,ZESTRIL) 10 MG tablet Take 10 mg by mouth Daily. 7/20/17   Bipin Zamora MD   nitrofurantoin, macrocrystal-monohydrate, (MACROBID) 100 MG capsule Take 100 mg by mouth 2 (Two) Times a Day.    Bipin Zamora MD   Omega-3 1000 MG capsule Take 1,000 mg by mouth Daily.    Bipin Zamora MD   risperiDONE (risperDAL) 3 MG tablet Take 3 mg by mouth 2 (Two) Times a Day.    Bipin Zamora MD   tamsulosin (FLOMAX) 0.4 MG capsule 24 hr capsule Take 1 capsule by mouth Daily.    Bipin Zamora MD   TiZANidine (ZANAFLEX) 2 MG capsule Take 2 mg by mouth 2 (Two) Times a Day.    Bipin Zamora MD   vitamin C (ASCORBIC ACID) 500 MG tablet Take 500 mg by mouth 2 (Two) Times a Day.    Bipin Zamora MD       /74   Pulse 85   Temp 98.4 °F (36.9 °C)   Resp 18   Ht 200.7 cm (79\")   Wt 108 kg (239 lb)   SpO2 98%   BMI 26.92 kg/m²     Objective   Physical Exam  Vitals and nursing note reviewed.   Constitutional:       Appearance: He is well-developed.      Comments: Chronically ill appearing. No acute distress. Non toxic appearing. Answers questions appropriately.    HENT:      Head: Normocephalic and atraumatic. "   Eyes:      Conjunctiva/sclera: Conjunctivae normal.      Pupils: Pupils are equal, round, and reactive to light.   Neck:      Comments: IJ to right side of neck is migrating out. No signs of infection noted. No eryth or drainage noted.   Cardiovascular:      Rate and Rhythm: Normal rate and regular rhythm.      Heart sounds: Normal heart sounds.   Pulmonary:      Effort: Pulmonary effort is normal.      Breath sounds: Normal breath sounds.   Abdominal:      General: Bowel sounds are normal.      Palpations: Abdomen is soft.   Musculoskeletal:      Cervical back: Normal range of motion and neck supple.      Comments: Chronic left side hemiplegia    Skin:     General: Skin is warm and dry.   Neurological:      Mental Status: He is alert and oriented to person, place, and time.      Deep Tendon Reflexes: Reflexes are normal and symmetric.   Psychiatric:         Behavior: Behavior normal.         Thought Content: Thought content normal.         Judgment: Judgment normal.         Procedures         Lab Results (last 24 hours)     ** No results found for the last 24 hours. **          No orders to display       ED Course  ED Course as of 03/01/23 2024   Wed Mar 01, 2023   1408 Nursing Secretary states that they would like another pic line or midline placed for iv antibiotics. Vascular access team notified [CW]   1451 Vascular access team removed IJ from right side of neck and placed midline in right upper arm. Pt tolerated well. Will be discharged back to nursing home at this time  [CW]      ED Course User Index  [CW] Blanca Byrnes APRN        Medical Decision Making  Patient is a 32-year-old chronically ill white male presents emergency department per EMS from [facility] stating that his PICC line is coming out.  He has had the PICC line for about the past month for IV antibiotics.  He had sepsis and was admitted to [facility] in [place].  He is status postcraniotomy and  shunt placement with  chronic left hemiplegia and spasticity, he had a left nephrostomy drain and Arnold, and baclofen pump.  He underwent left percutaneous nephrolithotomy and ureter stent which was in October.  In December he was seen here in emergency department and was transferred to [facility] for further evaluation and treatment.  He has been receiving ertapenen since he has been back at the nursing home and has been receiving every 24 hour infusions for uti. He is scheduled to finish his atbs on march 10th.   Course of treatment in the er: vascular access team removed IJ and placed dressing and placed midline access in right upper arm. Pt tolerated well without complications. Pt will be discharged back to nursing home in stable condition     Complication of central venous catheter, unspecified complication, initial encounter: acute illness or injury     Details: vascular access team assessed pt and removed IJ and placed dressing. midline placed to right upper arm. pt tolerated well without complications. will be sent back to nursing home  Risk  Prescription drug management.           Final diagnoses:   Complication of central venous catheter, unspecified complication, initial encounter          Blanca Byrnes, APRN  03/01/23 2024

## 2023-03-01 NOTE — CONSULTS
16 cm Midline catheter placed in pt right basilic vein. Pt tolerated procedure well. Line flushes and draws well. Sterile dressing applied.

## 2023-03-08 ENCOUNTER — OFFICE VISIT (OUTPATIENT)
Dept: WOUND CARE | Facility: HOSPITAL | Age: 33
End: 2023-03-08
Payer: COMMERCIAL

## 2023-03-08 DIAGNOSIS — S31.100D UNSPECIFIED OPEN WOUND OF ABDOMINAL WALL, RIGHT UPPER QUADRANT WITHOUT PENETRATION INTO PERITONEAL CAVITY, SUBSEQUENT ENCOUNTER: ICD-10-CM

## 2023-03-08 DIAGNOSIS — Z48.89 ENCOUNTER FOR OTHER SPECIFIED SURGICAL AFTERCARE: ICD-10-CM

## 2023-03-08 DIAGNOSIS — A49.9 BACTERIAL INFECTION, UNSPECIFIED: ICD-10-CM

## 2023-03-08 DIAGNOSIS — L89.154 PRESSURE ULCER OF SACRAL REGION, STAGE 4: ICD-10-CM

## 2023-03-08 PROCEDURE — 97605 NEG PRS WND THER DME<=50SQCM: CPT

## 2023-03-08 PROCEDURE — G0463 HOSPITAL OUTPT CLINIC VISIT: HCPCS

## 2023-03-08 PROCEDURE — 11042 DBRDMT SUBQ TIS 1ST 20SQCM/<: CPT | Performed by: NURSE PRACTITIONER

## 2023-03-15 ENCOUNTER — OFFICE VISIT (OUTPATIENT)
Dept: WOUND CARE | Facility: HOSPITAL | Age: 33
End: 2023-03-15
Payer: COMMERCIAL

## 2023-03-15 DIAGNOSIS — R26.89 OTHER ABNORMALITIES OF GAIT AND MOBILITY: ICD-10-CM

## 2023-03-15 DIAGNOSIS — L89.154 PRESSURE ULCER OF SACRAL REGION, STAGE 4: ICD-10-CM

## 2023-03-15 DIAGNOSIS — I69.959 HEMIPLEGIA AND HEMIPARESIS FOLLOWING UNSPECIFIED CEREBROVASCULAR DISEASE AFFECTING UNSPECIFIED SIDE: ICD-10-CM

## 2023-03-15 PROCEDURE — G0463 HOSPITAL OUTPT CLINIC VISIT: HCPCS

## 2023-03-15 PROCEDURE — 11042 DBRDMT SUBQ TIS 1ST 20SQCM/<: CPT | Performed by: NURSE PRACTITIONER

## 2023-03-15 PROCEDURE — 97605 NEG PRS WND THER DME<=50SQCM: CPT

## 2023-03-21 NOTE — ANESTHESIA POSTPROCEDURE EVALUATION
Department of Anesthesiology  Postprocedure Note    Patient: Bryon Olszewski  MRN: [id number]  YOB: 1990  Date of evaluation: 10/4/2022      Procedure Summary     Date: 10/04/22 Room / Location: [address]    Anesthesia Start: 5428 Anesthesia Stop: 6421    Procedure: BACLOFEN PAIN PUMP REPLACEMENT Diagnosis:       End of battery life of intrathecal infusion pump      (End of battery life of intrathecal infusion pump [Z46.2])    Surgeons: Charlotte Pham MD Responsible Provider: ANGELA Harirson CRNA    Anesthesia Type: general ASA Status: 4          Anesthesia Type: No value filed.     Naveed Phase I: Naveed Score: 9    Naveed Phase II:        Anesthesia Post Evaluation    Patient location during evaluation: bedside  Patient participation: complete - patient participated  Level of consciousness: awake and alert  Pain score: 0  Airway patency: patent  Nausea & Vomiting: no nausea  Complications: no  Cardiovascular status: hemodynamically stable  Respiratory status: acceptable  Hydration status: euvolemic Rifampin Pregnancy And Lactation Text: This medication is Pregnancy Category C and it isn't know if it is safe during pregnancy. It is also excreted in breast milk and should not be used if you are breast feeding.

## 2023-03-22 ENCOUNTER — OFFICE VISIT (OUTPATIENT)
Dept: WOUND CARE | Facility: HOSPITAL | Age: 33
End: 2023-03-22
Payer: COMMERCIAL

## 2023-03-22 DIAGNOSIS — L89.154 PRESSURE ULCER OF SACRAL REGION, STAGE 4: ICD-10-CM

## 2023-03-22 DIAGNOSIS — R26.89 OTHER ABNORMALITIES OF GAIT AND MOBILITY: ICD-10-CM

## 2023-03-22 DIAGNOSIS — I69.959 HEMIPLEGIA AND HEMIPARESIS FOLLOWING UNSPECIFIED CEREBROVASCULAR DISEASE AFFECTING UNSPECIFIED SIDE: ICD-10-CM

## 2023-03-23 ENCOUNTER — TRANSCRIBE ORDERS (OUTPATIENT)
Dept: ADMINISTRATIVE | Facility: HOSPITAL | Age: 33
End: 2023-03-23
Payer: COMMERCIAL

## 2023-03-23 DIAGNOSIS — N20.0 STAGHORN CALCULUS: Primary | ICD-10-CM

## 2023-03-29 ENCOUNTER — OFFICE VISIT (OUTPATIENT)
Dept: WOUND CARE | Facility: HOSPITAL | Age: 33
End: 2023-03-29
Payer: COMMERCIAL

## 2023-03-29 PROCEDURE — G0463 HOSPITAL OUTPT CLINIC VISIT: HCPCS

## 2023-04-05 ENCOUNTER — OFFICE VISIT (OUTPATIENT)
Dept: WOUND CARE | Facility: HOSPITAL | Age: 33
End: 2023-04-05
Payer: COMMERCIAL

## 2023-04-05 DIAGNOSIS — I69.959 HEMIPLEGIA AND HEMIPARESIS FOLLOWING UNSPECIFIED CEREBROVASCULAR DISEASE AFFECTING UNSPECIFIED SIDE: ICD-10-CM

## 2023-04-05 DIAGNOSIS — L89.154 PRESSURE ULCER OF SACRAL REGION, STAGE 4: ICD-10-CM

## 2023-04-05 DIAGNOSIS — S31.829D UNSPECIFIED OPEN WOUND OF LEFT BUTTOCK, SUBSEQUENT ENCOUNTER: ICD-10-CM

## 2023-04-05 DIAGNOSIS — S31.819D UNSPECIFIED OPEN WOUND OF RIGHT BUTTOCK, SUBSEQUENT ENCOUNTER: ICD-10-CM

## 2023-04-05 DIAGNOSIS — R26.89 OTHER ABNORMALITIES OF GAIT AND MOBILITY: ICD-10-CM

## 2023-04-05 PROCEDURE — 97605 NEG PRS WND THER DME<=50SQCM: CPT

## 2023-04-05 PROCEDURE — G0463 HOSPITAL OUTPT CLINIC VISIT: HCPCS

## 2023-04-07 ENCOUNTER — HOSPITAL ENCOUNTER (OUTPATIENT)
Dept: CT IMAGING | Facility: HOSPITAL | Age: 33
Discharge: HOME | End: 2023-04-07
Admitting: STUDENT IN AN ORGANIZED HEALTH CARE EDUCATION/TRAINING PROGRAM
Payer: COMMERCIAL

## 2023-04-07 DIAGNOSIS — N20.0 STAGHORN CALCULUS: ICD-10-CM

## 2023-04-07 PROCEDURE — 74176 CT ABD & PELVIS W/O CONTRAST: CPT

## 2023-04-12 ENCOUNTER — OFFICE VISIT (OUTPATIENT)
Dept: WOUND CARE | Facility: HOSPITAL | Age: 33
End: 2023-04-12
Payer: COMMERCIAL

## 2023-04-12 PROCEDURE — 97605 NEG PRS WND THER DME<=50SQCM: CPT

## 2023-04-12 PROCEDURE — G0463 HOSPITAL OUTPT CLINIC VISIT: HCPCS

## 2023-04-19 ENCOUNTER — OFFICE VISIT (OUTPATIENT)
Dept: WOUND CARE | Facility: HOSPITAL | Age: 33
End: 2023-04-19
Payer: COMMERCIAL

## 2023-04-19 PROCEDURE — G0463 HOSPITAL OUTPT CLINIC VISIT: HCPCS

## 2023-04-26 ENCOUNTER — OFFICE VISIT (OUTPATIENT)
Dept: WOUND CARE | Facility: HOSPITAL | Age: 33
End: 2023-04-26
Payer: COMMERCIAL

## 2023-04-26 PROCEDURE — G0463 HOSPITAL OUTPT CLINIC VISIT: HCPCS

## 2023-04-26 PROCEDURE — 97605 NEG PRS WND THER DME<=50SQCM: CPT

## 2023-04-28 ENCOUNTER — LAB REQUISITION (OUTPATIENT)
Dept: LAB | Facility: HOSPITAL | Age: 33
End: 2023-04-28
Payer: COMMERCIAL

## 2023-04-28 LAB
BACTERIA UR QL AUTO: ABNORMAL /HPF
BILIRUB UR QL STRIP: NEGATIVE
CLARITY UR: ABNORMAL
COD CRY URNS QL: ABNORMAL /HPF
COLOR UR: YELLOW
GLUCOSE UR STRIP-MCNC: NEGATIVE MG/DL
HGB UR QL STRIP.AUTO: ABNORMAL
HYALINE CASTS UR QL AUTO: ABNORMAL /LPF
KETONES UR QL STRIP: NEGATIVE
LEUKOCYTE ESTERASE UR QL STRIP.AUTO: ABNORMAL
NITRITE UR QL STRIP: NEGATIVE
PH UR STRIP.AUTO: 6.5 [PH] (ref 5–8)
PROT UR QL STRIP: ABNORMAL
RBC # UR STRIP: ABNORMAL /HPF
REF LAB TEST METHOD: ABNORMAL
SP GR UR STRIP: 1.01 (ref 1–1.03)
SQUAMOUS #/AREA URNS HPF: ABNORMAL /HPF
UROBILINOGEN UR QL STRIP: ABNORMAL
WBC # UR STRIP: ABNORMAL /HPF

## 2023-04-28 PROCEDURE — 81001 URINALYSIS AUTO W/SCOPE: CPT | Performed by: NURSE PRACTITIONER

## 2023-04-28 PROCEDURE — 87086 URINE CULTURE/COLONY COUNT: CPT | Performed by: NURSE PRACTITIONER

## 2023-04-29 LAB — BACTERIA SPEC AEROBE CULT: NO GROWTH

## 2023-05-01 ENCOUNTER — LAB REQUISITION (OUTPATIENT)
Dept: LAB | Facility: HOSPITAL | Age: 33
End: 2023-05-01
Payer: COMMERCIAL

## 2023-05-01 LAB
ANION GAP SERPL CALCULATED.3IONS-SCNC: 13 MMOL/L (ref 5–15)
BASOPHILS # BLD AUTO: 0.05 10*3/MM3 (ref 0–0.2)
BASOPHILS NFR BLD AUTO: 0.7 % (ref 0–1.5)
BUN SERPL-MCNC: 13 MG/DL (ref 6–20)
BUN/CREAT SERPL: 10.6 (ref 7–25)
CALCIUM SPEC-SCNC: 9.3 MG/DL (ref 8.6–10.5)
CHLORIDE SERPL-SCNC: 100 MMOL/L (ref 98–107)
CO2 SERPL-SCNC: 27 MMOL/L (ref 22–29)
CREAT SERPL-MCNC: 1.23 MG/DL (ref 0.76–1.27)
DEPRECATED RDW RBC AUTO: 42.8 FL (ref 37–54)
EGFRCR SERPLBLD CKD-EPI 2021: 78.5 ML/MIN/1.73
EOSINOPHIL # BLD AUTO: 0.23 10*3/MM3 (ref 0–0.4)
EOSINOPHIL NFR BLD AUTO: 3.2 % (ref 0.3–6.2)
ERYTHROCYTE [DISTWIDTH] IN BLOOD BY AUTOMATED COUNT: 14.3 % (ref 12.3–15.4)
GLUCOSE SERPL-MCNC: 128 MG/DL (ref 65–99)
HCT VFR BLD AUTO: 28.8 % (ref 37.5–51)
HGB BLD-MCNC: 8.3 G/DL (ref 13–17.7)
IMM GRANULOCYTES # BLD AUTO: 0.03 10*3/MM3 (ref 0–0.05)
IMM GRANULOCYTES NFR BLD AUTO: 0.4 % (ref 0–0.5)
LYMPHOCYTES # BLD AUTO: 1.87 10*3/MM3 (ref 0.7–3.1)
LYMPHOCYTES NFR BLD AUTO: 26 % (ref 19.6–45.3)
MCH RBC QN AUTO: 24.1 PG (ref 26.6–33)
MCHC RBC AUTO-ENTMCNC: 28.8 G/DL (ref 31.5–35.7)
MCV RBC AUTO: 83.5 FL (ref 79–97)
MONOCYTES # BLD AUTO: 0.65 10*3/MM3 (ref 0.1–0.9)
MONOCYTES NFR BLD AUTO: 9 % (ref 5–12)
NEUTROPHILS NFR BLD AUTO: 4.36 10*3/MM3 (ref 1.7–7)
NEUTROPHILS NFR BLD AUTO: 60.7 % (ref 42.7–76)
NRBC BLD AUTO-RTO: 0 /100 WBC (ref 0–0.2)
PLATELET # BLD AUTO: 355 10*3/MM3 (ref 140–450)
PMV BLD AUTO: 10.5 FL (ref 6–12)
POTASSIUM SERPL-SCNC: 3.7 MMOL/L (ref 3.5–5.2)
RBC # BLD AUTO: 3.45 10*6/MM3 (ref 4.14–5.8)
SODIUM SERPL-SCNC: 140 MMOL/L (ref 136–145)
WBC NRBC COR # BLD: 7.19 10*3/MM3 (ref 3.4–10.8)

## 2023-05-01 PROCEDURE — 36415 COLL VENOUS BLD VENIPUNCTURE: CPT | Performed by: NURSE PRACTITIONER

## 2023-05-01 PROCEDURE — 85025 COMPLETE CBC W/AUTO DIFF WBC: CPT | Performed by: NURSE PRACTITIONER

## 2023-05-01 PROCEDURE — 80048 BASIC METABOLIC PNL TOTAL CA: CPT | Performed by: NURSE PRACTITIONER

## 2023-05-03 ENCOUNTER — OFFICE VISIT (OUTPATIENT)
Dept: WOUND CARE | Facility: HOSPITAL | Age: 33
End: 2023-05-03
Payer: COMMERCIAL

## 2023-05-03 PROCEDURE — 97605 NEG PRS WND THER DME<=50SQCM: CPT

## 2023-05-08 ENCOUNTER — LAB REQUISITION (OUTPATIENT)
Dept: LAB | Facility: HOSPITAL | Age: 33
End: 2023-05-08
Payer: COMMERCIAL

## 2023-05-08 LAB
BACTERIA UR QL AUTO: ABNORMAL /HPF
BILIRUB UR QL STRIP: NEGATIVE
CLARITY UR: ABNORMAL
COLOR UR: YELLOW
GLUCOSE UR STRIP-MCNC: NEGATIVE MG/DL
HGB UR QL STRIP.AUTO: ABNORMAL
HYALINE CASTS UR QL AUTO: ABNORMAL /LPF
KETONES UR QL STRIP: NEGATIVE
LEUKOCYTE ESTERASE UR QL STRIP.AUTO: ABNORMAL
NITRITE UR QL STRIP: POSITIVE
PH UR STRIP.AUTO: >=9 [PH] (ref 5–8)
PROT UR QL STRIP: ABNORMAL
RBC # UR STRIP: ABNORMAL /HPF
REF LAB TEST METHOD: ABNORMAL
SP GR UR STRIP: 1.01 (ref 1–1.03)
SQUAMOUS #/AREA URNS HPF: ABNORMAL /HPF
UROBILINOGEN UR QL STRIP: ABNORMAL
WBC # UR STRIP: ABNORMAL /HPF

## 2023-05-08 PROCEDURE — 87086 URINE CULTURE/COLONY COUNT: CPT | Performed by: NURSE PRACTITIONER

## 2023-05-08 PROCEDURE — 87077 CULTURE AEROBIC IDENTIFY: CPT | Performed by: NURSE PRACTITIONER

## 2023-05-08 PROCEDURE — 81001 URINALYSIS AUTO W/SCOPE: CPT | Performed by: NURSE PRACTITIONER

## 2023-05-08 PROCEDURE — 87186 SC STD MICRODIL/AGAR DIL: CPT | Performed by: NURSE PRACTITIONER

## 2023-05-09 ENCOUNTER — OFFICE VISIT (OUTPATIENT)
Dept: WOUND CARE | Facility: HOSPITAL | Age: 33
End: 2023-05-09
Payer: COMMERCIAL

## 2023-05-09 PROCEDURE — 97605 NEG PRS WND THER DME<=50SQCM: CPT

## 2023-05-10 ENCOUNTER — LAB REQUISITION (OUTPATIENT)
Dept: LAB | Facility: HOSPITAL | Age: 33
End: 2023-05-10
Payer: COMMERCIAL

## 2023-05-10 LAB
ANION GAP SERPL CALCULATED.3IONS-SCNC: 12 MMOL/L (ref 5–15)
BACTERIA SPEC AEROBE CULT: ABNORMAL
BASOPHILS # BLD AUTO: 0.06 10*3/MM3 (ref 0–0.2)
BASOPHILS NFR BLD AUTO: 0.7 % (ref 0–1.5)
BUN SERPL-MCNC: 12 MG/DL (ref 6–20)
BUN/CREAT SERPL: 9.8 (ref 7–25)
CALCIUM SPEC-SCNC: 9.2 MG/DL (ref 8.6–10.5)
CHLORIDE SERPL-SCNC: 99 MMOL/L (ref 98–107)
CO2 SERPL-SCNC: 27 MMOL/L (ref 22–29)
CREAT SERPL-MCNC: 1.22 MG/DL (ref 0.76–1.27)
DEPRECATED RDW RBC AUTO: 44.9 FL (ref 37–54)
EGFRCR SERPLBLD CKD-EPI 2021: 79.3 ML/MIN/1.73
EOSINOPHIL # BLD AUTO: 0.17 10*3/MM3 (ref 0–0.4)
EOSINOPHIL NFR BLD AUTO: 2.1 % (ref 0.3–6.2)
ERYTHROCYTE [DISTWIDTH] IN BLOOD BY AUTOMATED COUNT: 14.8 % (ref 12.3–15.4)
GLUCOSE SERPL-MCNC: 128 MG/DL (ref 65–99)
HCT VFR BLD AUTO: 28.1 % (ref 37.5–51)
HGB BLD-MCNC: 8.1 G/DL (ref 13–17.7)
IMM GRANULOCYTES # BLD AUTO: 0.04 10*3/MM3 (ref 0–0.05)
IMM GRANULOCYTES NFR BLD AUTO: 0.5 % (ref 0–0.5)
LYMPHOCYTES # BLD AUTO: 1.43 10*3/MM3 (ref 0.7–3.1)
LYMPHOCYTES NFR BLD AUTO: 17.2 % (ref 19.6–45.3)
MCH RBC QN AUTO: 23.7 PG (ref 26.6–33)
MCHC RBC AUTO-ENTMCNC: 28.8 G/DL (ref 31.5–35.7)
MCV RBC AUTO: 82.2 FL (ref 79–97)
MONOCYTES # BLD AUTO: 0.86 10*3/MM3 (ref 0.1–0.9)
MONOCYTES NFR BLD AUTO: 10.4 % (ref 5–12)
NEUTROPHILS NFR BLD AUTO: 5.73 10*3/MM3 (ref 1.7–7)
NEUTROPHILS NFR BLD AUTO: 69.1 % (ref 42.7–76)
NRBC BLD AUTO-RTO: 0 /100 WBC (ref 0–0.2)
PLATELET # BLD AUTO: 379 10*3/MM3 (ref 140–450)
PMV BLD AUTO: 10.3 FL (ref 6–12)
POTASSIUM SERPL-SCNC: 4 MMOL/L (ref 3.5–5.2)
RBC # BLD AUTO: 3.42 10*6/MM3 (ref 4.14–5.8)
SODIUM SERPL-SCNC: 138 MMOL/L (ref 136–145)
WBC NRBC COR # BLD: 8.29 10*3/MM3 (ref 3.4–10.8)

## 2023-05-10 PROCEDURE — 36415 COLL VENOUS BLD VENIPUNCTURE: CPT | Performed by: NURSE PRACTITIONER

## 2023-05-10 PROCEDURE — 85025 COMPLETE CBC W/AUTO DIFF WBC: CPT | Performed by: NURSE PRACTITIONER

## 2023-05-10 PROCEDURE — 80048 BASIC METABOLIC PNL TOTAL CA: CPT | Performed by: NURSE PRACTITIONER

## 2023-05-15 ENCOUNTER — LAB REQUISITION (OUTPATIENT)
Dept: LAB | Facility: HOSPITAL | Age: 33
End: 2023-05-15
Payer: COMMERCIAL

## 2023-05-15 LAB
BASOPHILS # BLD AUTO: 0.07 10*3/MM3 (ref 0–0.2)
BASOPHILS NFR BLD AUTO: 1.1 % (ref 0–1.5)
DEPRECATED RDW RBC AUTO: 43.5 FL (ref 37–54)
EOSINOPHIL # BLD AUTO: 0.17 10*3/MM3 (ref 0–0.4)
EOSINOPHIL NFR BLD AUTO: 2.7 % (ref 0.3–6.2)
ERYTHROCYTE [DISTWIDTH] IN BLOOD BY AUTOMATED COUNT: 14.9 % (ref 12.3–15.4)
FERRITIN SERPL-MCNC: 276.1 NG/ML (ref 30–400)
HCT VFR BLD AUTO: 29.2 % (ref 37.5–51)
HGB BLD-MCNC: 8.1 G/DL (ref 13–17.7)
IMM GRANULOCYTES # BLD AUTO: 0.07 10*3/MM3 (ref 0–0.05)
IMM GRANULOCYTES NFR BLD AUTO: 1.1 % (ref 0–0.5)
IRON 24H UR-MRATE: 24 MCG/DL (ref 59–158)
IRON SATN MFR SERPL: 9 % (ref 20–50)
LYMPHOCYTES # BLD AUTO: 1.81 10*3/MM3 (ref 0.7–3.1)
LYMPHOCYTES NFR BLD AUTO: 29 % (ref 19.6–45.3)
MCH RBC QN AUTO: 22.6 PG (ref 26.6–33)
MCHC RBC AUTO-ENTMCNC: 27.7 G/DL (ref 31.5–35.7)
MCV RBC AUTO: 81.3 FL (ref 79–97)
MONOCYTES # BLD AUTO: 0.55 10*3/MM3 (ref 0.1–0.9)
MONOCYTES NFR BLD AUTO: 8.8 % (ref 5–12)
NEUTROPHILS NFR BLD AUTO: 3.57 10*3/MM3 (ref 1.7–7)
NEUTROPHILS NFR BLD AUTO: 57.3 % (ref 42.7–76)
NRBC BLD AUTO-RTO: 0 /100 WBC (ref 0–0.2)
PLATELET # BLD AUTO: 432 10*3/MM3 (ref 140–450)
PMV BLD AUTO: 10.2 FL (ref 6–12)
RBC # BLD AUTO: 3.59 10*6/MM3 (ref 4.14–5.8)
TIBC SERPL-MCNC: 274 MCG/DL (ref 298–536)
TRANSFERRIN SERPL-MCNC: 184 MG/DL (ref 200–360)
WBC NRBC COR # BLD: 6.24 10*3/MM3 (ref 3.4–10.8)

## 2023-05-15 PROCEDURE — 84466 ASSAY OF TRANSFERRIN: CPT | Performed by: NURSE PRACTITIONER

## 2023-05-15 PROCEDURE — 82728 ASSAY OF FERRITIN: CPT | Performed by: NURSE PRACTITIONER

## 2023-05-15 PROCEDURE — 85025 COMPLETE CBC W/AUTO DIFF WBC: CPT | Performed by: NURSE PRACTITIONER

## 2023-05-15 PROCEDURE — 82607 VITAMIN B-12: CPT | Performed by: NURSE PRACTITIONER

## 2023-05-15 PROCEDURE — 83540 ASSAY OF IRON: CPT | Performed by: NURSE PRACTITIONER

## 2023-05-15 PROCEDURE — 82746 ASSAY OF FOLIC ACID SERUM: CPT | Performed by: NURSE PRACTITIONER

## 2023-05-15 PROCEDURE — 36415 COLL VENOUS BLD VENIPUNCTURE: CPT | Performed by: NURSE PRACTITIONER

## 2023-05-16 ENCOUNTER — OFFICE VISIT (OUTPATIENT)
Dept: WOUND CARE | Facility: HOSPITAL | Age: 33
End: 2023-05-16
Payer: COMMERCIAL

## 2023-05-16 LAB
FOLATE SERPL-MCNC: 17 NG/ML (ref 4.78–24.2)
VIT B12 BLD-MCNC: 941 PG/ML (ref 211–946)

## 2023-05-18 ENCOUNTER — HOSPITAL ENCOUNTER (EMERGENCY)
Facility: HOSPITAL | Age: 33
Discharge: HOME | End: 2023-05-18
Attending: FAMILY MEDICINE
Payer: COMMERCIAL

## 2023-05-18 ENCOUNTER — APPOINTMENT (OUTPATIENT)
Dept: GENERAL RADIOLOGY | Facility: HOSPITAL | Age: 33
End: 2023-05-18
Payer: COMMERCIAL

## 2023-05-18 ENCOUNTER — APPOINTMENT (OUTPATIENT)
Dept: CT IMAGING | Facility: HOSPITAL | Age: 33
End: 2023-05-18
Payer: COMMERCIAL

## 2023-05-18 VITALS
HEIGHT: 75 IN | SYSTOLIC BLOOD PRESSURE: 108 MMHG | BODY MASS INDEX: 29.6 KG/M2 | HEART RATE: 110 BPM | DIASTOLIC BLOOD PRESSURE: 79 MMHG | OXYGEN SATURATION: 98 % | TEMPERATURE: 97.5 F | WEIGHT: 238.1 LBS | RESPIRATION RATE: 18 BRPM

## 2023-05-18 DIAGNOSIS — R05.1 ACUTE COUGH: ICD-10-CM

## 2023-05-18 DIAGNOSIS — D64.9 CHRONIC ANEMIA: ICD-10-CM

## 2023-05-18 DIAGNOSIS — B34.9 VIRAL SYNDROME: ICD-10-CM

## 2023-05-18 DIAGNOSIS — N30.00 ACUTE CYSTITIS WITHOUT HEMATURIA: Primary | ICD-10-CM

## 2023-05-18 LAB
ALBUMIN SERPL-MCNC: 3.8 G/DL (ref 3.5–5.2)
ALBUMIN/GLOB SERPL: 0.8 G/DL
ALP SERPL-CCNC: 183 U/L (ref 39–117)
ALT SERPL W P-5'-P-CCNC: 11 U/L (ref 1–41)
ANION GAP SERPL CALCULATED.3IONS-SCNC: 12 MMOL/L (ref 5–15)
APTT PPP: 31.6 SECONDS (ref 24.1–35)
AST SERPL-CCNC: 8 U/L (ref 1–40)
BACTERIA UR QL AUTO: ABNORMAL /HPF
BASOPHILS # BLD AUTO: 0.06 10*3/MM3 (ref 0–0.2)
BASOPHILS NFR BLD AUTO: 0.9 % (ref 0–1.5)
BILIRUB SERPL-MCNC: 0.3 MG/DL (ref 0–1.2)
BILIRUB UR QL STRIP: NEGATIVE
BUN SERPL-MCNC: 17 MG/DL (ref 6–20)
BUN/CREAT SERPL: 12.1 (ref 7–25)
CALCIUM SPEC-SCNC: 10.1 MG/DL (ref 8.6–10.5)
CHLORIDE SERPL-SCNC: 103 MMOL/L (ref 98–107)
CLARITY UR: ABNORMAL
CO2 SERPL-SCNC: 27 MMOL/L (ref 22–29)
COD CRY URNS QL: ABNORMAL /HPF
COLOR UR: YELLOW
CREAT SERPL-MCNC: 1.41 MG/DL (ref 0.76–1.27)
D-LACTATE SERPL-SCNC: 0.8 MMOL/L (ref 0.5–2)
DEPRECATED RDW RBC AUTO: 44.6 FL (ref 37–54)
EGFRCR SERPLBLD CKD-EPI 2021: 66.6 ML/MIN/1.73
EOSINOPHIL # BLD AUTO: 0.17 10*3/MM3 (ref 0–0.4)
EOSINOPHIL NFR BLD AUTO: 2.6 % (ref 0.3–6.2)
ERYTHROCYTE [DISTWIDTH] IN BLOOD BY AUTOMATED COUNT: 15.5 % (ref 12.3–15.4)
GLOBULIN UR ELPH-MCNC: 4.9 GM/DL
GLUCOSE SERPL-MCNC: 99 MG/DL (ref 65–99)
GLUCOSE UR STRIP-MCNC: NEGATIVE MG/DL
GRAN CASTS URNS QL MICRO: ABNORMAL /LPF
HCT VFR BLD AUTO: 28.8 % (ref 37.5–51)
HGB BLD-MCNC: 8.3 G/DL (ref 13–17.7)
HGB UR QL STRIP.AUTO: ABNORMAL
HYALINE CASTS UR QL AUTO: ABNORMAL /LPF
IMM GRANULOCYTES # BLD AUTO: 0.03 10*3/MM3 (ref 0–0.05)
IMM GRANULOCYTES NFR BLD AUTO: 0.5 % (ref 0–0.5)
INR PPP: 1.11 (ref 0.91–1.09)
KETONES UR QL STRIP: NEGATIVE
LEUKOCYTE ESTERASE UR QL STRIP.AUTO: ABNORMAL
LYMPHOCYTES # BLD AUTO: 1.92 10*3/MM3 (ref 0.7–3.1)
LYMPHOCYTES NFR BLD AUTO: 29.5 % (ref 19.6–45.3)
MAGNESIUM SERPL-MCNC: 2.1 MG/DL (ref 1.6–2.6)
MCH RBC QN AUTO: 23.2 PG (ref 26.6–33)
MCHC RBC AUTO-ENTMCNC: 28.8 G/DL (ref 31.5–35.7)
MCV RBC AUTO: 80.7 FL (ref 79–97)
MONOCYTES # BLD AUTO: 0.63 10*3/MM3 (ref 0.1–0.9)
MONOCYTES NFR BLD AUTO: 9.7 % (ref 5–12)
NEUTROPHILS NFR BLD AUTO: 3.7 10*3/MM3 (ref 1.7–7)
NEUTROPHILS NFR BLD AUTO: 56.8 % (ref 42.7–76)
NITRITE UR QL STRIP: NEGATIVE
NRBC BLD AUTO-RTO: 0 /100 WBC (ref 0–0.2)
PH UR STRIP.AUTO: 6.5 [PH] (ref 5–8)
PLATELET # BLD AUTO: 370 10*3/MM3 (ref 140–450)
PMV BLD AUTO: 9.6 FL (ref 6–12)
POTASSIUM SERPL-SCNC: 3.8 MMOL/L (ref 3.5–5.2)
PROLACTIN SERPL-MCNC: 193 NG/ML (ref 4.04–15.2)
PROT SERPL-MCNC: 8.7 G/DL (ref 6–8.5)
PROT UR QL STRIP: ABNORMAL
PROTHROMBIN TIME: 14.5 SECONDS (ref 11.8–14.8)
RBC # BLD AUTO: 3.57 10*6/MM3 (ref 4.14–5.8)
RBC # UR STRIP: ABNORMAL /HPF
REF LAB TEST METHOD: ABNORMAL
SODIUM SERPL-SCNC: 142 MMOL/L (ref 136–145)
SP GR UR STRIP: >1.03 (ref 1–1.03)
SQUAMOUS #/AREA URNS HPF: ABNORMAL /HPF
UROBILINOGEN UR QL STRIP: ABNORMAL
WBC # UR STRIP: ABNORMAL /HPF
WBC NRBC COR # BLD: 6.51 10*3/MM3 (ref 3.4–10.8)

## 2023-05-18 PROCEDURE — 85610 PROTHROMBIN TIME: CPT | Performed by: FAMILY MEDICINE

## 2023-05-18 PROCEDURE — 84146 ASSAY OF PROLACTIN: CPT | Performed by: FAMILY MEDICINE

## 2023-05-18 PROCEDURE — 87150 DNA/RNA AMPLIFIED PROBE: CPT | Performed by: FAMILY MEDICINE

## 2023-05-18 PROCEDURE — 83735 ASSAY OF MAGNESIUM: CPT | Performed by: FAMILY MEDICINE

## 2023-05-18 PROCEDURE — 70496 CT ANGIOGRAPHY HEAD: CPT

## 2023-05-18 PROCEDURE — 87040 BLOOD CULTURE FOR BACTERIA: CPT | Performed by: FAMILY MEDICINE

## 2023-05-18 PROCEDURE — 85025 COMPLETE CBC W/AUTO DIFF WBC: CPT | Performed by: FAMILY MEDICINE

## 2023-05-18 PROCEDURE — 25510000001 IOPAMIDOL PER 1 ML: Performed by: FAMILY MEDICINE

## 2023-05-18 PROCEDURE — 81001 URINALYSIS AUTO W/SCOPE: CPT | Performed by: FAMILY MEDICINE

## 2023-05-18 PROCEDURE — 96365 THER/PROPH/DIAG IV INF INIT: CPT

## 2023-05-18 PROCEDURE — 85730 THROMBOPLASTIN TIME PARTIAL: CPT | Performed by: FAMILY MEDICINE

## 2023-05-18 PROCEDURE — 80053 COMPREHEN METABOLIC PANEL: CPT | Performed by: FAMILY MEDICINE

## 2023-05-18 PROCEDURE — 87147 CULTURE TYPE IMMUNOLOGIC: CPT | Performed by: FAMILY MEDICINE

## 2023-05-18 PROCEDURE — 87086 URINE CULTURE/COLONY COUNT: CPT | Performed by: FAMILY MEDICINE

## 2023-05-18 PROCEDURE — 94640 AIRWAY INHALATION TREATMENT: CPT

## 2023-05-18 PROCEDURE — 94799 UNLISTED PULMONARY SVC/PX: CPT

## 2023-05-18 PROCEDURE — P9612 CATHETERIZE FOR URINE SPEC: HCPCS

## 2023-05-18 PROCEDURE — 36415 COLL VENOUS BLD VENIPUNCTURE: CPT

## 2023-05-18 PROCEDURE — 83605 ASSAY OF LACTIC ACID: CPT | Performed by: FAMILY MEDICINE

## 2023-05-18 PROCEDURE — 70450 CT HEAD/BRAIN W/O DYE: CPT

## 2023-05-18 PROCEDURE — 99284 EMERGENCY DEPT VISIT MOD MDM: CPT

## 2023-05-18 PROCEDURE — 70498 CT ANGIOGRAPHY NECK: CPT

## 2023-05-18 PROCEDURE — 71045 X-RAY EXAM CHEST 1 VIEW: CPT

## 2023-05-18 PROCEDURE — 25010000002 CEFTRIAXONE PER 250 MG: Performed by: FAMILY MEDICINE

## 2023-05-18 RX ORDER — IPRATROPIUM BROMIDE AND ALBUTEROL SULFATE 2.5; .5 MG/3ML; MG/3ML
3 SOLUTION RESPIRATORY (INHALATION)
Status: DISCONTINUED | OUTPATIENT
Start: 2023-05-18 | End: 2023-05-18 | Stop reason: HOSPADM

## 2023-05-18 RX ORDER — SODIUM CHLORIDE 0.9 % (FLUSH) 0.9 %
10 SYRINGE (ML) INJECTION AS NEEDED
Status: DISCONTINUED | OUTPATIENT
Start: 2023-05-18 | End: 2023-05-18 | Stop reason: HOSPADM

## 2023-05-18 RX ORDER — CEFDINIR 300 MG/1
300 CAPSULE ORAL 2 TIMES DAILY
Qty: 14 CAPSULE | Refills: 0 | Status: SHIPPED | OUTPATIENT
Start: 2023-05-18 | End: 2023-05-25

## 2023-05-18 RX ADMIN — IOPAMIDOL 100 ML: 755 INJECTION, SOLUTION INTRAVENOUS at 13:01

## 2023-05-18 RX ADMIN — IPRATROPIUM BROMIDE AND ALBUTEROL SULFATE 3 ML: 2.5; .5 SOLUTION RESPIRATORY (INHALATION) at 15:08

## 2023-05-18 RX ADMIN — CEFTRIAXONE 1 G: 1 INJECTION, POWDER, FOR SOLUTION INTRAMUSCULAR; INTRAVENOUS at 16:01
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
## 2023-05-19 LAB
BACTERIA BLD CULT: ABNORMAL
BACTERIA SPEC AEROBE CULT: NO GROWTH
BOTTLE TYPE: ABNORMAL

## 2023-05-20 ENCOUNTER — TELEPHONE (OUTPATIENT)
Dept: EMERGENCY DEPT | Facility: HOSPITAL | Age: 33
End: 2023-05-20
Payer: COMMERCIAL

## 2023-05-20 LAB
BACTERIA SPEC AEROBE CULT: ABNORMAL
GRAM STN SPEC: ABNORMAL
GRAM STN SPEC: ABNORMAL
ISOLATED FROM: ABNORMAL

## 2023-05-20 NOTE — TELEPHONE ENCOUNTER
Cultures and blood cultures.  Patient states he has been taking the antibiotic since leaving the ER and is feeling significantly better.  Advised that pending further susceptibility he may be contacted again however importance of returning should he develop any new or worsening symptoms.  Patient is appreciative and otherwise aware to follow-up with his primary care provider within 48 hours.  Patient with no further questions, concerns, or needs.

## 2023-05-23 ENCOUNTER — OFFICE VISIT (OUTPATIENT)
Dept: WOUND CARE | Facility: HOSPITAL | Age: 33
End: 2023-05-23
Payer: COMMERCIAL

## 2023-05-23 ENCOUNTER — TRANSCRIBE ORDERS (OUTPATIENT)
Dept: ADMINISTRATIVE | Facility: HOSPITAL | Age: 33
End: 2023-05-23
Payer: COMMERCIAL

## 2023-05-23 DIAGNOSIS — N20.0 KIDNEY STONE: Primary | ICD-10-CM

## 2023-05-23 DIAGNOSIS — N20.0 STAGHORN CALCULUS: ICD-10-CM

## 2023-05-23 LAB — BACTERIA SPEC AEROBE CULT: NORMAL

## 2023-05-23 PROCEDURE — 97605 NEG PRS WND THER DME<=50SQCM: CPT

## 2023-05-30 ENCOUNTER — OFFICE VISIT (OUTPATIENT)
Dept: WOUND CARE | Facility: HOSPITAL | Age: 33
End: 2023-05-30

## 2023-05-30 PROCEDURE — 97605 NEG PRS WND THER DME<=50SQCM: CPT

## 2023-06-05 ENCOUNTER — OFFICE VISIT (OUTPATIENT)
Dept: WOUND CARE | Facility: HOSPITAL | Age: 33
End: 2023-06-05
Payer: MEDICARE

## 2023-06-06 ENCOUNTER — LAB REQUISITION (OUTPATIENT)
Dept: LAB | Facility: HOSPITAL | Age: 33
End: 2023-06-06
Payer: MEDICARE

## 2023-06-06 LAB
ANION GAP SERPL CALCULATED.3IONS-SCNC: 14 MMOL/L (ref 5–15)
BASOPHILS # BLD AUTO: 0.07 10*3/MM3 (ref 0–0.2)
BASOPHILS NFR BLD AUTO: 1.1 % (ref 0–1.5)
BUN SERPL-MCNC: 17 MG/DL (ref 6–20)
BUN/CREAT SERPL: 13.1 (ref 7–25)
CALCIUM SPEC-SCNC: 9.4 MG/DL (ref 8.6–10.5)
CHLORIDE SERPL-SCNC: 101 MMOL/L (ref 98–107)
CO2 SERPL-SCNC: 24 MMOL/L (ref 22–29)
CREAT SERPL-MCNC: 1.3 MG/DL (ref 0.76–1.27)
DEPRECATED RDW RBC AUTO: 49.1 FL (ref 37–54)
EGFRCR SERPLBLD CKD-EPI 2021: 73.5 ML/MIN/1.73
EOSINOPHIL # BLD AUTO: 0.32 10*3/MM3 (ref 0–0.4)
EOSINOPHIL NFR BLD AUTO: 5 % (ref 0.3–6.2)
ERYTHROCYTE [DISTWIDTH] IN BLOOD BY AUTOMATED COUNT: 16.5 % (ref 12.3–15.4)
GLUCOSE SERPL-MCNC: 97 MG/DL (ref 65–99)
HCT VFR BLD AUTO: 31.8 % (ref 37.5–51)
HGB BLD-MCNC: 9.1 G/DL (ref 13–17.7)
IMM GRANULOCYTES # BLD AUTO: 0.02 10*3/MM3 (ref 0–0.05)
IMM GRANULOCYTES NFR BLD AUTO: 0.3 % (ref 0–0.5)
LYMPHOCYTES # BLD AUTO: 1.87 10*3/MM3 (ref 0.7–3.1)
LYMPHOCYTES NFR BLD AUTO: 29.5 % (ref 19.6–45.3)
MCH RBC QN AUTO: 23.4 PG (ref 26.6–33)
MCHC RBC AUTO-ENTMCNC: 28.6 G/DL (ref 31.5–35.7)
MCV RBC AUTO: 81.7 FL (ref 79–97)
MONOCYTES # BLD AUTO: 0.5 10*3/MM3 (ref 0.1–0.9)
MONOCYTES NFR BLD AUTO: 7.9 % (ref 5–12)
NEUTROPHILS NFR BLD AUTO: 3.56 10*3/MM3 (ref 1.7–7)
NEUTROPHILS NFR BLD AUTO: 56.2 % (ref 42.7–76)
NRBC BLD AUTO-RTO: 0 /100 WBC (ref 0–0.2)
PLATELET # BLD AUTO: 291 10*3/MM3 (ref 140–450)
PMV BLD AUTO: 11.4 FL (ref 6–12)
POTASSIUM SERPL-SCNC: 3.8 MMOL/L (ref 3.5–5.2)
RBC # BLD AUTO: 3.89 10*6/MM3 (ref 4.14–5.8)
SODIUM SERPL-SCNC: 139 MMOL/L (ref 136–145)
WBC NRBC COR # BLD: 6.34 10*3/MM3 (ref 3.4–10.8)

## 2023-06-06 PROCEDURE — 80048 BASIC METABOLIC PNL TOTAL CA: CPT | Performed by: NURSE PRACTITIONER

## 2023-06-06 PROCEDURE — 36415 COLL VENOUS BLD VENIPUNCTURE: CPT | Performed by: NURSE PRACTITIONER

## 2023-06-06 PROCEDURE — 85025 COMPLETE CBC W/AUTO DIFF WBC: CPT | Performed by: NURSE PRACTITIONER

## 2023-09-22 ENCOUNTER — HOSPITAL ENCOUNTER (EMERGENCY)
Facility: HOSPITAL | Age: 33
Discharge: HOME | End: 2023-09-22
Payer: COMMERCIAL

## 2023-09-22 ENCOUNTER — APPOINTMENT (OUTPATIENT)
Dept: CT IMAGING | Facility: HOSPITAL | Age: 33
End: 2023-09-22
Payer: COMMERCIAL

## 2023-09-22 ENCOUNTER — APPOINTMENT (OUTPATIENT)
Dept: GENERAL RADIOLOGY | Facility: HOSPITAL | Age: 33
End: 2023-09-22
Payer: COMMERCIAL

## 2023-09-22 VITALS
TEMPERATURE: 98 F | DIASTOLIC BLOOD PRESSURE: 75 MMHG | HEIGHT: 75 IN | SYSTOLIC BLOOD PRESSURE: 93 MMHG | HEART RATE: 80 BPM | BODY MASS INDEX: 29.76 KG/M2 | RESPIRATION RATE: 18 BRPM | OXYGEN SATURATION: 99 %

## 2023-09-22 DIAGNOSIS — R07.9 CHEST PAIN, UNSPECIFIED TYPE: ICD-10-CM

## 2023-09-22 DIAGNOSIS — Z86.16 HISTORY OF COVID-19: ICD-10-CM

## 2023-09-22 DIAGNOSIS — J98.11 ATELECTASIS: Primary | ICD-10-CM

## 2023-09-22 LAB
ALBUMIN SERPL-MCNC: 3.7 G/DL (ref 3.5–5.2)
ALBUMIN/GLOB SERPL: 0.9 G/DL
ALP SERPL-CCNC: 170 U/L (ref 39–117)
ALT SERPL W P-5'-P-CCNC: 9 U/L (ref 1–41)
ANION GAP SERPL CALCULATED.3IONS-SCNC: 12 MMOL/L (ref 5–15)
APTT PPP: 28.2 SECONDS (ref 24.5–36)
AST SERPL-CCNC: 8 U/L (ref 1–40)
BASOPHILS # BLD AUTO: 0.05 10*3/MM3 (ref 0–0.2)
BASOPHILS NFR BLD AUTO: 0.7 % (ref 0–1.5)
BILIRUB SERPL-MCNC: 0.3 MG/DL (ref 0–1.2)
BUN SERPL-MCNC: 16 MG/DL (ref 6–20)
BUN/CREAT SERPL: 13.4 (ref 7–25)
CALCIUM SPEC-SCNC: 9.3 MG/DL (ref 8.6–10.5)
CHLORIDE SERPL-SCNC: 101 MMOL/L (ref 98–107)
CO2 SERPL-SCNC: 27 MMOL/L (ref 22–29)
CREAT SERPL-MCNC: 1.19 MG/DL (ref 0.76–1.27)
D DIMER PPP FEU-MCNC: 0.73 MCGFEU/ML (ref 0–0.5)
DEPRECATED RDW RBC AUTO: 43.9 FL (ref 37–54)
EGFRCR SERPLBLD CKD-EPI 2021: 81.7 ML/MIN/1.73
EOSINOPHIL # BLD AUTO: 0.26 10*3/MM3 (ref 0–0.4)
EOSINOPHIL NFR BLD AUTO: 3.4 % (ref 0.3–6.2)
ERYTHROCYTE [DISTWIDTH] IN BLOOD BY AUTOMATED COUNT: 15.5 % (ref 12.3–15.4)
GEN 5 2HR TROPONIN T REFLEX: 11 NG/L
GLOBULIN UR ELPH-MCNC: 4.2 GM/DL
GLUCOSE SERPL-MCNC: 94 MG/DL (ref 65–99)
HCT VFR BLD AUTO: 36.3 % (ref 37.5–51)
HGB BLD-MCNC: 11.3 G/DL (ref 13–17.7)
HOLD SPECIMEN: NORMAL
HOLD SPECIMEN: NORMAL
IMM GRANULOCYTES # BLD AUTO: 0.07 10*3/MM3 (ref 0–0.05)
IMM GRANULOCYTES NFR BLD AUTO: 0.9 % (ref 0–0.5)
INR PPP: 1.02 (ref 0.91–1.09)
LYMPHOCYTES # BLD AUTO: 2.09 10*3/MM3 (ref 0.7–3.1)
LYMPHOCYTES NFR BLD AUTO: 27.7 % (ref 19.6–45.3)
MCH RBC QN AUTO: 24.7 PG (ref 26.6–33)
MCHC RBC AUTO-ENTMCNC: 31.1 G/DL (ref 31.5–35.7)
MCV RBC AUTO: 79.4 FL (ref 79–97)
MONOCYTES # BLD AUTO: 0.56 10*3/MM3 (ref 0.1–0.9)
MONOCYTES NFR BLD AUTO: 7.4 % (ref 5–12)
NEUTROPHILS NFR BLD AUTO: 4.51 10*3/MM3 (ref 1.7–7)
NEUTROPHILS NFR BLD AUTO: 59.9 % (ref 42.7–76)
NRBC BLD AUTO-RTO: 0 /100 WBC (ref 0–0.2)
PLATELET # BLD AUTO: 245 10*3/MM3 (ref 140–450)
PMV BLD AUTO: 9.6 FL (ref 6–12)
POTASSIUM SERPL-SCNC: 3.8 MMOL/L (ref 3.5–5.2)
PROT SERPL-MCNC: 7.9 G/DL (ref 6–8.5)
PROTHROMBIN TIME: 13.5 SECONDS (ref 11.8–14.8)
QT INTERVAL: 354 MS
QT INTERVAL: 364 MS
QTC INTERVAL: 415 MS
QTC INTERVAL: 438 MS
RBC # BLD AUTO: 4.57 10*6/MM3 (ref 4.14–5.8)
SODIUM SERPL-SCNC: 140 MMOL/L (ref 136–145)
TROPONIN T DELTA: -1 NG/L
TROPONIN T SERPL HS-MCNC: 12 NG/L
WBC NRBC COR # BLD: 7.54 10*3/MM3 (ref 3.4–10.8)
WHOLE BLOOD HOLD COAG: NORMAL
WHOLE BLOOD HOLD SPECIMEN: NORMAL

## 2023-09-22 PROCEDURE — 93005 ELECTROCARDIOGRAM TRACING: CPT

## 2023-09-22 PROCEDURE — 85610 PROTHROMBIN TIME: CPT | Performed by: NURSE PRACTITIONER

## 2023-09-22 PROCEDURE — 85025 COMPLETE CBC W/AUTO DIFF WBC: CPT | Performed by: NURSE PRACTITIONER

## 2023-09-22 PROCEDURE — 25510000001 IOPAMIDOL PER 1 ML: Performed by: NURSE PRACTITIONER

## 2023-09-22 PROCEDURE — 99285 EMERGENCY DEPT VISIT HI MDM: CPT

## 2023-09-22 PROCEDURE — 84484 ASSAY OF TROPONIN QUANT: CPT | Performed by: NURSE PRACTITIONER

## 2023-09-22 PROCEDURE — 93005 ELECTROCARDIOGRAM TRACING: CPT | Performed by: NURSE PRACTITIONER

## 2023-09-22 PROCEDURE — 80053 COMPREHEN METABOLIC PANEL: CPT | Performed by: NURSE PRACTITIONER

## 2023-09-22 PROCEDURE — 85379 FIBRIN DEGRADATION QUANT: CPT | Performed by: NURSE PRACTITIONER

## 2023-09-22 PROCEDURE — 85730 THROMBOPLASTIN TIME PARTIAL: CPT | Performed by: NURSE PRACTITIONER

## 2023-09-22 PROCEDURE — 71045 X-RAY EXAM CHEST 1 VIEW: CPT

## 2023-09-22 PROCEDURE — 71275 CT ANGIOGRAPHY CHEST: CPT

## 2023-09-22 PROCEDURE — 36415 COLL VENOUS BLD VENIPUNCTURE: CPT

## 2023-09-22 RX ORDER — ALBUTEROL SULFATE 2.5 MG/3ML
2.5 SOLUTION RESPIRATORY (INHALATION) EVERY 4 HOURS PRN
Qty: 20 EACH | Refills: 0 | Status: SHIPPED | OUTPATIENT
Start: 2023-09-22

## 2023-09-22 RX ORDER — SODIUM CHLORIDE 0.9 % (FLUSH) 0.9 %
10 SYRINGE (ML) INJECTION AS NEEDED
Status: DISCONTINUED | OUTPATIENT
Start: 2023-09-22 | End: 2023-09-22 | Stop reason: HOSPADM

## 2023-09-22 RX ADMIN — IOPAMIDOL 100 ML: 755 INJECTION, SOLUTION INTRAVENOUS at 14:40

## 2023-09-22 NOTE — ED PROVIDER NOTES
Subjective   History of Present Illness  Patient is a 33-year-old male who presents to the ER via EMS from [facility] with complaints of chest pain.  Patient was diagnosed with COVID last week.  He states this morning he started having left-sided chest pain described as sharp and stabbing in nature.  He states it hurts with breathing.  Patient is afebrile on exam and not hypoxic.  No chest pain on exam.  Past medical history significant for CVA, depression, Heema paresis, hydrocephalus, kidney stones, neurogenic bladder, seizures, sleep apnea, subdural hematoma, traumatic brain injury, UTIs      Review of Systems   Constitutional: Negative.  Negative for fever.   HENT:  Positive for congestion.    Respiratory:  Positive for cough and shortness of breath.    Cardiovascular:  Positive for chest pain.   Gastrointestinal: Negative.  Negative for abdominal pain, constipation, diarrhea, nausea and vomiting.   Genitourinary: Negative.  Negative for dysuria.   Musculoskeletal: Negative.  Negative for back pain.   Skin: Negative.    All other systems reviewed and are negative.    Past Medical History:   Diagnosis Date    CVA (cerebral vascular accident)     Depression     Hemiparesis     Hydrocephalus     Kidney stones     Neurogenic bladder     Neurogenic bowel     Seizure     Sinusitis     Sleep apnea     Subdural hematoma     TBI (traumatic brain injury)     UTI (urinary tract infection)        Allergies   Allergen Reactions    Dilantin [Phenytoin]     Latex     Penicillins     Sulfa Antibiotics        Past Surgical History:   Procedure Laterality Date    BACLOFEN PUMP IMPLANTATION      CHEST TUBE INSERTION      REMOVED    CRANIOTOMY      FOR EVACUATION OF SUBDURAL HEMATOMA & VENTRICULOSTOMY    HAMSTRING RELEASE      KIDNEY STONE SURGERY      PEG TUBE INSERTION      REMOVED    TRACHEOSTOMY      REMOVED     SHUNT INSERTION         Family History   Problem Relation Age of Onset    No Known Problems Mother      No Known Problems Father        Social History     Socioeconomic History    Marital status: Single   Tobacco Use    Smoking status: Never    Smokeless tobacco: Never   Vaping Use    Vaping Use: Never used   Substance and Sexual Activity    Alcohol use: No    Drug use: No    Sexual activity: Defer           Objective   Physical Exam  Vitals and nursing note reviewed.   Constitutional:       General: He is not in acute distress.     Appearance: He is well-developed. He is not diaphoretic.   HENT:      Head: Atraumatic.      Nose: Nose normal.   Eyes:      General: No scleral icterus.     Conjunctiva/sclera: Conjunctivae normal.      Pupils: Pupils are equal, round, and reactive to light.   Neck:      Thyroid: No thyromegaly.      Vascular: No JVD.   Cardiovascular:      Rate and Rhythm: Normal rate and regular rhythm.      Heart sounds: Normal heart sounds. No murmur heard.  Pulmonary:      Effort: Pulmonary effort is normal. No respiratory distress.      Breath sounds: Rales present. No wheezing.   Chest:      Chest wall: No tenderness.   Abdominal:      General: Bowel sounds are normal. There is no distension.      Palpations: Abdomen is soft. There is no mass.      Tenderness: There is no abdominal tenderness. There is no guarding or rebound.   Musculoskeletal:      Cervical back: Normal range of motion and neck supple.   Lymphadenopathy:      Cervical: No cervical adenopathy.   Skin:     General: Skin is warm and dry.      Capillary Refill: Capillary refill takes less than 2 seconds.      Coloration: Skin is not pale.      Findings: No erythema or rash.   Neurological:      General: No focal deficit present.      Mental Status: He is alert and oriented to person, place, and time.      Cranial Nerves: No cranial nerve deficit.      Coordination: Coordination normal.      Deep Tendon Reflexes: Reflexes are normal and symmetric.   Psychiatric:         Behavior: Behavior normal.         Thought Content: Thought  content normal.         Judgment: Judgment normal.       Procedures           ED Course                                           Medical Decision Making  Patient is a 33-year-old male who presents to the ER via EMS from [facility] with complaints of chest pain.  Patient was diagnosed with COVID last week.  He states this morning he started having left-sided chest pain described as sharp and stabbing in nature.  He states it hurts with breathing.  Patient is afebrile on exam and not hypoxic.  No chest pain on exam.  Past medical history significant for CVA, depression, Heema paresis, hydrocephalus, kidney stones, neurogenic bladder, seizures, sleep apnea, subdural hematoma, traumatic brain injury, UTIs  Differential diagnosis: Pneumonia, pleurisy, PE, and other    Labs Reviewed  COMPREHENSIVE METABOLIC PANEL - Abnormal; Notable for the following components:     Alkaline Phosphatase          170 (*)             All other components within normal limits         Narrative: GFR Normal >60                  Chronic Kidney Disease <60                  Kidney Failure <15                    D-DIMER, QUANTITATIVE - Abnormal; Notable for the following components:     D-Dimer, Quantitative         0.73 (*)            All other components within normal limits         Narrative: According to the assay 's published package insert, a normal (<0.50 MCGFEU/mL) D-dimer result in conjunction with a non-high clinical probability assessment, excludes deep vein thrombosis (DVT) and pulmonary embolism (PE) with high sensitivity.                                    D-dimer values increase with age and this can make VTE exclusion of an older population difficult. To address this, the [employer], based on best available evidence and recent guidelines, recommends that clinicians use age-adjusted D-dimer thresholds in patients greater than 50 years of age with: a) a low probability of PE who do not meet  "all Pulmonary Embolism Rule Out Criteria, or b) in those with intermediate probability of PE.                   The formula for an age-adjusted D-dimer cut-off is \"age/100\".                  For example, a 60 year old patient would have an age-adjusted cut-off of 0.60 MCGFEU/mL and an 80 year old 0.80 MCGFEU/mL.  CBC WITH AUTO DIFFERENTIAL - Abnormal; Notable for the following components:     Hemoglobin                    11.3 (*)               Hematocrit                    36.3 (*)               MCH                           24.7 (*)               MCHC                          31.1 (*)               RDW                           15.5 (*)               Immature Grans %              0.9 (*)                Immature Grans, Absolute      0.07 (*)            All other components within normal limits  PROTIME-INR - Normal  APTT - Normal  TROPONIN - Normal         Narrative: High Sensitive Troponin T Reference Range:                  <10.0 ng/L- Negative Female for AMI                  <15.0 ng/L- Negative Male for AMI                  >=10 - Abnormal Female indicating possible myocardial injury.                  >=15 - Abnormal Male indicating possible myocardial injury.                   Clinicians would have to utilize clinical acumen, EKG, Troponin, and serial changes to determine if it is an Acute Myocardial Infarction or myocardial injury due to an underlying chronic condition.                                       HIGH SENSITIVITIY TROPONIN T 2HR - Normal         Narrative: High Sensitive Troponin T Reference Range:                  <10.0 ng/L- Negative Female for AMI                  <15.0 ng/L- Negative Male for AMI                  >=10 - Abnormal Female indicating possible myocardial injury.                  >=15 - Abnormal Male indicating possible myocardial injury.                   Clinicians would have to utilize clinical acumen, EKG, Troponin, and serial changes to determine if it is an Acute Myocardial " Infarction or myocardial injury due to an underlying chronic condition.                                       RAINBOW DRAW         Narrative: The following orders were created for panel order [place] Draw.                  Procedure                               Abnormality         Status                                     ---------                               -----------         ------                                     Green Top (Gel)[[id number]]                                  Final result                               Lavender Top[[id number]]                                     Final result                               Red Top[[id number]]                                          Final result                               Light Blue Top[[id number]]                                   Final result                                                 Please view results for these tests on the individual orders.  GREEN TOP  LAVENDER TOP  RED TOP  LIGHT BLUE TOP  CBC AND DIFFERENTIAL   CT Angiogram Chest   Final Result    1.  No evidence of pulmonary embolus or other acute cardiopulmonary    process.    2.  Left lower lobe discoid atelectasis.    3.  Nonobstructing right renal stones.                   This report was signed and finalized on 9/22/2023 3:53 PM CDT by Dr Wander Wall.          XR Chest 1 View   Final Result    1. No acute disease.         This report was signed and finalized on 9/22/2023 12:17 PM CDT by Dr. Roldan Webb MD.         HEART Score for Major Cardiac Events - MDCalc  Calculated on Sep 22 2023 7:55 PM  1 points -> Low Score (0-3 points) Risk of MACE of 0.9-1.7%.  CTA of the chest is negative for pulmonary embolism.  Patient does have left lower lobe atelectasis.  We will treat with breathing treatments and recommend follow-up with PCP for reevaluation.  He is not hypoxic.  He is not toxic-appearing.  He will be discharged back to the nursing home in stable condition.    Problems  Addressed:  Atelectasis: acute illness or injury     Details: Cute on chronic  Chest pain, unspecified type: acute illness or injury  History of COVID-19: acute illness or injury    Amount and/or Complexity of Data Reviewed  Labs: ordered. Decision-making details documented in ED Course.  Radiology: ordered. Decision-making details documented in ED Course.  ECG/medicine tests: ordered. Decision-making details documented in ED Course.    Risk  Prescription drug management.        Final diagnoses:   Atelectasis   Chest pain, unspecified type   History of COVID-19       ED Disposition  ED Disposition       ED Disposition   Discharge    Condition   Good    Comment   --               No follow-up provider specified.       Medication List        New Prescriptions      albuterol (2.5 MG/3ML) 0.083% nebulizer solution  Commonly known as: PROVENTIL  Take 2.5 mg by nebulization Every 4 (Four) Hours As Needed for Wheezing.               Where to Get Your Medications        These medications were sent to [facility], [place] - [address] [place] [address] [phone] [facility] [phone]   [address] [address]      Phone: [phone]   albuterol (2.5 MG/3ML) 0.083% nebulizer solution            Xiomara Barrera, APRN  09/22/23 4122

## 2023-09-22 NOTE — DISCHARGE INSTRUCTIONS
Breathing treatments every 4-6 hours for the next several days and then PRN  F/u with pcp for re-evaluation

## 2023-09-29 LAB
QT INTERVAL: 354 MS
QT INTERVAL: 364 MS
QTC INTERVAL: 415 MS
QTC INTERVAL: 438 MS

## 2023-11-01 ENCOUNTER — OFFICE VISIT (OUTPATIENT)
Dept: WOUND CARE | Facility: HOSPITAL | Age: 33
End: 2023-11-01
Payer: COMMERCIAL

## 2023-11-01 PROCEDURE — G0463 HOSPITAL OUTPT CLINIC VISIT: HCPCS

## 2023-11-29 ENCOUNTER — OFFICE VISIT (OUTPATIENT)
Dept: WOUND CARE | Facility: HOSPITAL | Age: 33
End: 2023-11-29
Payer: COMMERCIAL

## 2023-11-30 ENCOUNTER — HOSPITAL ENCOUNTER (INPATIENT)
Facility: HOSPITAL | Age: 33
LOS: 4 days | Discharge: NURSING, REHAB OR LONG-TERM CARE | DRG: 871 | End: 2023-12-04
Attending: EMERGENCY MEDICINE | Admitting: FAMILY MEDICINE
Payer: COMMERCIAL

## 2023-11-30 ENCOUNTER — APPOINTMENT (OUTPATIENT)
Dept: GENERAL RADIOLOGY | Facility: HOSPITAL | Age: 33
DRG: 871 | End: 2023-11-30
Payer: COMMERCIAL

## 2023-11-30 DIAGNOSIS — G91.8 OTHER HYDROCEPHALUS: ICD-10-CM

## 2023-11-30 DIAGNOSIS — N39.0 ACUTE UTI (URINARY TRACT INFECTION): Primary | ICD-10-CM

## 2023-11-30 DIAGNOSIS — R13.10 DYSPHAGIA, UNSPECIFIED TYPE: ICD-10-CM

## 2023-11-30 DIAGNOSIS — A41.9 SEPSIS, DUE TO UNSPECIFIED ORGANISM, UNSPECIFIED WHETHER ACUTE ORGAN DYSFUNCTION PRESENT: ICD-10-CM

## 2023-11-30 LAB
ALBUMIN SERPL-MCNC: 4.2 G/DL (ref 3.5–5.2)
ALBUMIN/GLOB SERPL: 0.9 G/DL
ALP SERPL-CCNC: 192 U/L (ref 39–117)
ALT SERPL W P-5'-P-CCNC: 11 U/L (ref 1–41)
ANION GAP SERPL CALCULATED.3IONS-SCNC: 14 MMOL/L (ref 5–15)
ARTERIAL PATENCY WRIST A: ABNORMAL
AST SERPL-CCNC: 10 U/L (ref 1–40)
ATMOSPHERIC PRESS: 748 MMHG
BACTERIA UR QL AUTO: ABNORMAL /HPF
BASE EXCESS BLDA CALC-SCNC: 3 MMOL/L (ref 0–2)
BASOPHILS # BLD AUTO: 0.06 10*3/MM3 (ref 0–0.2)
BASOPHILS NFR BLD AUTO: 0.3 % (ref 0–1.5)
BDY SITE: ABNORMAL
BILIRUB SERPL-MCNC: 0.3 MG/DL (ref 0–1.2)
BILIRUB UR QL STRIP: NEGATIVE
BODY TEMPERATURE: 39.4
BUN SERPL-MCNC: 17 MG/DL (ref 6–20)
BUN/CREAT SERPL: 10.5 (ref 7–25)
CALCIUM SPEC-SCNC: 9.5 MG/DL (ref 8.6–10.5)
CHLORIDE SERPL-SCNC: 102 MMOL/L (ref 98–107)
CLARITY UR: ABNORMAL
CO2 SERPL-SCNC: 28 MMOL/L (ref 22–29)
COLOR UR: YELLOW
CREAT SERPL-MCNC: 1.62 MG/DL (ref 0.76–1.27)
D-LACTATE SERPL-SCNC: 2.7 MMOL/L (ref 0.5–2)
DEPRECATED RDW RBC AUTO: 41.7 FL (ref 37–54)
EGFRCR SERPLBLD CKD-EPI 2021: 56.4 ML/MIN/1.73
EOSINOPHIL # BLD AUTO: 0.01 10*3/MM3 (ref 0–0.4)
EOSINOPHIL NFR BLD AUTO: 0.1 % (ref 0.3–6.2)
ERYTHROCYTE [DISTWIDTH] IN BLOOD BY AUTOMATED COUNT: 13.9 % (ref 12.3–15.4)
FLUAV RNA RESP QL NAA+PROBE: NOT DETECTED
FLUBV RNA RESP QL NAA+PROBE: NOT DETECTED
GLOBULIN UR ELPH-MCNC: 4.6 GM/DL
GLUCOSE SERPL-MCNC: 142 MG/DL (ref 65–99)
GLUCOSE UR STRIP-MCNC: NEGATIVE MG/DL
HCO3 BLDA-SCNC: 25.2 MMOL/L (ref 20–26)
HCT VFR BLD AUTO: 42.4 % (ref 37.5–51)
HGB BLD-MCNC: 13 G/DL (ref 13–17.7)
HGB UR QL STRIP.AUTO: ABNORMAL
HYALINE CASTS UR QL AUTO: ABNORMAL /LPF
IMM GRANULOCYTES # BLD AUTO: 0.1 10*3/MM3 (ref 0–0.05)
IMM GRANULOCYTES NFR BLD AUTO: 0.5 % (ref 0–0.5)
INR PPP: 1.12 (ref 0.91–1.09)
KETONES UR QL STRIP: NEGATIVE
LEUKOCYTE ESTERASE UR QL STRIP.AUTO: ABNORMAL
LYMPHOCYTES # BLD AUTO: 0.74 10*3/MM3 (ref 0.7–3.1)
LYMPHOCYTES NFR BLD AUTO: 4 % (ref 19.6–45.3)
Lab: ABNORMAL
MCH RBC QN AUTO: 25.5 PG (ref 26.6–33)
MCHC RBC AUTO-ENTMCNC: 30.7 G/DL (ref 31.5–35.7)
MCV RBC AUTO: 83.1 FL (ref 79–97)
MODALITY: ABNORMAL
MONOCYTES # BLD AUTO: 0.91 10*3/MM3 (ref 0.1–0.9)
MONOCYTES NFR BLD AUTO: 4.9 % (ref 5–12)
NEUTROPHILS NFR BLD AUTO: 16.7 10*3/MM3 (ref 1.7–7)
NEUTROPHILS NFR BLD AUTO: 90.2 % (ref 42.7–76)
NITRITE UR QL STRIP: POSITIVE
NRBC BLD AUTO-RTO: 0 /100 WBC (ref 0–0.2)
PCO2 BLDA: 30.5 MM HG (ref 35–45)
PCO2 TEMP ADJ BLD: 33.9 MM HG (ref 35–45)
PH BLDA: 7.53 PH UNITS (ref 7.35–7.45)
PH UR STRIP.AUTO: 5.5 [PH] (ref 5–8)
PH, TEMP CORRECTED: 7.49 PH UNITS (ref 7.35–7.45)
PLATELET # BLD AUTO: 212 10*3/MM3 (ref 140–450)
PMV BLD AUTO: 10.4 FL (ref 6–12)
PO2 BLDA: 77.5 MM HG (ref 83–108)
PO2 TEMP ADJ BLD: 89.8 MM HG (ref 83–108)
POTASSIUM SERPL-SCNC: 4.4 MMOL/L (ref 3.5–5.2)
PROCALCITONIN SERPL-MCNC: 0.36 NG/ML (ref 0–0.25)
PROT SERPL-MCNC: 8.8 G/DL (ref 6–8.5)
PROT UR QL STRIP: NEGATIVE
PROTHROMBIN TIME: 14.6 SECONDS (ref 11.8–14.8)
RBC # BLD AUTO: 5.1 10*6/MM3 (ref 4.14–5.8)
RBC # UR STRIP: ABNORMAL /HPF
REF LAB TEST METHOD: ABNORMAL
SAO2 % BLDCOA: 97.2 % (ref 94–99)
SARS-COV-2 RNA RESP QL NAA+PROBE: NOT DETECTED
SODIUM SERPL-SCNC: 144 MMOL/L (ref 136–145)
SP GR UR STRIP: 1.01 (ref 1–1.03)
SQUAMOUS #/AREA URNS HPF: ABNORMAL /HPF
UROBILINOGEN UR QL STRIP: ABNORMAL
VENTILATOR MODE: ABNORMAL
WBC # UR STRIP: ABNORMAL /HPF
WBC NRBC COR # BLD AUTO: 18.52 10*3/MM3 (ref 3.4–10.8)

## 2023-11-30 PROCEDURE — 36600 WITHDRAWAL OF ARTERIAL BLOOD: CPT

## 2023-11-30 PROCEDURE — 83605 ASSAY OF LACTIC ACID: CPT | Performed by: EMERGENCY MEDICINE

## 2023-11-30 PROCEDURE — 71045 X-RAY EXAM CHEST 1 VIEW: CPT

## 2023-11-30 PROCEDURE — 85025 COMPLETE CBC W/AUTO DIFF WBC: CPT | Performed by: EMERGENCY MEDICINE

## 2023-11-30 PROCEDURE — 82803 BLOOD GASES ANY COMBINATION: CPT

## 2023-11-30 PROCEDURE — 85610 PROTHROMBIN TIME: CPT | Performed by: EMERGENCY MEDICINE

## 2023-11-30 PROCEDURE — 25810000003 SEPSIS FLUID NS 0.9 % SOLUTION: Performed by: EMERGENCY MEDICINE

## 2023-11-30 PROCEDURE — 99285 EMERGENCY DEPT VISIT HI MDM: CPT

## 2023-11-30 PROCEDURE — P9612 CATHETERIZE FOR URINE SPEC: HCPCS

## 2023-11-30 PROCEDURE — 25010000002 ERTAPENEM PER 500 MG: Performed by: EMERGENCY MEDICINE

## 2023-11-30 PROCEDURE — 25810000003 LACTATED RINGERS SOLUTION: Performed by: EMERGENCY MEDICINE

## 2023-11-30 PROCEDURE — 81001 URINALYSIS AUTO W/SCOPE: CPT | Performed by: EMERGENCY MEDICINE

## 2023-11-30 PROCEDURE — 93005 ELECTROCARDIOGRAM TRACING: CPT | Performed by: EMERGENCY MEDICINE

## 2023-11-30 PROCEDURE — 87040 BLOOD CULTURE FOR BACTERIA: CPT | Performed by: EMERGENCY MEDICINE

## 2023-11-30 PROCEDURE — 84145 PROCALCITONIN (PCT): CPT | Performed by: EMERGENCY MEDICINE

## 2023-11-30 PROCEDURE — 80053 COMPREHEN METABOLIC PANEL: CPT | Performed by: EMERGENCY MEDICINE

## 2023-11-30 PROCEDURE — 87636 SARSCOV2 & INF A&B AMP PRB: CPT | Performed by: EMERGENCY MEDICINE

## 2023-11-30 RX ORDER — ACETAMINOPHEN 500 MG
1000 TABLET ORAL ONCE
Status: COMPLETED | OUTPATIENT
Start: 2023-11-30 | End: 2023-11-30

## 2023-11-30 RX ADMIN — ERTAPENEM SODIUM 1000 MG: 1 INJECTION, POWDER, LYOPHILIZED, FOR SOLUTION INTRAMUSCULAR; INTRAVENOUS at 21:47

## 2023-11-30 RX ADMIN — SODIUM CHLORIDE, POTASSIUM CHLORIDE, SODIUM LACTATE AND CALCIUM CHLORIDE 1000 ML: 600; 310; 30; 20 INJECTION, SOLUTION INTRAVENOUS at 20:57

## 2023-11-30 RX ADMIN — SODIUM CHLORIDE, POTASSIUM CHLORIDE, SODIUM LACTATE AND CALCIUM CHLORIDE 500 ML: 600; 310; 30; 20 INJECTION, SOLUTION INTRAVENOUS at 20:57

## 2023-11-30 RX ADMIN — ACETAMINOPHEN TAB 500 MG 1000 MG: 500 TAB at 20:24

## 2023-11-30 RX ADMIN — SODIUM CHLORIDE 2682 ML: 9 INJECTION, SOLUTION INTRAVENOUS at 23:34

## 2023-12-01 ENCOUNTER — APPOINTMENT (OUTPATIENT)
Dept: ULTRASOUND IMAGING | Facility: HOSPITAL | Age: 33
DRG: 871 | End: 2023-12-01
Payer: COMMERCIAL

## 2023-12-01 LAB
ALBUMIN SERPL-MCNC: 3 G/DL (ref 3.5–5.2)
ALBUMIN/GLOB SERPL: 0.8 G/DL
ALP SERPL-CCNC: 144 U/L (ref 39–117)
ALT SERPL W P-5'-P-CCNC: 10 U/L (ref 1–41)
ANION GAP SERPL CALCULATED.3IONS-SCNC: 12 MMOL/L (ref 5–15)
AST SERPL-CCNC: 16 U/L (ref 1–40)
BASOPHILS # BLD AUTO: 0.05 10*3/MM3 (ref 0–0.2)
BASOPHILS NFR BLD AUTO: 0.5 % (ref 0–1.5)
BILIRUB SERPL-MCNC: 0.3 MG/DL (ref 0–1.2)
BUN SERPL-MCNC: 14 MG/DL (ref 6–20)
BUN/CREAT SERPL: 10.5 (ref 7–25)
CALCIUM SPEC-SCNC: 8 MG/DL (ref 8.6–10.5)
CHLORIDE SERPL-SCNC: 107 MMOL/L (ref 98–107)
CK SERPL-CCNC: 27 U/L (ref 20–200)
CO2 SERPL-SCNC: 21 MMOL/L (ref 22–29)
CREAT SERPL-MCNC: 1.33 MG/DL (ref 0.76–1.27)
D-LACTATE SERPL-SCNC: 1.2 MMOL/L (ref 0.5–2)
DEPRECATED RDW RBC AUTO: 41.7 FL (ref 37–54)
EGFRCR SERPLBLD CKD-EPI 2021: 71.5 ML/MIN/1.73
EOSINOPHIL # BLD AUTO: 0 10*3/MM3 (ref 0–0.4)
EOSINOPHIL NFR BLD AUTO: 0 % (ref 0.3–6.2)
ERYTHROCYTE [DISTWIDTH] IN BLOOD BY AUTOMATED COUNT: 13.9 % (ref 12.3–15.4)
GLOBULIN UR ELPH-MCNC: 4 GM/DL
GLUCOSE SERPL-MCNC: 114 MG/DL (ref 65–99)
HCT VFR BLD AUTO: 33.9 % (ref 37.5–51)
HGB BLD-MCNC: 10.5 G/DL (ref 13–17.7)
IMM GRANULOCYTES # BLD AUTO: 0.05 10*3/MM3 (ref 0–0.05)
IMM GRANULOCYTES NFR BLD AUTO: 0.5 % (ref 0–0.5)
LYMPHOCYTES # BLD AUTO: 0.78 10*3/MM3 (ref 0.7–3.1)
LYMPHOCYTES NFR BLD AUTO: 7.4 % (ref 19.6–45.3)
MCH RBC QN AUTO: 25.7 PG (ref 26.6–33)
MCHC RBC AUTO-ENTMCNC: 31 G/DL (ref 31.5–35.7)
MCV RBC AUTO: 82.9 FL (ref 79–97)
MONOCYTES # BLD AUTO: 0.71 10*3/MM3 (ref 0.1–0.9)
MONOCYTES NFR BLD AUTO: 6.7 % (ref 5–12)
NEUTROPHILS NFR BLD AUTO: 8.94 10*3/MM3 (ref 1.7–7)
NEUTROPHILS NFR BLD AUTO: 84.9 % (ref 42.7–76)
NRBC BLD AUTO-RTO: 0 /100 WBC (ref 0–0.2)
PLATELET # BLD AUTO: 174 10*3/MM3 (ref 140–450)
PMV BLD AUTO: 10.4 FL (ref 6–12)
POTASSIUM SERPL-SCNC: 3.9 MMOL/L (ref 3.5–5.2)
PROT SERPL-MCNC: 7 G/DL (ref 6–8.5)
RBC # BLD AUTO: 4.09 10*6/MM3 (ref 4.14–5.8)
SODIUM SERPL-SCNC: 140 MMOL/L (ref 136–145)
WBC NRBC COR # BLD AUTO: 10.53 10*3/MM3 (ref 3.4–10.8)

## 2023-12-01 PROCEDURE — 85025 COMPLETE CBC W/AUTO DIFF WBC: CPT | Performed by: FAMILY MEDICINE

## 2023-12-01 PROCEDURE — 80053 COMPREHEN METABOLIC PANEL: CPT | Performed by: FAMILY MEDICINE

## 2023-12-01 PROCEDURE — 94799 UNLISTED PULMONARY SVC/PX: CPT

## 2023-12-01 PROCEDURE — 83605 ASSAY OF LACTIC ACID: CPT | Performed by: EMERGENCY MEDICINE

## 2023-12-01 PROCEDURE — 76775 US EXAM ABDO BACK WALL LIM: CPT

## 2023-12-01 PROCEDURE — 25010000002 HEPARIN (PORCINE) PER 1000 UNITS: Performed by: FAMILY MEDICINE

## 2023-12-01 PROCEDURE — 94660 CPAP INITIATION&MGMT: CPT

## 2023-12-01 PROCEDURE — 82550 ASSAY OF CK (CPK): CPT | Performed by: FAMILY MEDICINE

## 2023-12-01 PROCEDURE — 25010000002 ERTAPENEM PER 500 MG: Performed by: FAMILY MEDICINE

## 2023-12-01 PROCEDURE — 25810000003 SODIUM CHLORIDE 0.9 % SOLUTION: Performed by: FAMILY MEDICINE

## 2023-12-01 PROCEDURE — 92610 EVALUATE SWALLOWING FUNCTION: CPT

## 2023-12-01 PROCEDURE — 99222 1ST HOSP IP/OBS MODERATE 55: CPT | Performed by: INTERNAL MEDICINE

## 2023-12-01 RX ORDER — AMOXICILLIN 250 MG
2 CAPSULE ORAL 2 TIMES DAILY
Status: DISCONTINUED | OUTPATIENT
Start: 2023-12-01 | End: 2023-12-04 | Stop reason: HOSPADM

## 2023-12-01 RX ORDER — FERROUS SULFATE 325(65) MG
325 TABLET ORAL DAILY
COMMUNITY

## 2023-12-01 RX ORDER — POLYETHYLENE GLYCOL 3350 17 G/17G
17 POWDER, FOR SOLUTION ORAL DAILY PRN
Status: DISCONTINUED | OUTPATIENT
Start: 2023-12-01 | End: 2023-12-04 | Stop reason: HOSPADM

## 2023-12-01 RX ORDER — OXYCODONE HYDROCHLORIDE AND ACETAMINOPHEN 5; 325 MG/1; MG/1
1 TABLET ORAL EVERY 4 HOURS PRN
Status: DISCONTINUED | OUTPATIENT
Start: 2023-12-01 | End: 2023-12-04 | Stop reason: HOSPADM

## 2023-12-01 RX ORDER — PAROXETINE HYDROCHLORIDE 20 MG/1
20 TABLET, FILM COATED ORAL NIGHTLY
COMMUNITY

## 2023-12-01 RX ORDER — SODIUM CHLORIDE 9 MG/ML
75 INJECTION, SOLUTION INTRAVENOUS CONTINUOUS
Status: DISCONTINUED | OUTPATIENT
Start: 2023-12-01 | End: 2023-12-04 | Stop reason: HOSPADM

## 2023-12-01 RX ORDER — BISACODYL 10 MG
10 SUPPOSITORY, RECTAL RECTAL DAILY PRN
Status: DISCONTINUED | OUTPATIENT
Start: 2023-12-01 | End: 2023-12-04 | Stop reason: HOSPADM

## 2023-12-01 RX ORDER — BISACODYL 5 MG/1
5 TABLET, DELAYED RELEASE ORAL DAILY PRN
Status: DISCONTINUED | OUTPATIENT
Start: 2023-12-01 | End: 2023-12-04 | Stop reason: HOSPADM

## 2023-12-01 RX ORDER — TRAZODONE HYDROCHLORIDE 150 MG/1
150 TABLET ORAL NIGHTLY
COMMUNITY
End: 2023-12-04 | Stop reason: HOSPADM

## 2023-12-01 RX ORDER — SODIUM CHLORIDE 0.9 % (FLUSH) 0.9 %
10 SYRINGE (ML) INJECTION EVERY 12 HOURS SCHEDULED
Status: DISCONTINUED | OUTPATIENT
Start: 2023-12-01 | End: 2023-12-04 | Stop reason: HOSPADM

## 2023-12-01 RX ORDER — AMANTADINE HYDROCHLORIDE 100 MG/1
100 CAPSULE, GELATIN COATED ORAL EVERY 12 HOURS SCHEDULED
Status: DISCONTINUED | OUTPATIENT
Start: 2023-12-01 | End: 2023-12-01 | Stop reason: SDUPTHER

## 2023-12-01 RX ORDER — UREA 10 %
220 LOTION (ML) TOPICAL DAILY
COMMUNITY
End: 2023-12-04 | Stop reason: HOSPADM

## 2023-12-01 RX ORDER — ONDANSETRON 2 MG/ML
4 INJECTION INTRAMUSCULAR; INTRAVENOUS EVERY 6 HOURS PRN
Status: DISCONTINUED | OUTPATIENT
Start: 2023-12-01 | End: 2023-12-04 | Stop reason: HOSPADM

## 2023-12-01 RX ORDER — RISPERIDONE 2 MG/1
2 TABLET ORAL NIGHTLY
COMMUNITY
End: 2023-12-04 | Stop reason: HOSPADM

## 2023-12-01 RX ORDER — AMANTADINE HYDROCHLORIDE 100 MG/1
100 TABLET ORAL EVERY 12 HOURS SCHEDULED
Status: DISCONTINUED | OUTPATIENT
Start: 2023-12-01 | End: 2023-12-04 | Stop reason: HOSPADM

## 2023-12-01 RX ORDER — FAMOTIDINE 20 MG/1
40 TABLET, FILM COATED ORAL DAILY
Status: DISCONTINUED | OUTPATIENT
Start: 2023-12-01 | End: 2023-12-04 | Stop reason: HOSPADM

## 2023-12-01 RX ORDER — RISPERIDONE 1 MG/1
3 TABLET ORAL 2 TIMES DAILY
Status: DISCONTINUED | OUTPATIENT
Start: 2023-12-01 | End: 2023-12-04 | Stop reason: HOSPADM

## 2023-12-01 RX ORDER — ALBUTEROL SULFATE 2.5 MG/3ML
2.5 SOLUTION RESPIRATORY (INHALATION) EVERY 4 HOURS PRN
Status: DISCONTINUED | OUTPATIENT
Start: 2023-12-01 | End: 2023-12-04 | Stop reason: HOSPADM

## 2023-12-01 RX ORDER — PAROXETINE HYDROCHLORIDE 20 MG/1
20 TABLET, FILM COATED ORAL NIGHTLY
Status: DISCONTINUED | OUTPATIENT
Start: 2023-12-01 | End: 2023-12-04 | Stop reason: HOSPADM

## 2023-12-01 RX ORDER — MULTIPLE VITAMINS W/ MINERALS TAB 9MG-400MCG
1 TAB ORAL DAILY
COMMUNITY

## 2023-12-01 RX ORDER — ASCORBIC ACID 500 MG
500 TABLET ORAL DAILY
Status: DISCONTINUED | OUTPATIENT
Start: 2023-12-01 | End: 2023-12-04 | Stop reason: HOSPADM

## 2023-12-01 RX ORDER — BUSPIRONE HYDROCHLORIDE 10 MG/1
10 TABLET ORAL 3 TIMES DAILY
Status: DISCONTINUED | OUTPATIENT
Start: 2023-12-01 | End: 2023-12-04 | Stop reason: HOSPADM

## 2023-12-01 RX ORDER — AMOXICILLIN 250 MG
2 CAPSULE ORAL 2 TIMES DAILY
COMMUNITY
End: 2023-12-04 | Stop reason: HOSPADM

## 2023-12-01 RX ORDER — LISINOPRIL 10 MG/1
10 TABLET ORAL DAILY
Status: DISCONTINUED | OUTPATIENT
Start: 2023-12-01 | End: 2023-12-01

## 2023-12-01 RX ORDER — FUROSEMIDE 20 MG/1
20 TABLET ORAL DAILY
COMMUNITY
End: 2023-12-04 | Stop reason: HOSPADM

## 2023-12-01 RX ORDER — BISACODYL 10 MG
10 SUPPOSITORY, RECTAL RECTAL DAILY PRN
COMMUNITY

## 2023-12-01 RX ORDER — SODIUM CHLORIDE 0.9 % (FLUSH) 0.9 %
10 SYRINGE (ML) INJECTION AS NEEDED
Status: DISCONTINUED | OUTPATIENT
Start: 2023-12-01 | End: 2023-12-04 | Stop reason: HOSPADM

## 2023-12-01 RX ORDER — POLYETHYLENE GLYCOL 3350 17 G/17G
17 POWDER, FOR SOLUTION ORAL DAILY PRN
COMMUNITY

## 2023-12-01 RX ORDER — GABAPENTIN 300 MG/1
300 CAPSULE ORAL 2 TIMES DAILY
Status: DISCONTINUED | OUTPATIENT
Start: 2023-12-01 | End: 2023-12-04 | Stop reason: HOSPADM

## 2023-12-01 RX ORDER — LACTULOSE 10 G/15ML
20 SOLUTION ORAL DAILY PRN
COMMUNITY

## 2023-12-01 RX ORDER — HYDROXYZINE HYDROCHLORIDE 25 MG/1
25 TABLET, FILM COATED ORAL 2 TIMES DAILY
COMMUNITY

## 2023-12-01 RX ORDER — ONDANSETRON 4 MG/1
4 TABLET, ORALLY DISINTEGRATING ORAL EVERY 8 HOURS PRN
COMMUNITY

## 2023-12-01 RX ORDER — MIDODRINE HYDROCHLORIDE 10 MG/1
10 TABLET ORAL
COMMUNITY
End: 2023-12-04 | Stop reason: HOSPADM

## 2023-12-01 RX ORDER — FLUTICASONE PROPIONATE 50 MCG
2 SPRAY, SUSPENSION (ML) NASAL DAILY
COMMUNITY

## 2023-12-01 RX ORDER — LORAZEPAM 0.5 MG/1
0.5 TABLET ORAL EVERY 8 HOURS PRN
Status: DISCONTINUED | OUTPATIENT
Start: 2023-12-01 | End: 2023-12-04 | Stop reason: HOSPADM

## 2023-12-01 RX ORDER — SODIUM CHLORIDE 9 MG/ML
40 INJECTION, SOLUTION INTRAVENOUS AS NEEDED
Status: DISCONTINUED | OUTPATIENT
Start: 2023-12-01 | End: 2023-12-04 | Stop reason: HOSPADM

## 2023-12-01 RX ORDER — CHOLECALCIFEROL (VITAMIN D3) 125 MCG
5 CAPSULE ORAL NIGHTLY
COMMUNITY

## 2023-12-01 RX ORDER — OXYCODONE HYDROCHLORIDE 5 MG/1
10 TABLET ORAL EVERY 8 HOURS
COMMUNITY
End: 2023-12-04 | Stop reason: HOSPADM

## 2023-12-01 RX ORDER — ACETAMINOPHEN 325 MG/1
650 TABLET ORAL EVERY 4 HOURS PRN
Status: DISCONTINUED | OUTPATIENT
Start: 2023-12-01 | End: 2023-12-04 | Stop reason: HOSPADM

## 2023-12-01 RX ORDER — TAMSULOSIN HYDROCHLORIDE 0.4 MG/1
0.4 CAPSULE ORAL DAILY
Status: DISCONTINUED | OUTPATIENT
Start: 2023-12-01 | End: 2023-12-04 | Stop reason: HOSPADM

## 2023-12-01 RX ORDER — BENZONATATE 100 MG/1
100 CAPSULE ORAL EVERY 8 HOURS PRN
COMMUNITY
End: 2023-12-04 | Stop reason: HOSPADM

## 2023-12-01 RX ORDER — HEPARIN SODIUM 5000 [USP'U]/ML
5000 INJECTION, SOLUTION INTRAVENOUS; SUBCUTANEOUS EVERY 8 HOURS SCHEDULED
Status: DISCONTINUED | OUTPATIENT
Start: 2023-12-01 | End: 2023-12-04 | Stop reason: HOSPADM

## 2023-12-01 RX ORDER — ACETAMINOPHEN 325 MG/1
650 TABLET ORAL EVERY 6 HOURS PRN
COMMUNITY

## 2023-12-01 RX ORDER — TIZANIDINE 4 MG/1
2 TABLET ORAL EVERY 12 HOURS SCHEDULED
Status: DISCONTINUED | OUTPATIENT
Start: 2023-12-01 | End: 2023-12-04 | Stop reason: HOSPADM

## 2023-12-01 RX ADMIN — GABAPENTIN 300 MG: 300 CAPSULE ORAL at 20:48

## 2023-12-01 RX ADMIN — AMANTADINE HYDROCHLORIDE 100 MG: 100 TABLET ORAL at 09:53

## 2023-12-01 RX ADMIN — RISPERIDONE 3 MG: 1 TABLET, FILM COATED ORAL at 20:48

## 2023-12-01 RX ADMIN — CITALOPRAM 30 MG: 20 TABLET, FILM COATED ORAL at 09:53

## 2023-12-01 RX ADMIN — ERTAPENEM 1000 MG: 1 INJECTION INTRAMUSCULAR; INTRAVENOUS at 20:24

## 2023-12-01 RX ADMIN — DOCUSATE SODIUM 50 MG AND SENNOSIDES 8.6 MG 2 TABLET: 8.6; 5 TABLET, FILM COATED ORAL at 09:53

## 2023-12-01 RX ADMIN — Medication 10 ML: at 01:14

## 2023-12-01 RX ADMIN — HEPARIN SODIUM 5000 UNITS: 5000 INJECTION INTRAVENOUS; SUBCUTANEOUS at 14:16

## 2023-12-01 RX ADMIN — TIZANIDINE 2 MG: 4 TABLET ORAL at 20:47

## 2023-12-01 RX ADMIN — AMANTADINE HYDROCHLORIDE 100 MG: 100 TABLET ORAL at 20:48

## 2023-12-01 RX ADMIN — TAMSULOSIN HYDROCHLORIDE 0.4 MG: 0.4 CAPSULE ORAL at 09:53

## 2023-12-01 RX ADMIN — BUSPIRONE HYDROCHLORIDE 10 MG: 10 TABLET ORAL at 20:48

## 2023-12-01 RX ADMIN — LISINOPRIL 10 MG: 10 TABLET ORAL at 09:53

## 2023-12-01 RX ADMIN — HEPARIN SODIUM 5000 UNITS: 5000 INJECTION INTRAVENOUS; SUBCUTANEOUS at 06:07

## 2023-12-01 RX ADMIN — SODIUM CHLORIDE 150 ML/HR: 9 INJECTION, SOLUTION INTRAVENOUS at 02:10

## 2023-12-01 RX ADMIN — HEPARIN SODIUM 5000 UNITS: 5000 INJECTION INTRAVENOUS; SUBCUTANEOUS at 22:30

## 2023-12-01 RX ADMIN — FAMOTIDINE 40 MG: 20 TABLET, FILM COATED ORAL at 09:53

## 2023-12-01 RX ADMIN — BUSPIRONE HYDROCHLORIDE 10 MG: 10 TABLET ORAL at 09:53

## 2023-12-01 RX ADMIN — SODIUM CHLORIDE 150 ML/HR: 9 INJECTION, SOLUTION INTRAVENOUS at 12:31

## 2023-12-01 RX ADMIN — BUSPIRONE HYDROCHLORIDE 10 MG: 10 TABLET ORAL at 18:18

## 2023-12-01 RX ADMIN — PAROXETINE 20 MG: 20 TABLET, FILM COATED ORAL at 20:48

## 2023-12-01 RX ADMIN — TIZANIDINE 2 MG: 4 TABLET ORAL at 09:53

## 2023-12-01 RX ADMIN — OXYCODONE HYDROCHLORIDE AND ACETAMINOPHEN 500 MG: 500 TABLET ORAL at 09:53

## 2023-12-01 RX ADMIN — RISPERIDONE 3 MG: 1 TABLET, FILM COATED ORAL at 09:53

## 2023-12-01 RX ADMIN — ACETAMINOPHEN 650 MG: 325 TABLET, FILM COATED ORAL at 18:18

## 2023-12-01 RX ADMIN — GABAPENTIN 300 MG: 300 CAPSULE ORAL at 09:53

## 2023-12-01 RX ADMIN — DOCUSATE SODIUM 50 MG AND SENNOSIDES 8.6 MG 2 TABLET: 8.6; 5 TABLET, FILM COATED ORAL at 20:50

## 2023-12-01 NOTE — THERAPY EVALUATION
Acute Care - Speech Language Pathology   Swallow Initial Evaluation [facility]     Patient Name: Octavio Werner  : 1990  MRN: [id number]  Today's Date: 2023               Admit Date: 2023    SPEECH-LANGUAGE PATHOLOGY EVALUATION - SWALLOW  Subjective: The patient was seen on this date for a Clinical Swallow evaluation.  Patient was alert and cooperative.  Significant history: UTI, sepsis, acute renal failure, AMARI on CPAP, CXR: basilar atelectasis; lungs otherwise clear. Hx TBI, storke, post-traumatic spasticity and  shunt.   Objective: Textures given included thin liquid and regular consistency.  Assessment: Difficulties were noted with regular consistency.  Observations:  Prolonged mastication of the regular solid due to poor dentition, but good clearance of oral residue. Delayed cough at end of assessment, but vocal quality remained clear. He also exhibited coughing prior to PO trials as well.   SLP Findings:  Patient presents with mild oral dysphagia, without esophageal component, rule out pharyngeal dysphagia.   Recommendations: Diet Textures: thin liquid, regular consistency food.  Medications should be taken whole with thin liquids.   Recommended Strategies: Upright for PO, small bites and sips, and alternate liquids and solids. Oral care before breakfast, after all meals and PRN.  Dysphagia therapy is recommended to monitor diet tolerance.   Isamar Whitlock, GANESH-SLP 2023 10:41 CST     Visit Dx:     ICD-10-CM ICD-9-CM   1. Acute UTI (urinary tract infection)  N39.0 599.0   2. Sepsis, due to unspecified organism, unspecified whether acute organ dysfunction present  A41.9 038.9     995.91   3. Dysphagia, unspecified type  R13.10 787.20     Patient Active Problem List   Diagnosis    Closed TBI (traumatic brain injury)    Hydrocephalus    S/P  shunt    Presence of intrathecal baclofen pump    H/O traumatic subdural hematoma    Intractable epilepsy without status epilepticus     Post-traumatic spasticity    AMARI on CPAP    Sepsis with acute renal failure, due to unspecified organism, unspecified acute renal failure type, unspecified whether septic shock present    Sepsis     Past Medical History:   Diagnosis Date    CVA (cerebral vascular accident)     Depression     Hemiparesis     Hydrocephalus     Kidney stones     Neurogenic bladder     Neurogenic bowel     Seizure     Sinusitis     Sleep apnea     Subdural hematoma     TBI (traumatic brain injury)     UTI (urinary tract infection)      Past Surgical History:   Procedure Laterality Date    BACLOFEN PUMP IMPLANTATION      CHEST TUBE INSERTION      REMOVED    CRANIOTOMY      FOR EVACUATION OF SUBDURAL HEMATOMA & VENTRICULOSTOMY    HAMSTRING RELEASE      KIDNEY STONE SURGERY      PEG TUBE INSERTION      REMOVED    TRACHEOSTOMY      REMOVED     SHUNT INSERTION         SLP Recommendation and Plan  SLP Swallowing Diagnosis: swallow WFL/no suspected pharyngeal impairment (12/01/23 0954)  SLP Diet Recommendation: regular textures, thin liquids (12/01/23 0954)  Recommended Precautions and Strategies: upright posture during/after eating, small bites of food and sips of liquid, alternate between small bites of food and sips of liquid, general aspiration precautions (12/01/23 0954)  SLP Rec. for Method of Medication Administration: meds whole, with thin liquids (12/01/23 0954)     Monitor for Signs of Aspiration: yes, cough, gurgly voice, throat clearing, pneumonia, notify SLP if any concerns (12/01/23 0954)     Swallow Criteria for Skilled Therapeutic Interventions Met: demonstrates skilled criteria (12/01/23 0954)  Anticipated Discharge Disposition (SLP): extended care facility (12/01/23 0954)  Rehab Potential/Prognosis, Swallowing: good, to achieve stated therapy goals (12/01/23 0954)  Therapy Frequency (Swallow): PRN (12/01/23 0954)  Predicted Duration Therapy Intervention (Days): until discharge (12/01/23 0954)  Oral Care Recommendations:  Oral Care BID/PRN (12/01/23 0954)                                      Oral Care Recommendations: Oral Care BID/PRN (12/01/23 0954)    Plan of Care Reviewed With: patient, sibling, family  Outcome Evaluation: See note      SWALLOW EVALUATION (last 72 hours)       SLP Adult Swallow Evaluation       Row Name 12/01/23 0954                   Rehab Evaluation    Document Type evaluation  -MB        Subjective Information no complaints  -MB        Patient Observations alert;cooperative  -MB        Patient/Family/Caregiver Comments/Observations Brother and sister arrived during evaluation  -MB           General Information    Patient Profile Reviewed yes  -MB        Pertinent History Of Current Problem UTI, sepsis, acute renal failure, AMARI on CPAP, CXR: basilar atelectasis; lungs otherwise clear. Hx TBI, storke, post-traumatic spasticity and  shunt.  -MB        Current Method of Nutrition pureed;nectar/syrup-thick liquids  -MB        Precautions/Limitations, Vision WFL with corrective lenses  -MB        Precautions/Limitations, Hearing WFL;for purposes of eval  -MB        Prior Level of Function-Communication WFL  -MB        Prior Level of Function-Swallowing no diet consistency restrictions  -MB        Plans/Goals Discussed with patient  -MB        Barriers to Rehab none identified  -MB        Patient's Goals for Discharge return to regular diet  -MB        Family Goals for Discharge patient able to return to regular diet  -MB           Pain    Additional Documentation Pain Scale: FACES Pre/Post-Treatment (Group)  -MB           Pain Scale: FACES Pre/Post-Treatment    Pain: FACES Scale, Pretreatment 0-->no hurt  -MB           Oral Motor Structure and Function    Dentition Assessment poor oral hygiene;missing teeth;teeth are in poor condition  -MB        Secretion Management WNL/WFL  -MB        Mucosal Quality dry  -MB           Oral Musculature and Cranial Nerve Assessment    Oral Motor General Assessment generalized  oral motor weakness  -MB           General Eating/Swallowing Observations    Eating/Swallowing Skills fed by SLP  -MB        Positioning During Eating upright in bed  -MB        Utensils Used straw  -MB        Consistencies Trialed regular textures;thin liquids  -MB           Clinical Swallow Eval    Oral Prep Phase impaired  -MB        Oral Transit WFL  -MB        Oral Residue WFL  -MB        Pharyngeal Phase suspected pharyngeal impairment  -MB        Esophageal Phase unremarkable  -MB        Clinical Swallow Evaluation Summary See note  -MB           Oral Prep Concerns    Oral Prep Concerns prolonged mastication  -MB        Prolonged Mastication regular consistencies  -MB           Pharyngeal Phase Concerns    Pharyngeal Phase Concerns cough  -MB        Cough other (see comments)  delayed at end of evaluation  -MB           SLP Evaluation Clinical Impression    SLP Swallowing Diagnosis swallow WFL/no suspected pharyngeal impairment  -MB        Functional Impact risk of aspiration/pneumonia  -MB        Rehab Potential/Prognosis, Swallowing good, to achieve stated therapy goals  -MB        Swallow Criteria for Skilled Therapeutic Interventions Met demonstrates skilled criteria  -MB           Recommendations    Therapy Frequency (Swallow) PRN  -MB        Predicted Duration Therapy Intervention (Days) until discharge  -MB        SLP Diet Recommendation regular textures;thin liquids  -MB        Recommended Precautions and Strategies upright posture during/after eating;small bites of food and sips of liquid;alternate between small bites of food and sips of liquid;general aspiration precautions  -MB        Oral Care Recommendations Oral Care BID/PRN  -MB        SLP Rec. for Method of Medication Administration meds whole;with thin liquids  -MB        Monitor for Signs of Aspiration yes;cough;gurgly voice;throat clearing;pneumonia;notify SLP if any concerns  -MB        Anticipated Discharge Disposition (SLP) extended care  facility  -MB           Swallow Goals (SLP)    Swallow LTGs Swallow Long Term Goal (free text)  -MB        Swallow STGs diet tolerance goal selection (SLP)  -MB        Diet Tolerance Goal Selection (SLP) Patient will tolerate trials of  -MB           (LTG) Swallow    (LTG) Swallow Pt will tolerate least restrictive diet with no overt s/s of aspiration.  -MB        [place] (Swallow Long Term Goal) independently (over 90% accuracy)  -MB        Time Frame (Swallow Long Term Goal) by discharge  -MB        Barriers (Swallow Long Term Goal) n/a  -MB        Progress/Outcomes (Swallow Long Term Goal) new goal  -MB        Comment (Swallow Long Term Goal) n/a  -MB           (STG) Patient will tolerate trials of    Consistencies Trialed (Tolerate trials) regular textures;thin liquids  -MB        Desired Outcome (Tolerate trials) without signs/symptoms of aspiration;with adequate oral prep/transit/clearance  -MB        [place] (Tolerate trials) independently (over 90% accuracy)  -MB        Time Frame (Tolerate trials) by discharge  -MB        Progress/Outcomes (Tolerate trials) new goal  -MB        Comment (Tolerate trials) n/a  -MB                  User Key  (r) = Recorded By, (t) = Taken By, (c) = Cosigned By      Initials Name Effective Dates    Isamar Isabel, CCC-SLP 02/03/23 -                     EDUCATION  The patient has been educated in the following areas:   Dysphagia (Swallowing Impairment).        SLP GOALS       Row Name 12/01/23 0954             (LTG) Swallow    (LTG) Swallow Pt will tolerate least restrictive diet with no overt s/s of aspiration.  -MB      [place] (Swallow Long Term Goal) independently (over 90% accuracy)  -MB      Time Frame (Swallow Long Term Goal) by discharge  -MB      Barriers (Swallow Long Term Goal) n/a  -MB      Progress/Outcomes (Swallow Long Term Goal) new goal  -MB      Comment (Swallow Long Term Goal) n/a  -MB         (STG) Patient will tolerate trials of     Consistencies Trialed (Tolerate trials) regular textures;thin liquids  -MB      Desired Outcome (Tolerate trials) without signs/symptoms of aspiration;with adequate oral prep/transit/clearance  -MB      [place] (Tolerate trials) independently (over 90% accuracy)  -MB      Time Frame (Tolerate trials) by discharge  -MB      Progress/Outcomes (Tolerate trials) new goal  -MB      Comment (Tolerate trials) n/a  -MB                User Key  (r) = Recorded By, (t) = Taken By, (c) = Cosigned By      Initials Name Provider Type    Isamar Isabel CCC-SLP Speech and Language Pathologist                       Time Calculation:    Time Calculation- SLP       Row Name 12/01/23 1041             Time Calculation- SLP    SLP Start Time 0954  -MB      SLP Stop Time 1041  -MB      SLP Time Calculation (min) 47 min  -MB      SLP Received On 12/01/23  -MB      SLP Goal Re-Cert Due Date 12/11/23  -MB         Untimed Charges    [id number] Eval Oral Pharyng Swallow Minutes 47  -MB         Total Minutes    Untimed Charges Total Minutes 47  -MB       Total Minutes 47  -MB                User Key  (r) = Recorded By, (t) = Taken By, (c) = Cosigned By      Initials Name Provider Type    Isamar Isabel CCC-SLP Speech and Language Pathologist                    Therapy Charges for Today       Code Description Service Date Service Provider Modifiers Qty    [id number]  ST EVAL ORAL PHARYNG SWALLOW 3 12/1/2023 Isamar Whitlock CCC-SLP GN 1                 AYLIN Rasheed  12/1/2023

## 2023-12-01 NOTE — H&P
[facility] Medicine Services  HISTORY AND PHYSICAL    Date of Admission: 11/30/2023  Primary Care Physician: Sarath Camacho DO Subjective   Primary Historian: Patient    Chief Complaint: Fevers, malaise    History of Present Illness  33-year-old male with history of severe traumatic brain injury, reported stroke in 2005, hydrocephalus, status post craniotomy and  shunt placement, chronic left hemiplegia, functional paraplegia, wheelchair-bound, nephrolithiasis, obstructive sleep apnea, sacral pressure ulcer, recurrent urinary tract infections, history of renal cell carcinoma, status post left simple nephrectomy, to the hospital reporting malaise, and fevers.  Apparently patient lives in a nursing home, and was sent from the setting for evaluation in the emergency department.  No abdominal pain reported.  No vomiting.  No cough.  No chest pain.  No diarrhea.  No Arnold in place.        Review of Systems   Otherwise complete ROS reviewed and negative except as mentioned in the HPI.    Past Medical History:   Past Medical History:   Diagnosis Date    CVA (cerebral vascular accident)     Depression     Hemiparesis     Hydrocephalus     Kidney stones     Neurogenic bladder     Neurogenic bowel     Seizure     Sinusitis     Sleep apnea     Subdural hematoma     TBI (traumatic brain injury)     UTI (urinary tract infection)      Past Surgical History:  Past Surgical History:   Procedure Laterality Date    BACLOFEN PUMP IMPLANTATION      CHEST TUBE INSERTION      REMOVED    CRANIOTOMY      FOR EVACUATION OF SUBDURAL HEMATOMA & VENTRICULOSTOMY    HAMSTRING RELEASE      KIDNEY STONE SURGERY      PEG TUBE INSERTION      REMOVED    TRACHEOSTOMY      REMOVED     SHUNT INSERTION       Social History:  reports that he has never smoked. He has never used smokeless tobacco. He reports that he does not drink alcohol and does not use drugs.    Family History: family history includes No  Known Problems in his father and mother.   Reviewed    Allergies:  Allergies   Allergen Reactions    Dilantin [Phenytoin]     Latex     Penicillins     Sulfa Antibiotics        Medications:  Prior to Admission medications    Medication Sig Start Date End Date Taking? Authorizing Provider   albuterol (PROVENTIL) (2.5 MG/3ML) 0.083% nebulizer solution Take 2.5 mg by nebulization Every 4 (Four) Hours As Needed for Wheezing. 9/22/23   Xiomara Barrera APRN   amantadine (SYMMETREL) 100 MG capsule Take 100 mg by mouth 2 (Two) Times a Day.    Bipin Zamora MD   busPIRone (BUSPAR) 10 MG tablet Take 10 mg by mouth 3 (Three) Times a Day.    Bipin Zamora MD   Cholecalciferol (Vitamin D3) 10 MCG (400 UNIT) capsule Take 1 capsule by mouth Daily.    Bipin Zamora MD   citalopram (CeleXA) 20 MG tablet Take 30 mg by mouth Daily.    Bipin Zamora MD   gabapentin (NEURONTIN) 300 MG capsule Take 300 mg by mouth 2 (Two) Times a Day.    Bipin Zamora MD   HYDROcodone-acetaminophen (NORCO) 5-325 MG per tablet Take 1 tablet by mouth Every 6 (Six) Hours As Needed.    Bpiin Zamora MD   lisinopril (PRINIVIL,ZESTRIL) 10 MG tablet Take 10 mg by mouth Daily. 7/20/17   Bipin Zamora MD   nitrofurantoin, macrocrystal-monohydrate, (MACROBID) 100 MG capsule Take 100 mg by mouth 2 (Two) Times a Day.    Bipin Zamora MD   Omega-3 1000 MG capsule Take 1,000 mg by mouth Daily.    Bipin Zamora MD   risperiDONE (risperDAL) 3 MG tablet Take 3 mg by mouth 2 (Two) Times a Day.    Bipin Zamora MD   tamsulosin (FLOMAX) 0.4 MG capsule 24 hr capsule Take 1 capsule by mouth Daily.    Bipin Zamora MD   TiZANidine (ZANAFLEX) 2 MG capsule Take 2 mg by mouth 2 (Two) Times a Day.    Bipin Zamora MD   vitamin C (ASCORBIC ACID) 500 MG tablet Take 500 mg by mouth 2 (Two) Times a Day.    Bipin Zamora MD     I have utilized all available immediate resources  "to obtain, update, or review the patient's current medications (including all prescriptions, over-the-counter products, herbals, cannabis/cannabidiol products, and vitamin/mineral/dietary (nutritional) supplements).    Objective     Vital Signs: /81   Pulse 112   Temp 98.4 °F (36.9 °C)   Resp 19   Ht 182.9 cm (72\")   Wt 107 kg (235 lb)   SpO2 99%   BMI 31.87 kg/m²   Physical Exam   Physical Exam  Constitutional:       Appearance: Chronically ill-appearing; alert, oriented.  HENT:      Head: Normocephalic and atraumatic.      Nose: Nose normal.      Mouth/Throat:      Mouth: Dentition, mucous membranes are dry     Pharynx: Oropharynx is clear.   Eyes:      Extraocular Movements: Extraocular movements intact.      Conjunctiva/sclera: Conjunctivae normal.      Pupils: Pupils are equal, round, and reactive to light.   Cardiovascular:      Rate and Rhythm: Normal rate and regular rhythm.      Pulses: Normal pulses.   Pulmonary:      Effort: No respiratory distress.      Breath sounds: Normal breath sounds. No wheezing, rhonchi or rales.   Abdominal:      General: Abdomen is obese, flat. Bowel sounds are normal.      Palpations: Abdomen is soft.      Tenderness: There is no guarding or rebound.   Musculoskeletal:         General: Atrophic musculature in lower extremities.     Left lower leg: No edema.   Skin:     Capillary Refill: Capillary refill takes less than 2 seconds.      Coloration: Skin is not jaundiced.      Findings: No rash.   Neurological:      General: Slow speech.     Mental Status: He is alert. He is oriented.     Sensory: No sensory deficit.      Motor: Left hemiplegia.     Coordination: Coordination normal.   Psychiatric:         Mood and Affect: Mood normal.         Behavior: Behavior normal.           Results Reviewed:  Lab Results (last 24 hours)       Procedure Component Value Units Date/Time    STAT Lactic Acid, Reflex [[id number]]  (Normal) Collected: 12/01/23 0113    Specimen: Blood " "from Arm, Right Updated: 12/01/23 0136     Lactate 1.2 mmol/L     Urinalysis With Microscopic If Indicated (No Culture) - Straight Cath [[id number]]  (Abnormal) Collected: 11/30/23 2144    Specimen: Urine from Straight Cath Updated: 11/30/23 2223     Color, UA Yellow     Appearance, UA Cloudy     pH, UA 5.5     Specific Gravity, UA 1.013     Glucose, UA Negative     Ketones, UA Negative     Bilirubin, UA Negative     Blood, UA Moderate (2+)     Protein, UA Negative     Leuk Esterase, UA Large (3+)     Nitrite, UA Positive     Urobilinogen, UA 1.0 E.U./dL    Urinalysis, Microscopic Only - Straight Cath [[id number]]  (Abnormal) Collected: 11/30/23 2144    Specimen: Urine from Straight Cath Updated: 11/30/23 2223     RBC, UA 11-20 /HPF      WBC, UA Too Numerous to Count /HPF      Bacteria, UA 1+ /HPF      Squamous Epithelial Cells, UA 3-6 /HPF      Hyaline Casts, UA None Seen /LPF      Methodology Manual Light Microscopy    Procalcitonin [[id number]]  (Abnormal) Collected: 11/30/23 2027    Specimen: Blood Updated: 11/30/23 2127     Procalcitonin 0.36 ng/mL     Narrative:      As a Marker for Sepsis (Non-Neonates):    1. <0.5 ng/mL represents a low risk of severe sepsis and/or septic shock.  2. >2 ng/mL represents a high risk of severe sepsis and/or septic shock.    As a Marker for Lower Respiratory Tract Infections that require antibiotic therapy:    PCT on Admission    Antibiotic Therapy       6-12 Hrs later    >0.5                Strongly Recommended  >0.25 - <0.5        Recommended   0.1 - 0.25          Discouraged              Remeasure/reassess PCT  <0.1                Strongly Discouraged     Remeasure/reassess PCT    As 28 day mortality risk marker: \"Change in Procalcitonin Result\" (>80% or <=80%) if Day 0 (or Day 1) and Day 4 values are available. Refer to http://www.[facility]-pct-calculator.com    Change in PCT <=80%  A decrease of PCT levels below or equal to 80% defines a positive change in PCT test result " representing a higher risk for 28-day all-cause mortality of patients diagnosed with severe sepsis for septic shock.    Change in PCT >80%  A decrease of PCT levels of more than 80% defines a negative change in PCT result representing a lower risk for 28-day all-cause mortality of patients diagnosed with severe sepsis or septic shock.       Comprehensive Metabolic Panel [[id number]]  (Abnormal) Collected: 11/30/23 2027    Specimen: Blood Updated: 11/30/23 2124     Glucose 142 mg/dL      BUN 17 mg/dL      Creatinine 1.62 mg/dL      Sodium 144 mmol/L      Potassium 4.4 mmol/L      Chloride 102 mmol/L      CO2 28.0 mmol/L      Calcium 9.5 mg/dL      Total Protein 8.8 g/dL      Albumin 4.2 g/dL      ALT (SGPT) 11 U/L      AST (SGOT) 10 U/L      Alkaline Phosphatase 192 U/L      Total Bilirubin 0.3 mg/dL      Globulin 4.6 gm/dL      A/G Ratio 0.9 g/dL      BUN/Creatinine Ratio 10.5     Anion Gap 14.0 mmol/L      eGFR 56.4 mL/min/1.73     Narrative:      GFR Normal >60  Chronic Kidney Disease <60  Kidney Failure <15      Lactic Acid, Plasma [[id number]]  (Abnormal) Collected: 11/30/23 2027    Specimen: Blood Updated: 11/30/23 2118     Lactate 2.7 mmol/L     COVID PRE-OP / PRE-PROCEDURE SCREENING ORDER (NO ISOLATION) - Swab, Nasal Cavity [[id number]]  (Normal) Collected: 11/30/23 2029    Specimen: Swab from Nasal Cavity Updated: 11/30/23 2112    Narrative:      The following orders were created for panel order COVID PRE-OP / PRE-PROCEDURE SCREENING ORDER (NO ISOLATION) - Swab, Nasal Cavity.  Procedure                               Abnormality         Status                     ---------                               -----------         ------                     COVID-19 and FLU A/B PCR...[[id number]]  Normal              Final result                 Please view results for these tests on the individual orders.    COVID-19 and FLU A/B PCR, 1 HR TAT - Swab, Nasopharynx [[id number]]  (Normal) Collected: 11/30/23 2029    Specimen:  Swab from Nasopharynx Updated: 11/30/23 2112     COVID19 Not Detected     Influenza A PCR Not Detected     Influenza B PCR Not Detected    Narrative:      Fact sheet for providers: https://www.fda.gov/media/132528/download    Fact sheet for patients: https://www.fda.gov/media/559726/download    Test performed by PCR.    Blood Culture - Blood, Arm, Right [[id number]] Collected: 11/30/23 2055    Specimen: Blood from Arm, Right Updated: 11/30/23 2110    Protime-INR [[id number]]  (Abnormal) Collected: 11/30/23 2027    Specimen: Blood Updated: 11/30/23 2107     Protime 14.6 Seconds      INR 1.12    Blood Culture - Blood, Arm, Right [[id number]] Collected: 11/30/23 2027    Specimen: Blood from Arm, Right Updated: 11/30/23 2101    CBC & Differential [[id number]]  (Abnormal) Collected: 11/30/23 2027    Specimen: Blood Updated: 11/30/23 2100    Narrative:      The following orders were created for panel order CBC & Differential.  Procedure                               Abnormality         Status                     ---------                               -----------         ------                     CBC Auto Differential[[id number]]        Abnormal            Final result                 Please view results for these tests on the individual orders.    CBC Auto Differential [[id number]]  (Abnormal) Collected: 11/30/23 2027    Specimen: Blood Updated: 11/30/23 2100     WBC 18.52 10*3/mm3      RBC 5.10 10*6/mm3      Hemoglobin 13.0 g/dL      Hematocrit 42.4 %      MCV 83.1 fL      MCH 25.5 pg      MCHC 30.7 g/dL      RDW 13.9 %      RDW-SD 41.7 fl      MPV 10.4 fL      Platelets 212 10*3/mm3      Neutrophil % 90.2 %      Lymphocyte % 4.0 %      Monocyte % 4.9 %      Eosinophil % 0.1 %      Basophil % 0.3 %      Immature Grans % 0.5 %      Neutrophils, Absolute 16.70 10*3/mm3      Lymphocytes, Absolute 0.74 10*3/mm3      Monocytes, Absolute 0.91 10*3/mm3      Eosinophils, Absolute 0.01 10*3/mm3      Basophils, Absolute 0.06 10*3/mm3       Immature Grans, Absolute 0.10 10*3/mm3      nRBC 0.0 /100 WBC     Blood Gas, Arterial - [[id number]]  (Abnormal) Collected: 11/30/23 2059    Specimen: Arterial Blood Updated: 11/30/23 2058     Site Right Brachial     Roosevelt's Test N/A     pH, Arterial 7.525 pH units      Comment: 83 Value above reference range        pCO2, Arterial 30.5 mm Hg      Comment: 84 Value below reference range        pO2, Arterial 77.5 mm Hg      Comment: 84 Value below reference range        HCO3, Arterial 25.2 mmol/L      Base Excess, Arterial 3.0 mmol/L      Comment: 83 Value above reference range        O2 Saturation, Arterial 97.2 %      Temperature 39.4     Barometric Pressure for Blood Gas 748 mmHg      Modality Room Air     Ventilator Mode NA     Collected by [id number]     Comment: Meter: [id number]     :  [id number]        pCO2, Temperature Corrected 33.9 mm Hg      pH, Temp Corrected 7.488 pH Units      pO2, Temperature Corrected 89.8 mm Hg           Imaging Results (Last 24 Hours)       Procedure Component Value Units Date/Time    XR Chest 1 View [[id number]] Collected: 11/30/23 2106     Updated: 11/30/23 2110    Narrative:      EXAMINATION: XR CHEST 1 VW-  11/30/2023 9:06 PM CST     HISTORY: Cough.     FINDINGS: Today's exam is compared to previous dated 9/22/2023. The  lungs are hypoventilated with mild basilar atelectasis. There is no  consolidative pneumonia or effusion. The mediastinal contours are within  normal limits.       Impression:      1. Poor inspiratory effort with basilar atelectasis. Lungs are otherwise  clear.     This report was signed and finalized on 11/30/2023 9:07 PM CST by Dr. Roldan Webb MD.             I have personally reviewed and interpreted the radiology studies and ECG obtained at time of admission.     Assessment / Plan   Assessment:   Active Hospital Problems    Diagnosis     **Sepsis with acute renal failure, due to unspecified organism, unspecified acute renal failure type,  unspecified whether septic shock present     AMARI on CPAP     H/O traumatic subdural hematoma     Post-traumatic spasticity     S/P  shunt      Severe sepsis.  Urinary tract infection, complicated, with history of ESBL positive microorganisms  Acute kidney injury/renal insufficiency  History of traumatic brain injury.  Traumatic subdural hematoma.  Status postcraniotomy.  Left hemiplegia  Wheelchair-bound  Obstructive sleep apnea  History of neurogenic bladder    Recurrent urinary tract infections.    ESBL positive E. coli and Klebsiella pneumoniae in urine June 30, 2023.    Creatinine 1.62.  BUN 17.  Lactate 2.7 mild leukocytosis 18.5.  Neutrophil 90%    The patient received 1 dose of ertapenem IV in the emergency department.  Blood pressure stable.      Treatment Plan  The patient will be admitted to my service here at [facility].    Admit to the hospital, telemetry monitoring.  IV fluids normal saline 150 MLS per hour.  Follow renal function and electrolytes.  Follow lactic acid.  Monitor urine output.  Bladder scanning given history of neurogenic bladder.  No ultrasound requested.  Continue ertapenem IV.  Follow urine and blood cultures.  Infectious disease specialist evaluation.  Continue CPAP at bedtime.  PT OT evaluation.  Speech therapy evaluation.  Dysphagia diet until assessment.  Heparin subcutaneous for DVT prophylaxis    Medical Decision Making  Number and Complexity of problems: Multiple medical problems.  Differential Diagnosis: See above    Conditions and Status        Condition is unchanged.     [facility] Data  External documents reviewed: None  Cardiac tracing (EKG, telemetry) interpretation: Reviewed  Radiology interpretation: Reviewed  Labs reviewed: Yes  Any tests that were considered but not ordered: None     Decision rules/scores evaluated (example [id number], Wells, etc): None     Discussed with: Patient      Care Planning  Shared decision making: With patient  Code status and  discussions: Full code    Disposition  Social Determinants of Health that impact treatment or disposition: Nursing home, wheelchair-bound  Estimated length of stay is 2+ days.     I confirmed that the patient's advanced care plan is present, code status is documented, and a surrogate decision maker is listed in the patient's medical record.     The patient's surrogate decision maker is patient's brother.     The patient was seen and examined by me on December 1, 2023 at 12:17 AM.    Electronically signed by Miek Valadez MD, 12/01/23, 01:40 CST.

## 2023-12-01 NOTE — ED PROVIDER NOTES
Subjective   History of Present Illness  33-year-old with multiple strokes in the past and nursing home came to the ED for fever.  Got a history of altered drug-resistant E. coli in the past has got a wound on the sacrum which does not appear to be cellulitic at this time.    Fever  Max temp prior to arrival:  Febrile  Temp source:  Oral  Severity:  Moderate  Onset quality:  Gradual  Duration:  2 days  Timing:  Constant  Progression:  Worsening  Chronicity:  New  Relieved by:  Nothing  Worsened by:  Nothing  Ineffective treatments:  None tried  Associated symptoms: chills, confusion, cough, dysuria and nausea    Associated symptoms: no chest pain, no diarrhea, no ear pain, no headaches, no rash, no rhinorrhea, no somnolence and no sore throat    Risk factors: sick contacts    Risk factors: no contaminated food and no contaminated water        Review of Systems   Unable to perform ROS: Dementia   Constitutional:  Positive for chills and fever.   HENT:  Negative for ear pain, rhinorrhea and sore throat.    Respiratory:  Positive for cough.    Cardiovascular:  Negative for chest pain.   Gastrointestinal:  Positive for nausea. Negative for diarrhea.   Genitourinary:  Positive for dysuria.   Skin:  Negative for rash.   Neurological:  Negative for headaches.   Psychiatric/Behavioral:  Positive for confusion.        Past Medical History:   Diagnosis Date    CVA (cerebral vascular accident)     Depression     Hemiparesis     Hydrocephalus     Kidney stones     Neurogenic bladder     Neurogenic bowel     Seizure     Sinusitis     Sleep apnea     Subdural hematoma     TBI (traumatic brain injury)     UTI (urinary tract infection)        Allergies   Allergen Reactions    Dilantin [Phenytoin]     Latex     Penicillins     Sulfa Antibiotics        Past Surgical History:   Procedure Laterality Date    BACLOFEN PUMP IMPLANTATION      CHEST TUBE INSERTION      REMOVED    CRANIOTOMY      FOR EVACUATION OF SUBDURAL HEMATOMA &  VENTRICULOSTOMY    HAMSTRING RELEASE      KIDNEY STONE SURGERY      PEG TUBE INSERTION      REMOVED    TRACHEOSTOMY      REMOVED     SHUNT INSERTION         Family History   Problem Relation Age of Onset    No Known Problems Mother     No Known Problems Father        Social History     Socioeconomic History    Marital status: Single   Tobacco Use    Smoking status: Never    Smokeless tobacco: Never   Vaping Use    Vaping Use: Never used   Substance and Sexual Activity    Alcohol use: No    Drug use: No    Sexual activity: Defer           Objective   Physical Exam  Vitals and nursing note reviewed. Exam conducted with a chaperone present.   Constitutional:       General: He is awake.      Appearance: Normal appearance. He is well-developed. He is obese. He is ill-appearing.   HENT:      Head: Normocephalic and atraumatic. No right periorbital erythema or left periorbital erythema.      Comments: Dry mucosa no exudates no tonsillar abscess  Eyes:      General: Lids are normal.      Conjunctiva/sclera: Conjunctivae normal.      Pupils: Pupils are equal, round, and reactive to light.   Cardiovascular:      Rate and Rhythm: Normal rate and regular rhythm. Tachycardia present.      Chest Wall: PMI is not displaced.      Pulses: Normal pulses.      Heart sounds: Normal heart sounds.      No gallop.   Pulmonary:      Effort: Pulmonary effort is normal. Tachypnea present.      Breath sounds: Rales present. No decreased breath sounds.   Abdominal:      General: Abdomen is flat. Bowel sounds are normal.      Palpations: Abdomen is soft.      Tenderness: There is no abdominal tenderness.   Musculoskeletal:         General: Normal range of motion.      Cervical back: Full passive range of motion without pain, normal range of motion and neck supple. No rigidity or tenderness.   Skin:     General: Skin is warm and dry.      Capillary Refill: Capillary refill takes 2 to 3 seconds.   Neurological:      General: No focal deficit  present.      Mental Status: He is oriented to person, place, and time. Mental status is at baseline. He is lethargic and confused.      GCS: GCS eye subscore is 4. GCS verbal subscore is 4. GCS motor subscore is 6.      Cranial Nerves: Cranial nerves 2-12 are intact. Dysarthria present. No cranial nerve deficit.      Motor: Motor function is intact. Weakness present.      Deep Tendon Reflexes: Reflexes are normal and symmetric.      Comments: Patient chronic dysarthria and chronic weakness because of a prior stroke.   Psychiatric:         Behavior: Behavior is cooperative.         Procedures           ED Course  ED Course as of 11/30/23 2338   Thu Nov 30, 2023 2010 Sinus tachycardia right axis deviation [TS]   2334 Patient came in with fever and sepsis got a UTI.  No metabolic acidosis procalcitonin mildly elevated.  IV fluids IV antibiotics given to the patient patient blood pressure initially was on the softer side.  And that has improved. [TS]   2334 Shock index is 1.2  In the lower ratio is 11 moderate physiological stress  qSOFA score is 1   [TS]      ED Course User Index  [TS] Herb Meneses MD                                             Medical Decision Making  Febrile illness with fever and cough we will get lab work-up and diagnostics    Problems Addressed:  Acute UTI (urinary tract infection):     Details: Patient with acute UTI there is no vomiting and there is no flank pain that the patient is complaining about.  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  Sepsis, due to unspecified organism, unspecified whether acute organ dysfunction present: acute illness or injury     Details: Sepsis due to above was given IV fluids blood pressure has improved.    Amount and/or Complexity of Data Reviewed  Labs: ordered.     Details: Labs reviewed  Radiology: ordered.  ECG/medicine tests: ordered and independent interpretation performed.     Details: Tachycardia  no evidence of acute ischemia.  Discussion of management or test interpretation with external provider(s): Discussed with the hospitalist patient cleared to go to the medical floor bed    Risk  OTC drugs.  Risk Details: Shock index is 1.2  In the lower ratio is 11 moderate physiological stress  qSOFA score is 1        Final diagnoses:   Acute UTI (urinary tract infection)   Sepsis, due to unspecified organism, unspecified whether acute organ dysfunction present       ED Disposition  ED Disposition       ED Disposition   Decision to Admit    Condition   --    Comment   Level of Care: Telemetry [5]   Diagnosis: Sepsis [[id number]]   Admitting Physician: HUGO RAMIREZ [[id number]]   Attending Physician: HUGO RAMIREZ [[id number]]   Certification: I Certify That Inpatient Hospital Services Are Medically Necessary For Greater Than 2 Midnights                 No follow-up provider specified.       Medication List      No changes were made to your prescriptions during this visit.            Herb Meneses MD  11/30/23 2010       Herb Meneses MD  11/30/23 [id number]

## 2023-12-01 NOTE — PROGRESS NOTES
[facility] Medicine Services  INPATIENT PROGRESS NOTE    Patient Name: Octavio Werner  Date of Admission: 11/30/2023  Today's Date: 12/01/23  Length of Stay: 1  Primary Care Physician: Sarath Camacho DO    Subjective   Chief Complaint: fever  HPI   Patient is awake and conversant.  He states he feels a little better.  He is thirsty.  He denies any shortness of breath.  He has no nausea or vomiting at this time.    He is a resident of a local nursing facility.  He has a history of traumatic brain injury as well as a stroke in 2005.  Subsequently has had a craniotomy and  shunt placed.  He has chronic left hemiplegia.            Review of Systems   All pertinent negatives and positives are as above. All other systems have been reviewed and are negative unless otherwise stated.     Objective    Temp:  [98.4 °F (36.9 °C)-103 °F (39.4 °C)] 99 °F (37.2 °C)  Heart Rate:  [106-135] 113  Resp:  [18-21] 18  BP: (103-146)/(60-82) 109/65  Physical Exam  Vitals and nursing note reviewed.   Constitutional:       Appearance: He is obese. He is ill-appearing.   HENT:      Head: Normocephalic and atraumatic.      Right Ear: External ear normal.      Left Ear: External ear normal.      Nose: Nose normal.      Mouth/Throat:      Mouth: Mucous membranes are dry.   Eyes:      Conjunctiva/sclera: Conjunctivae normal.      Pupils: Pupils are equal, round, and reactive to light.   Cardiovascular:      Rate and Rhythm: Normal rate and regular rhythm.      Pulses: Normal pulses.      Heart sounds: Normal heart sounds.   Pulmonary:      Effort: Pulmonary effort is normal.      Breath sounds: Normal breath sounds.   Abdominal:      General: Bowel sounds are normal.      Palpations: Abdomen is soft.   Musculoskeletal:      Cervical back: No rigidity.      Comments: Left hemiparesis.  Patient is able to move his right foot slightly on command.  He does not have normal range of motion of the of the  "right lower extremity.  This is chronic in nature.  He is wheelchair-bound at the nursing home.   Skin:     General: Skin is warm and dry.      Comments: Patient has seborrhea on his face and hair line.             Results Review:  I have reviewed the labs, radiology results, and diagnostic studies.    Laboratory Data:   Results from last 7 days   Lab Units 12/01/23  0530 11/30/23 2027   WBC 10*3/mm3 10.53 18.52*   HEMOGLOBIN g/dL 10.5* 13.0   HEMATOCRIT % 33.9* 42.4   PLATELETS 10*3/mm3 174 212        Results from last 7 days   Lab Units 12/01/23  0530 11/30/23 2027   SODIUM mmol/L 140 144   POTASSIUM mmol/L 3.9 4.4   CHLORIDE mmol/L 107 102   CO2 mmol/L 21.0* 28.0   BUN mg/dL 14 17   CREATININE mg/dL 1.33* 1.62*   CALCIUM mg/dL 8.0* 9.5   BILIRUBIN mg/dL 0.3 0.3   ALK PHOS U/L 144* 192*   ALT (SGPT) U/L 10 11   AST (SGOT) U/L 16 10   GLUCOSE mg/dL 114* 142*       Culture Data:   No results found for: \"BLOODCX\", \"URINECX\", \"WOUNDCX\", \"MRSACX\", \"RESPCX\", \"STOOLCX\"    Radiology Data:   Imaging Results (Last 24 Hours)       Procedure Component Value Units Date/Time    US Renal Bilateral [[id number]] Collected: 12/01/23 0822     Updated: 12/01/23 0829    Narrative:      US RENAL BILATERAL- 12/1/2023 6:58 AM CST     HISTORY: neurogenic bladder; N39.0-Urinary tract infection, site not  specified; A41.9-Sepsis, unspecified organism     COMPARISON: CT scan dated 1723     TECHNIQUE: Multiple longitudinal and transverse realtime sonographic  images of the kidneys and urinary bladder are obtained.      FINDINGS:      RIGHT KIDNEY: 12.7 x 6.2 x 5.4 cm. Cortical scar versus postoperative  change at the left upper pole. No solid or cystic solid mass identified.  No hydronephrosis. There are small echogenic foci which appear to be  small nonobstructing stones. No perinephric collection identified.  Stones measure up to 9 mm in diameter.     LEFT KIDNEY: Surgically absent.     PELVIS: Urinary bladder is nondistended. Mild diffuse " wall thickening  and trabeculation.       Impression:      1. Left kidney is surgically absent.  2. Urinary bladder is nondistended. There is a mild diffuse wall  thickening and trabeculation which would be consistent with neurogenic  bladder.  3. No right-sided hydroureteronephrosis. There are nonobstructing right  renal stones measuring up to 9 mm in diameter.        This report was signed and finalized on 12/1/2023 8:26 AM CST by Dr Wander Wall.       XR Chest 1 View [[id number]] Collected: 11/30/23 2106     Updated: 11/30/23 2110    Narrative:      EXAMINATION: XR CHEST 1 VW-  11/30/2023 9:06 PM CST     HISTORY: Cough.     FINDINGS: Today's exam is compared to previous dated 9/22/2023. The  lungs are hypoventilated with mild basilar atelectasis. There is no  consolidative pneumonia or effusion. The mediastinal contours are within  normal limits.       Impression:      1. Poor inspiratory effort with basilar atelectasis. Lungs are otherwise  clear.     This report was signed and finalized on 11/30/2023 9:07 PM CST by Dr. Roldan Webb MD.               I have reviewed the patient's current medications.     Assessment/Plan   Assessment  Active Hospital Problems    Diagnosis     **Sepsis with acute renal failure, due to unspecified organism, unspecified acute renal failure type, unspecified whether septic shock present     AMARI on CPAP     H/O traumatic subdural hematoma     Post-traumatic spasticity     S/P  shunt        Treatment Plan  Continue IV fluids  Home meds have been resumed.  Continue Invanz patient has a history of ESBL urinary tract infection.  PT and OT to evaluate    Medical Decision Making  Number and Complexity of problems:   Sepsis with acute renal failure due to unspecified organism high complexity  Obstructive sleep apnea on CPAP moderate complexity  History of traumatic subdural hematoma/traumatic brain injury moderate complexity  Posttraumatic spasticity moderate complexity  History of   shunt moderate complexity    Differential Diagnosis:     Conditions and Status        Improving     [facility] Data  External documents reviewed: Reviewed  Cardiac tracing (EKG, telemetry) interpretation: Reviewed  Radiology interpretation: Reviewed  Labs reviewed: Reviewed  Any tests that were considered but not ordered: None     Decision rules/scores evaluated (example [id number], Wells, etc): None      Discussed with: Patient     Care Planning  Shared decision making: Patient  Code status and discussions: Full    Disposition  Social Determinants of Health that impact treatment or disposition: None  I expect the patient to be discharged to nursing home in 2-3 days.     Electronically signed by Jazzmine Swenson, 12/01/23, 12:40 CST.
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
## 2023-12-01 NOTE — PAYOR COMM NOTE
"REF:   [id number]    [facility]  [place]ER[place],    [phone]  OR  FAX    [phone]    Alphonso Octavio AGUIRRE (33 y.o. Male)       Date of Birth   1990    Social Security Number       Address   [address] [place] [address]    Home Phone   [phone]    MRN   [id number]       Orthodoxy   Sabianist    Marital Status   Single                            Admission Date   11/30/23    Admission Type   Emergency    Admitting Provider   Jazzmine Swenson    Attending Provider   Jazzmine Swenson    Department, Room/Bed   [facility] EMERGENCY DEPARTMENT, 42/42       Discharge Date       Discharge Disposition       Discharge Destination                                 Attending Provider: Jazzmine Swenson    Allergies: Dilantin [Phenytoin], Latex, Penicillins, Sulfa Antibiotics    Isolation: None   Infection: ESBL E coli (01/17/23), ESBL Klebsiella (07/04/23)   Code Status: CPR    Ht: 182.9 cm (72\")   Wt: 107 kg (235 lb)    Admission Cmt: None   Principal Problem: Sepsis with acute renal failure, due to unspecified organism, unspecified acute renal failure type, unspecified whether septic shock present [A41.9,R65.20,N17.9]                   Active Insurance as of 11/30/2023       Primary Coverage       Payor Plan Insurance Group Employer/Plan Group    [facility] BLUE [facility] PPO [id number]       Payor Plan Address Payor Plan Phone Number Payor Plan Fax Number Effective Dates    [address] [phone]  1/1/2022 - None Entered    [facility] [id number]         Subscriber Name Subscriber Birth Date Member ID       UMESH VILLALBA 8/18/1956 [id number]               Secondary Coverage       Payor Plan Insurance Group Employer/Plan Group    MEDICARE MEDICARE A & B        Payor Plan Address Payor Plan Phone Number Payor Plan Fax Number Effective Dates    [address] [phone]  6/1/2008 - None Entered    [facility] [id number]         Subscriber Name Subscriber Birth Date " Member ID       JUDITH WERNER 1990 [id number]               Tertiary Coverage       Payor Plan Insurance Group Employer/Plan Group    [place]        Payor Plan Address Payor Plan Phone Number Payor Plan Fax Number Effective Dates    [address] [phone]  10/31/2023 - None Entered    [place] [address]         Subscriber Name Subscriber Birth Date Member ID       JUDITH WERNER 1990 [id number]                     Emergency Contacts        (Rel.) Home Phone Work Phone Mobile Phone    Harsh Werner (Brother) -- -- [phone]    Cezar Werner (Father) -- -- [phone]                 History & Physical        Mike Valadez MD at 12/01/23 0017              [facility] Medicine Services  HISTORY AND PHYSICAL    Date of Admission: 11/30/2023  Primary Care Physician: Sarath Camacho DO Subjective   Primary Historian: Patient    Chief Complaint: Fevers, malaise    History of Present Illness  33-year-old male with history of severe traumatic brain injury, reported stroke in 2005, hydrocephalus, status post craniotomy and  shunt placement, chronic left hemiplegia, functional paraplegia, wheelchair-bound, nephrolithiasis, obstructive sleep apnea, sacral pressure ulcer, recurrent urinary tract infections, history of renal cell carcinoma, status post left simple nephrectomy, to the hospital reporting malaise, and fevers.  Apparently patient lives in a nursing home, and was sent from the setting for evaluation in the emergency department.  No abdominal pain reported.  No vomiting.  No cough.  No chest pain.  No diarrhea.  No Arnold in place.        Review of Systems   Otherwise complete ROS reviewed and negative except as mentioned in the HPI.    Past Medical History:   Past Medical History:   Diagnosis Date    CVA (cerebral vascular accident)     Depression     Hemiparesis     Hydrocephalus     Kidney stones     Neurogenic  bladder     Neurogenic bowel     Seizure     Sinusitis     Sleep apnea     Subdural hematoma     TBI (traumatic brain injury)     UTI (urinary tract infection)      Past Surgical History:  Past Surgical History:   Procedure Laterality Date    BACLOFEN PUMP IMPLANTATION      CHEST TUBE INSERTION      REMOVED    CRANIOTOMY      FOR EVACUATION OF SUBDURAL HEMATOMA & VENTRICULOSTOMY    HAMSTRING RELEASE      KIDNEY STONE SURGERY      PEG TUBE INSERTION      REMOVED    TRACHEOSTOMY      REMOVED     SHUNT INSERTION       Social History:  reports that he has never smoked. He has never used smokeless tobacco. He reports that he does not drink alcohol and does not use drugs.    Family History: family history includes No Known Problems in his father and mother.   Reviewed    Allergies:  Allergies   Allergen Reactions    Dilantin [Phenytoin]     Latex     Penicillins     Sulfa Antibiotics        Medications:  Prior to Admission medications    Medication Sig Start Date End Date Taking? Authorizing Provider   albuterol (PROVENTIL) (2.5 MG/3ML) 0.083% nebulizer solution Take 2.5 mg by nebulization Every 4 (Four) Hours As Needed for Wheezing. 9/22/23   Xiomara Barrera APRN   amantadine (SYMMETREL) 100 MG capsule Take 100 mg by mouth 2 (Two) Times a Day.    Bipin Zamora MD   busPIRone (BUSPAR) 10 MG tablet Take 10 mg by mouth 3 (Three) Times a Day.    Bipin Zamora MD   Cholecalciferol (Vitamin D3) 10 MCG (400 UNIT) capsule Take 1 capsule by mouth Daily.    Bipin Zamora MD   citalopram (CeleXA) 20 MG tablet Take 30 mg by mouth Daily.    Bipin Zamora MD   gabapentin (NEURONTIN) 300 MG capsule Take 300 mg by mouth 2 (Two) Times a Day.    Bipin Zamora MD   HYDROcodone-acetaminophen (NORCO) 5-325 MG per tablet Take 1 tablet by mouth Every 6 (Six) Hours As Needed.    Bipin Zamora MD   lisinopril (PRINIVIL,ZESTRIL) 10 MG tablet Take 10 mg by mouth Daily. 7/20/17   Sera  "MD Bipin   nitrofurantoin, macrocrystal-monohydrate, (MACROBID) 100 MG capsule Take 100 mg by mouth 2 (Two) Times a Day.    Bipin Zamora MD   Omega-3 1000 MG capsule Take 1,000 mg by mouth Daily.    Bipin Zamora MD   risperiDONE (risperDAL) 3 MG tablet Take 3 mg by mouth 2 (Two) Times a Day.    Bipin Zamora MD   tamsulosin (FLOMAX) 0.4 MG capsule 24 hr capsule Take 1 capsule by mouth Daily.    Bipin Zamora MD   TiZANidine (ZANAFLEX) 2 MG capsule Take 2 mg by mouth 2 (Two) Times a Day.    Bipin Zamora MD   vitamin C (ASCORBIC ACID) 500 MG tablet Take 500 mg by mouth 2 (Two) Times a Day.    Bipin Zamora MD     I have utilized all available immediate resources to obtain, update, or review the patient's current medications (including all prescriptions, over-the-counter products, herbals, cannabis/cannabidiol products, and vitamin/mineral/dietary (nutritional) supplements).    Objective     Vital Signs: /81   Pulse 112   Temp 98.4 °F (36.9 °C)   Resp 19   Ht 182.9 cm (72\")   Wt 107 kg (235 lb)   SpO2 99%   BMI 31.87 kg/m²   Physical Exam   Physical Exam  Constitutional:       Appearance: Chronically ill-appearing; alert, oriented.  HENT:      Head: Normocephalic and atraumatic.      Nose: Nose normal.      Mouth/Throat:      Mouth: Dentition, mucous membranes are dry     Pharynx: Oropharynx is clear.   Eyes:      Extraocular Movements: Extraocular movements intact.      Conjunctiva/sclera: Conjunctivae normal.      Pupils: Pupils are equal, round, and reactive to light.   Cardiovascular:      Rate and Rhythm: Normal rate and regular rhythm.      Pulses: Normal pulses.   Pulmonary:      Effort: No respiratory distress.      Breath sounds: Normal breath sounds. No wheezing, rhonchi or rales.   Abdominal:      General: Abdomen is obese, flat. Bowel sounds are normal.      Palpations: Abdomen is soft.      Tenderness: There is no guarding or rebound. "   Musculoskeletal:         General: Atrophic musculature in lower extremities.     Left lower leg: No edema.   Skin:     Capillary Refill: Capillary refill takes less than 2 seconds.      Coloration: Skin is not jaundiced.      Findings: No rash.   Neurological:      General: Slow speech.     Mental Status: He is alert. He is oriented.     Sensory: No sensory deficit.      Motor: Left hemiplegia.     Coordination: Coordination normal.   Psychiatric:         Mood and Affect: Mood normal.         Behavior: Behavior normal.           Results Reviewed:  Lab Results (last 24 hours)       Procedure Component Value Units Date/Time    STAT Lactic Acid, Reflex [[id number]]  (Normal) Collected: 12/01/23 0113    Specimen: Blood from Arm, Right Updated: 12/01/23 0136     Lactate 1.2 mmol/L     Urinalysis With Microscopic If Indicated (No Culture) - Straight Cath [[id number]]  (Abnormal) Collected: 11/30/23 2144    Specimen: Urine from Straight Cath Updated: 11/30/23 2223     Color, UA Yellow     Appearance, UA Cloudy     pH, UA 5.5     Specific Gravity, UA 1.013     Glucose, UA Negative     Ketones, UA Negative     Bilirubin, UA Negative     Blood, UA Moderate (2+)     Protein, UA Negative     Leuk Esterase, UA Large (3+)     Nitrite, UA Positive     Urobilinogen, UA 1.0 E.U./dL    Urinalysis, Microscopic Only - Straight Cath [[id number]]  (Abnormal) Collected: 11/30/23 2144    Specimen: Urine from Straight Cath Updated: 11/30/23 2223     RBC, UA 11-20 /HPF      WBC, UA Too Numerous to Count /HPF      Bacteria, UA 1+ /HPF      Squamous Epithelial Cells, UA 3-6 /HPF      Hyaline Casts, UA None Seen /LPF      Methodology Manual Light Microscopy    Procalcitonin [[id number]]  (Abnormal) Collected: 11/30/23 2027    Specimen: Blood Updated: 11/30/23 2127     Procalcitonin 0.36 ng/mL     Narrative:      As a Marker for Sepsis (Non-Neonates):    1. <0.5 ng/mL represents a low risk of severe sepsis and/or septic shock.  2. >2 ng/mL  "represents a high risk of severe sepsis and/or septic shock.    As a Marker for Lower Respiratory Tract Infections that require antibiotic therapy:    PCT on Admission    Antibiotic Therapy       6-12 Hrs later    >0.5                Strongly Recommended  >0.25 - <0.5        Recommended   0.1 - 0.25          Discouraged              Remeasure/reassess PCT  <0.1                Strongly Discouraged     Remeasure/reassess PCT    As 28 day mortality risk marker: \"Change in Procalcitonin Result\" (>80% or <=80%) if Day 0 (or Day 1) and Day 4 values are available. Refer to http://www.[email or web address]-pct-calculator.[email or web address]    Change in PCT <=80%  A decrease of PCT levels below or equal to 80% defines a positive change in PCT test result representing a higher risk for 28-day all-cause mortality of patients diagnosed with severe sepsis for septic shock.    Change in PCT >80%  A decrease of PCT levels of more than 80% defines a negative change in PCT result representing a lower risk for 28-day all-cause mortality of patients diagnosed with severe sepsis or septic shock.       Comprehensive Metabolic Panel [[id number]]  (Abnormal) Collected: 11/30/23 2027    Specimen: Blood Updated: 11/30/23 2124     Glucose 142 mg/dL      BUN 17 mg/dL      Creatinine 1.62 mg/dL      Sodium 144 mmol/L      Potassium 4.4 mmol/L      Chloride 102 mmol/L      CO2 28.0 mmol/L      Calcium 9.5 mg/dL      Total Protein 8.8 g/dL      Albumin 4.2 g/dL      ALT (SGPT) 11 U/L      AST (SGOT) 10 U/L      Alkaline Phosphatase 192 U/L      Total Bilirubin 0.3 mg/dL      Globulin 4.6 gm/dL      A/G Ratio 0.9 g/dL      BUN/Creatinine Ratio 10.5     Anion Gap 14.0 mmol/L      eGFR 56.4 mL/min/1.73     Narrative:      GFR Normal >60  Chronic Kidney Disease <60  Kidney Failure <15      Lactic Acid, Plasma [[id number]]  (Abnormal) Collected: 11/30/23 2027    Specimen: Blood Updated: 11/30/23 2118     Lactate 2.7 mmol/L     COVID PRE-OP / PRE-PROCEDURE SCREENING ORDER (NO " ISOLATION) - Swab, Nasal Cavity [[id number]]  (Normal) Collected: 11/30/23 2029    Specimen: Swab from Nasal Cavity Updated: 11/30/23 2112    Narrative:      The following orders were created for panel order COVID PRE-OP / PRE-PROCEDURE SCREENING ORDER (NO ISOLATION) - Swab, Nasal Cavity.  Procedure                               Abnormality         Status                     ---------                               -----------         ------                     COVID-19 and FLU A/B PCR...[[id number]]  Normal              Final result                 Please view results for these tests on the individual orders.    COVID-19 and FLU A/B PCR, 1 HR TAT - Swab, Nasopharynx [[id number]]  (Normal) Collected: 11/30/23 2029    Specimen: Swab from Nasopharynx Updated: 11/30/23 2112     COVID19 Not Detected     Influenza A PCR Not Detected     Influenza B PCR Not Detected    Narrative:      Fact sheet for providers: https://www.fda.gov/media/708103/download    Fact sheet for patients: https://www.fda.gov/media/861549/download    Test performed by PCR.    Blood Culture - Blood, Arm, Right [[id number]] Collected: 11/30/23 2055    Specimen: Blood from Arm, Right Updated: 11/30/23 2110    Protime-INR [[id number]]  (Abnormal) Collected: 11/30/23 2027    Specimen: Blood Updated: 11/30/23 2107     Protime 14.6 Seconds      INR 1.12    Blood Culture - Blood, Arm, Right [[id number]] Collected: 11/30/23 2027    Specimen: Blood from Arm, Right Updated: 11/30/23 2101    CBC & Differential [[id number]]  (Abnormal) Collected: 11/30/23 2027    Specimen: Blood Updated: 11/30/23 2100    Narrative:      The following orders were created for panel order CBC & Differential.  Procedure                               Abnormality         Status                     ---------                               -----------         ------                     CBC Auto Differential[[id number]]        Abnormal            Final result                 Please view results  for these tests on the individual orders.    CBC Auto Differential [[id number]]  (Abnormal) Collected: 11/30/23 2027    Specimen: Blood Updated: 11/30/23 2100     WBC 18.52 10*3/mm3      RBC 5.10 10*6/mm3      Hemoglobin 13.0 g/dL      Hematocrit 42.4 %      MCV 83.1 fL      MCH 25.5 pg      MCHC 30.7 g/dL      RDW 13.9 %      RDW-SD 41.7 fl      MPV 10.4 fL      Platelets 212 10*3/mm3      Neutrophil % 90.2 %      Lymphocyte % 4.0 %      Monocyte % 4.9 %      Eosinophil % 0.1 %      Basophil % 0.3 %      Immature Grans % 0.5 %      Neutrophils, Absolute 16.70 10*3/mm3      Lymphocytes, Absolute 0.74 10*3/mm3      Monocytes, Absolute 0.91 10*3/mm3      Eosinophils, Absolute 0.01 10*3/mm3      Basophils, Absolute 0.06 10*3/mm3      Immature Grans, Absolute 0.10 10*3/mm3      nRBC 0.0 /100 WBC     Blood Gas, Arterial - [[id number]]  (Abnormal) Collected: 11/30/23 2059    Specimen: Arterial Blood Updated: 11/30/23 2058     Site Right Brachial     Roosevelt's Test N/A     pH, Arterial 7.525 pH units      Comment: 83 Value above reference range        pCO2, Arterial 30.5 mm Hg      Comment: 84 Value below reference range        pO2, Arterial 77.5 mm Hg      Comment: 84 Value below reference range        HCO3, Arterial 25.2 mmol/L      Base Excess, Arterial 3.0 mmol/L      Comment: 83 Value above reference range        O2 Saturation, Arterial 97.2 %      Temperature 39.4     Barometric Pressure for Blood Gas 748 mmHg      Modality Room Air     Ventilator Mode NA     Collected by [id number]     Comment: Meter: [id number]     :  [id number]        pCO2, Temperature Corrected 33.9 mm Hg      pH, Temp Corrected 7.488 pH Units      pO2, Temperature Corrected 89.8 mm Hg           Imaging Results (Last 24 Hours)       Procedure Component Value Units Date/Time    XR Chest 1 View [[id number]] Collected: 11/30/23 2106     Updated: 11/30/23 2110    Narrative:      EXAMINATION: XR CHEST 1 VW-  11/30/2023 9:06 PM CST     HISTORY:  Cough.     FINDINGS: Today's exam is compared to previous dated 9/22/2023. The  lungs are hypoventilated with mild basilar atelectasis. There is no  consolidative pneumonia or effusion. The mediastinal contours are within  normal limits.       Impression:      1. Poor inspiratory effort with basilar atelectasis. Lungs are otherwise  clear.     This report was signed and finalized on 11/30/2023 9:07 PM CST by Dr. Roldan Webb MD.             I have personally reviewed and interpreted the radiology studies and ECG obtained at time of admission.     Assessment / Plan   Assessment:   Active Hospital Problems    Diagnosis     **Sepsis with acute renal failure, due to unspecified organism, unspecified acute renal failure type, unspecified whether septic shock present     AMARI on CPAP     H/O traumatic subdural hematoma     Post-traumatic spasticity     S/P  shunt      Severe sepsis.  Urinary tract infection, complicated, with history of ESBL positive microorganisms  Acute kidney injury/renal insufficiency  History of traumatic brain injury.  Traumatic subdural hematoma.  Status postcraniotomy.  Left hemiplegia  Wheelchair-bound  Obstructive sleep apnea  History of neurogenic bladder    Recurrent urinary tract infections.    ESBL positive E. coli and Klebsiella pneumoniae in urine June 30, 2023.    Creatinine 1.62.  BUN 17.  Lactate 2.7 mild leukocytosis 18.5.  Neutrophil 90%    The patient received 1 dose of ertapenem IV in the emergency department.  Blood pressure stable.      Treatment Plan  The patient will be admitted to my service here at [facility].    Admit to the hospital, telemetry monitoring.  IV fluids normal saline 150 MLS per hour.  Follow renal function and electrolytes.  Follow lactic acid.  Monitor urine output.  Bladder scanning given history of neurogenic bladder.  No ultrasound requested.  Continue ertapenem IV.  Follow urine and blood cultures.  Infectious disease specialist  evaluation.  Continue CPAP at bedtime.  PT OT evaluation.  Speech therapy evaluation.  Dysphagia diet until assessment.  Heparin subcutaneous for DVT prophylaxis    Medical Decision Making  Number and Complexity of problems: Multiple medical problems.  Differential Diagnosis: See above    Conditions and Status        Condition is unchanged.     [facility] Data  External documents reviewed: None  Cardiac tracing (EKG, telemetry) interpretation: Reviewed  Radiology interpretation: Reviewed  Labs reviewed: Yes  Any tests that were considered but not ordered: None     Decision rules/scores evaluated (example [id number], Wells, etc): None     Discussed with: Patient      Care Planning  Shared decision making: With patient  Code status and discussions: Full code    Disposition  Social Determinants of Health that impact treatment or disposition: Nursing home, wheelchair-bound  Estimated length of stay is 2+ days.     I confirmed that the patient's advanced care plan is present, code status is documented, and a surrogate decision maker is listed in the patient's medical record.     The patient's surrogate decision maker is patient's brother.     The patient was seen and examined by me on December 1, 2023 at 12:17 AM.    Electronically signed by Mike Valadez MD, 12/01/23, 01:40 CST.                Electronically signed by Mike Valadez MD at 12/01/23 0149          Emergency Department Notes        Claudepierre, Ariane, RN at 12/01/23 0059          Spoke with Thomas from pharmacy, and he will be sending down the pt's medications.    Electronically signed by Claudepierre, Ariane, RN at 12/01/23 0100       Herb Meneses MD at 11/30/23 2008          Subjective   History of Present Illness  33-year-old with multiple strokes in the past and nursing home came to the ED for fever.  Got a history of altered drug-resistant E. coli in the past has got a wound on the sacrum which does not appear to be cellulitic at this  time.    Fever  Max temp prior to arrival:  Febrile  Temp source:  Oral  Severity:  Moderate  Onset quality:  Gradual  Duration:  2 days  Timing:  Constant  Progression:  Worsening  Chronicity:  New  Relieved by:  Nothing  Worsened by:  Nothing  Ineffective treatments:  None tried  Associated symptoms: chills, confusion, cough, dysuria and nausea    Associated symptoms: no chest pain, no diarrhea, no ear pain, no headaches, no rash, no rhinorrhea, no somnolence and no sore throat    Risk factors: sick contacts    Risk factors: no contaminated food and no contaminated water        Review of Systems   Unable to perform ROS: Dementia   Constitutional:  Positive for chills and fever.   HENT:  Negative for ear pain, rhinorrhea and sore throat.    Respiratory:  Positive for cough.    Cardiovascular:  Negative for chest pain.   Gastrointestinal:  Positive for nausea. Negative for diarrhea.   Genitourinary:  Positive for dysuria.   Skin:  Negative for rash.   Neurological:  Negative for headaches.   Psychiatric/Behavioral:  Positive for confusion.        Past Medical History:   Diagnosis Date    CVA (cerebral vascular accident)     Depression     Hemiparesis     Hydrocephalus     Kidney stones     Neurogenic bladder     Neurogenic bowel     Seizure     Sinusitis     Sleep apnea     Subdural hematoma     TBI (traumatic brain injury)     UTI (urinary tract infection)        Allergies   Allergen Reactions    Dilantin [Phenytoin]     Latex     Penicillins     Sulfa Antibiotics        Past Surgical History:   Procedure Laterality Date    BACLOFEN PUMP IMPLANTATION      CHEST TUBE INSERTION      REMOVED    CRANIOTOMY      FOR EVACUATION OF SUBDURAL HEMATOMA & VENTRICULOSTOMY    HAMSTRING RELEASE      KIDNEY STONE SURGERY      PEG TUBE INSERTION      REMOVED    TRACHEOSTOMY      REMOVED     SHUNT INSERTION         Family History   Problem Relation Age of Onset    No Known Problems Mother     No Known Problems Father         Social History     Socioeconomic History    Marital status: Single   Tobacco Use    Smoking status: Never    Smokeless tobacco: Never   Vaping Use    Vaping Use: Never used   Substance and Sexual Activity    Alcohol use: No    Drug use: No    Sexual activity: Defer           Objective   Physical Exam  Vitals and nursing note reviewed. Exam conducted with a chaperone present.   Constitutional:       General: He is awake.      Appearance: Normal appearance. He is well-developed. He is obese. He is ill-appearing.   HENT:      Head: Normocephalic and atraumatic. No right periorbital erythema or left periorbital erythema.      Comments: Dry mucosa no exudates no tonsillar abscess  Eyes:      General: Lids are normal.      Conjunctiva/sclera: Conjunctivae normal.      Pupils: Pupils are equal, round, and reactive to light.   Cardiovascular:      Rate and Rhythm: Normal rate and regular rhythm. Tachycardia present.      Chest Wall: PMI is not displaced.      Pulses: Normal pulses.      Heart sounds: Normal heart sounds.      No gallop.   Pulmonary:      Effort: Pulmonary effort is normal. Tachypnea present.      Breath sounds: Rales present. No decreased breath sounds.   Abdominal:      General: Abdomen is flat. Bowel sounds are normal.      Palpations: Abdomen is soft.      Tenderness: There is no abdominal tenderness.   Musculoskeletal:         General: Normal range of motion.      Cervical back: Full passive range of motion without pain, normal range of motion and neck supple. No rigidity or tenderness.   Skin:     General: Skin is warm and dry.      Capillary Refill: Capillary refill takes 2 to 3 seconds.   Neurological:      General: No focal deficit present.      Mental Status: He is oriented to person, place, and time. Mental status is at baseline. He is lethargic and confused.      GCS: GCS eye subscore is 4. GCS verbal subscore is 4. GCS motor subscore is 6.      Cranial Nerves: Cranial nerves 2-12 are  intact. Dysarthria present. No cranial nerve deficit.      Motor: Motor function is intact. Weakness present.      Deep Tendon Reflexes: Reflexes are normal and symmetric.      Comments: Patient chronic dysarthria and chronic weakness because of a prior stroke.   Psychiatric:         Behavior: Behavior is cooperative.         Procedures          ED Course  ED Course as of 11/30/23 2338   Thu Nov 30, 2023 2010 Sinus tachycardia right axis deviation [TS]   2334 Patient came in with fever and sepsis got a UTI.  No metabolic acidosis procalcitonin mildly elevated.  IV fluids IV antibiotics given to the patient patient blood pressure initially was on the softer side.  And that has improved. [TS]   2334 Shock index is 1.2  In the lower ratio is 11 moderate physiological stress  qSOFA score is 1   [TS]      ED Course User Index  [TS] Herb Meneses MD                                             Medical Decision Making  Febrile illness with fever and cough we will get lab work-up and diagnostics    Problems Addressed:  Acute UTI (urinary tract infection):     Details: Patient with acute UTI there is no vomiting and there is no flank pain that the patient is complaining about.  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  Sepsis, due to unspecified organism, unspecified whether acute organ dysfunction present: acute illness or injury     Details: Sepsis due to above was given IV fluids blood pressure has improved.    Amount and/or Complexity of Data Reviewed  Labs: ordered.     Details: Labs reviewed  Radiology: ordered.  ECG/medicine tests: ordered and independent interpretation performed.     Details: Tachycardia no evidence of acute ischemia.  Discussion of management or test interpretation with external provider(s): Discussed with the hospitalist patient cleared to go to the medical floor bed    Risk  OTC drugs.  Risk Details: Shock index is 1.2  In the lower ratio is  11 moderate physiological stress  qSOFA score is 1        Final diagnoses:   Acute UTI (urinary tract infection)   Sepsis, due to unspecified organism, unspecified whether acute organ dysfunction present       ED Disposition  ED Disposition       ED Disposition   Decision to Admit    Condition   --    Comment   Level of Care: Telemetry [5]   Diagnosis: Sepsis [[id number]]   Admitting Physician: HUGO RAMIREZ [[id number]]   Attending Physician: HUGO RAMIREZ [[id number]]   Certification: I Certify That Inpatient Hospital Services Are Medically Necessary For Greater Than 2 Midnights                 No follow-up provider specified.       Medication List      No changes were made to your prescriptions during this visit.            Herb Meneses MD  11/30/23 2010       Herb Meneses MD  11/30/23 2338      Electronically signed by Herb Meneses MD at 11/30/23 2338       Vital Signs (last 2 days)       Date/Time Temp Temp src Pulse Resp BP Patient Position SpO2    12/01/23 0754 100.5 (38.1) Oral 118 18 106/76 -- 98    12/01/23 0612 -- -- 113 18 -- -- 98    12/01/23 0510 99.5 (37.5) Oral 116 20 123/81 -- 98    12/01/23 04:13:58 -- -- 106 21 -- Sitting 96    12/01/23 0052 98.4 (36.9) -- 112 19 146/81 -- 99    11/30/23 23:35:02 -- -- 114 20 107/61 -- 95    11/30/23 2330 98.9 (37.2) Oral 117 20 107/61 -- 95    11/30/23 2325 -- -- -- -- 103/60 Lying --    11/30/23 22:05:54 99.9 (37.7) Oral -- -- -- -- --    11/30/23 2002 -- -- 135 20 139/82 -- 96    11/30/23 1959 103 (39.4) Oral -- -- -- -- --          Oxygen Therapy (last 2 days)       Date/Time SpO2 Device (Oxygen Therapy) Flow (L/min) Oxygen Concentration (%) ETCO2 (mmHg)    12/01/23 0754 98 room air -- -- --    12/01/23 0612 98 room air -- -- --    12/01/23 0510 98 room air -- -- --    12/01/23 04:13:58 96 room air -- -- --    12/01/23 0052 99 -- -- -- --    11/30/23 23:35:02 95 -- -- -- --    11/30/23 2330 95 -- -- -- --    11/30/23 2002 96 room air -- -- --           Intake & Output (last 2 days)         11/29 0701 11/30 0700 11/30 0701  12/01 0700 12/01 0701  12/02 0700    P.O.   120    IV Piggyback  500     Total Intake(mL/kg)  500 (4.7) 120 (1.1)    Net  +500 +120           Urine Unmeasured Occurrence   1 x          Facility-Administered Medications as of 11/30/2023   Medication Dose Route Frequency Provider Last Rate Last Admin    [COMPLETED] acetaminophen (TYLENOL) tablet 1,000 mg  1,000 mg Oral Once Herb Meneses MD   1,000 mg at 11/30/23 2024    acetaminophen (TYLENOL) tablet 650 mg  650 mg Oral Q4H PRN Mike Valadez MD        albuterol (PROVENTIL) nebulizer solution 0.083% 2.5 mg/3mL  2.5 mg Nebulization Q4H PRN Mike Valadez MD        amantadine (SYMMETREL) tablet 100 mg  100 mg Oral Q12H Jazzmine Swenson        ascorbic acid (VITAMIN C) tablet 500 mg  500 mg Oral Daily Mike Valadez MD        sennosides-docusate (PERICOLACE) 8.6-50 MG per tablet 2 tablet  2 tablet Oral BID Mike Valadez MD        And    polyethylene glycol (MIRALAX) packet 17 g  17 g Oral Daily PRN Mike Valadez MD        And    bisacodyl (DULCOLAX) EC tablet 5 mg  5 mg Oral Daily PRN Mike Valadez MD        And    bisacodyl (DULCOLAX) suppository 10 mg  10 mg Rectal Daily PRN Mike Valadez MD        busPIRone (BUSPAR) tablet 10 mg  10 mg Oral TID Mike Valadez MD        citalopram (CeleXA) tablet 30 mg  30 mg Oral Daily Mike Valadez MD        [COMPLETED] ertapenem (INVanz) 1,000 mg in sodium chloride 0.9 % 100 mL IVPB  1,000 mg Intravenous Once Herb Meneses MD   Stopped at 11/30/23 2334    ertapenem (INVanz) 1,000 mg in sodium chloride 0.9 % 100 mL IVPB  1,000 mg Intravenous Q24H Mike Valadez MD        famotidine (PEPCID) tablet 40 mg  40 mg Oral Daily Mike Valadez MD        gabapentin (NEURONTIN) capsule 300 mg  300 mg Oral BID Mike Valadez MD        heparin (porcine) 5000 UNIT/ML injection 5,000 Units  5,000 Units  Subcutaneous Q8H Mike Valadez MD   5,000 Units at 12/01/23 0607    [COMPLETED] lactated ringers bolus 1,000 mL  1,000 mL Intravenous Once Herb Meneses MD   Stopped at 11/30/23 2331    [COMPLETED] lactated ringers bolus 500 mL  500 mL Intravenous Once Herb Meneses MD   Stopped at 11/30/23 2238    lisinopril (PRINIVIL,ZESTRIL) tablet 10 mg  10 mg Oral Daily Mike Valadez MD        LORazepam (ATIVAN) tablet 0.5 mg  0.5 mg Oral Q8H PRN Mike Valadez MD        ondansetron (ZOFRAN) injection 4 mg  4 mg Intravenous Q6H PRN Mike Valadez MD        oxyCODONE-acetaminophen (PERCOCET) 5-325 MG per tablet 1 tablet  1 tablet Oral Q4H PRN Mike Valadez MD        risperiDONE (risperDAL) tablet 3 mg  3 mg Oral BID Mike Valadez MD        [COMPLETED] sepsis fluid NS 0.9 % bolus 2,682 mL  30 mL/kg (Adjusted) Intravenous Once Herb Meneses MD   2,682 mL at 11/30/23 2334    sodium chloride 0.9 % flush 10 mL  10 mL Intravenous Q12H Mike Valadez MD   10 mL at 12/01/23 0114    sodium chloride 0.9 % flush 10 mL  10 mL Intravenous PRN Mike Valadez MD        sodium chloride 0.9 % infusion 40 mL  40 mL Intravenous PRN Mike Valadez MD        sodium chloride 0.9 % infusion  150 mL/hr Intravenous Continuous Mike Valadez  mL/hr at 12/01/23 0413 150 mL/hr at 12/01/23 0413    tamsulosin (FLOMAX) 24 hr capsule 0.4 mg  0.4 mg Oral Daily Mike Valadez MD        tiZANidine (ZANAFLEX) tablet 2 mg  2 mg Oral Q12H Mike Valadez MD         Orders (last 48 hrs)        Start     Ordered    12/01/23 2100  ertapenem (INVanz) 1,000 mg in sodium chloride 0.9 % 100 mL IVPB  Every 24 Hours         12/01/23 0024 12/01/23 0900  sennosides-docusate (PERICOLACE) 8.6-50 MG per tablet 2 tablet  2 Times Daily        See Hyperspace for full Linked Orders Report.    12/01/23 0017 12/01/23 0900  famotidine (PEPCID) tablet 40 mg  Daily         12/01/23 0017 12/01/23 0900  amantadine  (SYMMETREL) capsule 100 mg  Every 12 Hours Scheduled,   Status:  Discontinued         12/01/23 0745    12/01/23 0900  busPIRone (BUSPAR) tablet 10 mg  3 Times Daily         12/01/23 0745    12/01/23 0900  citalopram (CeleXA) tablet 30 mg  Daily         12/01/23 0745    12/01/23 0900  gabapentin (NEURONTIN) capsule 300 mg  2 Times Daily         12/01/23 0745    12/01/23 0900  lisinopril (PRINIVIL,ZESTRIL) tablet 10 mg  Daily         12/01/23 0745    12/01/23 0900  risperiDONE (risperDAL) tablet 3 mg  2 Times Daily         12/01/23 0745    12/01/23 0900  tamsulosin (FLOMAX) 24 hr capsule 0.4 mg  Daily         12/01/23 0745    12/01/23 0900  tiZANidine (ZANAFLEX) tablet 2 mg  Every 12 Hours Scheduled         12/01/23 0745    12/01/23 0900  ascorbic acid (VITAMIN C) tablet 500 mg  Daily         12/01/23 0745    12/01/23 0900  amantadine (SYMMETREL) tablet 100 mg  Every 12 Hours Scheduled         12/01/23 0823    12/01/23 0800  Oral Care  2 Times Daily       12/01/23 0017    12/01/23 0745  albuterol (PROVENTIL) nebulizer solution 0.083% 2.5 mg/3mL  Every 4 Hours PRN         12/01/23 0745    12/01/23 0702  Inpatient Infectious Diseases Consult  [place]         Specialty:  Infectious Diseases  Provider:  Lela Hess MD    12/01/23 0014    12/01/23 0600  heparin (porcine) 5000 UNIT/ML injection 5,000 Units  Every 8 Hours Scheduled         12/01/23 0017    12/01/23 0600  CBC Auto Differential  Morning Draw         12/01/23 0017    12/01/23 0600  CK  Morning Draw         12/01/23 0017    12/01/23 0600  Comprehensive Metabolic Panel  Morning Draw         12/01/23 0017    12/01/23 0400  Vital Signs  Every 4 Hours       12/01/23 0017    12/01/23 0147  US Renal Bilateral  1 Time Imaging         12/01/23 0147    12/01/23 0144  PT Consult: Eval & Treat Discharge Placement Assessment  Once         12/01/23 0143    12/01/23 0144  SLP Consult: Eval & Treat Swallow Disorder, Communication Disorder  Once         12/01/23 0143     12/01/23 0033  sodium chloride 0.9 % flush 10 mL  Every 12 Hours Scheduled         12/01/23 0017    12/01/23 0033  sodium chloride 0.9 % infusion  Continuous         12/01/23 0017    12/01/23 0030  Bladder Scan  Every Shift       12/01/23 0029    12/01/23 0023  NIPPV (CPAP or BIPAP)  At Bedtime & As Needed-RT         12/01/23 0023    12/01/23 0017  ondansetron (ZOFRAN) injection 4 mg  Every 6 Hours PRN         12/01/23 0017    12/01/23 0017  LORazepam (ATIVAN) tablet 0.5 mg  Every 8 Hours PRN         12/01/23 0017    12/01/23 0016  oxyCODONE-acetaminophen (PERCOCET) 5-325 MG per tablet 1 tablet  Every 4 Hours PRN         12/01/23 0017    12/01/23 0016  acetaminophen (TYLENOL) tablet 650 mg  Every 4 Hours PRN         12/01/23 0017    12/01/23 0016  Diet: Cardiac Diets; Healthy Heart (2-3 Na+); Texture: Pureed (NDD 1); Fluid Consistency: Nectar Thick  Diet Effective Now         12/01/23 0017    12/01/23 0015  Intake & Output  Every Shift       12/01/23 0017    12/01/23 0015  Weigh Patient  Once         12/01/23 0017    12/01/23 0015  Insert Peripheral IV  Once         12/01/23 0017    12/01/23 0015  Saline Lock & Maintain IV Access  Continuous         12/01/23 0017    12/01/23 0015  Code Status and Medical Interventions:  Continuous         12/01/23 0017    12/01/23 0015  Activity - Strict Bed Rest  Until Discontinued         12/01/23 0017    12/01/23 0015  Turn Patient  Now Then Every 2 Hours         12/01/23 0017    12/01/23 0014  sodium chloride 0.9 % flush 10 mL  As Needed         12/01/23 0017    12/01/23 0014  sodium chloride 0.9 % infusion 40 mL  As Needed         12/01/23 0017    12/01/23 0014  polyethylene glycol (MIRALAX) packet 17 g  Daily PRN        See Hyperspace for full Linked Orders Report.    12/01/23 0017    12/01/23 0014  bisacodyl (DULCOLAX) EC tablet 5 mg  Daily PRN        See Hyperspace for full Linked Orders Report.    12/01/23 0017    12/01/23 0014  bisacodyl (DULCOLAX) suppository 10 mg  Daily  PRN        See Hyperspace for full Linked Orders Report.    12/01/23 0017    11/30/23 2339  Inpatient Admission  Once         11/30/23 2338    11/30/23 2339  Cardiac Monitoring  Continuous        Comments: Follow Standing Orders As Outlined in Process Instructions (Open Order Report to View Full Instructions)    11/30/23 2338    11/30/23 2327  STAT Lactic Acid, Reflex  PROCEDURE ONCE         11/30/23 2118    11/30/23 2303  sepsis fluid NS 0.9 % bolus 2,682 mL  Once         11/30/23 2247    11/30/23 2156  Urinalysis, Microscopic Only - Straight Cath  Once         11/30/23 2155 11/30/23 2059  Blood Gas, Arterial -  PROCEDURE ONCE         11/30/23 2059 11/30/23 2023  lactated ringers bolus 1,000 mL  Once         11/30/23 2008 11/30/23 2023  lactated ringers bolus 500 mL  Once         11/30/23 2008 11/30/23 2023  acetaminophen (TYLENOL) tablet 1,000 mg  Once         11/30/23 2008 11/30/23 2023  ertapenem (INVanz) 1,000 mg in sodium chloride 0.9 % 100 mL IVPB  Once         11/30/23 2008 11/30/23 2007  COVID PRE-OP / PRE-PROCEDURE SCREENING ORDER (NO ISOLATION) - Swab, Nasal Cavity  Once         11/30/23 2008 11/30/23 2007  CBC & Differential  Once         11/30/23 2008 11/30/23 2007  Comprehensive Metabolic Panel  Once         11/30/23 2008 11/30/23 2007  Protime-INR  Once         11/30/23 2008 11/30/23 2007  Urinalysis With Microscopic If Indicated (No Culture) - Straight Cath  Once         11/30/23 2008 11/30/23 2007  Blood Gas, Arterial -  Once         11/30/23 2008 11/30/23 2007  Blood Culture - Blood, Arm, Right  Once         11/30/23 2008 11/30/23 2007  Blood Culture - Blood, Arm, Right  Once         11/30/23 2008 11/30/23 2007  Lactic Acid, Plasma  Once         11/30/23 2008 11/30/23 2007  Procalcitonin  Once         11/30/23 2008 11/30/23 2007  ECG 12 Lead Tachycardia  Once         11/30/23 2008 11/30/23 2007  XR Chest 1 View  1 Time Imaging         11/30/23 2008     11/30/23 2007  COVID-19 and FLU A/B PCR, 1 HR TAT - Swab, Nasopharynx  PROCEDURE ONCE         11/30/23 2008 11/30/23 2007  CBC Auto Differential  PROCEDURE ONCE         11/30/23 2008 11/30/23 2004  ECG 12 Lead Tachycardia  Once         11/30/23 2003

## 2023-12-01 NOTE — PLAN OF CARE
Goal Outcome Evaluation:  Plan of Care Reviewed With: patient, sibling, family           Outcome Evaluation: See note      Anticipated Discharge Disposition (SLP): extended care facility

## 2023-12-02 LAB
ALBUMIN SERPL-MCNC: 2.8 G/DL (ref 3.5–5.2)
ALBUMIN/GLOB SERPL: 0.8 G/DL
ALP SERPL-CCNC: 119 U/L (ref 39–117)
ALT SERPL W P-5'-P-CCNC: 9 U/L (ref 1–41)
ANION GAP SERPL CALCULATED.3IONS-SCNC: 10 MMOL/L (ref 5–15)
AST SERPL-CCNC: 9 U/L (ref 1–40)
BASOPHILS # BLD AUTO: 0.05 10*3/MM3 (ref 0–0.2)
BASOPHILS NFR BLD AUTO: 0.7 % (ref 0–1.5)
BILIRUB SERPL-MCNC: 0.2 MG/DL (ref 0–1.2)
BUN SERPL-MCNC: 10 MG/DL (ref 6–20)
BUN/CREAT SERPL: 10.1 (ref 7–25)
CALCIUM SPEC-SCNC: 7.7 MG/DL (ref 8.6–10.5)
CHLORIDE SERPL-SCNC: 108 MMOL/L (ref 98–107)
CO2 SERPL-SCNC: 22 MMOL/L (ref 22–29)
CREAT SERPL-MCNC: 0.99 MG/DL (ref 0.76–1.27)
DEPRECATED RDW RBC AUTO: 41.1 FL (ref 37–54)
EGFRCR SERPLBLD CKD-EPI 2021: 101.9 ML/MIN/1.73
EOSINOPHIL # BLD AUTO: 0.07 10*3/MM3 (ref 0–0.4)
EOSINOPHIL NFR BLD AUTO: 1 % (ref 0.3–6.2)
ERYTHROCYTE [DISTWIDTH] IN BLOOD BY AUTOMATED COUNT: 13.7 % (ref 12.3–15.4)
GLOBULIN UR ELPH-MCNC: 3.5 GM/DL
GLUCOSE SERPL-MCNC: 100 MG/DL (ref 65–99)
HCT VFR BLD AUTO: 30.8 % (ref 37.5–51)
HGB BLD-MCNC: 9.6 G/DL (ref 13–17.7)
IMM GRANULOCYTES # BLD AUTO: 0.02 10*3/MM3 (ref 0–0.05)
IMM GRANULOCYTES NFR BLD AUTO: 0.3 % (ref 0–0.5)
LYMPHOCYTES # BLD AUTO: 1.1 10*3/MM3 (ref 0.7–3.1)
LYMPHOCYTES NFR BLD AUTO: 15.4 % (ref 19.6–45.3)
MCH RBC QN AUTO: 25.6 PG (ref 26.6–33)
MCHC RBC AUTO-ENTMCNC: 31.2 G/DL (ref 31.5–35.7)
MCV RBC AUTO: 82.1 FL (ref 79–97)
MONOCYTES # BLD AUTO: 0.65 10*3/MM3 (ref 0.1–0.9)
MONOCYTES NFR BLD AUTO: 9.1 % (ref 5–12)
NEUTROPHILS NFR BLD AUTO: 5.24 10*3/MM3 (ref 1.7–7)
NEUTROPHILS NFR BLD AUTO: 73.5 % (ref 42.7–76)
NRBC BLD AUTO-RTO: 0 /100 WBC (ref 0–0.2)
PLATELET # BLD AUTO: 142 10*3/MM3 (ref 140–450)
PMV BLD AUTO: 9.8 FL (ref 6–12)
POTASSIUM SERPL-SCNC: 3.5 MMOL/L (ref 3.5–5.2)
PROT SERPL-MCNC: 6.3 G/DL (ref 6–8.5)
RBC # BLD AUTO: 3.75 10*6/MM3 (ref 4.14–5.8)
SODIUM SERPL-SCNC: 140 MMOL/L (ref 136–145)
WBC NRBC COR # BLD AUTO: 7.13 10*3/MM3 (ref 3.4–10.8)

## 2023-12-02 PROCEDURE — 36415 COLL VENOUS BLD VENIPUNCTURE: CPT | Performed by: FAMILY MEDICINE

## 2023-12-02 PROCEDURE — 25810000003 SODIUM CHLORIDE 0.9 % SOLUTION: Performed by: FAMILY MEDICINE

## 2023-12-02 PROCEDURE — 80053 COMPREHEN METABOLIC PANEL: CPT | Performed by: FAMILY MEDICINE

## 2023-12-02 PROCEDURE — 25010000002 ERTAPENEM PER 500 MG: Performed by: FAMILY MEDICINE

## 2023-12-02 PROCEDURE — 25010000002 HEPARIN (PORCINE) PER 1000 UNITS: Performed by: FAMILY MEDICINE

## 2023-12-02 PROCEDURE — 99232 SBSQ HOSP IP/OBS MODERATE 35: CPT | Performed by: INTERNAL MEDICINE

## 2023-12-02 PROCEDURE — 85025 COMPLETE CBC W/AUTO DIFF WBC: CPT | Performed by: FAMILY MEDICINE

## 2023-12-02 RX ADMIN — TIZANIDINE 2 MG: 4 TABLET ORAL at 09:46

## 2023-12-02 RX ADMIN — OXYCODONE HYDROCHLORIDE AND ACETAMINOPHEN 1 TABLET: 5; 325 TABLET ORAL at 21:34

## 2023-12-02 RX ADMIN — PAROXETINE 20 MG: 20 TABLET, FILM COATED ORAL at 21:34

## 2023-12-02 RX ADMIN — SODIUM CHLORIDE 150 ML/HR: 9 INJECTION, SOLUTION INTRAVENOUS at 10:46

## 2023-12-02 RX ADMIN — HEPARIN SODIUM 5000 UNITS: 5000 INJECTION INTRAVENOUS; SUBCUTANEOUS at 05:36

## 2023-12-02 RX ADMIN — HEPARIN SODIUM 5000 UNITS: 5000 INJECTION INTRAVENOUS; SUBCUTANEOUS at 21:35

## 2023-12-02 RX ADMIN — ERTAPENEM 1000 MG: 1 INJECTION INTRAMUSCULAR; INTRAVENOUS at 21:34

## 2023-12-02 RX ADMIN — BUSPIRONE HYDROCHLORIDE 10 MG: 10 TABLET ORAL at 09:46

## 2023-12-02 RX ADMIN — FAMOTIDINE 40 MG: 20 TABLET, FILM COATED ORAL at 09:46

## 2023-12-02 RX ADMIN — GABAPENTIN 300 MG: 300 CAPSULE ORAL at 09:46

## 2023-12-02 RX ADMIN — TAMSULOSIN HYDROCHLORIDE 0.4 MG: 0.4 CAPSULE ORAL at 10:46

## 2023-12-02 RX ADMIN — AMANTADINE HYDROCHLORIDE 100 MG: 100 TABLET ORAL at 21:34

## 2023-12-02 RX ADMIN — OXYCODONE HYDROCHLORIDE AND ACETAMINOPHEN 500 MG: 500 TABLET ORAL at 09:46

## 2023-12-02 RX ADMIN — GABAPENTIN 300 MG: 300 CAPSULE ORAL at 21:34

## 2023-12-02 RX ADMIN — AMANTADINE HYDROCHLORIDE 100 MG: 100 TABLET ORAL at 09:46

## 2023-12-02 RX ADMIN — Medication 10 ML: at 09:47

## 2023-12-02 RX ADMIN — BUSPIRONE HYDROCHLORIDE 10 MG: 10 TABLET ORAL at 17:20

## 2023-12-02 RX ADMIN — HEPARIN SODIUM 5000 UNITS: 5000 INJECTION INTRAVENOUS; SUBCUTANEOUS at 14:33

## 2023-12-02 RX ADMIN — DOCUSATE SODIUM 50 MG AND SENNOSIDES 8.6 MG 2 TABLET: 8.6; 5 TABLET, FILM COATED ORAL at 09:57

## 2023-12-02 RX ADMIN — RISPERIDONE 3 MG: 1 TABLET, FILM COATED ORAL at 09:46

## 2023-12-02 RX ADMIN — Medication 10 ML: at 21:35

## 2023-12-02 RX ADMIN — TIZANIDINE 2 MG: 4 TABLET ORAL at 21:34

## 2023-12-02 RX ADMIN — BUSPIRONE HYDROCHLORIDE 10 MG: 10 TABLET ORAL at 21:34

## 2023-12-02 RX ADMIN — OXYCODONE HYDROCHLORIDE AND ACETAMINOPHEN 1 TABLET: 5; 325 TABLET ORAL at 07:01

## 2023-12-02 RX ADMIN — RISPERIDONE 3 MG: 1 TABLET, FILM COATED ORAL at 21:34

## 2023-12-02 NOTE — PLAN OF CARE
"Goal Outcome Evaluation:  Plan of Care Reviewed With: patient         Patient is received from ER. Alert and oriented x 4. VSS, low grade temp. Room air. Dry skin. Sacral ulcer stage Iv present. Dressing changed with wet to dry NS dressing and mepilex. Barrier cream applied. Right great toe echyomotic. Per patient \"nail fell off\". Wound care consult placed. Dolphin mattress requested. Incontinent to bladder. Bedpan for BM. LBM 2 days ago per patient. Bed bound, wheel chair bound baseline. Safety precautions maintained. SCDs. . Tele continue. Will continue to monitor.             "

## 2023-12-02 NOTE — PLAN OF CARE
"Goal Outcome Evaluation:  Plan of Care Reviewed With: patient   Patient is alert and oriented x 4. VSS. Remains afebrile. Soft BP. RA. Dry scaly skin. Rates pain at 10 all the time. Resting comfortably and non verbal indicator absent while at rest. During patient care activities patient yells \"Ouch\" with each light touch or movement and states \"You are hurting me\". Pain more sensitive at bilateral left upper and lower extrimities. Repositioning Q2. Incontinent bowel and bladder, Incontinent care as needed. Dressing at sacral area CDI.Able to feed self. Tolerated eating and drinking well. IVF as ordered. Heparin for VTE per MD order. Will continue to monitor.                   "

## 2023-12-02 NOTE — PLAN OF CARE
Goal Outcome Evaluation:  Plan of Care Reviewed With: patient        Progress: no change  Outcome Evaluation: Pt is A&Ox4. Demanding, calling out frequently for nursing staff. Speech is garbled. VSS. RA. Tele running normal sinus. Incontinent, voiding via brief. Last BM 12/2. Pressure wound to sacral area, wet to dry dressing changed x2. IV R wrist, NS infsing at 150 mL/hr per order. Bed alarm set. Call light in reach. Safety maintained.

## 2023-12-02 NOTE — PROGRESS NOTES
[facility] Medicine Services  INPATIENT PROGRESS NOTE    Patient Name: Octavio Werner  Date of Admission: 11/30/2023  Today's Date: 12/02/23  Length of Stay: 2  Primary Care Physician: Sarath Camacho DO    Subjective   Chief Complaint: fever.  HPI   Patient notes he is feeling better today  No shortness of breath  No chest pain.  Less fatigued.     Chronic left hemiplegia.         Review of Systems   All pertinent negatives and positives are as above. All other systems have been reviewed and are negative unless otherwise stated.     Objective    Temp:  [97.9 °F (36.6 °C)-99.2 °F (37.3 °C)] 98.4 °F (36.9 °C)  Heart Rate:  [] 52  Resp:  [18-20] 18  BP: (101-135)/(54-74) 101/54  Physical Exam  Vitals and nursing note reviewed.   Constitutional:       Appearance: He is obese.   HENT:      Head: Normocephalic and atraumatic.      Right Ear: External ear normal.      Nose: Nose normal.      Mouth/Throat:      Mouth: Mucous membranes are moist.   Eyes:      General: No scleral icterus.     Pupils: Pupils are equal, round, and reactive to light.   Cardiovascular:      Rate and Rhythm: Normal rate and regular rhythm.      Pulses: Normal pulses.      Heart sounds: Normal heart sounds.   Pulmonary:      Effort: Pulmonary effort is normal.      Breath sounds: Normal breath sounds.   Abdominal:      General: Bowel sounds are normal.      Palpations: Abdomen is soft.      Tenderness: There is no abdominal tenderness.      Comments: obese   Musculoskeletal:      Cervical back: Normal range of motion.      Comments: Left hemiparesis   Skin:     General: Skin is warm and dry.      Capillary Refill: Capillary refill takes less than 2 seconds.      Comments: Seborrheic changes skin of face.   Neurological:      Mental Status: He is alert and oriented to person, place, and time.   Psychiatric:         Mood and Affect: Mood normal.         Behavior: Behavior normal.             Results  Review:  I have reviewed the labs, radiology results, and diagnostic studies.    Laboratory Data:   Results from last 7 days   Lab Units 12/02/23  0854 12/01/23 0530 11/30/23 2027   WBC 10*3/mm3 7.13 10.53 18.52*   HEMOGLOBIN g/dL 9.6* 10.5* 13.0   HEMATOCRIT % 30.8* 33.9* 42.4   PLATELETS 10*3/mm3 142 174 212        Results from last 7 days   Lab Units 12/02/23  0854 12/01/23 0530 11/30/23 2027   SODIUM mmol/L 140 140 144   POTASSIUM mmol/L 3.5 3.9 4.4   CHLORIDE mmol/L 108* 107 102   CO2 mmol/L 22.0 21.0* 28.0   BUN mg/dL 10 14 17   CREATININE mg/dL 0.99 1.33* 1.62*   CALCIUM mg/dL 7.7* 8.0* 9.5   BILIRUBIN mg/dL 0.2 0.3 0.3   ALK PHOS U/L 119* 144* 192*   ALT (SGPT) U/L 9 10 11   AST (SGOT) U/L 9 16 10   GLUCOSE mg/dL 100* 114* 142*       Culture Data:   Blood Culture   Date Value Ref Range Status   11/30/2023 No growth at 24 hours  Preliminary   11/30/2023 No growth at 24 hours  Preliminary       Radiology Data:   Imaging Results (Last 24 Hours)       ** No results found for the last 24 hours. **            I have reviewed the patient's current medications.     Assessment/Plan   Assessment  Active Hospital Problems    Diagnosis     **Sepsis with acute renal failure, due to unspecified organism, unspecified acute renal failure type, unspecified whether septic shock present     AMARI on CPAP     H/O traumatic subdural hematoma     Post-traumatic spasticity     S/P  shunt        Treatment Plan  Continue IV fluids decreased to 75 cc an hour.  Continue IV ertapenem  A.m. CBC and CMP  Increase patient's activity.  Appreciate ID consultation and following   consult.  Patient is from nursing home.      Medical Decision Making  Number and Complexity of problems:   Sepsis with acute renal failure due to unspecified organism high complexity  Obstructive sleep apnea on CPAP moderate complexity  History of traumatic subdural hematoma/traumatic brain injury moderate complexity  Posttraumatic spasticity  moderate complexity  History of  shunt moderate complexity     Differential Diagnosis:      Conditions and Status        Improving     MDM Data  External documents reviewed: Reviewed  Cardiac tracing (EKG, telemetry) interpretation: Reviewed  Radiology interpretation: Reviewed  Labs reviewed: Reviewed  Any tests that were considered but not ordered: None     Decision rules/scores evaluated (example [id number], Wells, etc): None      Discussed with: Patient     Care Planning  Shared decision making: Patient  Code status and discussions: Full     Disposition  Social Determinants of Health that impact treatment or disposition: None  I expect the patient to be discharged to nursing home in 2-3 days.     Electronically signed by Jazzmine Swenson, 12/02/23, 12:32 CST.

## 2023-12-02 NOTE — CASE MANAGEMENT/SOCIAL WORK
Discharge Planning Assessment  Psychiatric     Patient Name: Octavio Werner  MRN: [id number]  Today's Date: 12/2/2023    Admit Date: 11/30/2023        Discharge Needs Assessment       Row Name 12/02/23 0945       Living Environment    People in Home facility resident    Current Living Arrangements extended care facility    Potentially Unsafe Housing Conditions none    Primary Care Provided by self    Provides Primary Care For no one, unable/limited ability to care for self    Family Caregiver if Needed sibling(s);parent(s)    Quality of Family Relationships helpful;involved;supportive       Resource/Environmental Concerns    Resource/Environmental Concerns none    Transportation Concerns none       Transition Planning    Patient/Family Anticipates Transition to long-term care facility    Patient/Family Anticipated Services at Transition skilled nursing    Transportation Anticipated health plan transportation       Discharge Needs Assessment    Readmission Within the Last 30 Days no previous admission in last 30 days    Equipment Currently Used at Home wheelchair    Concerns to be Addressed denies needs/concerns at this time    Anticipated Changes Related to Illness none    Equipment Needed After Discharge none    Outpatient/Agency/Support Group Needs skilled nursing facility    Discharge Facility/Level of Care Needs nursing facility, skilled    Discharge Coordination/Progress PT is a current resident of [facility]. PT anticipates return to SNF upon dc. SW has attempted to verify bed hold status, but the RN at [facility] was not able to verify. Will follow up with [facility] on Monday to verify bed hold status.                   Discharge Plan    No documentation.                 Continued Care and Services - Admitted Since 11/30/2023    Coordination has not been started for this encounter.          Demographic Summary    No documentation.                  Functional Status    No documentation.                  Psychosocial     No documentation.                  Abuse/Neglect    No documentation.                  Legal    No documentation.                  Substance Abuse    No documentation.                  Patient Forms    No documentation.                     AMANDA Harman

## 2023-12-03 LAB
ALBUMIN SERPL-MCNC: 2.9 G/DL (ref 3.5–5.2)
ALBUMIN/GLOB SERPL: 0.9 G/DL
ALP SERPL-CCNC: 114 U/L (ref 39–117)
ALT SERPL W P-5'-P-CCNC: 9 U/L (ref 1–41)
ANION GAP SERPL CALCULATED.3IONS-SCNC: 12 MMOL/L (ref 5–15)
AST SERPL-CCNC: 13 U/L (ref 1–40)
BASOPHILS # BLD AUTO: 0.05 10*3/MM3 (ref 0–0.2)
BASOPHILS NFR BLD AUTO: 1 % (ref 0–1.5)
BILIRUB SERPL-MCNC: <0.2 MG/DL (ref 0–1.2)
BUN SERPL-MCNC: 6 MG/DL (ref 6–20)
BUN/CREAT SERPL: 6.6 (ref 7–25)
CALCIUM SPEC-SCNC: 8.1 MG/DL (ref 8.6–10.5)
CHLORIDE SERPL-SCNC: 111 MMOL/L (ref 98–107)
CO2 SERPL-SCNC: 19 MMOL/L (ref 22–29)
CREAT SERPL-MCNC: 0.91 MG/DL (ref 0.76–1.27)
DEPRECATED RDW RBC AUTO: 43.4 FL (ref 37–54)
EGFRCR SERPLBLD CKD-EPI 2021: 112.7 ML/MIN/1.73
EOSINOPHIL # BLD AUTO: 0.23 10*3/MM3 (ref 0–0.4)
EOSINOPHIL NFR BLD AUTO: 4.4 % (ref 0.3–6.2)
ERYTHROCYTE [DISTWIDTH] IN BLOOD BY AUTOMATED COUNT: 13.7 % (ref 12.3–15.4)
GLOBULIN UR ELPH-MCNC: 3.3 GM/DL
GLUCOSE SERPL-MCNC: 97 MG/DL (ref 65–99)
HCT VFR BLD AUTO: 35.4 % (ref 37.5–51)
HGB BLD-MCNC: 10.3 G/DL (ref 13–17.7)
IMM GRANULOCYTES # BLD AUTO: 0.05 10*3/MM3 (ref 0–0.05)
IMM GRANULOCYTES NFR BLD AUTO: 1 % (ref 0–0.5)
LYMPHOCYTES # BLD AUTO: 1.56 10*3/MM3 (ref 0.7–3.1)
LYMPHOCYTES NFR BLD AUTO: 29.9 % (ref 19.6–45.3)
MCH RBC QN AUTO: 25.4 PG (ref 26.6–33)
MCHC RBC AUTO-ENTMCNC: 29.1 G/DL (ref 31.5–35.7)
MCV RBC AUTO: 87.2 FL (ref 79–97)
MONOCYTES # BLD AUTO: 0.57 10*3/MM3 (ref 0.1–0.9)
MONOCYTES NFR BLD AUTO: 10.9 % (ref 5–12)
NEUTROPHILS NFR BLD AUTO: 2.76 10*3/MM3 (ref 1.7–7)
NEUTROPHILS NFR BLD AUTO: 52.8 % (ref 42.7–76)
NRBC BLD AUTO-RTO: 0 /100 WBC (ref 0–0.2)
PLATELET # BLD AUTO: 177 10*3/MM3 (ref 140–450)
PMV BLD AUTO: 10.8 FL (ref 6–12)
POTASSIUM SERPL-SCNC: 3.8 MMOL/L (ref 3.5–5.2)
PROT SERPL-MCNC: 6.2 G/DL (ref 6–8.5)
RBC # BLD AUTO: 4.06 10*6/MM3 (ref 4.14–5.8)
SODIUM SERPL-SCNC: 142 MMOL/L (ref 136–145)
WBC NRBC COR # BLD AUTO: 5.22 10*3/MM3 (ref 3.4–10.8)

## 2023-12-03 PROCEDURE — 80053 COMPREHEN METABOLIC PANEL: CPT | Performed by: FAMILY MEDICINE

## 2023-12-03 PROCEDURE — 25810000003 SODIUM CHLORIDE 0.9 % SOLUTION: Performed by: FAMILY MEDICINE

## 2023-12-03 PROCEDURE — 85025 COMPLETE CBC W/AUTO DIFF WBC: CPT | Performed by: FAMILY MEDICINE

## 2023-12-03 PROCEDURE — 99231 SBSQ HOSP IP/OBS SF/LOW 25: CPT | Performed by: INTERNAL MEDICINE

## 2023-12-03 PROCEDURE — 25010000002 ERTAPENEM PER 500 MG: Performed by: FAMILY MEDICINE

## 2023-12-03 PROCEDURE — 25010000002 HEPARIN (PORCINE) PER 1000 UNITS: Performed by: FAMILY MEDICINE

## 2023-12-03 RX ADMIN — PAROXETINE 20 MG: 20 TABLET, FILM COATED ORAL at 20:33

## 2023-12-03 RX ADMIN — TAMSULOSIN HYDROCHLORIDE 0.4 MG: 0.4 CAPSULE ORAL at 09:14

## 2023-12-03 RX ADMIN — HEPARIN SODIUM 5000 UNITS: 5000 INJECTION INTRAVENOUS; SUBCUTANEOUS at 05:49

## 2023-12-03 RX ADMIN — FAMOTIDINE 40 MG: 20 TABLET, FILM COATED ORAL at 09:13

## 2023-12-03 RX ADMIN — HEPARIN SODIUM 5000 UNITS: 5000 INJECTION INTRAVENOUS; SUBCUTANEOUS at 13:24

## 2023-12-03 RX ADMIN — GABAPENTIN 300 MG: 300 CAPSULE ORAL at 20:34

## 2023-12-03 RX ADMIN — OXYCODONE HYDROCHLORIDE AND ACETAMINOPHEN 1 TABLET: 5; 325 TABLET ORAL at 11:51

## 2023-12-03 RX ADMIN — Medication 10 ML: at 09:14

## 2023-12-03 RX ADMIN — TIZANIDINE 2 MG: 4 TABLET ORAL at 09:13

## 2023-12-03 RX ADMIN — RISPERIDONE 3 MG: 1 TABLET, FILM COATED ORAL at 20:34

## 2023-12-03 RX ADMIN — AMANTADINE HYDROCHLORIDE 100 MG: 100 TABLET ORAL at 09:14

## 2023-12-03 RX ADMIN — BUSPIRONE HYDROCHLORIDE 10 MG: 10 TABLET ORAL at 09:14

## 2023-12-03 RX ADMIN — HEPARIN SODIUM 5000 UNITS: 5000 INJECTION INTRAVENOUS; SUBCUTANEOUS at 21:54

## 2023-12-03 RX ADMIN — SODIUM CHLORIDE 75 ML/HR: 9 INJECTION, SOLUTION INTRAVENOUS at 04:15

## 2023-12-03 RX ADMIN — AMANTADINE HYDROCHLORIDE 100 MG: 100 TABLET ORAL at 20:35

## 2023-12-03 RX ADMIN — TIZANIDINE 2 MG: 4 TABLET ORAL at 20:33

## 2023-12-03 RX ADMIN — BUSPIRONE HYDROCHLORIDE 10 MG: 10 TABLET ORAL at 17:07

## 2023-12-03 RX ADMIN — RISPERIDONE 3 MG: 1 TABLET, FILM COATED ORAL at 09:14

## 2023-12-03 RX ADMIN — BUSPIRONE HYDROCHLORIDE 10 MG: 10 TABLET ORAL at 20:34

## 2023-12-03 RX ADMIN — OXYCODONE HYDROCHLORIDE AND ACETAMINOPHEN 500 MG: 500 TABLET ORAL at 09:14

## 2023-12-03 RX ADMIN — SODIUM CHLORIDE 75 ML/HR: 9 INJECTION, SOLUTION INTRAVENOUS at 20:33

## 2023-12-03 RX ADMIN — ERTAPENEM 1000 MG: 1 INJECTION INTRAMUSCULAR; INTRAVENOUS at 20:35

## 2023-12-03 RX ADMIN — GABAPENTIN 300 MG: 300 CAPSULE ORAL at 09:13

## 2023-12-03 NOTE — PLAN OF CARE
Goal Outcome Evaluation:  Plan of Care Reviewed With: patient        Progress: improving  Outcome Evaluation: Pt is A&Ox4. Speech is garbled. VSS. RA. Tele running normal sinus. Incontinent, voiding via brief. Last BM 12/3. Pressure wound to sacral area, wet to dry dressing changed x2. IV R wrist, NS infusing at 75 mL/hr per order. Bed alarm set. Call light in reach. Safety maintained.          Otolaryngologist Procedure Text (A): After obtaining clear surgical margins the patient was sent to otolaryngology for surgical repair.  The patient understands they will receive post-surgical care and follow-up from the referring physician's office.

## 2023-12-03 NOTE — PROGRESS NOTES
[facility] Medicine Services  INPATIENT PROGRESS NOTE    Patient Name: Octavio Werner  Date of Admission: 11/30/2023  Today's Date: 12/03/23  Length of Stay: 3  Primary Care Physician: Sarath Camacho DO    Subjective   Chief Complaint: fever.  HPI   Patient notes he is feeling better today  Back hurts from lying on it all night.  Would like to change position.   No nausea  No shortness of breath.   No cough    IV Invanz day 3    Chronic left hemiplegia.         Review of Systems   All pertinent negatives and positives are as above. All other systems have been reviewed and are negative unless otherwise stated.     Objective    Temp:  [97.9 °F (36.6 °C)-98.1 °F (36.7 °C)] 98.1 °F (36.7 °C)  Heart Rate:  [56-62] 62  Resp:  [16-18] 16  BP: (111-145)/(52-84) 111/57  Physical Exam  Vitals and nursing note reviewed.   Constitutional:       Appearance: He is obese.   HENT:      Head: Normocephalic and atraumatic.      Right Ear: External ear normal.      Nose: Nose normal.      Mouth/Throat:      Mouth: Mucous membranes are moist.   Eyes:      General: No scleral icterus.     Pupils: Pupils are equal, round, and reactive to light.   Cardiovascular:      Rate and Rhythm: Normal rate and regular rhythm.      Pulses: Normal pulses.      Heart sounds: Normal heart sounds.   Pulmonary:      Effort: Pulmonary effort is normal.      Breath sounds: Normal breath sounds.   Abdominal:      General: Bowel sounds are normal.      Palpations: Abdomen is soft.      Tenderness: There is no abdominal tenderness.      Comments: obese   Musculoskeletal:      Cervical back: Normal range of motion.      Comments: Left hemiparesis   Skin:     General: Skin is warm and dry.      Capillary Refill: Capillary refill takes less than 2 seconds.      Comments: Seborrheic changes skin of face.   Neurological:      Mental Status: He is alert and oriented to person, place, and time.   Psychiatric:         Mood  and Affect: Mood normal.         Behavior: Behavior normal.             Results Review:  I have reviewed the labs, radiology results, and diagnostic studies.    Laboratory Data:   Results from last 7 days   Lab Units 12/03/23  0718 12/02/23  0854 12/01/23  0530   WBC 10*3/mm3 5.22 7.13 10.53   HEMOGLOBIN g/dL 10.3* 9.6* 10.5*   HEMATOCRIT % 35.4* 30.8* 33.9*   PLATELETS 10*3/mm3 177 142 174        Results from last 7 days   Lab Units 12/03/23  0612 12/02/23  0854 12/01/23  0530   SODIUM mmol/L 142 140 140   POTASSIUM mmol/L 3.8 3.5 3.9   CHLORIDE mmol/L 111* 108* 107   CO2 mmol/L 19.0* 22.0 21.0*   BUN mg/dL 6 10 14   CREATININE mg/dL 0.91 0.99 1.33*   CALCIUM mg/dL 8.1* 7.7* 8.0*   BILIRUBIN mg/dL <0.2 0.2 0.3   ALK PHOS U/L 114 119* 144*   ALT (SGPT) U/L 9 9 10   AST (SGOT) U/L 13 9 16   GLUCOSE mg/dL 97 100* 114*       Culture Data:   Blood Culture   Date Value Ref Range Status   11/30/2023 No growth at 24 hours  Preliminary   11/30/2023 No growth at 24 hours  Preliminary       Radiology Data:   Imaging Results (Last 24 Hours)       ** No results found for the last 24 hours. **            I have reviewed the patient's current medications.     Assessment/Plan   Assessment  Active Hospital Problems    Diagnosis     **Sepsis with acute renal failure, due to unspecified organism, unspecified acute renal failure type, unspecified whether septic shock present     AMARI on CPAP     H/O traumatic subdural hematoma     Post-traumatic spasticity     S/P  shunt        Treatment Plan  DC IV fluids  Continue IV ertapenem  BMP in AM   consult.  Patient is from nursing home.      Medical Decision Making  Number and Complexity of problems:   Sepsis with acute renal failure due to unspecified organism high complexity  Obstructive sleep apnea on CPAP moderate complexity  History of traumatic subdural hematoma/traumatic brain injury moderate complexity  Posttraumatic spasticity moderate complexity  History of   shunt moderate complexity     Differential Diagnosis:      Conditions and Status        Improving     [facility] Data  External documents reviewed: Reviewed  Cardiac tracing (EKG, telemetry) interpretation: Reviewed  Radiology interpretation: Reviewed  Labs reviewed: Reviewed  Any tests that were considered but not ordered: None     Decision rules/scores evaluated (example [id number], Wells, etc): None      Discussed with: Patient     Care Planning  Shared decision making: Patient  Code status and discussions: Full     Disposition  Social Determinants of Health that impact treatment or disposition: None  I expect the patient to be discharged to nursing home in 2-3 days.     Electronically signed by Jazzmine Swenson, 12/03/23, 12:34 CST.

## 2023-12-03 NOTE — PROGRESS NOTES
"Infectious Diseases Progress Note    Patient:  Octavio Werner  YOB: 1990  MRN: [id number]   Admit date: 11/30/2023   Admitting Physician: Jazzmine Swenson  Primary Care Physician: Sarath Camacho DO    Chief Complaint/Interval History: He appears comfortable.  He is without new symptoms.  He has had no further fever.  He is hemodynamically stable.  Although his urinalysis showed too numerous to count white blood cells, it does not appear that a urine culture was completed.    Intake/Output Summary (Last 24 hours) at 12/2/2023 2024  Last data filed at 12/2/2023 1220  Gross per 24 hour   Intake 360 ml   Output 700 ml   Net -340 ml     Allergies:   Allergies   Allergen Reactions    Dilantin [Phenytoin]     Latex     Penicillins     Sulfa Antibiotics      Current Scheduled Medications:   amantadine, 100 mg, Oral, Q12H  vitamin C, 500 mg, Oral, Daily  busPIRone, 10 mg, Oral, TID  ertapenem, 1,000 mg, Intravenous, Q24H  famotidine, 40 mg, Oral, Daily  gabapentin, 300 mg, Oral, BID  heparin (porcine), 5,000 Units, Subcutaneous, Q8H  PARoxetine, 20 mg, Oral, Nightly  risperiDONE, 3 mg, Oral, BID  senna-docusate sodium, 2 tablet, Oral, BID  sodium chloride, 10 mL, Intravenous, Q12H  tamsulosin, 0.4 mg, Oral, Daily  tiZANidine, 2 mg, Oral, Q12H      Current PRN Medications:    acetaminophen    albuterol    senna-docusate sodium **AND** polyethylene glycol **AND** bisacodyl **AND** bisacodyl    LORazepam    ondansetron    oxyCODONE-acetaminophen    sodium chloride    sodium chloride    Review of Systems see HPI    Vital Signs:  /63 (BP Location: Right arm, Patient Position: Lying)   Pulse 56   Temp 98 °F (36.7 °C) (Oral)   Resp 18   Ht 182.9 cm (72\")   Wt 107 kg (235 lb)   SpO2 100%   BMI 31.87 kg/m²     Physical Exam  Vital signs - reviewed.  Line/IV site - No erythema, warmth, induration, or tenderness.  Abdomen is soft and nontender  No suprapubic or flank tenderness    Lab Results:  CBC: "   Results from last 7 days   Lab Units 12/02/23  0854 12/01/23  0530 11/30/23 2027   WBC 10*3/mm3 7.13 10.53 18.52*   HEMOGLOBIN g/dL 9.6* 10.5* 13.0   HEMATOCRIT % 30.8* 33.9* 42.4   PLATELETS 10*3/mm3 142 174 212     BMP:  Results from last 7 days   Lab Units 12/02/23  0854 12/01/23  0530 11/30/23 2027   SODIUM mmol/L 140 140 144   POTASSIUM mmol/L 3.5 3.9 4.4   CHLORIDE mmol/L 108* 107 102   CO2 mmol/L 22.0 21.0* 28.0   BUN mg/dL 10 14 17   CREATININE mg/dL 0.99 1.33* 1.62*   GLUCOSE mg/dL 100* 114* 142*   CALCIUM mg/dL 7.7* 8.0* 9.5   ALT (SGPT) U/L 9 10 11     Culture Results:   Blood Culture   Date Value Ref Range Status   11/30/2023 No growth at 24 hours  Preliminary   11/30/2023 No growth at 24 hours  Preliminary     Urine culture this admission-although his urine showed too numerous to count white blood cells, it does not appear that his urine sample reflexed to a culture.    Per chart review history of ESBL positive E. coli and ESBL positive Klebsiella infection on June 30, 2023    Radiology: None  Additional Studies Reviewed: None    Impression:   1.  Urinary tract infection with sepsis-improving.  Temperature curve improved.  2 numerous to count white blood cells on urinalysis, but does not appear urine culture was completed.  Feel we should continue ertapenem to cover the possibility of ESBL positive producing organism given his prior history of ESBL positive infection.  He is also responding favorably to ertapenem treatment.  2.  Minor renal insufficiency-resolved  3.  History of neurogenic bladder  4.  History of traumatic brain injury    Recommendations:   Continue treatment with ertapenem  Continue supportive care  Would recommend 7-day course of ertapenem from initiation  Feel he can transition back to [facility] when ready for release per attending service    Roland Calhoun MD

## 2023-12-03 NOTE — CASE MANAGEMENT/SOCIAL WORK
Continued Stay Note   Ger     Patient Name: Octavio Werner  MRN: [id number]  Today's Date: 12/3/2023    Admit Date: 11/30/2023        Discharge Plan       Row Name 12/03/23 1251       Plan    Plan Comments PT form [facility] Unable to verify bed hold status on the weekend, will follow up with [facility] on Monday. If PT is able to return to [facility] upon dc, a pre-cert will be needed. This will be initiated Monday as well.                   Discharge Codes    No documentation.                       AMANDA Harman

## 2023-12-04 VITALS
RESPIRATION RATE: 18 BRPM | WEIGHT: 235 LBS | SYSTOLIC BLOOD PRESSURE: 121 MMHG | TEMPERATURE: 98 F | BODY MASS INDEX: 31.83 KG/M2 | HEIGHT: 72 IN | OXYGEN SATURATION: 96 % | HEART RATE: 45 BPM | DIASTOLIC BLOOD PRESSURE: 76 MMHG

## 2023-12-04 PROBLEM — N39.0 UTI (URINARY TRACT INFECTION): Status: ACTIVE | Noted: 2023-12-04

## 2023-12-04 LAB
ANION GAP SERPL CALCULATED.3IONS-SCNC: 11 MMOL/L (ref 5–15)
BUN SERPL-MCNC: 5 MG/DL (ref 6–20)
BUN/CREAT SERPL: 5.7 (ref 7–25)
CALCIUM SPEC-SCNC: 8.2 MG/DL (ref 8.6–10.5)
CHLORIDE SERPL-SCNC: 109 MMOL/L (ref 98–107)
CO2 SERPL-SCNC: 22 MMOL/L (ref 22–29)
CREAT SERPL-MCNC: 0.87 MG/DL (ref 0.76–1.27)
EGFRCR SERPLBLD CKD-EPI 2021: 115.4 ML/MIN/1.73
GLUCOSE SERPL-MCNC: 93 MG/DL (ref 65–99)
POTASSIUM SERPL-SCNC: 3.6 MMOL/L (ref 3.5–5.2)
SODIUM SERPL-SCNC: 142 MMOL/L (ref 136–145)

## 2023-12-04 PROCEDURE — 92526 ORAL FUNCTION THERAPY: CPT | Performed by: SPEECH-LANGUAGE PATHOLOGIST

## 2023-12-04 PROCEDURE — 99221 1ST HOSP IP/OBS SF/LOW 40: CPT | Performed by: NURSE PRACTITIONER

## 2023-12-04 PROCEDURE — 97161 PT EVAL LOW COMPLEX 20 MIN: CPT | Performed by: PHYSICAL THERAPIST

## 2023-12-04 PROCEDURE — 25810000003 SODIUM CHLORIDE 0.9 % SOLUTION: Performed by: FAMILY MEDICINE

## 2023-12-04 PROCEDURE — 80048 BASIC METABOLIC PNL TOTAL CA: CPT | Performed by: FAMILY MEDICINE

## 2023-12-04 PROCEDURE — 25010000002 HEPARIN (PORCINE) PER 1000 UNITS: Performed by: FAMILY MEDICINE

## 2023-12-04 RX ORDER — GABAPENTIN 300 MG/1
300 CAPSULE ORAL 3 TIMES DAILY
Qty: 6 CAPSULE | Refills: 0 | Status: SHIPPED | OUTPATIENT
Start: 2023-12-04

## 2023-12-04 RX ADMIN — SODIUM CHLORIDE 75 ML/HR: 9 INJECTION, SOLUTION INTRAVENOUS at 10:36

## 2023-12-04 RX ADMIN — BUSPIRONE HYDROCHLORIDE 10 MG: 10 TABLET ORAL at 08:12

## 2023-12-04 RX ADMIN — AMANTADINE HYDROCHLORIDE 100 MG: 100 TABLET ORAL at 08:15

## 2023-12-04 RX ADMIN — OXYCODONE HYDROCHLORIDE AND ACETAMINOPHEN 500 MG: 500 TABLET ORAL at 08:13

## 2023-12-04 RX ADMIN — DOCUSATE SODIUM 50 MG AND SENNOSIDES 8.6 MG 2 TABLET: 8.6; 5 TABLET, FILM COATED ORAL at 08:11

## 2023-12-04 RX ADMIN — FAMOTIDINE 40 MG: 20 TABLET, FILM COATED ORAL at 08:15

## 2023-12-04 RX ADMIN — OXYCODONE HYDROCHLORIDE AND ACETAMINOPHEN 1 TABLET: 5; 325 TABLET ORAL at 08:13

## 2023-12-04 RX ADMIN — GABAPENTIN 300 MG: 300 CAPSULE ORAL at 08:13

## 2023-12-04 RX ADMIN — RISPERIDONE 3 MG: 1 TABLET, FILM COATED ORAL at 08:14

## 2023-12-04 RX ADMIN — HEPARIN SODIUM 5000 UNITS: 5000 INJECTION INTRAVENOUS; SUBCUTANEOUS at 05:43

## 2023-12-04 RX ADMIN — TIZANIDINE 2 MG: 4 TABLET ORAL at 08:11

## 2023-12-04 RX ADMIN — TAMSULOSIN HYDROCHLORIDE 0.4 MG: 0.4 CAPSULE ORAL at 08:12

## 2023-12-04 RX ADMIN — Medication 10 ML: at 08:15

## 2023-12-04 NOTE — THERAPY DISCHARGE NOTE
Patient Name: Octavio Werner  : 1990    MRN: [id number]                              Today's Date: 2023       Admit Date: 2023    Visit Dx:     ICD-10-CM ICD-9-CM   1. Acute UTI (urinary tract infection)  N39.0 599.0   2. Sepsis, due to unspecified organism, unspecified whether acute organ dysfunction present  A41.9 038.9     995.91   3. Dysphagia, unspecified type  R13.10 787.20     Patient Active Problem List   Diagnosis    Closed TBI (traumatic brain injury)    Hydrocephalus    S/P  shunt    Presence of intrathecal baclofen pump    H/O traumatic subdural hematoma    Intractable epilepsy without status epilepticus    Post-traumatic spasticity    AMARI on CPAP    Sepsis with acute renal failure, due to unspecified organism, unspecified acute renal failure type, unspecified whether septic shock present    Sepsis     Past Medical History:   Diagnosis Date    CVA (cerebral vascular accident)     Depression     Hemiparesis     Hydrocephalus     Kidney stones     Neurogenic bladder     Neurogenic bowel     Seizure     Sinusitis     Sleep apnea     Subdural hematoma     TBI (traumatic brain injury)     UTI (urinary tract infection)      Past Surgical History:   Procedure Laterality Date    BACLOFEN PUMP IMPLANTATION      CHEST TUBE INSERTION      REMOVED    CRANIOTOMY      FOR EVACUATION OF SUBDURAL HEMATOMA & VENTRICULOSTOMY    HAMSTRING RELEASE      KIDNEY STONE SURGERY      PEG TUBE INSERTION      REMOVED    TRACHEOSTOMY      REMOVED     SHUNT INSERTION        General Information       Row Name 23 6130          Physical Therapy Time and Intention    Document Type evaluation  sepsis with acute renal failure, Hx of TBI,  shunt, UTI  -MS     Mode of Treatment physical therapy;individual therapy  -MS       Row Name 23 1559          General Information    Patient Profile Reviewed yes  -MS     Prior Level of Function min assist:;bed  mobility;dependent:;dressing;bathing;grooming;independent:;feeding  -MS     Existing Precautions/Restrictions fall  -MS     Barriers to Rehab physical barrier;previous functional deficit  -MS       Row Name 12/04/23 0840          Living Environment    People in Home facility resident  -MS       Row Name 12/04/23 0840          Home Main Entrance    Number of Stairs, Main Entrance none  -MS       Row Name 12/04/23 0840          Stairs Within Home, Primary    Number of Stairs, Within Home, Primary none  -MS       Row Name 12/04/23 0840          Cognition    Orientation Status (Cognition) oriented x 4  -MS       Row Name 12/04/23 0840          Safety Issues, Functional Mobility    Impairments Affecting Function (Mobility) balance;coordination;endurance/activity tolerance;grasp;motor control;muscle tone abnormal;pain;postural/trunk control;range of motion (ROM);sensation/sensory awareness;strength  -MS               User Key  (r) = Recorded By, (t) = Taken By, (c) = Cosigned By      Initials Name Provider Type    Ora Bassett, PT, DPT, NCS Physical Therapist                   Mobility       Row Name 12/04/23 0840          Bed Mobility    Bed Mobility rolling left  -MS     Rolling Left [place] (Bed Mobility) minimum assist (75% patient effort);verbal cues;nonverbal cues (demo/gesture)  -MS     Assistive Device (Bed Mobility) bed rails;draw sheet  -MS     Comment, (Bed Mobility) pt able to roll 75% of the way to L, requires min A to roll the last 25%  -MS               User Key  (r) = Recorded By, (t) = Taken By, (c) = Cosigned By      Initials Name Provider Type    Ora Bassett, PT, DPT, NCS Physical Therapist                   Obj/Interventions       Row Name 12/04/23 0840          Range of Motion Comprehensive    Comment, General Range of Motion R UE WFL, R LE WFL, L UE and L LE severely impaired  -MS       Row Name 12/04/23 0840          Sensory Assessment (Somatosensory)    Sensory Assessment  "(Somatosensory) --  pt is hypersensative to any touch and is felt as painful  -MS               User Key  (r) = Recorded By, (t) = Taken By, (c) = Cosigned By      Initials Name Provider Type    MS Ora Saenz, PT, DPT, NCS Physical Therapist                   Goals/Plan    No documentation.                  Clinical Impression       Row Name 12/04/23 0840          Pain    Pretreatment Pain Rating 8/10  \" R hamstring is numb/tingling\"  -MS     Posttreatment Pain Rating 8/10  -MS     Pain Location - Side/Orientation Right  -MS     Pain Location - foot  -MS     Pain Intervention(s) Repositioned;Ambulation/increased activity  -MS       Row Name 12/04/23 0840          Plan of Care Review    Plan of Care Reviewed With patient  -MS     Progress no change  -MS     Outcome Evaluation THe patient presents alert and oriented x4 lying in bed. He reports that he is a dependent lift for any transfers and that he typically rolls himself to the L only. He feeds himself, but is dependent for all other ADL's. He has not worked with PT for 3 years. The patient is currently at his baseline function and has no current needs for PT at this time. Recommend discharge back to SNF.  -MS       Row Name 12/04/23 0840          Therapy Assessment/Plan (PT)    Criteria for Skilled Interventions Met (PT) no problems identified which require skilled intervention;does not meet criteria for skilled intervention  -MS     Therapy Frequency (PT) evaluation only  -MS       Row Name 12/04/23 0840          Positioning and Restraints    Post Treatment Position bed  -MS     In Bed side lying left;call light within reach;encouraged to call for assist;side rails up x2;pillow between legs;R heel elevated  -MS               User Key  (r) = Recorded By, (t) = Taken By, (c) = Cosigned By      Initials Name Provider Type    Ora Bassett, PT, DPT, NCS Physical Therapist                   Outcome Measures       Row Name 12/04/23 0840          How much " help from another person do you currently need...    Turning from your back to your side while in flat bed without using bedrails? 1  -MS     Moving from lying on back to sitting on the side of a flat bed without bedrails? 1  -MS     Moving to and from a bed to a chair (including a wheelchair)? 1  -MS     Standing up from a chair using your arms (e.g., wheelchair, bedside chair)? 1  -MS     Climbing 3-5 steps with a railing? 1  -MS     To walk in hospital room? 1  -MS     AM-PAC 6 Clicks Score (PT) 6  -MS     Highest Level of Mobility Goal 2 --> Bed activities/dependent transfer  -MS       Row Name 12/04/23 0840          Functional Assessment    Outcome Measure Options AM-PAC 6 Clicks Basic Mobility (PT)  -MS               User Key  (r) = Recorded By, (t) = Taken By, (c) = Cosigned By      Initials Name Provider Type    Ora Bassett, PT, DPT, NCS Physical Therapist                    PT Recommendation and Plan     Plan of Care Reviewed With: patient  Progress: no change  Outcome Evaluation: THe patient presents alert and oriented x4 lying in bed. He reports that he is a dependent lift for any transfers and that he typically rolls himself to the L only. He feeds himself, but is dependent for all other ADL's. He has not worked with PT for 3 years. The patient is currently at his baseline function and has no current needs for PT at this time. Recommend discharge back to SNF.     Time Calculation:         PT Charges       Row Name 12/04/23 0840             Time Calculation    Start Time 0840  -MS      Stop Time 0918  -MS      Time Calculation (min) 38 min  -MS      PT Received On 12/04/23  -MS         Untimed Charges    PT Eval/Re-eval Minutes 38  -MS         Total Minutes    Untimed Charges Total Minutes 38  -MS       Total Minutes 38  -MS                User Key  (r) = Recorded By, (t) = Taken By, (c) = Cosigned By      Initials Name Provider Type    Ora Bassett, PT, DPT, NCS Physical Therapist                       PT G-Codes  Outcome Measure Options: AM-PAC 6 Clicks Basic Mobility (PT)  AM-PAC 6 Clicks Score (PT): 6    PT Discharge Summary  Anticipated Discharge Disposition (PT): skilled nursing facility    Ora Saenz, PT, DPT, NCS  12/4/2023

## 2023-12-04 NOTE — PLAN OF CARE
Goal Outcome Evaluation:  Pt is alert and oriented, c/o moderate generalized pain, vitals are stable, no s/s of distress, pt is going to NH with IV to cont with IV antibiotics, report was called to Elias at [facility], pt brother was notified that he is being discharged, EMS has been notified

## 2023-12-04 NOTE — DISCHARGE SUMMARY
[facility] Medicine Services  DISCHARGE SUMMARY       Date of Admission: 11/30/2023  Date of Discharge:  12/4/2023  Primary Care Physician: Sarath Camacho DO    Discharge Diagnoses:  Active Hospital Problems    Diagnosis     **Sepsis with acute renal failure, due to unspecified organism, unspecified acute renal failure type, unspecified whether septic shock present     UTI (urinary tract infection) w sepsis     AMARI on CPAP     H/O traumatic subdural hematoma     Post-traumatic spasticity     S/P  shunt          Presenting Problem/History of Present Illness:  Sepsis [A41.9]     Chief Complaint on Day of Discharge:   Fever    History of Present Illness on Day of Discharge:   The patient has been seen and evaluated by infectious diseases.  It is noted that the patient is appropriate for discharge back to skilled nursing facility to continue IV Invanz for total of 7 days.    Hospital Course  33-year-old male with history of severe traumatic brain injury, reported stroke in 2005, hydrocephalus, status post craniotomy and  shunt placement, chronic left hemiplegia, functional paraplegia, wheelchair-bound, nephrolithiasis, obstructive sleep apnea, sacral pressure ulcer, recurrent urinary tract infections, history of renal cell carcinoma, status post left simple nephrectomy, to the hospital reporting malaise, and fevers.  Apparently patient lives in a nursing home, and was sent from the setting for evaluation in the emergency department.  No abdominal pain reported.  No vomiting.  No cough.  No chest pain.  No diarrhea.  No Arnold in place.   Treatment Plan  The patient will be admitted to my service here at [facility].    Admit to the hospital, telemetry monitoring.  IV fluids normal saline 150 MLS per hour.  Follow renal function and electrolytes.  Follow lactic acid.  Monitor urine output.  Bladder scanning given history of neurogenic bladder.  No ultrasound  requested.  Continue ertapenem IV.  Follow urine and blood cultures.  Infectious disease specialist evaluation.  Continue CPAP at bedtime.  PT OT evaluation.  Speech therapy evaluation.  Dysphagia diet until assessment.  Heparin subcutaneous for DVT prophylaxis    The patient was seen and evaluated by infectious diseases shortly after admission.  Diagnosis was noted to be UTI with sepsis with no hydronephrosis noted on ultrasound.  Treatment was continued throughout his hospital stay with ertapenem.  Temperature curve improved.  TNTC WBCs on UA apparently no urine culture was ordered at the time of admission.  Ertapenem was continued to cover the possibility of ESBL producing organisms given his previous history of ESBL positive infection.  The patient became afebrile by 12/3.  He was felt to be appropriate by both services for transfer back to nursing home to continue IV antibiotics for 7 days total.  The patient was seen and evaluated by wound care with recommendations noted below.      Consults:   Infectious diseases:  mpression:   1.  Urinary tract infection with sepsis.  No hydronephrosis on renal ultrasound.  2.  Mild renal insufficiency  3.  History of traumatic brain injury  4.  History of neurogenic bladder     Recommendations:    Await urine culture  Continue treatment with ertapenem  Await blood cultures  Supportive care  Continue to follow     Roland Calhoun MD    Wound care:  DIAGNOSIS:   Pressure injury of sacral region, stage 4  Pressure injury of left buttock, stage 2  Impaired mobility and ADLs  Hemiparesis  Neurogenic bowel   Neurogenic bladder     PLAN:   Orders placed for wound care as listed below  Patient is on Dolphin mattress with pressure and moisture management orders in place.   Patient is being discharged today and will follow up with [facility] after discharge.      Wound RN Orders (last 12 hours) (12h ago, onward)          Start     Ordered     12/04/23 1800   Wound Care  2 Times Daily     "  Question Answer Comment   Wound Locations Sacral spine and left buttock     Wound Care Instructions Clean with NS. Apply iodoform strip to sacral wound, do not pack tightly. Cover both wounds with silicone foam border     Cleanse Normal Saline     Intervention Iodoform Packing Strips     Securement Silicone Border Dressing         12/04/23 1032     12/04/23 1023   Wound Care  Daily      Question Answer Comment   Wound Locations Right great toe and left second toe     Wound Care Instructions Clean wit NS. Paint with Betadine. Leave open to air.     Cleanse Normal Saline         12/04/23 1022     12/04/23 1023   Turn Patient  Now Then Every 2 Hours         12/04/23 1022     12/04/23 1023   Elevate Heels Off of Bed  Until Discontinued         12/04/23 1022     12/04/23 1023   Use Seat Cushion When Up In Chair  Continuous         12/04/23 1022     12/04/23 1023   Use Repositioning Wedge to Position Patient  Continuous        Comments: Use Comfort Glide repositioning sheet and wedges to position patient.    12/04/23 1022                  Discussed findings and treatment plan including risks, benefits, and treatment options with patient in detail. Patient agreed with treatment plan.        This document has been electronically signed by GREG Lucas       Result Review    Result Review:  I have personally reviewed the results from the time of this admission to 12/4/2023 10:56 CST and agree with these findings:  []  Laboratory  []  Microbiology  []  Radiology  []  EKG/Telemetry   []  Cardiology/Vascular   []  Pathology  []  Old records  []  Other:    Condition on Discharge:    Stable and improved    Physical Exam on Discharge:  /76 (BP Location: Right arm, Patient Position: Lying)   Pulse (!) 45   Temp 98 °F (36.7 °C) (Oral)   Resp 18   Ht 182.9 cm (72\")   Wt 107 kg (235 lb)   SpO2 96%   BMI 31.87 kg/m²   Physical Exam     Constitutional:       Appearance: He is obese.   HENT:      Head: " Normocephalic and atraumatic.      Right Ear: External ear normal.      Nose: Nose normal.      Mouth/Throat:      Mouth: Mucous membranes are moist.   Eyes:      General: No scleral icterus.     Pupils: Pupils are equal, round, and reactive to light.   Cardiovascular:      Rate and Rhythm: Normal rate and regular rhythm.      Pulses: Normal pulses.      Heart sounds: Normal heart sounds.   Pulmonary:      Effort: Pulmonary effort is normal.      Breath sounds: Normal breath sounds.   Abdominal:      General: Bowel sounds are normal.      Palpations: Abdomen is soft.      Tenderness: There is no abdominal tenderness.      Comments: obese   Musculoskeletal:      Cervical back: Normal range of motion.      Comments: Left hemiparesis   Skin:     General: Skin is warm and dry.      Capillary Refill: Capillary refill takes less than 2 seconds.      Comments: Seborrheic changes skin of face.   Neurological:      Mental Status: He is alert and oriented to person, place, and time.   Psychiatric:         Mood and Affect: Mood normal.         Behavior: Behavior normal.       Discharge Disposition:  Skilled Nursing Facility (DC - External)    Discharge Medications:     Discharge Medications        New Medications        Instructions Start Date   ertapenem 1,000 mg in sodium chloride 0.9 % 100 mL IVPB   1,000 mg, Intravenous, Every 24 Hours             Changes to Medications        Instructions Start Date   risperiDONE 1 MG tablet  Commonly known as: risperDAL  What changed: Another medication with the same name was removed. Continue taking this medication, and follow the directions you see here.   1.5 mg, Oral, 2 Times Daily             Continue These Medications        Instructions Start Date   acetaminophen 325 MG tablet  Commonly known as: TYLENOL   650 mg, Oral, Every 6 Hours PRN      albuterol (2.5 MG/3ML) 0.083% nebulizer solution  Commonly known as: PROVENTIL   2.5 mg, Nebulization, Every 4 Hours PRN      amantadine 100  MG capsule  Commonly known as: SYMMETREL   100 mg, Oral, 2 Times Daily      bisacodyl 10 MG suppository  Commonly known as: DULCOLAX   10 mg, Rectal, Daily PRN      busPIRone 10 MG tablet  Commonly known as: BUSPAR   10 mg, Oral, 3 Times Daily      ferrous sulfate 325 (65 FE) MG tablet   325 mg, Oral, Daily      fluticasone 50 MCG/ACT nasal spray  Commonly known as: FLONASE   2 sprays, Nasal, Daily      gabapentin 300 MG capsule  Commonly known as: NEURONTIN   300 mg, Oral, 3 Times Daily      hydrOXYzine 25 MG tablet  Commonly known as: ATARAX   25 mg, Oral, 2 Times Daily      lactulose 10 GM/15ML solution  Commonly known as: CHRONULAC   20 g, Oral, Daily PRN      melatonin 5 MG tablet tablet   5 mg, Oral, Nightly      multivitamin with minerals tablet tablet   1 tablet, Oral, Daily      ondansetron ODT 4 MG disintegrating tablet  Commonly known as: ZOFRAN-ODT   4 mg, Translingual, Every 8 Hours PRN      PARoxetine 20 MG tablet  Commonly known as: PAXIL   20 mg, Oral, Nightly      polyethylene glycol 17 GM/SCOOP powder  Commonly known as: MIRALAX   17 g, Oral, Daily PRN, Give with 4-8 oz of water/juice      tamsulosin 0.4 MG capsule 24 hr capsule  Commonly known as: FLOMAX   1 capsule, Oral, Daily      tiZANidine 2 MG tablet  Commonly known as: ZANAFLEX   2 mg, Oral, Every 12 Hours PRN             Stop These Medications      benzonatate 100 MG capsule  Commonly known as: TESSALON     camphor-menthol 0.5-0.5 % lotion  Commonly known as: SARNA     furosemide 20 MG tablet  Commonly known as: LASIX     miconazole 2 % cream  Commonly known as: MICOTIN     midodrine 10 MG tablet  Commonly known as: PROAMATINE     oxyCODONE 5 MG immediate release tablet  Commonly known as: ROXICODONE     ProSource liquid     sennosides-docusate 8.6-50 MG per tablet  Commonly known as: PERICOLACE     traZODone 150 MG tablet  Commonly known as: DESYREL     vitamin C 500 MG tablet  Commonly known as: ASCORBIC ACID     Zinc Sulfate 220 (50 Zn)  MG tablet              Discharge Diet:   Diet Instructions       Diet: Regular/House Diet; Regular Texture (IDDSI 7); Thin (IDDSI 0)      Discharge Diet: Regular/House Diet    Texture: Regular Texture (IDDSI 7)    Fluid Consistency: Thin (IDDSI 0)            Discharge Care Plan / Instructions:   Discharge to skilled nursing facility    Activity at Discharge:   Activity Instructions       Activity as Tolerated              Follow-up Appointments:  Follow-up with Dr. Paige in 1 week    Electronically signed by Timi Aranda DO, 12/04/23, 10:56 CST.    Time: Discharge over 30 min    Part of this note may be an electronic transcription/translation of spoken language to printed text using the Dragon Dictation system.

## 2023-12-04 NOTE — THERAPY TREATMENT NOTE
"Acute Care - Speech Language Pathology   Swallow Treatment Note [facility]     Patient Name: Octavio Werner  : 1990  MRN: [id number]  Today's Date: 2023               Admit Date: 2023  Swallow treatment completed for diet toleration purposes. The patient was alert and cooperative, self feeding breakfast when I entered the room. Slightly reclined positioning. SLP assisted with repositioning to upright position. Patient self fed regular solids and thin liquids. Impulsivity with bite size and continued to take bites as large amount remained in mouth. Required cues to clear left sided residue. Intermittent coughing was observed; however, vocal quality remained clear. Extensive education on importance of slower rate and clearing mouth prior to eating more, the patient reported, \"I have been told that before.\" Lungs are documented at diminished.     Continue current diet of regular solids and thin liquids. Oral care should be completed prior to and following PO to decrease risk. Check for pocketing often and clear with liquid wash, lingual or finger sweep as needed.     Will continue to follow while admitted to acute care for education and monitoring of diet toleration.     Ora King, MS CCC-SLP 2023 08:59 CST    Visit Dx:     ICD-10-CM ICD-9-CM   1. Acute UTI (urinary tract infection)  N39.0 599.0   2. Sepsis, due to unspecified organism, unspecified whether acute organ dysfunction present  A41.9 038.9     995.91   3. Dysphagia, unspecified type  R13.10 787.20     Patient Active Problem List   Diagnosis    Closed TBI (traumatic brain injury)    Hydrocephalus    S/P  shunt    Presence of intrathecal baclofen pump    H/O traumatic subdural hematoma    Intractable epilepsy without status epilepticus    Post-traumatic spasticity    AMARI on CPAP    Sepsis with acute renal failure, due to unspecified organism, unspecified acute renal failure type, unspecified whether septic shock present    " Sepsis     Past Medical History:   Diagnosis Date    CVA (cerebral vascular accident)     Depression     Hemiparesis     Hydrocephalus     Kidney stones     Neurogenic bladder     Neurogenic bowel     Seizure     Sinusitis     Sleep apnea     Subdural hematoma     TBI (traumatic brain injury)     UTI (urinary tract infection)      Past Surgical History:   Procedure Laterality Date    BACLOFEN PUMP IMPLANTATION      CHEST TUBE INSERTION      REMOVED    CRANIOTOMY      FOR EVACUATION OF SUBDURAL HEMATOMA & VENTRICULOSTOMY    HAMSTRING RELEASE      KIDNEY STONE SURGERY      PEG TUBE INSERTION      REMOVED    TRACHEOSTOMY      REMOVED     SHUNT INSERTION         SLP Recommendation and Plan     SLP Diet Recommendation: regular textures, thin liquids (12/04/23 0815)                                         Daily Summary of Progress (SLP): progress toward functional goals as expected (12/04/23 0815)               Treatment Assessment (SLP): continued (12/04/23 0815)  Treatment Assessment Comments (SLP): See note (12/04/23 0815)  Plan for Continued Treatment (SLP): continue treatment per plan of care (12/04/23 0815)            Plan of Care Reviewed With: patient  Progress: no change      SWALLOW EVALUATION (last 72 hours)       SLP Adult Swallow Evaluation       Row Name 12/04/23 0815 12/01/23 0954                Rehab Evaluation    Document Type therapy note (daily note)  -MG evaluation  -MB       Subjective Information no complaints  -MG no complaints  -MB       Patient Observations alert;cooperative;agree to therapy  -MG alert;cooperative  -MB       Patient/Family/Caregiver Comments/Observations No family present.  -MG Brother and sister arrived during evaluation  -MB       Patient Effort good  -MG --       Symptoms Noted During/After Treatment none  -MG --          General Information    Patient Profile Reviewed -- yes  -MB       Pertinent History Of Current Problem -- UTI, sepsis, acute renal failure, AMARI on CPAP,  CXR: basilar atelectasis; lungs otherwise clear. Hx TBI, storke, post-traumatic spasticity and  shunt.  -MB       Current Method of Nutrition -- pureed;nectar/syrup-thick liquids  -MB       Precautions/Limitations, Vision -- WFL with corrective lenses  -MB       Precautions/Limitations, Hearing -- WFL;for purposes of eval  -MB       Prior Level of Function-Communication -- WFL  -MB       Prior Level of Function-Swallowing -- no diet consistency restrictions  -MB       Plans/Goals Discussed with -- patient  -MB       Barriers to Rehab -- none identified  -MB       Patient's Goals for Discharge -- return to regular diet  -MB       Family Goals for Discharge -- patient able to return to regular diet  -MB          Pain    Additional Documentation Pain Scale: FACES Pre/Post-Treatment (Group)  -MG Pain Scale: FACES Pre/Post-Treatment (Group)  -MB          Pain Scale: FACES Pre/Post-Treatment    Pain: FACES Scale, Pretreatment 0-->no hurt  -MG 0-->no hurt  -MB       Posttreatment Pain Rating 0-->no hurt  -MG --          Oral Motor Structure and Function    Dentition Assessment -- poor oral hygiene;missing teeth;teeth are in poor condition  -MB       Secretion Management -- WNL/WFL  -MB       Mucosal Quality -- dry  -MB          Oral Musculature and Cranial Nerve Assessment    Oral Motor General Assessment -- generalized oral motor weakness  -MB          General Eating/Swallowing Observations    Eating/Swallowing Skills -- fed by SLP  -MB       Positioning During Eating -- upright in bed  -MB       Utensils Used -- straw  -MB       Consistencies Trialed -- regular textures;thin liquids  -MB          Clinical Swallow Eval    Oral Prep Phase -- impaired  -MB       Oral Transit -- WFL  -MB       Oral Residue -- WFL  -MB       Pharyngeal Phase -- suspected pharyngeal impairment  -MB       Esophageal Phase -- unremarkable  -MB       Clinical Swallow Evaluation Summary -- See note  -MB          Oral Prep Concerns    Oral Prep  Concerns -- prolonged mastication  -MB       Prolonged Mastication -- regular consistencies  -MB          Pharyngeal Phase Concerns    Pharyngeal Phase Concerns -- cough  -MB       Cough -- other (see comments)  delayed at end of evaluation  -MB          SLP Evaluation Clinical Impression    SLP Swallowing Diagnosis -- swallow WFL/no suspected pharyngeal impairment  -MB       Functional Impact -- risk of aspiration/pneumonia  -MB       Rehab Potential/Prognosis, Swallowing -- good, to achieve stated therapy goals  -MB       Swallow Criteria for Skilled Therapeutic Interventions Met -- demonstrates skilled criteria  -MB          SLP Treatment Clinical Impressions    Treatment Assessment (SLP) continued  -MG --       Treatment Assessment Comments (SLP) See note  -MG --       Daily Summary of Progress (SLP) progress toward functional goals as expected  -MG --       Barriers to Overall Progress (SLP) Baseline deficits  -MG --       Plan for Continued Treatment (SLP) continue treatment per plan of care  -MG --       Care Plan Review care plan/treatment goals reviewed  -MG --          Recommendations    Therapy Frequency (Swallow) -- PRN  -MB       Predicted Duration Therapy Intervention (Days) -- until discharge  -MB       SLP Diet Recommendation regular textures;thin liquids  -MG regular textures;thin liquids  -MB       Recommended Precautions and Strategies -- upright posture during/after eating;small bites of food and sips of liquid;alternate between small bites of food and sips of liquid;general aspiration precautions  -MB       Oral Care Recommendations -- Oral Care BID/PRN  -MB       SLP Rec. for Method of Medication Administration -- meds whole;with thin liquids  -MB       Monitor for Signs of Aspiration -- yes;cough;gurgly voice;throat clearing;pneumonia;notify SLP if any concerns  -MB       Anticipated Discharge Disposition (SLP) -- extended care facility  -MB          Swallow Goals (SLP)    Swallow LTGs Swallow  Long Term Goal (free text)  -MG Swallow Long Term Goal (free text)  -MB       Swallow STGs diet tolerance goal selection (SLP)  -MG diet tolerance goal selection (SLP)  -MB       Diet Tolerance Goal Selection (SLP) Patient will tolerate trials of  -MG Patient will tolerate trials of  -MB          (LTG) Swallow    (LTG) Swallow Pt will tolerate least restrictive diet with no overt s/s of aspiration.  -MG Pt will tolerate least restrictive diet with no overt s/s of aspiration.  -MB       Winn (Swallow Long Term Goal) independently (over 90% accuracy)  -MG independently (over 90% accuracy)  -MB       Time Frame (Swallow Long Term Goal) by discharge  -MG by discharge  -MB       Barriers (Swallow Long Term Goal) n/a  -MG n/a  -MB       Progress/Outcomes (Swallow Long Term Goal) continuing progress toward goal  -MG new goal  -MB       Comment (Swallow Long Term Goal) n/a  -MG n/a  -MB          (STG) Patient will tolerate trials of    Consistencies Trialed (Tolerate trials) regular textures;thin liquids  -MG regular textures;thin liquids  -MB       Desired Outcome (Tolerate trials) without signs/symptoms of aspiration;with adequate oral prep/transit/clearance  -MG without signs/symptoms of aspiration;with adequate oral prep/transit/clearance  -MB       Winn (Tolerate trials) independently (over 90% accuracy)  -MG independently (over 90% accuracy)  -MB       Time Frame (Tolerate trials) by discharge  -MG by discharge  -MB       Progress/Outcomes (Tolerate trials) continuing progress toward goal  -MG new goal  -MB       Comment (Tolerate trials) n/a  -MG n/a  -MB                 User Key  (r) = Recorded By, (t) = Taken By, (c) = Cosigned By      Initials Name Effective Dates    Isamar Isabel, CCC-SLP 02/03/23 -     MG Ora King, MS [facility]-SLP 07/11/23 -                     EDUCATION  The patient has been educated in the following areas:   Dysphagia (Swallowing Impairment) Oral Care/Hydration.         SLP GOALS       Row Name 12/04/23 0815 12/01/23 0954          (LTG) Swallow    (LTG) Swallow Pt will tolerate least restrictive diet with no overt s/s of aspiration.  -MG Pt will tolerate least restrictive diet with no overt s/s of aspiration.  -MB     Benton (Swallow Long Term Goal) independently (over 90% accuracy)  -MG independently (over 90% accuracy)  -MB     Time Frame (Swallow Long Term Goal) by discharge  -MG by discharge  -MB     Barriers (Swallow Long Term Goal) n/a  -MG n/a  -MB     Progress/Outcomes (Swallow Long Term Goal) continuing progress toward goal  -MG new goal  -MB     Comment (Swallow Long Term Goal) n/a  -MG n/a  -MB        (STG) Patient will tolerate trials of    Consistencies Trialed (Tolerate trials) regular textures;thin liquids  -MG regular textures;thin liquids  -MB     Desired Outcome (Tolerate trials) without signs/symptoms of aspiration;with adequate oral prep/transit/clearance  -MG without signs/symptoms of aspiration;with adequate oral prep/transit/clearance  -MB     Benton (Tolerate trials) independently (over 90% accuracy)  -MG independently (over 90% accuracy)  -MB     Time Frame (Tolerate trials) by discharge  -MG by discharge  -MB     Progress/Outcomes (Tolerate trials) continuing progress toward goal  -MG new goal  -MB     Comment (Tolerate trials) n/a  -MG n/a  -MB               User Key  (r) = Recorded By, (t) = Taken By, (c) = Cosigned By      Initials Name Provider Type    Isamar Isabel, CCC-SLP Speech and Language Pathologist    MG Ora King, MS CCC-SLP Speech and Language Pathologist                       Time Calculation:    Time Calculation- SLP       Row Name 12/04/23 0858             Time Calculation- SLP    SLP Start Time 0815  -MG      SLP Stop Time 0858  -MG      SLP Time Calculation (min) 43 min  -MG      SLP Received On 12/04/23  -MG         Untimed Charges    [id number] Treatment Swallow Minutes 43  -MG         Total Minutes     Untimed Charges Total Minutes 43  -MG       Total Minutes 43  -MG                User Key  (r) = Recorded By, (t) = Taken By, (c) = Cosigned By      Initials Name Provider Type    MG Ora King, MS CCC-SLP Speech and Language Pathologist                    Therapy Charges for Today       Code Description Service Date Service Provider Modifiers Qty    [id number]  ST TREATMENT SWALLOW 3 12/4/2023 Ora King, MS ANDERSEN-SLP GN 1                 Ora King MS CCC-MARBIN  12/4/2023

## 2023-12-04 NOTE — PLAN OF CARE
"Goal Outcome Evaluation:  Plan of Care Reviewed With: patient        Progress: no change              Swallow treatment completed for diet toleration purposes. The patient was alert and cooperative, self feeding breakfast when I entered the room. Slightly reclined positioning. SLP assisted with repositioning to upright position. Patient self fed regular solids and thin liquids. Impulsivity with bite size and continued to take bites as large amount remained in mouth. Required cues to clear left sided residue. Intermittent coughing was observed; however, vocal quality remained clear. Extensive education on importance of slower rate and clearing mouth prior to eating more, the patient reported, \"I have been told that before.\" Lungs are documented at diminished.     Continue current diet of regular solids and thin liquids. Oral care should be completed prior to and following PO to decrease risk. Check for pocketing often and clear with liquid wash, lingual or finger sweep as needed.     Will continue to follow while admitted to acute care for education and monitoring of diet toleration.     Ora King, MS CCC-SLP 12/4/2023 08:58 CST    "

## 2023-12-04 NOTE — PROGRESS NOTES
Enter Query Response Below      Query Response:   Unable to determine     You would be better served directing this query to Dr. Swenson who has taken care of him for 4 days. I saw him only at [facility].     Electronically signed by Timi Aranda, , 23, 13:02 CST.              If applicable, please update the problem list.       Patient: Octavio Werner        : 1990  Account: [id number]           Admit Date:         How to Respond to this query:       a. Click New Note     b. Answer query within the yellow box.                c. Update the Problem List, if applicable.      If you have any questions about this query contact me at: [email or web address]         33 year old male admitted  with UTI and sepsis with acute kidney injury. Creatinine 1.62 () - 0.87 (). Lactic acid 2.7 () - 1.2 ().     Treatment: IV fluid boluses totaling 4182, IV Invanz     Are either of this patient’s organ dysfunctions (QUANG and elevated lactic acid) secondary to sepsis?     Yes, both secondary to sepsis   Yes, but not both, please specify____________   No   Other- specify____________   Unable to determine        By submitting this query, we are merely seeking further clarification of documentation to accurately reflect all conditions that you are monitoring, evaluating, treating or that extend the hospitalization or utilize additional resources of care. Please utilize your independent clinical judgment when addressing the question(s) above.     This query and your response, once completed, will be entered into the legal medical record.    Sincerely,  Candice Mane RN CCDS  Clinical Documentation Integrity Program

## 2023-12-04 NOTE — CASE MANAGEMENT/SOCIAL WORK
Continued Stay Note   [facility]     Patient Name: Octavio Werner  MRN: [id number]  Today's Date: 12/4/2023    Admit Date: 11/30/2023        Discharge Plan       Row Name 12/04/23 1026       Plan    Plan Comments PT has a bed hold at [facility] and may return when medically ready. [facility] will pre-cert him upon his return to the facility.                   Discharge Codes    No documentation.                       AMANDA Harman

## 2023-12-04 NOTE — CONSULTS
[facility]  INPATIENT WOUND & OSTOMY CONSULTATION    Today's Date: 12/04/23    Patient Name: Octavio Werner  MRN: [id number]  [facility]: [id number]  PCP: Sarath Camacho DO  Referring Provider:   Consulting Provider (From admission, onward)      Start Ordered     Status Ordering Provider    12/01/23 2356 12/01/23 2356  Inpatient Wound Care MD Consult  Once        Specialty:  Wound Care  Provider:  Charo Sun APRN    Acknowledged DONNA EVANS    12/01/23 1448 12/01/23 1448  Inpatient Wound Care MD Consult  Once        Specialty:  Wound Care  Provider:  Charo Snu APRN    Acknowledged DONNA EVANS           Attending Provider: Timi Aranda DO  Length of Stay: 4    SUBJECTIVE   Chief Complaint: Wounds of buttocks, sacrum, right great toe, left second toe    HPI: Octavio Werner, a 33 y.o.male, presents with a past medical history of CVA, TBI, neurogenic bowel, neurogenic bladder, hemiparesis.  A full past medical history as listed below.  Patient presents to the hospital 11/30 due to fevers and malaise.  Patient resides at [facility].  He was sent to the emergency department due to fevers and malaise.  Patient was found to be septic with acute renal failure and admitted for further investigation and treatment.    Inpatient wound care consulted due to chronic wounds of left buttock and sacrum.  He also has wounds charted on right great toe and left second toe.  Patient receives care at [facility] wound care center.  Sacral wound is treated with iodoform and left buttock is treated with PolyMem.  Last visit was on 11/29.    Visit Dx:    ICD-10-CM ICD-9-CM   1. Acute UTI (urinary tract infection)  N39.0 599.0   2. Sepsis, due to unspecified organism, unspecified whether acute organ dysfunction present  A41.9 038.9     995.91   3. Dysphagia, unspecified type  R13.10 787.20       Hospital Problem List:     Sepsis with acute renal failure, due to unspecified organism,  unspecified acute renal failure type, unspecified whether septic shock present    S/P  shunt    H/O traumatic subdural hematoma    Post-traumatic spasticity    AMARI on CPAP      History:   Past Medical History:   Diagnosis Date    CVA (cerebral vascular accident)     Depression     Hemiparesis     Hydrocephalus     Kidney stones     Neurogenic bladder     Neurogenic bowel     Seizure     Sinusitis     Sleep apnea     Subdural hematoma     TBI (traumatic brain injury)     UTI (urinary tract infection)      Past Surgical History:   Procedure Laterality Date    BACLOFEN PUMP IMPLANTATION      CHEST TUBE INSERTION      REMOVED    CRANIOTOMY      FOR EVACUATION OF SUBDURAL HEMATOMA & VENTRICULOSTOMY    HAMSTRING RELEASE      KIDNEY STONE SURGERY      PEG TUBE INSERTION      REMOVED    TRACHEOSTOMY      REMOVED     SHUNT INSERTION       Social History     Socioeconomic History    Marital status: Single   Tobacco Use    Smoking status: Never    Smokeless tobacco: Never   Vaping Use    Vaping Use: Never used   Substance and Sexual Activity    Alcohol use: No    Drug use: No    Sexual activity: Defer     Family History   Problem Relation Age of Onset    No Known Problems Mother     No Known Problems Father        Allergies:  Allergies   Allergen Reactions    Dilantin [Phenytoin]     Latex     Penicillins     Sulfa Antibiotics        Medications:    Current Facility-Administered Medications:     acetaminophen (TYLENOL) tablet 650 mg, 650 mg, Oral, Q4H PRN, Mike Valadez MD, 650 mg at 12/01/23 1818    albuterol (PROVENTIL) nebulizer solution 0.083% 2.5 mg/3mL, 2.5 mg, Nebulization, Q4H PRN, Mike Valadez MD    amantadine (SYMMETREL) tablet 100 mg, 100 mg, Oral, Q12H, Jazzmine Swenson, 100 mg at 12/04/23 0815    ascorbic acid (VITAMIN C) tablet 500 mg, 500 mg, Oral, Daily, Mike Valadez MD, 500 mg at 12/04/23 0813    sennosides-docusate (PERICOLACE) 8.6-50 MG per tablet 2 tablet, 2 tablet, Oral, BID, 2  tablet at 12/04/23 0811 **AND** polyethylene glycol (MIRALAX) packet 17 g, 17 g, Oral, Daily PRN **AND** bisacodyl (DULCOLAX) EC tablet 5 mg, 5 mg, Oral, Daily PRN **AND** bisacodyl (DULCOLAX) suppository 10 mg, 10 mg, Rectal, Daily PRN, Mike Valadez MD    busPIRone (BUSPAR) tablet 10 mg, 10 mg, Oral, TID, Mike Valadez MD, 10 mg at 12/04/23 0812    ertapenem (INVanz) 1,000 mg in sodium chloride 0.9 % 100 mL IVPB, 1,000 mg, Intravenous, Q24H, Mike Valadez MD, Last Rate: 200 mL/hr at 12/03/23 2035, 1,000 mg at 12/03/23 2035    famotidine (PEPCID) tablet 40 mg, 40 mg, Oral, Daily, Mike Valadez MD, 40 mg at 12/04/23 0815    gabapentin (NEURONTIN) capsule 300 mg, 300 mg, Oral, BID, Mike Valadez MD, 300 mg at 12/04/23 0813    heparin (porcine) 5000 UNIT/ML injection 5,000 Units, 5,000 Units, Subcutaneous, Q8H, Mike Valadez MD, 5,000 Units at 12/04/23 0543    LORazepam (ATIVAN) tablet 0.5 mg, 0.5 mg, Oral, Q8H PRN, Mike Valadez MD    ondansetron (ZOFRAN) injection 4 mg, 4 mg, Intravenous, Q6H PRN, Mike Valadez MD    oxyCODONE-acetaminophen (PERCOCET) 5-325 MG per tablet 1 tablet, 1 tablet, Oral, Q4H PRN, Mike Valadez MD, 1 tablet at 12/04/23 0813    PARoxetine (PAXIL) tablet 20 mg, 20 mg, Oral, Nightly, Jazzmine Swenson, 20 mg at 12/03/23 2033    risperiDONE (risperDAL) tablet 3 mg, 3 mg, Oral, BID, Mike Valadez MD, 3 mg at 12/04/23 0814    sodium chloride 0.9 % flush 10 mL, 10 mL, Intravenous, Q12H, Mike Valadez MD, 10 mL at 12/04/23 0815    sodium chloride 0.9 % flush 10 mL, 10 mL, Intravenous, PRN, Mike Valadez MD    sodium chloride 0.9 % infusion 40 mL, 40 mL, Intravenous, PRN, Mike Valadez MD    sodium chloride 0.9 % infusion, 75 mL/hr, Intravenous, Continuous, Jazzmine Swenson, Last Rate: 75 mL/hr at 12/03/23 2033, 75 mL/hr at 12/03/23 2033    tamsulosin (FLOMAX) 24 hr capsule 0.4 mg, 0.4 mg, Oral, Daily, Mike Valadez MD, 0.4 mg  at 12/04/23 0812    tiZANidine (ZANAFLEX) tablet 2 mg, 2 mg, Oral, Q12H, Mike Valadez MD, 2 mg at 12/04/23 0811    Review of Systems:   Review of Systems   Constitutional:  Negative for chills and fever.   Respiratory:  Negative for cough and shortness of breath.    Cardiovascular:  Negative for chest pain and palpitations.   Gastrointestinal:  Negative for diarrhea, nausea and vomiting.   Musculoskeletal:  Positive for myalgias.        Reports everything hurts   Skin:  Positive for color change and wound.   Neurological:  Positive for weakness. Negative for dizziness and headaches.   Psychiatric/Behavioral:  Negative for agitation and behavioral problems.          OBJECTIVE     Vitals:    12/04/23 0805   BP: 121/76   Pulse: (!) 45   Resp: 18   Temp: 98 °F (36.7 °C)   SpO2: 96%       PHYSICAL EXAM:   Physical Exam  Vitals and nursing note reviewed.   Constitutional:       General: He is awake.      Appearance: He is obese.      Comments: Body mass index is 31.87 kg/m².    Cardiovascular:      Rate and Rhythm: Regular rhythm. Bradycardia present.   Pulmonary:      Effort: Pulmonary effort is normal. No respiratory distress.   Abdominal:      General: There is no distension.      Palpations: Abdomen is soft.   Musculoskeletal:        Feet:    Feet:      Right foot:      Skin integrity: Ulcer present.      Left foot:      Skin integrity: Erythema present.      Comments: Skin breakdown noted to right hallux which appears to have been toenail avulsion.  Red dry wound bed.  No drainage.  No signs of infection.  Left second toe erythema with no open ulceration present.  No drainage.  Skin:     General: Skin is warm and dry.      Findings: Erythema and wound present.      Comments: Healing stage 4 pressure injury of sacral region.  Wound measures 1.2 cm x 1.2 cm x 1.4 cm with slough and subcutaneous tissue present wound bed.  Epibole of edges.  Periwound area is erythemic with breakdown of periwound area.  Scant  serous drainage.  Stage 2 pressure injury of left buttock with red moist wound bed.  Irregular edges attached wound bed.  No signs of undermining or tunneling.   Neurological:      Mental Status: He is alert and oriented to person, place, and time.      Motor: Weakness and atrophy present.      Gait: Gait abnormal.   Psychiatric:         Attention and Perception: Attention normal.         Speech: Speech is slurred.         Behavior: Behavior is cooperative.                      Results Review:  Lab Results (last 48 hours)       Procedure Component Value Units Date/Time    Basic Metabolic Panel [[id number]]  (Abnormal) Collected: 12/04/23 0756    Specimen: Blood Updated: 12/04/23 0918     Glucose 93 mg/dL      BUN 5 mg/dL      Creatinine 0.87 mg/dL      Sodium 142 mmol/L      Potassium 3.6 mmol/L      Chloride 109 mmol/L      CO2 22.0 mmol/L      Calcium 8.2 mg/dL      BUN/Creatinine Ratio 5.7     Anion Gap 11.0 mmol/L      eGFR 115.4 mL/min/1.73     Narrative:      GFR Normal >60  Chronic Kidney Disease <60  Kidney Failure <15      Blood Culture - Blood, Arm, Right [[id number]]  (Normal) Collected: 11/30/23 2055    Specimen: Blood from Arm, Right Updated: 12/03/23 2115     Blood Culture No growth at 3 days    Blood Culture - Blood, Arm, Right [[id number]]  (Normal) Collected: 11/30/23 2027    Specimen: Blood from Arm, Right Updated: 12/03/23 2115     Blood Culture No growth at 3 days    CBC & Differential [[id number]]  (Abnormal) Collected: 12/03/23 0718    Specimen: Blood Updated: 12/03/23 0810    Narrative:      The following orders were created for panel order CBC & Differential.  Procedure                               Abnormality         Status                     ---------                               -----------         ------                     CBC Auto Differential[[id number]]        Abnormal            Final result                 Please view results for these tests on the individual orders.    CBC Auto  Differential [[id number]]  (Abnormal) Collected: 12/03/23 0718    Specimen: Blood Updated: 12/03/23 0810     WBC 5.22 10*3/mm3      RBC 4.06 10*6/mm3      Hemoglobin 10.3 g/dL      Hematocrit 35.4 %      MCV 87.2 fL      MCH 25.4 pg      MCHC 29.1 g/dL      RDW 13.7 %      RDW-SD 43.4 fl      MPV 10.8 fL      Platelets 177 10*3/mm3      Neutrophil % 52.8 %      Lymphocyte % 29.9 %      Monocyte % 10.9 %      Eosinophil % 4.4 %      Basophil % 1.0 %      Immature Grans % 1.0 %      Neutrophils, Absolute 2.76 10*3/mm3      Lymphocytes, Absolute 1.56 10*3/mm3      Monocytes, Absolute 0.57 10*3/mm3      Eosinophils, Absolute 0.23 10*3/mm3      Basophils, Absolute 0.05 10*3/mm3      Immature Grans, Absolute 0.05 10*3/mm3      nRBC 0.0 /100 WBC     Comprehensive Metabolic Panel [[id number]]  (Abnormal) Collected: 12/03/23 0612    Specimen: Blood Updated: 12/03/23 0656     Glucose 97 mg/dL      BUN 6 mg/dL      Creatinine 0.91 mg/dL      Sodium 142 mmol/L      Potassium 3.8 mmol/L      Comment: Slight hemolysis detected by analyzer. Result may be falsely elevated.        Chloride 111 mmol/L      CO2 19.0 mmol/L      Calcium 8.1 mg/dL      Total Protein 6.2 g/dL      Albumin 2.9 g/dL      ALT (SGPT) 9 U/L      AST (SGOT) 13 U/L      Comment: Slight hemolysis detected by analyzer. Result may be falsely elevated.        Alkaline Phosphatase 114 U/L      Total Bilirubin <0.2 mg/dL      Globulin 3.3 gm/dL      A/G Ratio 0.9 g/dL      BUN/Creatinine Ratio 6.6     Anion Gap 12.0 mmol/L      eGFR 112.7 mL/min/1.73     Narrative:      GFR Normal >60  Chronic Kidney Disease <60  Kidney Failure <15            Imaging Results (Last 72 Hours)       ** No results found for the last 72 hours. **               ASSESSMENT/PLAN       Examination and evaluation of wound(s) was performed.    DIAGNOSIS:   Pressure injury of sacral region, stage 4  Pressure injury of left buttock, stage 2  Impaired mobility and ADLs  Hemiparesis  Neurogenic bowel    Neurogenic bladder    PLAN:   Orders placed for wound care as listed below  Patient is on Dolphin mattress with pressure and moisture management orders in place.   Patient is being discharged today and will follow up with [facility] after discharge.     Wound RN Orders (last 12 hours) (12h ago, onward)       Start     Ordered    12/04/23 1800  Wound Care  2 Times Daily      Question Answer Comment   Wound Locations Sacral spine and left buttock    Wound Care Instructions Clean with NS. Apply iodoform strip to sacral wound, do not pack tightly. Cover both wounds with silicone foam border    Cleanse Normal Saline    Intervention Iodoform Packing Strips    Securement Silicone Border Dressing        12/04/23 1032    12/04/23 1023  Wound Care  Daily      Question Answer Comment   Wound Locations Right great toe and left second toe    Wound Care Instructions Clean wit NS. Paint with Betadine. Leave open to air.    Cleanse Normal Saline        12/04/23 1022    12/04/23 1023  Turn Patient  Now Then Every 2 Hours         12/04/23 1022    12/04/23 1023  Elevate Heels Off of Bed  Until Discontinued         12/04/23 1022    12/04/23 1023  Use Seat Cushion When Up In Chair  Continuous         12/04/23 1022    12/04/23 1023  Use Repositioning Wedge to Position Patient  Continuous        Comments: Use Comfort Glide repositioning sheet and wedges to position patient.    12/04/23 1022               Discussed findings and treatment plan including risks, benefits, and treatment options with patient in detail. Patient agreed with treatment plan.      This document has been electronically signed by GREG Lucas on 12/4/2023 10:07 CST

## 2023-12-04 NOTE — PLAN OF CARE
Goal Outcome Evaluation:  Plan of Care Reviewed With: patient        Progress: no change  Outcome Evaluation: THe patient presents alert and oriented x4 lying in bed. He reports that he is a dependent lift for any transfers and that he typically rolls himself to the L only. He feeds himself, but is dependent for all other ADL's. He has not worked with PT for 3 years. The patient is currently at his baseline function and has no current needs for PT at this time. Recommend discharge back to SNF.      Anticipated Discharge Disposition (PT): skilled nursing facility

## 2023-12-04 NOTE — PAYOR COMM NOTE
"  12/4/23. [facility] [phone]  [phone]    FAXING UPDATE CLINICAL .            Octavio Werner (33 y.o. Male)       Date of Birth   1990    Social Security Number       Address   [address] [place] [address]    Home Phone   [phone]    MRN   [id number]       Scientologist   Jainism    Marital Status   Single                            Admission Date   11/30/23    Admission Type   Emergency    Admitting Provider   Timi Aranda DO    Attending Provider       Department, Room/Bed   [facility] 3A, 346/1       Discharge Date   12/4/2023    Discharge Disposition   Skilled Nursing Facility (DC - External)    Discharge Destination                                 Attending Provider: (none)   Allergies: Dilantin [Phenytoin], Latex, Penicillins, Sulfa Antibiotics    Isolation: None   Infection: ESBL E coli (01/17/23), ESBL Klebsiella (07/04/23)   Code Status: CPR    Ht: 182.9 cm (72\")   Wt: 107 kg (235 lb)    Admission Cmt: None   Principal Problem: Sepsis with acute renal failure, due to unspecified organism, unspecified acute renal failure type, unspecified whether septic shock present [A41.9,R65.20,N17.9]                   Active Insurance as of 11/30/2023       Primary Coverage       Payor Plan Insurance Group Employer/Plan Group    [facility] prollie [facility] prollie BLUE [facility] PPO [id number]       Payor Plan Address Payor Plan Phone Number Payor Plan Fax Number Effective Dates    [address] [phone]  1/1/2022 - None Entered    [facility] [id number]         Subscriber Name Subscriber Birth Date Member ID       UMESH WERNER 8/18/1956 [id number]               Secondary Coverage       Payor Plan Insurance Group Employer/Plan Group    MEDICARE MEDICARE A & B        Payor Plan Address Payor Plan Phone Number Payor Plan Fax Number Effective Dates    [address] [phone]  6/1/2008 - None Entered    [facility] [id number]         Subscriber Name " Subscriber Birth Date Member ID       JUDITH WERNER 1990 [id number]               Tertiary Coverage       Payor Plan Insurance Group Employer/Plan Group    [place]        Payor Plan Address Payor Plan Phone Number Payor Plan Fax Number Effective Dates    [address] [phone]  10/31/2023 - None Entered    [facility] [id number]         Subscriber Name Subscriber Birth Date Member ID       JUDITH WERNER 1990 [id number]                     Emergency Contacts        (Rel.) Home Phone Work Phone Mobile Phone    AlphonsoHarsh (Brother) -- -- [phone]    SandovalCezar salazar (Father) -- -- [phone]             [facility] Encounter Date/Time: 2023 [facility]   Hospital Account: [id number]    MRN: [id number]   Patient:  Judith Werner   Contact Serial #: [id number]   SSN:          ENCOUNTER             Patient Class: Inpatient   Unit: [address] Service: Medicine     Bed: 346/1   Admitting Provider: Timi Aranda DO   Referring Physician: Mike Valadez   Attending Provider:     Adm Diagnosis: Sepsis [A41.9]               PATIENT          Name: Judith Werner : 1990 (33 yrs)   Address: [address] [place] [address] Sex: Male   City: [place] [address]   County: [facility]   Marital Status: SINGLE Ethnicity: NOT                                                                              Race: WHITE   Primary Care Provider: Sarath Camacho DO Patients Phone: Home Phone: [phone]     Mobile Phone: [phone]   EMERGENCY CONTACT   Contact Name Legal Guardian? Relationship to Patient Home Phone Work Phone   1. Harsh Werner  2. AlphonsoCezar      Brother  Father                GUARANTOR            Guarantor: Judith Werner     : 1990   Address: [address] [place] [address] Street [place]: [place] [address]     Relation to Patient: Self       Home Phone: [phone]   Guarantor ID: [id number]       Work Phone:     GUARANTOR  EMPLOYER   Employer:           Status: DISABLED   COVERAGE          PRIMARY INSURANCE   Payor: [email or web address] Plan: [employer] BLUE CROSS BLUE SHIELD PPO   Group Number: [id number] Insurance Type: INDEMNITY   Subscriber Name: UMESH WERNER Subscriber : 1956   Subscriber ID: [id number] Coverage Address: [address]  [place] [address]   Pat. Rel. to Subscriber: Child Coverage Phone: [phone]   SECONDARY INSURANCE   Payor: MEDICARE Plan: MEDICARE A & B   Group Number:   Insurance Type: INDEMNITY   Subscriber Name: JUDITH WERNER Subscriber : 1990   Subscriber ID: [id number] Coverage Address: [address]  [place] [address]   Pat. Rel. to Subscriber: SELF Coverage Phone: [phone]      Contact Serial # ([id number])         2023    Chart ID ([id number])           Roland Paige MD   Physician  Infectious Disease     Progress Notes      Signed     Date of Service: 23 1630  Creation Time: 23     Signed         Infectious Diseases Progress Note     Patient:  Judith Werner  YOB: 1990  MRN: [id number]       Admit date: 2023           Admitting Physician: Jazzmine Swenson  Primary Care Physician: Sarath Camacho DO     Chief Complaint/Interval History: He is comfortable.  He has no new symptoms.  He is without fever.  He is hemodynamically stable.  Explained we can likely help him discharged back to his nursing home tomorrow.  He was comfortable with that plan and would like to return to his nursing home facility.     No intake or output data in the 24 hours ending 23  Allergies:        Allergies   Allergen Reactions    Dilantin [Phenytoin]      Latex      Penicillins      Sulfa Antibiotics        Current Scheduled Medications:   amantadine, 100 mg, Oral, Q12H  vitamin C, 500 mg, Oral, Daily  busPIRone, 10 mg, Oral, TID  ertapenem, 1,000 mg, Intravenous, Q24H  famotidine, 40 mg, Oral, Daily  gabapentin, 300 mg,  "Oral, BID  heparin (porcine), 5,000 Units, Subcutaneous, Q8H  PARoxetine, 20 mg, Oral, Nightly  risperiDONE, 3 mg, Oral, BID  senna-docusate sodium, 2 tablet, Oral, BID  sodium chloride, 10 mL, Intravenous, Q12H  tamsulosin, 0.4 mg, Oral, Daily  tiZANidine, 2 mg, Oral, Q12H        Current PRN Medications:    acetaminophen    albuterol    senna-docusate sodium **AND** polyethylene glycol **AND** bisacodyl **AND** bisacodyl    LORazepam    ondansetron    oxyCODONE-acetaminophen    sodium chloride    sodium chloride     Review of Systems see HPI     Vital Signs:  /52 (BP Location: Right arm, Patient Position: Lying)   Pulse 58   Temp 97.7 °F (36.5 °C) (Oral)   Resp 18   Ht 182.9 cm (72\")   Wt 107 kg (235 lb)   SpO2 96%   BMI 31.87 kg/m²      Physical Exam  Vital signs - reviewed.  Line/IV site - No erythema, warmth, induration, or tenderness.  Alert, pleasant, no distress  Abdomen is soft and nontender  No suprapubic or flank tenderness     Lab Results:  CBC:           Results from last 7 days   Lab Units 12/03/23  0718 12/02/23  0854 12/01/23 0530 11/30/23 2027   WBC 10*3/mm3 5.22 7.13 10.53 18.52*   HEMOGLOBIN g/dL 10.3* 9.6* 10.5* 13.0   HEMATOCRIT % 35.4* 30.8* 33.9* 42.4   PLATELETS 10*3/mm3 177 142 174 212      BMP:          Results from last 7 days   Lab Units 12/03/23  0612 12/02/23  0854 12/01/23  0530 11/30/23 2027   SODIUM mmol/L 142 140 140 144   POTASSIUM mmol/L 3.8 3.5 3.9 4.4   CHLORIDE mmol/L 111* 108* 107 102   CO2 mmol/L 19.0* 22.0 21.0* 28.0   BUN mg/dL 6 10 14 17   CREATININE mg/dL 0.91 0.99 1.33* 1.62*   GLUCOSE mg/dL 97 100* 114* 142*   CALCIUM mg/dL 8.1* 7.7* 8.0* 9.5   ALT (SGPT) U/L 9 9 10 11      Culture Results:         Blood Culture   Date Value Ref Range Status   11/30/2023 No growth at 3 days   Preliminary   11/30/2023 No growth at 3 days   Preliminary      Radiology: None  Additional Studies Reviewed: None     Impression:   1.  Urinary tract infection with " sepsis-improving.  Temperature curve improved.  He is without fever.  No urine culture sent, but he is responding favorably to ertapenem and has had a prior history of ESBL positive organism infection.  2.  Mild renal insufficiency-resolved  3.  History of neurogenic bladder  4.  History of traumatic brain injury     Recommendations:   Continue ertapenem for 7 days from initiation  He appears to started treatment on December 1, 2023  Okay with me to transfer back to [facility] when he is ready for release per others  Would recommend continuing ertapenem through December 7, 2023 then discontinue antibiotic treatment  He can follow-up with his nursing home attending  I would be happy to reassess if recurrent symptoms or problems  He can otherwise follow-up with infectious diseases as needed     MD Manoj Tamayo Maurice S, DO   Physician  Hospitalist     Discharge Summary      Signed     Date of Service: 12/04/23 1054  Creation Time: 12/04/23 1054     Signed              [facility] Medicine Services  DISCHARGE SUMMARY         Date of Admission: 11/30/2023  Date of Discharge:  12/4/2023  Primary Care Physician: Sarath Camacho DO     Discharge Diagnoses:       Active Hospital Problems     Diagnosis      **Sepsis with acute renal failure, due to unspecified organism, unspecified acute renal failure type, unspecified whether septic shock present      UTI (urinary tract infection) w sepsis      AMARI on CPAP      H/O traumatic subdural hematoma      Post-traumatic spasticity      S/P  shunt              Presenting Problem/History of Present Illness:  Sepsis [A41.9]      Chief Complaint on Day of Discharge:   Fever     History of Present Illness on Day of Discharge:   The patient has been seen and evaluated by infectious diseases.  It is noted that the patient is appropriate for discharge back to skilled nursing facility to continue IV Invanz  for total of 7 days.     Hospital Course  33-year-old male with history of severe traumatic brain injury, reported stroke in 2005, hydrocephalus, status post craniotomy and  shunt placement, chronic left hemiplegia, functional paraplegia, wheelchair-bound, nephrolithiasis, obstructive sleep apnea, sacral pressure ulcer, recurrent urinary tract infections, history of renal cell carcinoma, status post left simple nephrectomy, to the hospital reporting malaise, and fevers.  Apparently patient lives in a nursing home, and was sent from the setting for evaluation in the emergency department.  No abdominal pain reported.  No vomiting.  No cough.  No chest pain.  No diarrhea.  No Arnold in place.   Treatment Plan  The patient will be admitted to my service here at [facility].    Admit to the hospital, telemetry monitoring.  IV fluids normal saline 150 MLS per hour.  Follow renal function and electrolytes.  Follow lactic acid.  Monitor urine output.  Bladder scanning given history of neurogenic bladder.  No ultrasound requested.  Continue ertapenem IV.  Follow urine and blood cultures.  Infectious disease specialist evaluation.  Continue CPAP at bedtime.  PT OT evaluation.  Speech therapy evaluation.  Dysphagia diet until assessment.  Heparin subcutaneous for DVT prophylaxis     The patient was seen and evaluated by infectious diseases shortly after admission.  Diagnosis was noted to be UTI with sepsis with no hydronephrosis noted on ultrasound.  Treatment was continued throughout his hospital stay with ertapenem.  Temperature curve improved.  TNTC WBCs on UA apparently no urine culture was ordered at the time of admission.  Ertapenem was continued to cover the possibility of ESBL producing organisms given his previous history of ESBL positive infection.  The patient became afebrile by 12/3.  He was felt to be appropriate by both services for transfer back to nursing home to continue IV antibiotics for 7 days  total.  The patient was seen and evaluated by wound care with recommendations noted below.        Consults:   Infectious diseases:  mpression:   1.  Urinary tract infection with sepsis.  No hydronephrosis on renal ultrasound.  2.  Mild renal insufficiency  3.  History of traumatic brain injury  4.  History of neurogenic bladder     Recommendations:    Await urine culture  Continue treatment with ertapenem  Await blood cultures  Supportive care  Continue to follow     Roland Calhoun MD     Wound care:  DIAGNOSIS:   Pressure injury of sacral region, stage 4  Pressure injury of left buttock, stage 2  Impaired mobility and ADLs  Hemiparesis  Neurogenic bowel   Neurogenic bladder     PLAN:   Orders placed for wound care as listed below  Patient is on Dolphin mattress with pressure and moisture management orders in place.   Patient is being discharged today and will follow up with C after discharge.      Wound RN Orders (last 12 hours) (12h ago, onward)                  Start     Ordered     12/04/23 1800   Wound Care  2 Times Daily      Question Answer Comment   Wound Locations Sacral spine and left buttock     Wound Care Instructions Clean with NS. Apply iodoform strip to sacral wound, do not pack tightly. Cover both wounds with silicone foam border     Cleanse Normal Saline     Intervention Iodoform Packing Strips     Securement Silicone Border Dressing         12/04/23 1032     12/04/23 1023   Wound Care  Daily      Question Answer Comment   Wound Locations Right great toe and left second toe     Wound Care Instructions Clean wit NS. Paint with Betadine. Leave open to air.     Cleanse Normal Saline         12/04/23 1022     12/04/23 1023   Turn Patient  Now Then Every 2 Hours         12/04/23 1022     12/04/23 1023   Elevate Heels Off of Bed  Until Discontinued         12/04/23 1022     12/04/23 1023   Use Seat Cushion When Up In Chair  Continuous         12/04/23 1022     12/04/23 1023   Use Repositioning Wedge to  "Position Patient  Continuous        Comments: Use Comfort Glide repositioning sheet and wedges to position patient.    12/04/23 1022                  Discussed findings and treatment plan including risks, benefits, and treatment options with patient in detail. Patient agreed with treatment plan.        This document has been electronically signed by GREG Lucas         Result Review    Result Review:  I have personally reviewed the results from the time of this admission to 12/4/2023 10:56 CST and agree with these findings:  []  Laboratory  []  Microbiology  []  Radiology  []  EKG/Telemetry   []  Cardiology/Vascular   []  Pathology  []  Old records  []  Other:     Condition on Discharge:    Stable and improved     Physical Exam on Discharge:  /76 (BP Location: Right arm, Patient Position: Lying)   Pulse (!) 45   Temp 98 °F (36.7 °C) (Oral)   Resp 18   Ht 182.9 cm (72\")   Wt 107 kg (235 lb)   SpO2 96%   BMI 31.87 kg/m²   Physical Exam     Constitutional:       Appearance: He is obese.   HENT:      Head: Normocephalic and atraumatic.      Right Ear: External ear normal.      Nose: Nose normal.      Mouth/Throat:      Mouth: Mucous membranes are moist.   Eyes:      General: No scleral icterus.     Pupils: Pupils are equal, round, and reactive to light.   Cardiovascular:      Rate and Rhythm: Normal rate and regular rhythm.      Pulses: Normal pulses.      Heart sounds: Normal heart sounds.   Pulmonary:      Effort: Pulmonary effort is normal.      Breath sounds: Normal breath sounds.   Abdominal:      General: Bowel sounds are normal.      Palpations: Abdomen is soft.      Tenderness: There is no abdominal tenderness.      Comments: obese   Musculoskeletal:      Cervical back: Normal range of motion.      Comments: Left hemiparesis   Skin:     General: Skin is warm and dry.      Capillary Refill: Capillary refill takes less than 2 seconds.      Comments: Seborrheic changes skin of face. "   Neurological:      Mental Status: He is alert and oriented to person, place, and time.   Psychiatric:         Mood and Affect: Mood normal.         Behavior: Behavior normal.         Discharge Disposition:  Skilled Nursing Facility (DC - External)     Discharge Medications:      Discharge Medications          New Medications         Instructions Start Date   ertapenem 1,000 mg in sodium chloride 0.9 % 100 mL IVPB    1,000 mg, Intravenous, Every 24 Hours                  Changes to Medications         Instructions Start Date   risperiDONE 1 MG tablet  Commonly known as: risperDAL  What changed: Another medication with the same name was removed. Continue taking this medication, and follow the directions you see here.    1.5 mg, Oral, 2 Times Daily                  Continue These Medications         Instructions Start Date   acetaminophen 325 MG tablet  Commonly known as: TYLENOL    650 mg, Oral, Every 6 Hours PRN        albuterol (2.5 MG/3ML) 0.083% nebulizer solution  Commonly known as: PROVENTIL    2.5 mg, Nebulization, Every 4 Hours PRN        amantadine 100 MG capsule  Commonly known as: SYMMETREL    100 mg, Oral, 2 Times Daily        bisacodyl 10 MG suppository  Commonly known as: DULCOLAX    10 mg, Rectal, Daily PRN        busPIRone 10 MG tablet  Commonly known as: BUSPAR    10 mg, Oral, 3 Times Daily        ferrous sulfate 325 (65 FE) MG tablet    325 mg, Oral, Daily        fluticasone 50 MCG/ACT nasal spray  Commonly known as: FLONASE    2 sprays, Nasal, Daily        gabapentin 300 MG capsule  Commonly known as: NEURONTIN    300 mg, Oral, 3 Times Daily        hydrOXYzine 25 MG tablet  Commonly known as: ATARAX    25 mg, Oral, 2 Times Daily        lactulose 10 GM/15ML solution  Commonly known as: CHRONULAC    20 g, Oral, Daily PRN        melatonin 5 MG tablet tablet    5 mg, Oral, Nightly        multivitamin with minerals tablet tablet    1 tablet, Oral, Daily        ondansetron ODT 4 MG disintegrating  tablet  Commonly known as: ZOFRAN-ODT    4 mg, Translingual, Every 8 Hours PRN        PARoxetine 20 MG tablet  Commonly known as: PAXIL    20 mg, Oral, Nightly        polyethylene glycol 17 GM/SCOOP powder  Commonly known as: MIRALAX    17 g, Oral, Daily PRN, Give with 4-8 oz of water/juice        tamsulosin 0.4 MG capsule 24 hr capsule  Commonly known as: FLOMAX    1 capsule, Oral, Daily        tiZANidine 2 MG tablet  Commonly known as: ZANAFLEX    2 mg, Oral, Every 12 Hours PRN                  Stop These Medications       benzonatate 100 MG capsule  Commonly known as: TESSALON      camphor-menthol 0.5-0.5 % lotion  Commonly known as: SARNA      furosemide 20 MG tablet  Commonly known as: LASIX      miconazole 2 % cream  Commonly known as: MICOTIN      midodrine 10 MG tablet  Commonly known as: PROAMATINE      oxyCODONE 5 MG immediate release tablet  Commonly known as: ROXICODONE      ProSource liquid      sennosides-docusate 8.6-50 MG per tablet  Commonly known as: PERICOLACE      traZODone 150 MG tablet  Commonly known as: DESYREL      vitamin C 500 MG tablet  Commonly known as: ASCORBIC ACID      Zinc Sulfate 220 (50 Zn) MG tablet                   Discharge Diet:   Diet Instructions         Diet: Regular/House Diet; Regular Texture (IDDSI 7); Thin (IDDSI 0)       Discharge Diet: Regular/House Diet     Texture: Regular Texture (IDDSI 7)     Fluid Consistency: Thin (IDDSI 0)                Discharge Care Plan / Instructions:   Discharge to skilled nursing facility     Activity at Discharge:   Activity Instructions         Activity as Tolerated                  Follow-up Appointments:  Follow-up with Dr. Paige in 1 week     Electronically signed by Timi Aranda DO, 12/04/23, 10:56 CST.     Time: Discharge over 30 min                    Current Facility-Administered Medications   Medication Dose Route Frequency Provider Last Rate Last Admin    acetaminophen (TYLENOL) tablet 650 mg  650 mg Oral Q4H PRN  Mike Valadez MD   650 mg at 12/01/23 1818    albuterol (PROVENTIL) nebulizer solution 0.083% 2.5 mg/3mL  2.5 mg Nebulization Q4H PRN Mike Valadez MD        amantadine (SYMMETREL) tablet 100 mg  100 mg Oral Q12H Jazzmine Swenson   100 mg at 12/04/23 0815    ascorbic acid (VITAMIN C) tablet 500 mg  500 mg Oral Daily Mike Valadez MD   500 mg at 12/04/23 0813    sennosides-docusate (PERICOLACE) 8.6-50 MG per tablet 2 tablet  2 tablet Oral BID Mike Valadez MD   2 tablet at 12/04/23 0811    And    polyethylene glycol (MIRALAX) packet 17 g  17 g Oral Daily PRN Mike Valadez MD        And    bisacodyl (DULCOLAX) EC tablet 5 mg  5 mg Oral Daily PRN Mike Valadez MD        And    bisacodyl (DULCOLAX) suppository 10 mg  10 mg Rectal Daily PRN Mike Valadez MD        busPIRone (BUSPAR) tablet 10 mg  10 mg Oral TID Mike Valadez MD   10 mg at 12/04/23 0812    ertapenem (INVanz) 1,000 mg in sodium chloride 0.9 % 100 mL IVPB  1,000 mg Intravenous Q24H Mike Valadez  mL/hr at 12/03/23 2035 1,000 mg at 12/03/23 2035    famotidine (PEPCID) tablet 40 mg  40 mg Oral Daily Mike Valadez MD   40 mg at 12/04/23 0815    gabapentin (NEURONTIN) capsule 300 mg  300 mg Oral BID Mike Valadez MD   300 mg at 12/04/23 0813    heparin (porcine) 5000 UNIT/ML injection 5,000 Units  5,000 Units Subcutaneous Q8H Mike Valadez MD   5,000 Units at 12/04/23 0543    LORazepam (ATIVAN) tablet 0.5 mg  0.5 mg Oral Q8H PRN Mike Valadez MD        ondansetron (ZOFRAN) injection 4 mg  4 mg Intravenous Q6H PRN Mike Valadez MD        oxyCODONE-acetaminophen (PERCOCET) 5-325 MG per tablet 1 tablet  1 tablet Oral Q4H PRN Mike Valadez MD   1 tablet at 12/04/23 0813    PARoxetine (PAXIL) tablet 20 mg  20 mg Oral Nightly Jazzmine Swenson   20 mg at 12/03/23 2033    risperiDONE (risperDAL) tablet 3 mg  3 mg Oral BID Mike Valadez MD   3 mg at 12/04/23 0814    sodium  chloride 0.9 % flush 10 mL  10 mL Intravenous Q12H Mike Valadez MD   10 mL at 12/04/23 0815    sodium chloride 0.9 % flush 10 mL  10 mL Intravenous PRN Mike Valadez MD        sodium chloride 0.9 % infusion 40 mL  40 mL Intravenous PRN Mike Valadez MD        sodium chloride 0.9 % infusion  75 mL/hr Intravenous Continuous Jazzmine Swenson Jeni 75 mL/hr at 12/04/23 1036 75 mL/hr at 12/04/23 1036    tamsulosin (FLOMAX) 24 hr capsule 0.4 mg  0.4 mg Oral Daily Mike Valadez MD   0.4 mg at 12/04/23 0812    tiZANidine (ZANAFLEX) tablet 2 mg  2 mg Oral Q12H Mike Valadez MD   2 mg at 12/04/23 0811     Current Outpatient Medications   Medication Sig Dispense Refill    acetaminophen (TYLENOL) 325 MG tablet Take 2 tablets by mouth Every 6 (Six) Hours As Needed for Mild Pain or Fever.      albuterol (PROVENTIL) (2.5 MG/3ML) 0.083% nebulizer solution Take 2.5 mg by nebulization Every 4 (Four) Hours As Needed for Wheezing. 20 each 0    amantadine (SYMMETREL) 100 MG capsule Take 1 capsule by mouth 2 (Two) Times a Day.      bisacodyl (DULCOLAX) 10 MG suppository Insert 1 suppository into the rectum Daily As Needed for Constipation.      busPIRone (BUSPAR) 10 MG tablet Take 1 tablet by mouth 3 (Three) Times a Day.      ertapenem 1,000 mg in sodium chloride 0.9 % 100 mL IVPB Infuse 1,000 mg into a venous catheter Daily for 4 doses. Indications: Infection with Dysregulated Inflammatory Response, Urinary Tract Infection      ferrous sulfate 325 (65 FE) MG tablet Take 1 tablet by mouth Daily.      fluticasone (FLONASE) 50 MCG/ACT nasal spray 2 sprays into the nostril(s) as directed by provider Daily.      gabapentin (NEURONTIN) 300 MG capsule Take 1 capsule by mouth 3 (Three) Times a Day. 6 capsule 0    hydrOXYzine (ATARAX) 25 MG tablet Take 1 tablet by mouth 2 (Two) Times a Day.      lactulose (CHRONULAC) 10 GM/15ML solution Take 30 mL by mouth Daily As Needed (constipation).      melatonin 5 MG tablet tablet  Take 1 tablet by mouth Every Night.      multivitamin with minerals tablet tablet Take 1 tablet by mouth Daily.      ondansetron ODT (ZOFRAN-ODT) 4 MG disintegrating tablet Place 1 tablet on the tongue Every 8 (Eight) Hours As Needed for Nausea or Vomiting.      PARoxetine (PAXIL) 20 MG tablet Take 1 tablet by mouth Every Night.      polyethylene glycol (MIRALAX) 17 GM/SCOOP powder Take 17 g by mouth Daily As Needed (constipation). Give with 4-8 oz of water/juice      risperiDONE (risperDAL) 1 MG tablet Take 1.5 tablets by mouth 2 (Two) Times a Day.      tamsulosin (FLOMAX) 0.4 MG capsule 24 hr capsule Take 1 capsule by mouth Daily.      tiZANidine (ZANAFLEX) 2 MG tablet Take 1 tablet by mouth Every 12 (Twelve) Hours As Needed for Muscle Spasms.

## 2023-12-04 NOTE — PLAN OF CARE
Goal Outcome Evaluation:   Pt A&O x 4 and afebrile. No acute events during the day. Pain controlled with PRN pain meds.

## 2023-12-04 NOTE — PROGRESS NOTES
"Infectious Diseases Progress Note    Patient:  Octavio Werner  YOB: 1990  MRN: [id number]   Admit date: 11/30/2023   Admitting Physician: Jazzmine Swneson  Primary Care Physician: Sarath Camacho DO    Chief Complaint/Interval History: He is comfortable.  He has no new symptoms.  He is without fever.  He is hemodynamically stable.  Explained we can likely help him discharged back to his nursing home tomorrow.  He was comfortable with that plan and would like to return to his nursing home facility.    No intake or output data in the 24 hours ending 12/03/23 8057  Allergies:   Allergies   Allergen Reactions    Dilantin [Phenytoin]     Latex     Penicillins     Sulfa Antibiotics      Current Scheduled Medications:   amantadine, 100 mg, Oral, Q12H  vitamin C, 500 mg, Oral, Daily  busPIRone, 10 mg, Oral, TID  ertapenem, 1,000 mg, Intravenous, Q24H  famotidine, 40 mg, Oral, Daily  gabapentin, 300 mg, Oral, BID  heparin (porcine), 5,000 Units, Subcutaneous, Q8H  PARoxetine, 20 mg, Oral, Nightly  risperiDONE, 3 mg, Oral, BID  senna-docusate sodium, 2 tablet, Oral, BID  sodium chloride, 10 mL, Intravenous, Q12H  tamsulosin, 0.4 mg, Oral, Daily  tiZANidine, 2 mg, Oral, Q12H      Current PRN Medications:    acetaminophen    albuterol    senna-docusate sodium **AND** polyethylene glycol **AND** bisacodyl **AND** bisacodyl    LORazepam    ondansetron    oxyCODONE-acetaminophen    sodium chloride    sodium chloride    Review of Systems see HPI    Vital Signs:  /52 (BP Location: Right arm, Patient Position: Lying)   Pulse 58   Temp 97.7 °F (36.5 °C) (Oral)   Resp 18   Ht 182.9 cm (72\")   Wt 107 kg (235 lb)   SpO2 96%   BMI 31.87 kg/m²     Physical Exam  Vital signs - reviewed.  Line/IV site - No erythema, warmth, induration, or tenderness.  Alert, pleasant, no distress  Abdomen is soft and nontender  No suprapubic or flank tenderness    Lab Results:  CBC:   Results from last 7 days   Lab Units " 12/03/23  0718 12/02/23  0854 12/01/23  0530 11/30/23 2027   WBC 10*3/mm3 5.22 7.13 10.53 18.52*   HEMOGLOBIN g/dL 10.3* 9.6* 10.5* 13.0   HEMATOCRIT % 35.4* 30.8* 33.9* 42.4   PLATELETS 10*3/mm3 177 142 174 212     BMP:  Results from last 7 days   Lab Units 12/03/23  0612 12/02/23  0854 12/01/23  0530 11/30/23 2027   SODIUM mmol/L 142 140 140 144   POTASSIUM mmol/L 3.8 3.5 3.9 4.4   CHLORIDE mmol/L 111* 108* 107 102   CO2 mmol/L 19.0* 22.0 21.0* 28.0   BUN mg/dL 6 10 14 17   CREATININE mg/dL 0.91 0.99 1.33* 1.62*   GLUCOSE mg/dL 97 100* 114* 142*   CALCIUM mg/dL 8.1* 7.7* 8.0* 9.5   ALT (SGPT) U/L 9 9 10 11     Culture Results:   Blood Culture   Date Value Ref Range Status   11/30/2023 No growth at 3 days  Preliminary   11/30/2023 No growth at 3 days  Preliminary     Radiology: None  Additional Studies Reviewed: None    Impression:   1.  Urinary tract infection with sepsis-improving.  Temperature curve improved.  He is without fever.  No urine culture sent, but he is responding favorably to ertapenem and has had a prior history of ESBL positive organism infection.  2.  Mild renal insufficiency-resolved  3.  History of neurogenic bladder  4.  History of traumatic brain injury    Recommendations:   Continue ertapenem for 7 days from initiation  He appears to started treatment on December 1, 2023  Okay with me to transfer back to [facility] when he is ready for release per others  Would recommend continuing ertapenem through December 7, 2023 then discontinue antibiotic treatment  He can follow-up with his nursing home attending  I would be happy to reassess if recurrent symptoms or problems  He can otherwise follow-up with infectious diseases as needed    Roland Calhoun MD

## 2023-12-04 NOTE — PLAN OF CARE
Goal Outcome Evaluation:  Plan of Care Reviewed With: patient        Progress: improving  Outcome Evaluation: Pt is A&Ox4. Speech is garbled. VSS. RA. Tele running normal sinus to sinus cherrie. Incontinent, voiding via brief. Last BM 12/3. Pressure wound to sacral area, wet to dry dressing changed x2. IV R wrist, NS infusing at 75 mL/hr per order. Bed alarm set. Call light in reach. Safety maintained.

## 2023-12-05 LAB
BACTERIA SPEC AEROBE CULT: NORMAL
BACTERIA SPEC AEROBE CULT: NORMAL
QT INTERVAL: 288 MS
QTC INTERVAL: 432 MS

## 2023-12-05 NOTE — THERAPY DISCHARGE NOTE
Acute Care - Speech Language Pathology Discharge Summary  [facility]       Patient Name: Octavio Werner  : 1990  MRN: [id number]    Today's Date: 2023                   Admit Date: 2023      SLP Recommendation and Plan  Regular solids and thin liquids    Visit Dx:    ICD-10-CM ICD-9-CM   1. Acute UTI (urinary tract infection)  N39.0 599.0   2. Sepsis, due to unspecified organism, unspecified whether acute organ dysfunction present  A41.9 038.9     995.91   3. Dysphagia, unspecified type  R13.10 787.20   4. Other hydrocephalus  G91.8 331.4                SLP GOALS       Row Name 23 1500 23 0815          (LTG) Swallow    (LTG) Swallow Pt will tolerate least restrictive diet with no overt s/s of aspiration.  -MB Pt will tolerate least restrictive diet with no overt s/s of aspiration.  -MG     [place] (Swallow Long Term Goal) independently (over 90% accuracy)  -MB independently (over 90% accuracy)  -MG     Time Frame (Swallow Long Term Goal) by discharge  -MB by discharge  -MG     Barriers (Swallow Long Term Goal) n/a  -MB n/a  -MG     Progress/Outcomes (Swallow Long Term Goal) goal partially met  -MB continuing progress toward goal  -MG     Comment (Swallow Long Term Goal) n/a  -MB n/a  -MG        (STG) Patient will tolerate trials of    Consistencies Trialed (Tolerate trials) regular textures;thin liquids  -MB regular textures;thin liquids  -MG     Desired Outcome (Tolerate trials) without signs/symptoms of aspiration;with adequate oral prep/transit/clearance  -MB without signs/symptoms of aspiration;with adequate oral prep/transit/clearance  -MG     [place] (Tolerate trials) independently (over 90% accuracy)  -MB independently (over 90% accuracy)  -MG     Time Frame (Tolerate trials) by discharge  -MB by discharge  -MG     Progress/Outcomes (Tolerate trials) goal partially met  -MB continuing progress toward goal  -MG     Comment (Tolerate trials) n/a  -MB n/a  -MG                User Key  (r) = Recorded By, (t) = Taken By, (c) = Cosigned By      Initials Name Provider Type    Isamar Isabel, CCC-SLP Speech and Language Pathologist    MG Ora King, MS CCC-SLP Speech and Language Pathologist                            SLP Discharge Summary  Anticipated Discharge Disposition (SLP): extended care facility  Reason for Discharge: discharge from this facility  Progress Toward Achieving Short/long Term Goals: goals partially met within established timelines  Discharge Destination: SNF      Isamar Whitlock CCC-SLP  12/5/2023

## 2023-12-05 NOTE — PAYOR COMM NOTE
"DC HOME 12-4-23  [id number]    [id number]    Octavio Werner (33 y.o. Male)       Date of Birth   1990    Social Security Number       Address   [address] [place] [address]    Home Phone   [phone]    MRN   [id number]       [employer]   [employer]    Marital Status   Single                            Admission Date   11/30/23    Admission Type   Emergency    Admitting Provider   Timi Aranda DO    Attending Provider       Department, Room/Bed   [facility] 3A, 346/1       Discharge Date   12/4/2023    Discharge Disposition   Skilled Nursing Facility (DC - External)    Discharge Destination                                 Attending Provider: (none)   Allergies: Dilantin [Phenytoin], Latex, Penicillins, Sulfa Antibiotics    Isolation: None   Infection: ESBL E coli (01/17/23), ESBL Klebsiella (07/04/23)   Code Status: Prior    Ht: 182.9 cm (72\")   Wt: 107 kg (235 lb)    Admission Cmt: None   Principal Problem: Sepsis with acute renal failure, due to unspecified organism, unspecified acute renal failure type, unspecified whether septic shock present [A41.9,R65.20,N17.9]                   Active Insurance as of 11/30/2023       Primary Coverage       Payor Plan Insurance Group Employer/Plan Group    [facility] BLUE CROSS ANTH BLUE CROSS BLUE SHIELD PPO [id number]       Payor Plan Address Payor Plan Phone Number Payor Plan Fax Number Effective Dates    [address] [phone]  1/1/2022 - None Entered    [facility] [id number]         Subscriber Name Subscriber Birth Date Member ID       UMESH WERNER 8/18/1956 [id number]               Secondary Coverage       Payor Plan Insurance Group Employer/Plan Group    MEDICARE MEDICARE A & B        Payor Plan Address Payor Plan Phone Number Payor Plan Fax Number Effective Dates    [address] [phone]  6/1/2008 - None Entered    [facility] [id number]         Subscriber Name Subscriber Birth Date Member ID       OCTAVIO WERNER 1990 " [id number]               Tertiary Coverage       Payor Plan Insurance Group Employer/Plan Group    [place]        Payor Plan Address Payor Plan Phone Number Payor Plan Fax Number Effective Dates    [address] [phone]  10/31/2023 - None Entered    [place] [id number]         Subscriber Name Subscriber Birth Date Member ID       JUDITH WERNER 1990 [id number]                     Emergency Contacts        (Rel.) Home Phone Work Phone Mobile Phone    Harsh Werner (Brother) -- -- [phone]    Cezar Werner (Father) -- -- [phone]                 Discharge Summary        Timi Aranda DO at 12/04/23 1054              [facility]  DISCHARGE SUMMARY       Date of Admission: 11/30/2023  Date of Discharge:  12/4/2023  Primary Care Physician: Sarath Camacho DO    Discharge Diagnoses:  Active Hospital Problems    Diagnosis     **Sepsis with acute renal failure, due to unspecified organism, unspecified acute renal failure type, unspecified whether septic shock present     UTI (urinary tract infection) w sepsis     AMARI on CPAP     H/O traumatic subdural hematoma     Post-traumatic spasticity     S/P  shunt          Presenting Problem/History of Present Illness:  Sepsis [A41.9]     Chief Complaint on Day of Discharge:   Fever    History of Present Illness on Day of Discharge:   The patient has been seen and evaluated by infectious diseases.  It is noted that the patient is appropriate for discharge back to skilled nursing facility to continue IV Invanz for total of 7 days.    Hospital Course  33-year-old male with history of severe traumatic brain injury, reported stroke in 2005, hydrocephalus, status post craniotomy and  shunt placement, chronic left hemiplegia, functional paraplegia, wheelchair-bound, nephrolithiasis, obstructive sleep apnea, sacral pressure ulcer, recurrent urinary tract infections,  history of renal cell carcinoma, status post left simple nephrectomy, to the hospital reporting malaise, and fevers.  Apparently patient lives in a nursing home, and was sent from the setting for evaluation in the emergency department.  No abdominal pain reported.  No vomiting.  No cough.  No chest pain.  No diarrhea.  No Arnold in place.   Treatment Plan  The patient will be admitted to my service here at [facility].    Admit to the hospital, telemetry monitoring.  IV fluids normal saline 150 MLS per hour.  Follow renal function and electrolytes.  Follow lactic acid.  Monitor urine output.  Bladder scanning given history of neurogenic bladder.  No ultrasound requested.  Continue ertapenem IV.  Follow urine and blood cultures.  Infectious disease specialist evaluation.  Continue CPAP at bedtime.  PT OT evaluation.  Speech therapy evaluation.  Dysphagia diet until assessment.  Heparin subcutaneous for DVT prophylaxis    The patient was seen and evaluated by infectious diseases shortly after admission.  Diagnosis was noted to be UTI with sepsis with no hydronephrosis noted on ultrasound.  Treatment was continued throughout his hospital stay with ertapenem.  Temperature curve improved.  TNTC WBCs on UA apparently no urine culture was ordered at the time of admission.  Ertapenem was continued to cover the possibility of ESBL producing organisms given his previous history of ESBL positive infection.  The patient became afebrile by 12/3.  He was felt to be appropriate by both services for transfer back to nursing home to continue IV antibiotics for 7 days total.  The patient was seen and evaluated by wound care with recommendations noted below.      Consults:   Infectious diseases:  mpression:   1.  Urinary tract infection with sepsis.  No hydronephrosis on renal ultrasound.  2.  Mild renal insufficiency  3.  History of traumatic brain injury  4.  History of neurogenic bladder     Recommendations:    Await  urine culture  Continue treatment with ertapenem  Await blood cultures  Supportive care  Continue to follow     Roland Calhoun MD    Wound care:  DIAGNOSIS:   Pressure injury of sacral region, stage 4  Pressure injury of left buttock, stage 2  Impaired mobility and ADLs  Hemiparesis  Neurogenic bowel   Neurogenic bladder     PLAN:   Orders placed for wound care as listed below  Patient is on Dolphin mattress with pressure and moisture management orders in place.   Patient is being discharged today and will follow up with [facility] after discharge.      Wound RN Orders (last 12 hours) (12h ago, onward)          Start     Ordered     12/04/23 1800   Wound Care  2 Times Daily      Question Answer Comment   Wound Locations Sacral spine and left buttock     Wound Care Instructions Clean with NS. Apply iodoform strip to sacral wound, do not pack tightly. Cover both wounds with silicone foam border     Cleanse Normal Saline     Intervention Iodoform Packing Strips     Securement Silicone Border Dressing         12/04/23 1032     12/04/23 1023   Wound Care  Daily      Question Answer Comment   Wound Locations Right great toe and left second toe     Wound Care Instructions Clean wit NS. Paint with Betadine. Leave open to air.     Cleanse Normal Saline         12/04/23 1022     12/04/23 1023   Turn Patient  Now Then Every 2 Hours         12/04/23 1022     12/04/23 1023   Elevate Heels Off of Bed  Until Discontinued         12/04/23 1022     12/04/23 1023   Use Seat Cushion When Up In Chair  Continuous         12/04/23 1022     12/04/23 1023   Use Repositioning Wedge to Position Patient  Continuous        Comments: Use Comfort Glide repositioning sheet and wedges to position patient.    12/04/23 1022                  Discussed findings and treatment plan including risks, benefits, and treatment options with patient in detail. Patient agreed with treatment plan.        This document has been electronically signed by Charo CHAIDEZ  "GREG Sun       Result Review   Result Review:  I have personally reviewed the results from the time of this admission to 12/4/2023 10:56 CST and agree with these findings:  []  Laboratory  []  Microbiology  []  Radiology  []  EKG/Telemetry   []  Cardiology/Vascular   []  Pathology  []  Old records  []  Other:    Condition on Discharge:    Stable and improved    Physical Exam on Discharge:  /76 (BP Location: Right arm, Patient Position: Lying)   Pulse (!) 45   Temp 98 °F (36.7 °C) (Oral)   Resp 18   Ht 182.9 cm (72\")   Wt 107 kg (235 lb)   SpO2 96%   BMI 31.87 kg/m²   Physical Exam     Constitutional:       Appearance: He is obese.   HENT:      Head: Normocephalic and atraumatic.      Right Ear: External ear normal.      Nose: Nose normal.      Mouth/Throat:      Mouth: Mucous membranes are moist.   Eyes:      General: No scleral icterus.     Pupils: Pupils are equal, round, and reactive to light.   Cardiovascular:      Rate and Rhythm: Normal rate and regular rhythm.      Pulses: Normal pulses.      Heart sounds: Normal heart sounds.   Pulmonary:      Effort: Pulmonary effort is normal.      Breath sounds: Normal breath sounds.   Abdominal:      General: Bowel sounds are normal.      Palpations: Abdomen is soft.      Tenderness: There is no abdominal tenderness.      Comments: obese   Musculoskeletal:      Cervical back: Normal range of motion.      Comments: Left hemiparesis   Skin:     General: Skin is warm and dry.      Capillary Refill: Capillary refill takes less than 2 seconds.      Comments: Seborrheic changes skin of face.   Neurological:      Mental Status: He is alert and oriented to person, place, and time.   Psychiatric:         Mood and Affect: Mood normal.         Behavior: Behavior normal.       Discharge Disposition:  Skilled Nursing Facility (DC - External)    Discharge Medications:     Discharge Medications        New Medications        Instructions Start Date   ertapenem 1,000 mg " in sodium chloride 0.9 % 100 mL IVPB   1,000 mg, Intravenous, Every 24 Hours             Changes to Medications        Instructions Start Date   risperiDONE 1 MG tablet  Commonly known as: risperDAL  What changed: Another medication with the same name was removed. Continue taking this medication, and follow the directions you see here.   1.5 mg, Oral, 2 Times Daily             Continue These Medications        Instructions Start Date   acetaminophen 325 MG tablet  Commonly known as: TYLENOL   650 mg, Oral, Every 6 Hours PRN      albuterol (2.5 MG/3ML) 0.083% nebulizer solution  Commonly known as: PROVENTIL   2.5 mg, Nebulization, Every 4 Hours PRN      amantadine 100 MG capsule  Commonly known as: SYMMETREL   100 mg, Oral, 2 Times Daily      bisacodyl 10 MG suppository  Commonly known as: DULCOLAX   10 mg, Rectal, Daily PRN      busPIRone 10 MG tablet  Commonly known as: BUSPAR   10 mg, Oral, 3 Times Daily      ferrous sulfate 325 (65 FE) MG tablet   325 mg, Oral, Daily      fluticasone 50 MCG/ACT nasal spray  Commonly known as: FLONASE   2 sprays, Nasal, Daily      gabapentin 300 MG capsule  Commonly known as: NEURONTIN   300 mg, Oral, 3 Times Daily      hydrOXYzine 25 MG tablet  Commonly known as: ATARAX   25 mg, Oral, 2 Times Daily      lactulose 10 GM/15ML solution  Commonly known as: CHRONULAC   20 g, Oral, Daily PRN      melatonin 5 MG tablet tablet   5 mg, Oral, Nightly      multivitamin with minerals tablet tablet   1 tablet, Oral, Daily      ondansetron ODT 4 MG disintegrating tablet  Commonly known as: ZOFRAN-ODT   4 mg, Translingual, Every 8 Hours PRN      PARoxetine 20 MG tablet  Commonly known as: PAXIL   20 mg, Oral, Nightly      polyethylene glycol 17 GM/SCOOP powder  Commonly known as: MIRALAX   17 g, Oral, Daily PRN, Give with 4-8 oz of water/juice      tamsulosin 0.4 MG capsule 24 hr capsule  Commonly known as: FLOMAX   1 capsule, Oral, Daily      tiZANidine 2 MG tablet  Commonly known as:  ZANAFLEX   2 mg, Oral, Every 12 Hours PRN             Stop These Medications      benzonatate 100 MG capsule  Commonly known as: TESSALON     camphor-menthol 0.5-0.5 % lotion  Commonly known as: SARNA     furosemide 20 MG tablet  Commonly known as: LASIX     miconazole 2 % cream  Commonly known as: MICOTIN     midodrine 10 MG tablet  Commonly known as: PROAMATINE     oxyCODONE 5 MG immediate release tablet  Commonly known as: ROXICODONE     ProSource liquid     sennosides-docusate 8.6-50 MG per tablet  Commonly known as: PERICOLACE     traZODone 150 MG tablet  Commonly known as: DESYREL     vitamin C 500 MG tablet  Commonly known as: ASCORBIC ACID     Zinc Sulfate 220 (50 Zn) MG tablet              Discharge Diet:   Diet Instructions       Diet: Regular/House Diet; Regular Texture (IDDSI 7); Thin (IDDSI 0)      Discharge Diet: Regular/House Diet    Texture: Regular Texture (IDDSI 7)    Fluid Consistency: Thin (IDDSI 0)            Discharge Care Plan / Instructions:   Discharge to skilled nursing facility    Activity at Discharge:   Activity Instructions       Activity as Tolerated              Follow-up Appointments:  Follow-up with Dr. Paige in 1 week    Electronically signed by Timi Aranda DO, 12/04/23, 10:56 CST.    Time: Discharge over 30 min    Part of this note may be an electronic transcription/translation of spoken language to printed text using the Dragon Dictation system.        Electronically signed by Timi Aranda DO at 12/04/23 1106

## 2023-12-12 NOTE — PROGRESS NOTES
Enter Query Response Below      Query Response: No   Electronically signed by Jazzmine Swenson, 23, 6:51 PM CST.               If applicable, please update the problem list.       Patient: Octavio Werner        : 1990  Account: [id number]           Admit Date: 2023        How to Respond to this query:       a. Click New Note     b. Answer query within the yellow box.                c. Update the Problem List, if applicable.      If you have any questions about this query contact me at: [email or web address]     Dr. Swenson    The following query was deferred to you by Dr Aranda:    33 year old male admitted  with UTI and sepsis with acute kidney injury. Creatinine 1.62 () - 0.87 (). Lactic acid 2.7 ()- 1.2 ().     Treatment: IV fluid boluses totaling 4182, IV Invanz     Are either of this patient’s organ dysfunctions (QUANG and elevated lactic acid) secondary to sepsis?     Yes, both secondary to sepsis   Yes, but not both, please specify____________   No   Other- specify____________   Unable to determine      By submitting this query, we are merely seeking further clarification of documentation to accurately reflect all conditions that you are monitoring, evaluating, treating or that extend the hospitalization or utilize additional resources of care. Please utilize your independent clinical judgment when addressing the question(s) above.     This query and your response, once completed, will be entered into the legal medical record.    Sincerely,  Candice Mane RN CCDS  Clinical Documentation Integrity Program

## 2023-12-27 ENCOUNTER — OFFICE VISIT (OUTPATIENT)
Dept: WOUND CARE | Facility: HOSPITAL | Age: 33
End: 2023-12-27
Payer: COMMERCIAL

## 2024-01-24 ENCOUNTER — OFFICE VISIT (OUTPATIENT)
Dept: WOUND CARE | Facility: HOSPITAL | Age: 34
End: 2024-01-24
Payer: COMMERCIAL

## 2024-02-11 ENCOUNTER — LAB REQUISITION (OUTPATIENT)
Dept: LAB | Facility: HOSPITAL | Age: 34
End: 2024-02-11
Payer: COMMERCIAL

## 2024-02-11 DIAGNOSIS — R82.90 UNSPECIFIED ABNORMAL FINDINGS IN URINE: ICD-10-CM

## 2024-02-11 LAB
BACTERIA UR QL AUTO: ABNORMAL /HPF
BILIRUB UR QL STRIP: NEGATIVE
CLARITY UR: CLEAR
COLOR UR: YELLOW
GLUCOSE UR STRIP-MCNC: NEGATIVE MG/DL
HGB UR QL STRIP.AUTO: NEGATIVE
HYALINE CASTS UR QL AUTO: ABNORMAL /LPF
KETONES UR QL STRIP: NEGATIVE
LEUKOCYTE ESTERASE UR QL STRIP.AUTO: ABNORMAL
NITRITE UR QL STRIP: NEGATIVE
PH UR STRIP.AUTO: 5.5 [PH] (ref 5–8)
PROT UR QL STRIP: NEGATIVE
RBC # UR STRIP: ABNORMAL /HPF
REF LAB TEST METHOD: ABNORMAL
SP GR UR STRIP: 1.02 (ref 1–1.03)
SQUAMOUS #/AREA URNS HPF: ABNORMAL /HPF
UROBILINOGEN UR QL STRIP: ABNORMAL
WBC # UR STRIP: ABNORMAL /HPF

## 2024-02-11 PROCEDURE — 87086 URINE CULTURE/COLONY COUNT: CPT | Performed by: PHYSICIAN ASSISTANT

## 2024-02-11 PROCEDURE — 81001 URINALYSIS AUTO W/SCOPE: CPT | Performed by: PHYSICIAN ASSISTANT

## 2024-02-13 LAB — BACTERIA SPEC AEROBE CULT: NO GROWTH

## 2024-02-21 ENCOUNTER — OFFICE VISIT (OUTPATIENT)
Dept: WOUND CARE | Facility: HOSPITAL | Age: 34
End: 2024-02-21
Payer: COMMERCIAL

## 2024-02-23 ENCOUNTER — HOSPITAL ENCOUNTER (EMERGENCY)
Age: 34
Discharge: NURSING, REHAB OR LONG-TERM CARE | End: 2024-02-23
Attending: EMERGENCY MEDICINE
Payer: COMMERCIAL

## 2024-02-23 VITALS
HEART RATE: 88 BPM | TEMPERATURE: 98.3 F | SYSTOLIC BLOOD PRESSURE: 123 MMHG | RESPIRATION RATE: 16 BRPM | DIASTOLIC BLOOD PRESSURE: 73 MMHG | OXYGEN SATURATION: 96 %

## 2024-02-23 VITALS
TEMPERATURE: 98.4 F | DIASTOLIC BLOOD PRESSURE: 79 MMHG | RESPIRATION RATE: 15 BRPM | HEART RATE: 90 BPM | SYSTOLIC BLOOD PRESSURE: 131 MMHG | OXYGEN SATURATION: 99 %

## 2024-02-23 DIAGNOSIS — F69 BEHAVIOR CONCERN IN ADULT: Primary | ICD-10-CM

## 2024-02-23 DIAGNOSIS — Z87.820 HISTORY OF TRAUMATIC BRAIN INJURY: ICD-10-CM

## 2024-02-23 PROCEDURE — 99283 EMERGENCY DEPT VISIT LOW MDM: CPT

## 2024-02-23 ASSESSMENT — ENCOUNTER SYMPTOMS
RESPIRATORY NEGATIVE: 1
EYES NEGATIVE: 1
EYES NEGATIVE: 1
GASTROINTESTINAL NEGATIVE: 1
RESPIRATORY NEGATIVE: 1
GASTROINTESTINAL NEGATIVE: 1

## 2024-02-23 NOTE — ED PROVIDER NOTES
needed      DISCHARGE MEDICATIONS:  New Prescriptions    No medications on file          (Please note that portions of this note were completed with a voice recognition program.  Efforts were made to edit the dictations butoccasionally words are mis-transcribed.)    Wilmer Perez Jr, MD (electronically signed)  AttendingEmergency Physician          Wilmer Perez Jr., MD  02/23/24 5960

## 2024-02-23 NOTE — ED TRIAGE NOTES
Pt sent from [facility] because he flipped off staff and cursed at them. Pt states he was mad because \"they pissed me off because they left me in my piss for hours.\" Pt calm and cooperative for EMS and during triage

## 2024-02-24 NOTE — ED NOTES
Per dayshift RN, report was called back to [facility], he spoke with . D/c papers to be sent with PT

## 2024-03-26 ENCOUNTER — HOSPITAL ENCOUNTER (EMERGENCY)
Facility: HOSPITAL | Age: 34
Discharge: HOME | End: 2024-03-27
Payer: COMMERCIAL

## 2024-03-26 ENCOUNTER — APPOINTMENT (OUTPATIENT)
Dept: GENERAL RADIOLOGY | Facility: HOSPITAL | Age: 34
End: 2024-03-26
Payer: COMMERCIAL

## 2024-03-26 DIAGNOSIS — K21.9 GASTROESOPHAGEAL REFLUX DISEASE WITHOUT ESOPHAGITIS: ICD-10-CM

## 2024-03-26 DIAGNOSIS — R07.89 ATYPICAL CHEST PAIN: Primary | ICD-10-CM

## 2024-03-26 LAB
ALBUMIN SERPL-MCNC: 3.9 G/DL (ref 3.5–5.2)
ALBUMIN/GLOB SERPL: 1 G/DL
ALP SERPL-CCNC: 176 U/L (ref 39–117)
ALT SERPL W P-5'-P-CCNC: 17 U/L (ref 1–41)
ANION GAP SERPL CALCULATED.3IONS-SCNC: 10 MMOL/L (ref 5–15)
APTT PPP: 28.3 SECONDS (ref 24.5–36)
AST SERPL-CCNC: 10 U/L (ref 1–40)
BASOPHILS # BLD AUTO: 0.08 10*3/MM3 (ref 0–0.2)
BASOPHILS NFR BLD AUTO: 0.8 % (ref 0–1.5)
BILIRUB SERPL-MCNC: 0.2 MG/DL (ref 0–1.2)
BUN SERPL-MCNC: 19 MG/DL (ref 6–20)
BUN/CREAT SERPL: 17 (ref 7–25)
CALCIUM SPEC-SCNC: 8.6 MG/DL (ref 8.6–10.5)
CHLORIDE SERPL-SCNC: 106 MMOL/L (ref 98–107)
CO2 SERPL-SCNC: 27 MMOL/L (ref 22–29)
CREAT SERPL-MCNC: 1.12 MG/DL (ref 0.76–1.27)
DEPRECATED RDW RBC AUTO: 43.8 FL (ref 37–54)
EGFRCR SERPLBLD CKD-EPI 2021: 87.3 ML/MIN/1.73
EOSINOPHIL # BLD AUTO: 0.28 10*3/MM3 (ref 0–0.4)
EOSINOPHIL NFR BLD AUTO: 2.7 % (ref 0.3–6.2)
ERYTHROCYTE [DISTWIDTH] IN BLOOD BY AUTOMATED COUNT: 14.7 % (ref 12.3–15.4)
FLUAV RNA RESP QL NAA+PROBE: NOT DETECTED
FLUBV RNA RESP QL NAA+PROBE: NOT DETECTED
GLOBULIN UR ELPH-MCNC: 3.8 GM/DL
GLUCOSE SERPL-MCNC: 118 MG/DL (ref 65–99)
HCT VFR BLD AUTO: 39.5 % (ref 37.5–51)
HGB BLD-MCNC: 12.7 G/DL (ref 13–17.7)
HOLD SPECIMEN: NORMAL
HOLD SPECIMEN: NORMAL
IMM GRANULOCYTES # BLD AUTO: 0.03 10*3/MM3 (ref 0–0.05)
IMM GRANULOCYTES NFR BLD AUTO: 0.3 % (ref 0–0.5)
INR PPP: 0.95 (ref 0.91–1.09)
LYMPHOCYTES # BLD AUTO: 1.85 10*3/MM3 (ref 0.7–3.1)
LYMPHOCYTES NFR BLD AUTO: 18 % (ref 19.6–45.3)
MAGNESIUM SERPL-MCNC: 2.1 MG/DL (ref 1.6–2.6)
MCH RBC QN AUTO: 26.3 PG (ref 26.6–33)
MCHC RBC AUTO-ENTMCNC: 32.2 G/DL (ref 31.5–35.7)
MCV RBC AUTO: 82 FL (ref 79–97)
MONOCYTES # BLD AUTO: 0.95 10*3/MM3 (ref 0.1–0.9)
MONOCYTES NFR BLD AUTO: 9.3 % (ref 5–12)
NEUTROPHILS NFR BLD AUTO: 68.9 % (ref 42.7–76)
NEUTROPHILS NFR BLD AUTO: 7.07 10*3/MM3 (ref 1.7–7)
NRBC BLD AUTO-RTO: 0 /100 WBC (ref 0–0.2)
PLATELET # BLD AUTO: 219 10*3/MM3 (ref 140–450)
PMV BLD AUTO: 10.6 FL (ref 6–12)
POTASSIUM SERPL-SCNC: 4.2 MMOL/L (ref 3.5–5.2)
PROT SERPL-MCNC: 7.7 G/DL (ref 6–8.5)
PROTHROMBIN TIME: 13.1 SECONDS (ref 11.8–14.8)
RBC # BLD AUTO: 4.82 10*6/MM3 (ref 4.14–5.8)
SARS-COV-2 RNA RESP QL NAA+PROBE: NOT DETECTED
SODIUM SERPL-SCNC: 143 MMOL/L (ref 136–145)
TROPONIN T SERPL HS-MCNC: 12 NG/L
WBC NRBC COR # BLD AUTO: 10.26 10*3/MM3 (ref 3.4–10.8)
WHOLE BLOOD HOLD COAG: NORMAL
WHOLE BLOOD HOLD SPECIMEN: NORMAL

## 2024-03-26 PROCEDURE — 83735 ASSAY OF MAGNESIUM: CPT

## 2024-03-26 PROCEDURE — 87636 SARSCOV2 & INF A&B AMP PRB: CPT

## 2024-03-26 PROCEDURE — 80053 COMPREHEN METABOLIC PANEL: CPT

## 2024-03-26 PROCEDURE — 85730 THROMBOPLASTIN TIME PARTIAL: CPT

## 2024-03-26 PROCEDURE — 99284 EMERGENCY DEPT VISIT MOD MDM: CPT

## 2024-03-26 PROCEDURE — 93010 ELECTROCARDIOGRAM REPORT: CPT | Performed by: INTERNAL MEDICINE

## 2024-03-26 PROCEDURE — 85025 COMPLETE CBC W/AUTO DIFF WBC: CPT

## 2024-03-26 PROCEDURE — 93005 ELECTROCARDIOGRAM TRACING: CPT

## 2024-03-26 PROCEDURE — 84484 ASSAY OF TROPONIN QUANT: CPT

## 2024-03-26 PROCEDURE — 71045 X-RAY EXAM CHEST 1 VIEW: CPT

## 2024-03-26 PROCEDURE — 85610 PROTHROMBIN TIME: CPT

## 2024-03-26 RX ORDER — NITROGLYCERIN 0.4 MG/1
0.4 TABLET SUBLINGUAL
Status: DISCONTINUED | OUTPATIENT
Start: 2024-03-26 | End: 2024-03-27 | Stop reason: HOSPADM

## 2024-03-26 RX ORDER — ACETAMINOPHEN 500 MG
1000 TABLET ORAL ONCE
Status: COMPLETED | OUTPATIENT
Start: 2024-03-26 | End: 2024-03-27

## 2024-03-26 RX ORDER — SODIUM CHLORIDE 0.9 % (FLUSH) 0.9 %
10 SYRINGE (ML) INJECTION AS NEEDED
Status: DISCONTINUED | OUTPATIENT
Start: 2024-03-26 | End: 2024-03-27 | Stop reason: HOSPADM

## 2024-03-26 RX ADMIN — NITROGLYCERIN 0.4 MG: 0.4 TABLET SUBLINGUAL at 23:01

## 2024-03-27 ENCOUNTER — OFFICE VISIT (OUTPATIENT)
Dept: WOUND CARE | Facility: HOSPITAL | Age: 34
End: 2024-03-27
Payer: COMMERCIAL

## 2024-03-27 VITALS
WEIGHT: 255 LBS | TEMPERATURE: 97.6 F | SYSTOLIC BLOOD PRESSURE: 134 MMHG | BODY MASS INDEX: 34.54 KG/M2 | HEIGHT: 72 IN | RESPIRATION RATE: 14 BRPM | DIASTOLIC BLOOD PRESSURE: 77 MMHG | OXYGEN SATURATION: 99 % | HEART RATE: 73 BPM

## 2024-03-27 LAB
GEN 5 2HR TROPONIN T REFLEX: 13 NG/L
HOLD SPECIMEN: NORMAL
TROPONIN T DELTA: 1 NG/L

## 2024-03-27 PROCEDURE — 36415 COLL VENOUS BLD VENIPUNCTURE: CPT

## 2024-03-27 PROCEDURE — 84484 ASSAY OF TROPONIN QUANT: CPT

## 2024-03-27 PROCEDURE — 93005 ELECTROCARDIOGRAM TRACING: CPT

## 2024-03-27 PROCEDURE — G0463 HOSPITAL OUTPT CLINIC VISIT: HCPCS

## 2024-03-27 PROCEDURE — 93010 ELECTROCARDIOGRAM REPORT: CPT | Performed by: INTERNAL MEDICINE

## 2024-03-27 RX ADMIN — ACETAMINOPHEN 1000 MG: 500 TABLET, FILM COATED ORAL at 00:08

## 2024-03-27 NOTE — ED PROVIDER NOTES
"Subjective   History of Present Illness  Patient is a 34-year-old male that presents to the emergency department by EMS from [facility] for chest pain.  Patient reports he had a sudden onset of \"sharp, constant pain radiating across his chest\" at 6 PM.  Patient denies any other acute symptoms at this time.  Patient states he suffers from chronic pain but reports only new pain is anterior chest pain.  Patient states that he has a sore throat but reports he has a chronic cough and thinks this is from persistent cough.  Patient denies any new or worsening symptoms of cough.  Patient denies any shortness of breath, abdominal pain, nausea, vomiting, constipation, fevers, or diarrhea.  Patient states they gave scheduled hydrocodone but patient had no relief in symptoms. Patient reports relief in chest pain symptoms after belching.       Review of Systems   Respiratory:  Positive for chest tightness.    Cardiovascular:  Positive for chest pain.   All other systems reviewed and are negative.      Past Medical History:   Diagnosis Date    CVA (cerebral vascular accident)     Depression     Hemiparesis     Hydrocephalus     Kidney stones     Neurogenic bladder     Neurogenic bowel     Seizure     Sinusitis     Sleep apnea     Subdural hematoma     TBI (traumatic brain injury)     UTI (urinary tract infection)        Allergies   Allergen Reactions    Dilantin [Phenytoin]     Latex     Penicillins     Sulfa Antibiotics        Past Surgical History:   Procedure Laterality Date    BACLOFEN PUMP IMPLANTATION      CHEST TUBE INSERTION      REMOVED    CRANIOTOMY      FOR EVACUATION OF SUBDURAL HEMATOMA & VENTRICULOSTOMY    HAMSTRING RELEASE      KIDNEY STONE SURGERY      PEG TUBE INSERTION      REMOVED    TRACHEOSTOMY      REMOVED     SHUNT INSERTION         Family History   Problem Relation Age of Onset    No Known Problems Mother     No Known Problems Father        Social History     Socioeconomic History    Marital " status: Single   Tobacco Use    Smoking status: Never    Smokeless tobacco: Never   Vaping Use    Vaping status: Never Used   Substance and Sexual Activity    Alcohol use: No    Drug use: No    Sexual activity: Defer           Objective   Physical Exam  Vitals and nursing note reviewed.   Constitutional:       Appearance: He is obese.      Comments: Chronically ill-appearing.  In no acute distress.    HENT:      Head: Normocephalic and atraumatic.      Right Ear: External ear normal.      Left Ear: External ear normal.      Nose: Nose normal.      Mouth/Throat:      Mouth: Mucous membranes are moist.      Pharynx: Oropharynx is clear.   Eyes:      Conjunctiva/sclera: Conjunctivae normal.   Cardiovascular:      Rate and Rhythm: Normal rate and regular rhythm.      Pulses: Normal pulses.      Heart sounds: Normal heart sounds.   Pulmonary:      Effort: Pulmonary effort is normal. No respiratory distress.      Breath sounds: Normal breath sounds. No wheezing.   Chest:      Chest wall: No tenderness.   Abdominal:      General: Bowel sounds are normal. There is no distension.      Palpations: Abdomen is soft.      Tenderness: There is no abdominal tenderness. There is no right CVA tenderness, left CVA tenderness, guarding or rebound.   Musculoskeletal:      Cervical back: Normal range of motion and neck supple.      Right lower leg: No edema.      Left lower leg: No edema.   Skin:     General: Skin is warm and dry.      Capillary Refill: Capillary refill takes less than 2 seconds.   Neurological:      Mental Status: He is alert and oriented to person, place, and time. Mental status is at baseline.   Psychiatric:         Mood and Affect: Mood normal.         Behavior: Behavior normal.         Thought Content: Thought content normal.         Judgment: Judgment normal.       Labs Reviewed   COMPREHENSIVE METABOLIC PANEL - Abnormal; Notable for the following components:       Result Value    Glucose 118 (*)     Alkaline  Phosphatase 176 (*)     All other components within normal limits    Narrative:     GFR Normal >60  Chronic Kidney Disease <60  Kidney Failure <15     CBC WITH AUTO DIFFERENTIAL - Abnormal; Notable for the following components:    Hemoglobin 12.7 (*)     MCH 26.3 (*)     Lymphocyte % 18.0 (*)     Neutrophils, Absolute 7.07 (*)     Monocytes, Absolute 0.95 (*)     All other components within normal limits   COVID-19 AND FLU A/B, NP SWAB IN TRANSPORT MEDIA 1 HR TAT - Normal    Narrative:     Fact sheet for providers: https://www.fda.gov/media/927862/download    Fact sheet for patients: https://www.fda.gov/media/357934/download    Test performed by PCR.   PROTIME-INR - Normal   APTT - Normal   TROPONIN - Normal    Narrative:     High Sensitive Troponin T Reference Range:  <14.0 ng/L- Negative Female for AMI  <22.0 ng/L- Negative Male for AMI  >=14 - Abnormal Female indicating possible myocardial injury.  >=22 - Abnormal Male indicating possible myocardial injury.   Clinicians would have to utilize clinical acumen, EKG, Troponin, and serial changes to determine if it is an Acute Myocardial Infarction or myocardial injury due to an underlying chronic condition.        MAGNESIUM - Normal   HIGH SENSITIVITIY TROPONIN T 2HR - Normal    Narrative:     High Sensitive Troponin T Reference Range:  <14.0 ng/L- Negative Female for AMI  <22.0 ng/L- Negative Male for AMI  >=14 - Abnormal Female indicating possible myocardial injury.  >=22 - Abnormal Male indicating possible myocardial injury.   Clinicians would have to utilize clinical acumen, EKG, Troponin, and serial changes to determine if it is an Acute Myocardial Infarction or myocardial injury due to an underlying chronic condition.        COVID PRE-OP / PRE-PROCEDURE SCREENING ORDER (NO ISOLATION)    Narrative:     The following orders were created for panel order COVID PRE-OP / PRE-PROCEDURE SCREENING ORDER (NO ISOLATION) - Swab, Nasopharynx.  Procedure                                Abnormality         Status                     ---------                               -----------         ------                     COVID-19 and FLU A/B PCR...[[id number]]  Normal              Final result                 Please view results for these tests on the individual orders.   RAINBOW DRAW    Narrative:     The following orders were created for panel order Mathias Draw.  Procedure                               Abnormality         Status                     ---------                               -----------         ------                     Green Top (Gel)[[id number]]                                  Final result               Lavender Top[[id number]]                                     Final result               Red Top[[id number]]                                          Final result               Foster Top[[id number]]                                         In process                 Light Blue Top[[id number]]                                   Final result                 Please view results for these tests on the individual orders.   GREEN TOP   LAVENDER TOP   RED TOP   LIGHT BLUE TOP   CBC AND DIFFERENTIAL    Narrative:     The following orders were created for panel order CBC & Differential.  Procedure                               Abnormality         Status                     ---------                               -----------         ------                     CBC Auto Differential[[id number]]        Abnormal            Final result                 Please view results for these tests on the individual orders.   GRAY TOP          Procedures           ED Course  ED Course as of 03/27/24 0139   Tue Mar 26, 2024   4257 Heart score: 3 points, low risk [KF]      ED Course User Index  [KF] Radha Mcdaniel APRN                HEART Score: 3                              Medical Decision Making  Octavio Werner is a 34 y.o. male who presents to the ED by EMS from [facility] for chest pain.   "Patient reports he had a sudden onset of \"sharp, constant pain radiating across his chest\" at 6 PM.  Patient denies any other acute symptoms at this time.  Patient states he suffers from chronic pain but reports only new pain is anterior chest pain.  Patient states that he has a sore throat but reports he has a chronic cough and thinks this is from persistent cough.  Patient denies any new or worsening symptoms of cough.  Patient denies any shortness of breath, abdominal pain, nausea, vomiting, constipation, fevers, or diarrhea.  Patient states they gave scheduled hydrocodone but patient had no relief in symptoms. Patient reports relief in chest pain symptoms after belching.     Patient is chronically ill-appearing. No acute distress was noted.  Vital signs stable.    Past medical history, surgical history, and medication regimen reviewed.     Previous notes, labs, imaging, and more reviewed.    Patient's presentation raises suspicion for differentials including, but not limited to, viral illness, pneumonia, ACS, pleurisy.    Please refer to above section of note for lab and imaging results that were reviewed and interpreted by radiology as well as attending physician.     Medications administered,   sodium chloride 0.9 % flush 10 mL (has no administration in time range)  nitroglycerin (NITROSTAT) SL tablet 0.4 mg (0.4 mg Sublingual Given 3/26/24 2301)  acetaminophen (TYLENOL) tablet 1,000 mg (1,000 mg Oral Given 3/27/24 0008)     Heart score: 3 points, low risk.    Given findings described above, patient's presentation is likely consistent with atypical chest pain related to possible episode of reflux. I have a low suspicion for ACS at this point in their ED course.      I had an in-depth discussion with the patient as well as family present bedside regarding all lab and imaging results completed during today's ED encounter.  Discussed that patient's workup was overall unremarkable and that patient will need to " follow-up with PCP. I answered all the questions regarding the emergency department evaluation, diagnosis, and treatment plan in plain and simple language that was understandable. We discussed that due to always having some diagnostic uncertainty while in the ER, there is always a chance that symptoms may change or new symptoms may reveal themselves after being discharged. Because of this, I stressed the importance of Octavio following up with their PCP. Patient informed that appointment will need to be done by calling their office to set up an appointment within the next few days or as soon as reasonably possible so that the symptoms can be re-evaluated for improvement or for any other questions. I also gave Octavio common sense return precautions and prompted patient to return to the emergency department within 24 - 48hrs if there are any new, worsening, or concerning symptoms. The patient verbalized understanding of the discharge instructions and agreed with them. Octavio was discharged in stable condition.     Dragon disclaimer:  Parts of this note may be an electronic transcription/translation of spoken language to printed text using the Dragon dictation system.    Problems Addressed:  Atypical chest pain: complicated acute illness or injury    Amount and/or Complexity of Data Reviewed  Labs: ordered.  Radiology: ordered.  ECG/medicine tests: ordered.    Risk  OTC drugs.  Prescription drug management.        Final diagnoses:   Atypical chest pain   Gastroesophageal reflux disease without esophagitis       ED Disposition  ED Disposition       ED Disposition   Discharge    Condition   Stable    Comment   --               Sarath Camacho, DO  [address] [address]  [phone]    Schedule an appointment as soon as possible for a visit       [facility] EMERGENCY DEPARTMENT  [address] [place] [id number] [phone]    If symptoms worsen         Medication List      No  changes were made to your prescriptions during this visit.            Radha Mcdaniel, APRN  03/27/24 0139

## 2024-03-27 NOTE — DISCHARGE INSTRUCTIONS
It was very nice to meet you, Octavio. Thank you for allowing us to take care of you today at [facility].    Today you were seen in the emergency department for your symptoms. Please understand that an ER evaluation is just the start of your evaluation. We do the best we can, but we are often unable to fully find what is causing your symptoms from one evaluation.  Because of this, the goal is to determine whether you need to be evaluated in the hospital or if it is safe for you to go home and see other doctors provided such as primary care physicians or specialist on an outpatient basis.     Like we discussed, I strongly urge that you follow up with your primary care doctor. Please call their office to set up an appointment as soon as possible so that you can be re-evaluated for improvement in your symptoms or for any other questions.  I have provided the information needed, including phone number, to call to set up an appointment below in these discharge papers.     Educational material has also been provided in the following pages regarding what we have discussed today.     Please return to the emergency room within 12-48 hours if you experience symptoms such as the following:   Fever, chills, chest pain or shortness of breath, pain with inspiration/expiration, pain that travels to your arms, neck or back, nausea, vomiting, severe headache, tearing pain in your chest, dizziness, feel as though you are about to pass out, OR if you have any worsening symptoms, or any other concerns.

## 2024-04-01 LAB
QT INTERVAL: 354 MS
QT INTERVAL: 390 MS
QTC INTERVAL: 430 MS
QTC INTERVAL: 453 MS

## 2024-04-24 ENCOUNTER — APPOINTMENT (OUTPATIENT)
Dept: WOUND CARE | Facility: HOSPITAL | Age: 34
End: 2024-04-24
Payer: COMMERCIAL

## 2024-04-24 PROCEDURE — G0463 HOSPITAL OUTPT CLINIC VISIT: HCPCS

## 2024-05-03 ENCOUNTER — HOSPITAL ENCOUNTER (EMERGENCY)
Facility: HOSPITAL | Age: 34
Discharge: HOME | End: 2024-05-03
Attending: EMERGENCY MEDICINE
Payer: COMMERCIAL

## 2024-05-03 VITALS
HEART RATE: 81 BPM | RESPIRATION RATE: 18 BRPM | WEIGHT: 245 LBS | SYSTOLIC BLOOD PRESSURE: 117 MMHG | OXYGEN SATURATION: 97 % | DIASTOLIC BLOOD PRESSURE: 60 MMHG | BODY MASS INDEX: 30.46 KG/M2 | HEIGHT: 75 IN | TEMPERATURE: 97.9 F

## 2024-05-03 DIAGNOSIS — F43.20 ADJUSTMENT DISORDER, UNSPECIFIED TYPE: Primary | ICD-10-CM

## 2024-05-03 LAB
ALBUMIN SERPL-MCNC: 3.7 G/DL (ref 3.5–5.2)
ALBUMIN/GLOB SERPL: 1 G/DL
ALP SERPL-CCNC: 181 U/L (ref 39–117)
ALT SERPL W P-5'-P-CCNC: 22 U/L (ref 1–41)
AMPHET+METHAMPHET UR QL: NEGATIVE
AMPHETAMINES UR QL: NEGATIVE
ANION GAP SERPL CALCULATED.3IONS-SCNC: 13 MMOL/L (ref 5–15)
AST SERPL-CCNC: 13 U/L (ref 1–40)
BARBITURATES UR QL SCN: NEGATIVE
BASOPHILS # BLD AUTO: 0.1 10*3/MM3 (ref 0–0.2)
BASOPHILS NFR BLD AUTO: 1.4 % (ref 0–1.5)
BENZODIAZ UR QL SCN: POSITIVE
BILIRUB SERPL-MCNC: 0.2 MG/DL (ref 0–1.2)
BUN SERPL-MCNC: 23 MG/DL (ref 6–20)
BUN/CREAT SERPL: 20.2 (ref 7–25)
BUPRENORPHINE SERPL-MCNC: NEGATIVE NG/ML
CALCIUM SPEC-SCNC: 8.6 MG/DL (ref 8.6–10.5)
CANNABINOIDS SERPL QL: NEGATIVE
CHLORIDE SERPL-SCNC: 104 MMOL/L (ref 98–107)
CO2 SERPL-SCNC: 22 MMOL/L (ref 22–29)
COCAINE UR QL: NEGATIVE
CREAT SERPL-MCNC: 1.14 MG/DL (ref 0.76–1.27)
DEPRECATED RDW RBC AUTO: 39.9 FL (ref 37–54)
EGFRCR SERPLBLD CKD-EPI 2021: 85.5 ML/MIN/1.73
EOSINOPHIL # BLD AUTO: 0.29 10*3/MM3 (ref 0–0.4)
EOSINOPHIL NFR BLD AUTO: 4.1 % (ref 0.3–6.2)
ERYTHROCYTE [DISTWIDTH] IN BLOOD BY AUTOMATED COUNT: 13.9 % (ref 12.3–15.4)
ETHANOL UR QL: <0.01 %
FENTANYL UR-MCNC: NEGATIVE NG/ML
FLUAV RNA RESP QL NAA+PROBE: NOT DETECTED
FLUBV RNA RESP QL NAA+PROBE: NOT DETECTED
GLOBULIN UR ELPH-MCNC: 3.7 GM/DL
GLUCOSE SERPL-MCNC: 121 MG/DL (ref 65–99)
HCT VFR BLD AUTO: 38.3 % (ref 37.5–51)
HGB BLD-MCNC: 12.5 G/DL (ref 13–17.7)
HOLD SPECIMEN: NORMAL
IMM GRANULOCYTES # BLD AUTO: 0.16 10*3/MM3 (ref 0–0.05)
IMM GRANULOCYTES NFR BLD AUTO: 2.2 % (ref 0–0.5)
LYMPHOCYTES # BLD AUTO: 2.49 10*3/MM3 (ref 0.7–3.1)
LYMPHOCYTES NFR BLD AUTO: 34.8 % (ref 19.6–45.3)
MCH RBC QN AUTO: 26.1 PG (ref 26.6–33)
MCHC RBC AUTO-ENTMCNC: 32.6 G/DL (ref 31.5–35.7)
MCV RBC AUTO: 80 FL (ref 79–97)
METHADONE UR QL SCN: NEGATIVE
MONOCYTES # BLD AUTO: 0.62 10*3/MM3 (ref 0.1–0.9)
MONOCYTES NFR BLD AUTO: 8.7 % (ref 5–12)
NEUTROPHILS NFR BLD AUTO: 3.49 10*3/MM3 (ref 1.7–7)
NEUTROPHILS NFR BLD AUTO: 48.8 % (ref 42.7–76)
NRBC BLD AUTO-RTO: 0 /100 WBC (ref 0–0.2)
OPIATES UR QL: NEGATIVE
OXYCODONE UR QL SCN: POSITIVE
PCP UR QL SCN: NEGATIVE
PLATELET # BLD AUTO: 182 10*3/MM3 (ref 140–450)
PMV BLD AUTO: 11 FL (ref 6–12)
POTASSIUM SERPL-SCNC: 4.3 MMOL/L (ref 3.5–5.2)
PROT SERPL-MCNC: 7.4 G/DL (ref 6–8.5)
RBC # BLD AUTO: 4.79 10*6/MM3 (ref 4.14–5.8)
SARS-COV-2 RNA RESP QL NAA+PROBE: NOT DETECTED
SODIUM SERPL-SCNC: 139 MMOL/L (ref 136–145)
T4 FREE SERPL-MCNC: 1.11 NG/DL (ref 0.93–1.7)
TRICYCLICS UR QL SCN: NEGATIVE
TSH SERPL DL<=0.05 MIU/L-ACNC: 2.69 UIU/ML (ref 0.27–4.2)
WBC NRBC COR # BLD AUTO: 7.15 10*3/MM3 (ref 3.4–10.8)
WHOLE BLOOD HOLD SPECIMEN: NORMAL

## 2024-05-03 PROCEDURE — 84439 ASSAY OF FREE THYROXINE: CPT | Performed by: EMERGENCY MEDICINE

## 2024-05-03 PROCEDURE — 87636 SARSCOV2 & INF A&B AMP PRB: CPT | Performed by: EMERGENCY MEDICINE

## 2024-05-03 PROCEDURE — 82077 ASSAY SPEC XCP UR&BREATH IA: CPT | Performed by: EMERGENCY MEDICINE

## 2024-05-03 PROCEDURE — 80307 DRUG TEST PRSMV CHEM ANLYZR: CPT | Performed by: EMERGENCY MEDICINE

## 2024-05-03 PROCEDURE — 80050 GENERAL HEALTH PANEL: CPT | Performed by: EMERGENCY MEDICINE

## 2024-05-03 PROCEDURE — 36415 COLL VENOUS BLD VENIPUNCTURE: CPT

## 2024-05-03 PROCEDURE — 99283 EMERGENCY DEPT VISIT LOW MDM: CPT
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
## 2024-05-03 NOTE — DISCHARGE INSTRUCTIONS
Continue take your current medications as prescribed and follow-up with primary care in 48 to 72 hours and return immediately to the emerged part for any worsening symptoms or for any other reason

## 2024-05-03 NOTE — ED NOTES
Spoke with Pan at [facility]; will fax labs and 7-10, 7-11 and they will see patient.

## 2024-05-15 ENCOUNTER — OFFICE VISIT (OUTPATIENT)
Dept: WOUND CARE | Facility: HOSPITAL | Age: 34
End: 2024-05-15
Payer: COMMERCIAL

## 2024-06-06 ENCOUNTER — OFFICE VISIT (OUTPATIENT)
Dept: WOUND CARE | Facility: HOSPITAL | Age: 34
End: 2024-06-06
Payer: COMMERCIAL

## 2024-06-06 PROCEDURE — G0463 HOSPITAL OUTPT CLINIC VISIT: HCPCS

## 2024-06-26 ENCOUNTER — TRANSCRIBE ORDERS (OUTPATIENT)
Dept: ADMINISTRATIVE | Facility: HOSPITAL | Age: 34
End: 2024-06-26
Payer: COMMERCIAL

## 2024-06-26 DIAGNOSIS — C64.1 MALIGNANT NEOPLASM OF RIGHT KIDNEY, EXCEPT RENAL PELVIS: ICD-10-CM

## 2024-06-26 DIAGNOSIS — N20.0 NEPHROLITHIASIS: Primary | ICD-10-CM

## 2024-07-01 ENCOUNTER — HOSPITAL ENCOUNTER (EMERGENCY)
Facility: HOSPITAL | Age: 34
Discharge: HOME | End: 2024-07-01
Attending: EMERGENCY MEDICINE | Admitting: EMERGENCY MEDICINE
Payer: COMMERCIAL

## 2024-07-01 VITALS
BODY MASS INDEX: 33.06 KG/M2 | HEIGHT: 77 IN | WEIGHT: 280 LBS | TEMPERATURE: 98.2 F | SYSTOLIC BLOOD PRESSURE: 118 MMHG | OXYGEN SATURATION: 99 % | DIASTOLIC BLOOD PRESSURE: 78 MMHG | HEART RATE: 86 BPM | RESPIRATION RATE: 20 BRPM

## 2024-07-01 DIAGNOSIS — N39.0 ACUTE UTI: Primary | ICD-10-CM

## 2024-07-01 LAB
ALBUMIN SERPL-MCNC: 3.8 G/DL (ref 3.5–5.2)
ALBUMIN/GLOB SERPL: 1.1 G/DL
ALP SERPL-CCNC: 174 U/L (ref 39–117)
ALT SERPL W P-5'-P-CCNC: 12 U/L (ref 1–41)
ANION GAP SERPL CALCULATED.3IONS-SCNC: 9 MMOL/L (ref 5–15)
AST SERPL-CCNC: 7 U/L (ref 1–40)
BACTERIA UR QL AUTO: ABNORMAL /HPF
BASOPHILS # BLD AUTO: 0.07 10*3/MM3 (ref 0–0.2)
BASOPHILS NFR BLD AUTO: 0.7 % (ref 0–1.5)
BILIRUB SERPL-MCNC: 0.2 MG/DL (ref 0–1.2)
BILIRUB UR QL STRIP: NEGATIVE
BUN SERPL-MCNC: 27 MG/DL (ref 6–20)
BUN/CREAT SERPL: 20.8 (ref 7–25)
CALCIUM SPEC-SCNC: 8.4 MG/DL (ref 8.6–10.5)
CHLORIDE SERPL-SCNC: 107 MMOL/L (ref 98–107)
CLARITY UR: ABNORMAL
CO2 SERPL-SCNC: 25 MMOL/L (ref 22–29)
COLOR UR: YELLOW
CREAT SERPL-MCNC: 1.3 MG/DL (ref 0.76–1.27)
D-LACTATE SERPL-SCNC: 1 MMOL/L (ref 0.5–2)
DEPRECATED RDW RBC AUTO: 44.4 FL (ref 37–54)
EGFRCR SERPLBLD CKD-EPI 2021: 73 ML/MIN/1.73
EOSINOPHIL # BLD AUTO: 0.23 10*3/MM3 (ref 0–0.4)
EOSINOPHIL NFR BLD AUTO: 2.4 % (ref 0.3–6.2)
ERYTHROCYTE [DISTWIDTH] IN BLOOD BY AUTOMATED COUNT: 14.8 % (ref 12.3–15.4)
GLOBULIN UR ELPH-MCNC: 3.6 GM/DL
GLUCOSE SERPL-MCNC: 106 MG/DL (ref 65–99)
GLUCOSE UR STRIP-MCNC: NEGATIVE MG/DL
HCT VFR BLD AUTO: 36.4 % (ref 37.5–51)
HGB BLD-MCNC: 11.9 G/DL (ref 13–17.7)
HGB UR QL STRIP.AUTO: ABNORMAL
HOLD SPECIMEN: NORMAL
HOLD SPECIMEN: NORMAL
HYALINE CASTS UR QL AUTO: ABNORMAL /LPF
IMM GRANULOCYTES # BLD AUTO: 0.02 10*3/MM3 (ref 0–0.05)
IMM GRANULOCYTES NFR BLD AUTO: 0.2 % (ref 0–0.5)
KETONES UR QL STRIP: NEGATIVE
LEUKOCYTE ESTERASE UR QL STRIP.AUTO: ABNORMAL
LYMPHOCYTES # BLD AUTO: 2.47 10*3/MM3 (ref 0.7–3.1)
LYMPHOCYTES NFR BLD AUTO: 26 % (ref 19.6–45.3)
MCH RBC QN AUTO: 27 PG (ref 26.6–33)
MCHC RBC AUTO-ENTMCNC: 32.7 G/DL (ref 31.5–35.7)
MCV RBC AUTO: 82.5 FL (ref 79–97)
MONOCYTES # BLD AUTO: 1.13 10*3/MM3 (ref 0.1–0.9)
MONOCYTES NFR BLD AUTO: 11.9 % (ref 5–12)
NEUTROPHILS NFR BLD AUTO: 5.57 10*3/MM3 (ref 1.7–7)
NEUTROPHILS NFR BLD AUTO: 58.8 % (ref 42.7–76)
NITRITE UR QL STRIP: POSITIVE
NRBC BLD AUTO-RTO: 0 /100 WBC (ref 0–0.2)
PH UR STRIP.AUTO: 6 [PH] (ref 5–8)
PLATELET # BLD AUTO: 211 10*3/MM3 (ref 140–450)
PMV BLD AUTO: 10.3 FL (ref 6–12)
POTASSIUM SERPL-SCNC: 4.1 MMOL/L (ref 3.5–5.2)
PROT SERPL-MCNC: 7.4 G/DL (ref 6–8.5)
PROT UR QL STRIP: ABNORMAL
RBC # BLD AUTO: 4.41 10*6/MM3 (ref 4.14–5.8)
RBC # UR STRIP: ABNORMAL /HPF
REF LAB TEST METHOD: ABNORMAL
SODIUM SERPL-SCNC: 141 MMOL/L (ref 136–145)
SP GR UR STRIP: 1.01 (ref 1–1.03)
SQUAMOUS #/AREA URNS HPF: ABNORMAL /HPF
UROBILINOGEN UR QL STRIP: ABNORMAL
WBC # UR STRIP: ABNORMAL /HPF
WBC CLUMPS # UR AUTO: ABNORMAL /HPF
WBC NRBC COR # BLD AUTO: 9.49 10*3/MM3 (ref 3.4–10.8)
WHOLE BLOOD HOLD COAG: NORMAL
WHOLE BLOOD HOLD SPECIMEN: NORMAL
YEAST URNS QL MICRO: ABNORMAL /HPF

## 2024-07-01 PROCEDURE — 25010000002 CEFTRIAXONE PER 250 MG: Performed by: EMERGENCY MEDICINE

## 2024-07-01 PROCEDURE — 25810000003 SODIUM CHLORIDE 0.9 % SOLUTION: Performed by: EMERGENCY MEDICINE

## 2024-07-01 PROCEDURE — 36415 COLL VENOUS BLD VENIPUNCTURE: CPT

## 2024-07-01 PROCEDURE — 96365 THER/PROPH/DIAG IV INF INIT: CPT

## 2024-07-01 PROCEDURE — P9612 CATHETERIZE FOR URINE SPEC: HCPCS

## 2024-07-01 PROCEDURE — 80053 COMPREHEN METABOLIC PANEL: CPT | Performed by: EMERGENCY MEDICINE

## 2024-07-01 PROCEDURE — 87086 URINE CULTURE/COLONY COUNT: CPT | Performed by: EMERGENCY MEDICINE

## 2024-07-01 PROCEDURE — 87040 BLOOD CULTURE FOR BACTERIA: CPT | Performed by: EMERGENCY MEDICINE

## 2024-07-01 PROCEDURE — 85025 COMPLETE CBC W/AUTO DIFF WBC: CPT | Performed by: EMERGENCY MEDICINE

## 2024-07-01 PROCEDURE — 87186 SC STD MICRODIL/AGAR DIL: CPT | Performed by: EMERGENCY MEDICINE

## 2024-07-01 PROCEDURE — 99283 EMERGENCY DEPT VISIT LOW MDM: CPT

## 2024-07-01 PROCEDURE — 87077 CULTURE AEROBIC IDENTIFY: CPT | Performed by: EMERGENCY MEDICINE

## 2024-07-01 PROCEDURE — 81001 URINALYSIS AUTO W/SCOPE: CPT | Performed by: EMERGENCY MEDICINE

## 2024-07-01 PROCEDURE — 83605 ASSAY OF LACTIC ACID: CPT | Performed by: EMERGENCY MEDICINE

## 2024-07-01 RX ORDER — SODIUM CHLORIDE 0.9 % (FLUSH) 0.9 %
10 SYRINGE (ML) INJECTION AS NEEDED
Status: DISCONTINUED | OUTPATIENT
Start: 2024-07-01 | End: 2024-07-02 | Stop reason: HOSPADM

## 2024-07-01 RX ORDER — CEFDINIR 300 MG/1
300 CAPSULE ORAL 2 TIMES DAILY
Qty: 20 CAPSULE | Refills: 0 | Status: SHIPPED | OUTPATIENT
Start: 2024-07-01

## 2024-07-01 RX ADMIN — CEFTRIAXONE SODIUM 2000 MG: 2 INJECTION, POWDER, FOR SOLUTION INTRAMUSCULAR; INTRAVENOUS at 21:40

## 2024-07-01 RX ADMIN — SODIUM CHLORIDE 1000 ML: 9 INJECTION, SOLUTION INTRAVENOUS at 21:38

## 2024-07-02 NOTE — ED NOTES
[facility] NOTIFIED FOR RETURN TRANSPORT TO [facility] .

## 2024-07-02 NOTE — ED PROVIDER NOTES
"Subjective   History of Present Illness  Patient is sent from the nursing home with report of a low blood pressure and burning with urination.  The patient tells me he is having \"pus\" in his urine for the past 2 days.  He does not have any fever or chills.  He is mostly bedridden because of a traumatic brain injury in the past.  He does not know what the blood pressures were there.  He tells me is not hurting right now and basically feels okay.  However the nursing home has given his blood pressures of in the mid 80s.  There is no report of nausea or vomiting or cough or congestion.    History provided by:  Patient and nursing home   used: No    Hypotension  Location:  General  Quality:  Low BP and hurting with urination  Severity:  Moderate  Onset quality:  Gradual  Duration:  2 days  Timing:  Intermittent  Progression:  Waxing and waning  Chronicity:  New  Associated symptoms: no abdominal pain, no chest pain, no congestion, no cough, no diarrhea, no ear pain, no fatigue, no fever, no headaches, no loss of consciousness, no myalgias, no nausea, no rash, no rhinorrhea, no shortness of breath, no sore throat, no vomiting and no wheezing        Review of Systems   Constitutional: Negative.  Negative for fatigue and fever.   HENT: Negative.  Negative for congestion, ear pain, rhinorrhea and sore throat.    Respiratory: Negative.  Negative for cough, shortness of breath and wheezing.    Cardiovascular: Negative.  Negative for chest pain.   Gastrointestinal: Negative.  Negative for abdominal pain, diarrhea, nausea and vomiting.   Genitourinary:  Positive for dysuria.   Musculoskeletal: Negative.  Negative for myalgias.   Skin: Negative.  Negative for rash.   Neurological: Negative.  Negative for loss of consciousness and headaches.   Psychiatric/Behavioral: Negative.     All other systems reviewed and are negative.      Past Medical History:   Diagnosis Date    CVA (cerebral vascular accident)     " Depression     Hemiparesis     Hydrocephalus     Kidney stones     Neurogenic bladder     Neurogenic bowel     Seizure     Sinusitis     Sleep apnea     Subdural hematoma     TBI (traumatic brain injury)     UTI (urinary tract infection)        Allergies   Allergen Reactions    Dilantin [Phenytoin]     Latex     Penicillins     Sulfa Antibiotics        Past Surgical History:   Procedure Laterality Date    BACLOFEN PUMP IMPLANTATION      CHEST TUBE INSERTION      REMOVED    CRANIOTOMY      FOR EVACUATION OF SUBDURAL HEMATOMA & VENTRICULOSTOMY    HAMSTRING RELEASE      KIDNEY STONE SURGERY      PEG TUBE INSERTION      REMOVED    TRACHEOSTOMY      REMOVED     SHUNT INSERTION         Family History   Problem Relation Age of Onset    No Known Problems Mother     No Known Problems Father        Social History     Socioeconomic History    Marital status: Single   Tobacco Use    Smoking status: Never    Smokeless tobacco: Never   Vaping Use    Vaping status: Never Used   Substance and Sexual Activity    Alcohol use: No    Drug use: No    Sexual activity: Defer       Prior to Admission medications    Medication Sig Start Date End Date Taking? Authorizing Provider   acetaminophen (TYLENOL) 325 MG tablet Take 2 tablets by mouth Every 6 (Six) Hours As Needed for Mild Pain or Fever.    Bipin Zamora MD   albuterol (PROVENTIL) (2.5 MG/3ML) 0.083% nebulizer solution Take 2.5 mg by nebulization Every 4 (Four) Hours As Needed for Wheezing. 9/22/23   Xiomara Barrera APRN   amantadine (SYMMETREL) 100 MG capsule Take 1 capsule by mouth 2 (Two) Times a Day.    Bipin Zamora MD   bisacodyl (DULCOLAX) 10 MG suppository Insert 1 suppository into the rectum Daily As Needed for Constipation.    Bipin Zamora MD   busPIRone (BUSPAR) 10 MG tablet Take 1 tablet by mouth 3 (Three) Times a Day.    Bipin Zamora MD   ferrous sulfate 325 (65 FE) MG tablet Take 1 tablet by mouth Daily.    Bipin Zamora  MD   fluticasone (FLONASE) 50 MCG/ACT nasal spray 2 sprays into the nostril(s) as directed by provider Daily.    Bipin Zamora MD   gabapentin (NEURONTIN) 300 MG capsule Take 1 capsule by mouth 3 (Three) Times a Day. 12/4/23   Timi Aranda DO   hydrOXYzine (ATARAX) 25 MG tablet Take 1 tablet by mouth 2 (Two) Times a Day.    Bipin Zamora MD   lactulose (CHRONULAC) 10 GM/15ML solution Take 30 mL by mouth Daily As Needed (constipation).    Bipin Zamora MD   melatonin 5 MG tablet tablet Take 1 tablet by mouth Every Night.    Bipin Zamora MD   multivitamin with minerals tablet tablet Take 1 tablet by mouth Daily.    Bipin Zamora MD   ondansetron ODT (ZOFRAN-ODT) 4 MG disintegrating tablet Place 1 tablet on the tongue Every 8 (Eight) Hours As Needed for Nausea or Vomiting.    Bipin Zamora MD   PARoxetine (PAXIL) 20 MG tablet Take 1 tablet by mouth Every Night.    Bipin Zamora MD   polyethylene glycol (MIRALAX) 17 GM/SCOOP powder Take 17 g by mouth Daily As Needed (constipation). Give with 4-8 oz of water/juice    Bipin Zamora MD   risperiDONE (risperDAL) 1 MG tablet Take 1.5 tablets by mouth 2 (Two) Times a Day.    Bipin Zamora MD   tamsulosin (FLOMAX) 0.4 MG capsule 24 hr capsule Take 1 capsule by mouth Daily.    Bipin Zamora MD   tiZANidine (ZANAFLEX) 2 MG tablet Take 1 tablet by mouth Every 12 (Twelve) Hours As Needed for Muscle Spasms.    Bipin Zamora MD       Medications   sodium chloride 0.9 % flush 10 mL (has no administration in time range)   sodium chloride 0.9 % bolus 1,000 mL (has no administration in time range)   cefTRIAXone (ROCEPHIN) 2,000 mg in sodium chloride 0.9 % 100 mL MBP (has no administration in time range)       Vitals:    07/01/24 2024   BP: 128/74   Pulse: 79   Resp: 20   Temp: 98.2 °F (36.8 °C)   SpO2: 97%         Objective   Physical Exam  Vitals and nursing note reviewed.   Constitutional:        Appearance: Normal appearance.   HENT:      Head: Normocephalic and atraumatic.   Eyes:      Extraocular Movements: Extraocular movements intact.      Pupils: Pupils are equal, round, and reactive to light.   Cardiovascular:      Rate and Rhythm: Normal rate and regular rhythm.   Pulmonary:      Effort: Pulmonary effort is normal.      Breath sounds: Normal breath sounds.   Abdominal:      Palpations: Abdomen is soft.      Tenderness: There is no abdominal tenderness.      Comments: Patient is morbidly obese.   Musculoskeletal:         General: Normal range of motion.      Cervical back: Normal range of motion and neck supple.   Skin:     General: Skin is warm and dry.      Comments: Patient does have an erythematous scaly rash on his face.   Neurological:      General: No focal deficit present.      Mental Status: He is alert and oriented to person, place, and time. Mental status is at baseline.      Comments: Patient has a slow mentation but does not answer questions correctly.   Psychiatric:         Mood and Affect: Mood normal.         Behavior: Behavior normal.         Procedures         Lab Results (last 24 hours)       Procedure Component Value Units Date/Time    Urinalysis With Culture If Indicated - Straight Cath [[id number]]  (Abnormal) Collected: 07/01/24 2036    Specimen: Urine from Straight Cath Updated: 07/01/24 2105     Color, UA Yellow     Appearance, UA Turbid     pH, UA 6.0     Specific Gravity, UA 1.013     Glucose, UA Negative     Ketones, UA Negative     Bilirubin, UA Negative     Blood, UA Moderate (2+)     Protein, UA 30 mg/dL (1+)     Leuk Esterase, UA Large (3+)     Nitrite, UA Positive     Urobilinogen, UA 0.2 E.U./dL    Narrative:      In absence of clinical symptoms, the presence of pyuria, bacteria, and/or nitrites on the urinalysis result does not correlate with infection.    Urinalysis, Microscopic Only - Straight Cath [[id number]]  (Abnormal) Collected: 07/01/24 2036    Specimen:  Urine from Straight Cath Updated: 07/01/24 2105     RBC, UA 0-2 /HPF      WBC, UA Too Numerous to Count /HPF      Bacteria, UA 4+ /HPF      Squamous Epithelial Cells, UA 0-2 /HPF      Yeast, UA Large/3+ Yeast /HPF      Hyaline Casts, UA       Unable to determine due to loaded field     /LPF     WBC Clumps, UA Moderate/2+ /HPF      Methodology Manual Light Microscopy    Urine Culture - Urine, Straight Cath [[id number]] Collected: 07/01/24 2036    Specimen: Urine from Straight Cath Updated: 07/01/24 2105    CBC & Differential [[id number]]  (Abnormal) Collected: 07/01/24 2049    Specimen: Blood Updated: 07/01/24 2100    Narrative:      The following orders were created for panel order CBC & Differential.  Procedure                               Abnormality         Status                     ---------                               -----------         ------                     CBC Auto Differential[[id number]]        Abnormal            Final result                 Please view results for these tests on the individual orders.    Comprehensive Metabolic Panel [[id number]]  (Abnormal) Collected: 07/01/24 2049    Specimen: Blood Updated: 07/01/24 2117     Glucose 106 mg/dL      BUN 27 mg/dL      Creatinine 1.30 mg/dL      Sodium 141 mmol/L      Potassium 4.1 mmol/L      Chloride 107 mmol/L      CO2 25.0 mmol/L      Calcium 8.4 mg/dL      Total Protein 7.4 g/dL      Albumin 3.8 g/dL      ALT (SGPT) 12 U/L      AST (SGOT) 7 U/L      Alkaline Phosphatase 174 U/L      Total Bilirubin 0.2 mg/dL      Globulin 3.6 gm/dL      A/G Ratio 1.1 g/dL      BUN/Creatinine Ratio 20.8     Anion Gap 9.0 mmol/L      eGFR 73.0 mL/min/1.73     Narrative:      GFR Normal >60  Chronic Kidney Disease <60  Kidney Failure <15      CBC Auto Differential [[id number]]  (Abnormal) Collected: 07/01/24 2049    Specimen: Blood Updated: 07/01/24 2100     WBC 9.49 10*3/mm3      RBC 4.41 10*6/mm3      Hemoglobin 11.9 g/dL      Hematocrit 36.4 %      MCV 82.5 fL       MCH 27.0 pg      MCHC 32.7 g/dL      RDW 14.8 %      RDW-SD 44.4 fl      MPV 10.3 fL      Platelets 211 10*3/mm3      Neutrophil % 58.8 %      Lymphocyte % 26.0 %      Monocyte % 11.9 %      Eosinophil % 2.4 %      Basophil % 0.7 %      Immature Grans % 0.2 %      Neutrophils, Absolute 5.57 10*3/mm3      Lymphocytes, Absolute 2.47 10*3/mm3      Monocytes, Absolute 1.13 10*3/mm3      Eosinophils, Absolute 0.23 10*3/mm3      Basophils, Absolute 0.07 10*3/mm3      Immature Grans, Absolute 0.02 10*3/mm3      nRBC 0.0 /100 WBC     Lactic Acid, Plasma [[id number]]  (Normal) Collected: 07/01/24 2049    Specimen: Blood Updated: 07/01/24 2115     Lactate 1.0 mmol/L             No orders to display       ED Course          MDM  Number of Diagnoses or Management Options  Acute UTI: new and requires workup  Diagnosis management comments: Tell the patient he does have a significant urinary tract infection but everything else checks out okay.  His renal functions are up a little bit but not terribly bad or worse than before.  He is white blood cell count is normal his lactic acid normal so there is no signs of sepsis overwhelming infection.  His blood pressure is probably fine and he has been stable.  He is discharged in stable condition with treatment for his UTI.       Amount and/or Complexity of Data Reviewed  Clinical lab tests: ordered and reviewed  Decide to obtain previous medical records or to obtain history from someone other than the patient: yes    Risk of Complications, Morbidity, and/or Mortality  Presenting problems: moderate  Diagnostic procedures: moderate  Management options: moderate    Patient Progress  Patient progress: stable        Final diagnoses:   Acute UTI          Bal Meredith Jr., MD  07/01/24 2128

## 2024-07-04 LAB — BACTERIA SPEC AEROBE CULT: ABNORMAL

## 2024-07-06 LAB
BACTERIA SPEC AEROBE CULT: NORMAL
BACTERIA SPEC AEROBE CULT: NORMAL

## 2024-09-08 ENCOUNTER — APPOINTMENT (OUTPATIENT)
Dept: CT IMAGING | Age: 34
End: 2024-09-08
Payer: MEDICARE

## 2024-09-08 ENCOUNTER — HOSPITAL ENCOUNTER (INPATIENT)
Age: 34
LOS: 4 days | Discharge: NURSING, REHAB OR LONG-TERM CARE | End: 2024-09-12
Attending: INTERNAL MEDICINE | Admitting: INTERNAL MEDICINE
Payer: MEDICARE

## 2024-09-08 ENCOUNTER — APPOINTMENT (OUTPATIENT)
Dept: GENERAL RADIOLOGY | Age: 34
End: 2024-09-08
Payer: MEDICARE
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
## 2024-09-08 DIAGNOSIS — A41.9 SEPSIS DUE TO URINARY TRACT INFECTION (HCC): Primary | ICD-10-CM

## 2024-09-08 DIAGNOSIS — N39.0 SEPSIS DUE TO URINARY TRACT INFECTION (HCC): Primary | ICD-10-CM

## 2024-09-08 DIAGNOSIS — R60.9 SWELLING: ICD-10-CM

## 2024-09-08 DIAGNOSIS — K63.89 MESENTERIC MASS: ICD-10-CM

## 2024-09-08 PROBLEM — N30.01 ACUTE CYSTITIS WITH HEMATURIA: Status: ACTIVE | Noted: 2024-09-08

## 2024-09-08 LAB
ALBUMIN SERPL-MCNC: 3.8 G/DL (ref 3.4–5)
ALBUMIN/GLOB SERPL: 1.2 {RATIO} (ref 1.1–2.2)
ALP SERPL-CCNC: 132 U/L (ref 40–129)
ALT SERPL-CCNC: 14 U/L (ref 10–40)
ANION GAP SERPL CALCULATED.3IONS-SCNC: 12 MMOL/L (ref 3–16)
AST SERPL-CCNC: 19 U/L (ref 15–37)
BACTERIA URNS QL MICRO: ABNORMAL /HPF
BASOPHILS # BLD: 0.1 K/UL (ref 0–0.2)
BASOPHILS NFR BLD: 0.9 %
BILIRUB SERPL-MCNC: 0.5 MG/DL (ref 0–1)
BILIRUB UR QL STRIP.AUTO: NEGATIVE
BUN SERPL-MCNC: 11 MG/DL (ref 7–20)
CALCIUM SERPL-MCNC: 8.4 MG/DL (ref 8.3–10.6)
CHLORIDE SERPL-SCNC: 108 MMOL/L (ref 99–110)
CK SERPL-CCNC: 169 U/L (ref 39–308)
CLARITY UR: ABNORMAL
CO2 SERPL-SCNC: 22 MMOL/L (ref 21–32)
COLOR UR: YELLOW
CREAT SERPL-MCNC: 1 MG/DL (ref 0.9–1.3)
DEPRECATED RDW RBC AUTO: 14.6 % (ref 12.4–15.4)
EOSINOPHIL # BLD: 0.1 K/UL (ref 0–0.6)
EOSINOPHIL NFR BLD: 1.7 %
EPI CELLS #/AREA URNS AUTO: 1 /HPF (ref 0–5)
GFR SERPLBLD CREATININE-BSD FMLA CKD-EPI: >90 ML/MIN/{1.73_M2}
GLUCOSE SERPL-MCNC: 91 MG/DL (ref 70–99)
GLUCOSE UR STRIP.AUTO-MCNC: NEGATIVE MG/DL
HCT VFR BLD AUTO: 41.1 % (ref 40.5–52.5)
HGB BLD-MCNC: 13.9 G/DL (ref 13.5–17.5)
HGB UR QL STRIP.AUTO: ABNORMAL
HYALINE CASTS #/AREA URNS AUTO: 17 /LPF (ref 0–8)
KETONES UR STRIP.AUTO-MCNC: 40 MG/DL
LACTATE BLDV-SCNC: 1.1 MMOL/L (ref 0.4–2)
LEUKOCYTE ESTERASE UR QL STRIP.AUTO: ABNORMAL
LYMPHOCYTES # BLD: 1.9 K/UL (ref 1–5.1)
LYMPHOCYTES NFR BLD: 26.4 %
MCH RBC QN AUTO: 28.3 PG (ref 26–34)
MCHC RBC AUTO-ENTMCNC: 33.9 G/DL (ref 31–36)
MCV RBC AUTO: 83.4 FL (ref 80–100)
MONOCYTES # BLD: 0.9 K/UL (ref 0–1.3)
MONOCYTES NFR BLD: 12.4 %
NEUTROPHILS # BLD: 4.2 K/UL (ref 1.7–7.7)
NEUTROPHILS NFR BLD: 58.6 %
NITRITE UR QL STRIP.AUTO: POSITIVE
PH UR STRIP.AUTO: 6 [PH] (ref 5–8)
PLATELET # BLD AUTO: 186 K/UL (ref 135–450)
PLATELET BLD QL SMEAR: ADEQUATE
PMV BLD AUTO: 9.7 FL (ref 5–10.5)
POTASSIUM SERPL-SCNC: 4.1 MMOL/L (ref 3.5–5.1)
PROT SERPL-MCNC: 7 G/DL (ref 6.4–8.2)
PROT UR STRIP.AUTO-MCNC: 30 MG/DL
RBC # BLD AUTO: 4.93 M/UL (ref 4.2–5.9)
RBC CLUMPS #/AREA URNS AUTO: 31 /HPF (ref 0–4)
SLIDE REVIEW: NORMAL
SODIUM SERPL-SCNC: 142 MMOL/L (ref 136–145)
SP GR UR STRIP.AUTO: 1.03 (ref 1–1.03)
UA DIPSTICK W REFLEX MICRO PNL UR: YES
URN SPEC COLLECT METH UR: ABNORMAL
UROBILINOGEN UR STRIP-ACNC: 1 E.U./DL
WBC # BLD AUTO: 7.1 K/UL (ref 4–11)
WBC #/AREA URNS AUTO: 1594 /HPF (ref 0–5)

## 2024-09-08 PROCEDURE — 2580000003 HC RX 258: Performed by: INTERNAL MEDICINE

## 2024-09-08 PROCEDURE — 80053 COMPREHEN METABOLIC PANEL: CPT

## 2024-09-08 PROCEDURE — 2580000003 HC RX 258: Performed by: PHYSICIAN ASSISTANT

## 2024-09-08 PROCEDURE — 72131 CT LUMBAR SPINE W/O DYE: CPT

## 2024-09-08 PROCEDURE — 6360000002 HC RX W HCPCS: Performed by: PHYSICIAN ASSISTANT

## 2024-09-08 PROCEDURE — 96374 THER/PROPH/DIAG INJ IV PUSH: CPT

## 2024-09-08 PROCEDURE — 74177 CT ABD & PELVIS W/CONTRAST: CPT

## 2024-09-08 PROCEDURE — 93005 ELECTROCARDIOGRAM TRACING: CPT | Performed by: PHYSICIAN ASSISTANT

## 2024-09-08 PROCEDURE — 96375 TX/PRO/DX INJ NEW DRUG ADDON: CPT

## 2024-09-08 PROCEDURE — 82550 ASSAY OF CK (CPK): CPT

## 2024-09-08 PROCEDURE — 6360000004 HC RX CONTRAST MEDICATION: Performed by: PHYSICIAN ASSISTANT

## 2024-09-08 PROCEDURE — 83605 ASSAY OF LACTIC ACID: CPT

## 2024-09-08 PROCEDURE — 87040 BLOOD CULTURE FOR BACTERIA: CPT

## 2024-09-08 PROCEDURE — 85025 COMPLETE CBC W/AUTO DIFF WBC: CPT

## 2024-09-08 PROCEDURE — 2060000000 HC ICU INTERMEDIATE R&B

## 2024-09-08 PROCEDURE — 6370000000 HC RX 637 (ALT 250 FOR IP): Performed by: INTERNAL MEDICINE

## 2024-09-08 PROCEDURE — 71045 X-RAY EXAM CHEST 1 VIEW: CPT

## 2024-09-08 PROCEDURE — 70450 CT HEAD/BRAIN W/O DYE: CPT

## 2024-09-08 PROCEDURE — 81001 URINALYSIS AUTO W/SCOPE: CPT

## 2024-09-08 PROCEDURE — 99285 EMERGENCY DEPT VISIT HI MDM: CPT

## 2024-09-08 PROCEDURE — 72100 X-RAY EXAM L-S SPINE 2/3 VWS: CPT

## 2024-09-08 PROCEDURE — 36415 COLL VENOUS BLD VENIPUNCTURE: CPT

## 2024-09-08 RX ORDER — MEDROXYPROGESTERONE ACETATE 150 MG/ML
150 INJECTION, SUSPENSION INTRAMUSCULAR
COMMUNITY

## 2024-09-08 RX ORDER — SODIUM CHLORIDE 9 MG/ML
INJECTION, SOLUTION INTRAVENOUS PRN
Status: DISCONTINUED | OUTPATIENT
Start: 2024-09-08 | End: 2024-09-12 | Stop reason: HOSPADM

## 2024-09-08 RX ORDER — AMANTADINE HYDROCHLORIDE 100 MG/1
100 TABLET ORAL 2 TIMES DAILY
Status: DISCONTINUED | OUTPATIENT
Start: 2024-09-09 | End: 2024-09-09

## 2024-09-08 RX ORDER — SODIUM CHLORIDE, SODIUM LACTATE, POTASSIUM CHLORIDE, AND CALCIUM CHLORIDE .6; .31; .03; .02 G/100ML; G/100ML; G/100ML; G/100ML
1000 INJECTION, SOLUTION INTRAVENOUS ONCE
Status: COMPLETED | OUTPATIENT
Start: 2024-09-08 | End: 2024-09-08

## 2024-09-08 RX ORDER — DOCUSATE SODIUM 100 MG/1
200 CAPSULE, LIQUID FILLED ORAL 2 TIMES DAILY
Status: DISCONTINUED | OUTPATIENT
Start: 2024-09-08 | End: 2024-09-12 | Stop reason: HOSPADM

## 2024-09-08 RX ORDER — PROPRANOLOL HCL 20 MG
20 TABLET ORAL 2 TIMES DAILY
Status: DISCONTINUED | OUTPATIENT
Start: 2024-09-08 | End: 2024-09-09

## 2024-09-08 RX ORDER — ONDANSETRON 2 MG/ML
4 INJECTION INTRAMUSCULAR; INTRAVENOUS EVERY 30 MIN PRN
Status: DISCONTINUED | OUTPATIENT
Start: 2024-09-08 | End: 2024-09-12 | Stop reason: HOSPADM

## 2024-09-08 RX ORDER — RISPERIDONE 1 MG/1
2 TABLET ORAL 4 TIMES DAILY
Status: DISCONTINUED | OUTPATIENT
Start: 2024-09-09 | End: 2024-09-12 | Stop reason: HOSPADM

## 2024-09-08 RX ORDER — IOPAMIDOL 755 MG/ML
75 INJECTION, SOLUTION INTRAVASCULAR
Status: COMPLETED | OUTPATIENT
Start: 2024-09-08 | End: 2024-09-08

## 2024-09-08 RX ORDER — OXYCODONE HYDROCHLORIDE 10 MG/1
10 TABLET ORAL 2 TIMES DAILY
COMMUNITY

## 2024-09-08 RX ORDER — ONDANSETRON 2 MG/ML
4 INJECTION INTRAMUSCULAR; INTRAVENOUS ONCE
Status: COMPLETED | OUTPATIENT
Start: 2024-09-08 | End: 2024-09-08

## 2024-09-08 RX ORDER — DIVALPROEX SODIUM 500 MG/1
500 TABLET, DELAYED RELEASE ORAL NIGHTLY
COMMUNITY

## 2024-09-08 RX ORDER — ASCORBIC ACID 500 MG
500 TABLET ORAL DAILY
COMMUNITY

## 2024-09-08 RX ORDER — SODIUM CHLORIDE, SODIUM LACTATE, POTASSIUM CHLORIDE, CALCIUM CHLORIDE 600; 310; 30; 20 MG/100ML; MG/100ML; MG/100ML; MG/100ML
INJECTION, SOLUTION INTRAVENOUS CONTINUOUS
Status: ACTIVE | OUTPATIENT
Start: 2024-09-08 | End: 2024-09-09

## 2024-09-08 RX ORDER — SODIUM CHLORIDE, SODIUM LACTATE, POTASSIUM CHLORIDE, AND CALCIUM CHLORIDE .6; .31; .03; .02 G/100ML; G/100ML; G/100ML; G/100ML
1000 INJECTION, SOLUTION INTRAVENOUS ONCE
Status: DISCONTINUED | OUTPATIENT
Start: 2024-09-08 | End: 2024-09-08

## 2024-09-08 RX ORDER — CITALOPRAM HYDROBROMIDE 10 MG/1
20 TABLET ORAL DAILY
Status: DISCONTINUED | OUTPATIENT
Start: 2024-09-08 | End: 2024-09-09

## 2024-09-08 RX ORDER — PHENOL 1.4 %
10 AEROSOL, SPRAY (ML) MUCOUS MEMBRANE
COMMUNITY

## 2024-09-08 RX ORDER — OXYCODONE HYDROCHLORIDE 10 MG/1
10 TABLET ORAL DAILY PRN
COMMUNITY

## 2024-09-08 RX ORDER — ASPIRIN 325 MG
325 TABLET ORAL DAILY
Status: DISCONTINUED | OUTPATIENT
Start: 2024-09-08 | End: 2024-09-09

## 2024-09-08 RX ORDER — ZINC GLUCONATE 50 MG
50 TABLET ORAL DAILY
COMMUNITY

## 2024-09-08 RX ORDER — SODIUM CHLORIDE 9 MG/ML
30 INJECTION, SOLUTION INTRAVENOUS ONCE
Status: COMPLETED | OUTPATIENT
Start: 2024-09-08 | End: 2024-09-08

## 2024-09-08 RX ORDER — TAMSULOSIN HYDROCHLORIDE 0.4 MG/1
0.4 CAPSULE ORAL DAILY
Status: DISCONTINUED | OUTPATIENT
Start: 2024-09-08 | End: 2024-09-08

## 2024-09-08 RX ORDER — SODIUM CHLORIDE 0.9 % (FLUSH) 0.9 %
5-40 SYRINGE (ML) INJECTION PRN
Status: DISCONTINUED | OUTPATIENT
Start: 2024-09-08 | End: 2024-09-08

## 2024-09-08 RX ORDER — BUSPIRONE HYDROCHLORIDE 10 MG/1
10 TABLET ORAL 3 TIMES DAILY
Status: DISCONTINUED | OUTPATIENT
Start: 2024-09-08 | End: 2024-09-12 | Stop reason: HOSPADM

## 2024-09-08 RX ORDER — LORAZEPAM 0.5 MG/1
0.5 TABLET ORAL DAILY PRN
COMMUNITY

## 2024-09-08 RX ORDER — LISINOPRIL 10 MG/1
10 TABLET ORAL DAILY
Status: DISCONTINUED | OUTPATIENT
Start: 2024-09-08 | End: 2024-09-09

## 2024-09-08 RX ORDER — ACETAMINOPHEN 325 MG/1
650 TABLET ORAL EVERY 6 HOURS PRN
Status: DISCONTINUED | OUTPATIENT
Start: 2024-09-08 | End: 2024-09-12 | Stop reason: HOSPADM

## 2024-09-08 RX ORDER — GABAPENTIN 300 MG/1
300 CAPSULE ORAL 3 TIMES DAILY
Status: DISCONTINUED | OUTPATIENT
Start: 2024-09-08 | End: 2024-09-12 | Stop reason: HOSPADM

## 2024-09-08 RX ORDER — SODIUM CHLORIDE 0.9 % (FLUSH) 0.9 %
5-40 SYRINGE (ML) INJECTION PRN
Status: DISCONTINUED | OUTPATIENT
Start: 2024-09-08 | End: 2024-09-12 | Stop reason: HOSPADM

## 2024-09-08 RX ORDER — SODIUM CHLORIDE 9 MG/ML
INJECTION, SOLUTION INTRAVENOUS PRN
Status: DISCONTINUED | OUTPATIENT
Start: 2024-09-08 | End: 2024-09-08

## 2024-09-08 RX ORDER — TRAZODONE HYDROCHLORIDE 50 MG/1
50 TABLET, FILM COATED ORAL NIGHTLY
Status: DISCONTINUED | OUTPATIENT
Start: 2024-09-08 | End: 2024-09-12 | Stop reason: HOSPADM

## 2024-09-08 RX ORDER — MAGNESIUM HYDROXIDE/ALUMINUM HYDROXICE/SIMETHICONE 120; 1200; 1200 MG/30ML; MG/30ML; MG/30ML
5 SUSPENSION ORAL EVERY 6 HOURS PRN
Status: DISCONTINUED | OUTPATIENT
Start: 2024-09-08 | End: 2024-09-12 | Stop reason: HOSPADM

## 2024-09-08 RX ORDER — SODIUM CHLORIDE 0.9 % (FLUSH) 0.9 %
5-40 SYRINGE (ML) INJECTION EVERY 12 HOURS SCHEDULED
Status: DISCONTINUED | OUTPATIENT
Start: 2024-09-08 | End: 2024-09-12 | Stop reason: HOSPADM

## 2024-09-08 RX ORDER — ALLOPURINOL 100 MG/1
200 TABLET ORAL DAILY
Status: DISCONTINUED | OUTPATIENT
Start: 2024-09-08 | End: 2024-09-09

## 2024-09-08 RX ORDER — ALBUTEROL SULFATE 2.5 MG/.5ML
2.5 SOLUTION RESPIRATORY (INHALATION) EVERY 4 HOURS PRN
COMMUNITY

## 2024-09-08 RX ORDER — PAROXETINE HYDROCHLORIDE HEMIHYDRATE 25 MG/1
25 TABLET, FILM COATED, EXTENDED RELEASE ORAL
COMMUNITY

## 2024-09-08 RX ORDER — FENTANYL CITRATE 50 UG/ML
100 INJECTION, SOLUTION INTRAMUSCULAR; INTRAVENOUS ONCE
Status: COMPLETED | OUTPATIENT
Start: 2024-09-08 | End: 2024-09-08

## 2024-09-08 RX ORDER — ENOXAPARIN SODIUM 100 MG/ML
30 INJECTION SUBCUTANEOUS 2 TIMES DAILY
Status: DISCONTINUED | OUTPATIENT
Start: 2024-09-09 | End: 2024-09-12 | Stop reason: HOSPADM

## 2024-09-08 RX ORDER — BACLOFEN 10 MG/1
10 TABLET ORAL 3 TIMES DAILY PRN
Status: DISCONTINUED | OUTPATIENT
Start: 2024-09-08 | End: 2024-09-12 | Stop reason: HOSPADM

## 2024-09-08 RX ORDER — SODIUM CHLORIDE 0.9 % (FLUSH) 0.9 %
5-40 SYRINGE (ML) INJECTION EVERY 12 HOURS SCHEDULED
Status: DISCONTINUED | OUTPATIENT
Start: 2024-09-08 | End: 2024-09-08

## 2024-09-08 RX ORDER — LACTULOSE 10 G/15ML
20 SOLUTION ORAL EVERY 12 HOURS PRN
COMMUNITY

## 2024-09-08 RX ORDER — ERGOCALCIFEROL (VITAMIN D2) 10 MCG
400 TABLET ORAL DAILY
Status: DISCONTINUED | OUTPATIENT
Start: 2024-09-08 | End: 2024-09-09

## 2024-09-08 RX ORDER — FERROUS SULFATE 325(65) MG
325 TABLET ORAL
COMMUNITY

## 2024-09-08 RX ORDER — HYDROXYZINE PAMOATE 25 MG/1
25 CAPSULE ORAL 3 TIMES DAILY PRN
Status: DISCONTINUED | OUTPATIENT
Start: 2024-09-08 | End: 2024-09-12 | Stop reason: HOSPADM

## 2024-09-08 RX ORDER — ACETAMINOPHEN 650 MG/1
650 SUPPOSITORY RECTAL EVERY 6 HOURS PRN
Status: DISCONTINUED | OUTPATIENT
Start: 2024-09-08 | End: 2024-09-12 | Stop reason: HOSPADM

## 2024-09-08 RX ADMIN — WATER 1000 MG: 1 INJECTION INTRAMUSCULAR; INTRAVENOUS; SUBCUTANEOUS at 18:53

## 2024-09-08 RX ADMIN — SODIUM CHLORIDE 2535 ML: 9 INJECTION, SOLUTION INTRAVENOUS at 19:00

## 2024-09-08 RX ADMIN — FENTANYL CITRATE 100 MCG: 50 INJECTION INTRAMUSCULAR; INTRAVENOUS at 19:29

## 2024-09-08 RX ADMIN — GABAPENTIN 300 MG: 300 CAPSULE ORAL at 22:09

## 2024-09-08 RX ADMIN — SODIUM CHLORIDE, POTASSIUM CHLORIDE, SODIUM LACTATE AND CALCIUM CHLORIDE: 600; 310; 30; 20 INJECTION, SOLUTION INTRAVENOUS at 22:09

## 2024-09-08 RX ADMIN — SODIUM CHLORIDE, POTASSIUM CHLORIDE, SODIUM LACTATE AND CALCIUM CHLORIDE 1000 ML: 600; 310; 30; 20 INJECTION, SOLUTION INTRAVENOUS at 14:12

## 2024-09-08 RX ADMIN — FENTANYL CITRATE 100 MCG: 50 INJECTION INTRAMUSCULAR; INTRAVENOUS at 13:40

## 2024-09-08 RX ADMIN — IOPAMIDOL 75 ML: 755 INJECTION, SOLUTION INTRAVENOUS at 16:57

## 2024-09-08 RX ADMIN — ONDANSETRON 4 MG: 2 INJECTION INTRAMUSCULAR; INTRAVENOUS at 13:40

## 2024-09-08 RX ADMIN — SODIUM CHLORIDE, PRESERVATIVE FREE 10 ML: 5 INJECTION INTRAVENOUS at 22:11

## 2024-09-08 ASSESSMENT — PAIN SCALES - GENERAL
PAINLEVEL_OUTOF10: 10
PAINLEVEL_OUTOF10: 10
PAINLEVEL_OUTOF10: 6
PAINLEVEL_OUTOF10: 10

## 2024-09-08 ASSESSMENT — PAIN DESCRIPTION - DESCRIPTORS
DESCRIPTORS: ACHING

## 2024-09-08 ASSESSMENT — PAIN DESCRIPTION - LOCATION
LOCATION: BACK

## 2024-09-08 ASSESSMENT — LIFESTYLE VARIABLES
HOW MANY STANDARD DRINKS CONTAINING ALCOHOL DO YOU HAVE ON A TYPICAL DAY: PATIENT DOES NOT DRINK
HOW OFTEN DO YOU HAVE A DRINK CONTAINING ALCOHOL: NEVER

## 2024-09-08 ASSESSMENT — PAIN DESCRIPTION - PAIN TYPE: TYPE: CHRONIC PAIN

## 2024-09-09 LAB
ANION GAP SERPL CALCULATED.3IONS-SCNC: 10 MMOL/L (ref 3–16)
BASOPHILS # BLD: 0.1 K/UL (ref 0–0.2)
BASOPHILS NFR BLD: 1 %
BUN SERPL-MCNC: 12 MG/DL (ref 7–20)
CALCIUM SERPL-MCNC: 8.2 MG/DL (ref 8.3–10.6)
CHLORIDE SERPL-SCNC: 111 MMOL/L (ref 99–110)
CO2 SERPL-SCNC: 22 MMOL/L (ref 21–32)
CREAT SERPL-MCNC: 1 MG/DL (ref 0.9–1.3)
DEPRECATED RDW RBC AUTO: 14.8 % (ref 12.4–15.4)
EKG ATRIAL RATE: 118 BPM
EKG DIAGNOSIS: NORMAL
EKG P AXIS: 60 DEGREES
EKG P-R INTERVAL: 150 MS
EKG Q-T INTERVAL: 322 MS
EKG QRS DURATION: 84 MS
EKG QTC CALCULATION (BAZETT): 451 MS
EKG R AXIS: 108 DEGREES
EKG T AXIS: 50 DEGREES
EKG VENTRICULAR RATE: 118 BPM
EOSINOPHIL # BLD: 0.2 K/UL (ref 0–0.6)
EOSINOPHIL NFR BLD: 2.9 %
GFR SERPLBLD CREATININE-BSD FMLA CKD-EPI: >90 ML/MIN/{1.73_M2}
GLUCOSE SERPL-MCNC: 107 MG/DL (ref 70–99)
HCT VFR BLD AUTO: 35.4 % (ref 40.5–52.5)
HGB BLD-MCNC: 11.9 G/DL (ref 13.5–17.5)
LYMPHOCYTES # BLD: 1.3 K/UL (ref 1–5.1)
LYMPHOCYTES NFR BLD: 20.7 %
MCH RBC QN AUTO: 28 PG (ref 26–34)
MCHC RBC AUTO-ENTMCNC: 33.6 G/DL (ref 31–36)
MCV RBC AUTO: 83.4 FL (ref 80–100)
MONOCYTES # BLD: 0.8 K/UL (ref 0–1.3)
MONOCYTES NFR BLD: 12.4 %
NEUTROPHILS # BLD: 4.1 K/UL (ref 1.7–7.7)
NEUTROPHILS NFR BLD: 63 %
PLATELET # BLD AUTO: 181 K/UL (ref 135–450)
PMV BLD AUTO: 10.3 FL (ref 5–10.5)
POTASSIUM SERPL-SCNC: 3.9 MMOL/L (ref 3.5–5.1)
RBC # BLD AUTO: 4.25 M/UL (ref 4.2–5.9)
SODIUM SERPL-SCNC: 143 MMOL/L (ref 136–145)
WBC # BLD AUTO: 6.5 K/UL (ref 4–11)

## 2024-09-09 PROCEDURE — 6370000000 HC RX 637 (ALT 250 FOR IP)

## 2024-09-09 PROCEDURE — 6370000000 HC RX 637 (ALT 250 FOR IP): Performed by: INTERNAL MEDICINE

## 2024-09-09 PROCEDURE — 6360000002 HC RX W HCPCS: Performed by: INTERNAL MEDICINE

## 2024-09-09 PROCEDURE — 85025 COMPLETE CBC W/AUTO DIFF WBC: CPT

## 2024-09-09 PROCEDURE — 2060000000 HC ICU INTERMEDIATE R&B

## 2024-09-09 PROCEDURE — 2580000003 HC RX 258: Performed by: INTERNAL MEDICINE

## 2024-09-09 PROCEDURE — 6360000002 HC RX W HCPCS

## 2024-09-09 PROCEDURE — 94760 N-INVAS EAR/PLS OXIMETRY 1: CPT

## 2024-09-09 PROCEDURE — 2580000003 HC RX 258

## 2024-09-09 PROCEDURE — 80048 BASIC METABOLIC PNL TOTAL CA: CPT

## 2024-09-09 PROCEDURE — 36415 COLL VENOUS BLD VENIPUNCTURE: CPT

## 2024-09-09 PROCEDURE — 93010 ELECTROCARDIOGRAM REPORT: CPT | Performed by: INTERNAL MEDICINE

## 2024-09-09 RX ORDER — DIVALPROEX SODIUM 500 MG/1
500 TABLET, DELAYED RELEASE ORAL NIGHTLY
Status: DISCONTINUED | OUTPATIENT
Start: 2024-09-09 | End: 2024-09-12 | Stop reason: HOSPADM

## 2024-09-09 RX ORDER — ASCORBIC ACID 500 MG
500 TABLET ORAL DAILY
Status: DISCONTINUED | OUTPATIENT
Start: 2024-09-09 | End: 2024-09-12 | Stop reason: HOSPADM

## 2024-09-09 RX ORDER — LORAZEPAM 0.5 MG/1
0.5 TABLET ORAL DAILY PRN
Status: DISCONTINUED | OUTPATIENT
Start: 2024-09-09 | End: 2024-09-12 | Stop reason: HOSPADM

## 2024-09-09 RX ORDER — LACTULOSE 10 G/15ML
20 SOLUTION ORAL EVERY 12 HOURS PRN
Status: DISCONTINUED | OUTPATIENT
Start: 2024-09-09 | End: 2024-09-12 | Stop reason: HOSPADM

## 2024-09-09 RX ORDER — ALBUTEROL SULFATE 0.83 MG/ML
2.5 SOLUTION RESPIRATORY (INHALATION) EVERY 4 HOURS PRN
Status: DISCONTINUED | OUTPATIENT
Start: 2024-09-09 | End: 2024-09-12 | Stop reason: HOSPADM

## 2024-09-09 RX ORDER — AMANTADINE HYDROCHLORIDE 100 MG/1
100 CAPSULE, GELATIN COATED ORAL 2 TIMES DAILY
Status: DISCONTINUED | OUTPATIENT
Start: 2024-09-09 | End: 2024-09-12 | Stop reason: HOSPADM

## 2024-09-09 RX ORDER — PAROXETINE 20 MG/1
20 TABLET, FILM COATED ORAL DAILY
Status: DISCONTINUED | OUTPATIENT
Start: 2024-09-09 | End: 2024-09-12 | Stop reason: HOSPADM

## 2024-09-09 RX ORDER — FERROUS SULFATE 325(65) MG
325 TABLET ORAL
Status: DISCONTINUED | OUTPATIENT
Start: 2024-09-10 | End: 2024-09-12 | Stop reason: HOSPADM

## 2024-09-09 RX ORDER — LANOLIN ALCOHOL/MO/W.PET/CERES
9 CREAM (GRAM) TOPICAL
Status: DISCONTINUED | OUTPATIENT
Start: 2024-09-09 | End: 2024-09-12 | Stop reason: HOSPADM

## 2024-09-09 RX ORDER — ZINC SULFATE 50(220)MG
50 CAPSULE ORAL DAILY
Status: DISCONTINUED | OUTPATIENT
Start: 2024-09-09 | End: 2024-09-12 | Stop reason: HOSPADM

## 2024-09-09 RX ORDER — OXYCODONE HYDROCHLORIDE 10 MG/1
10 TABLET ORAL DAILY PRN
Status: DISCONTINUED | OUTPATIENT
Start: 2024-09-09 | End: 2024-09-12 | Stop reason: HOSPADM

## 2024-09-09 RX ORDER — OXYCODONE HYDROCHLORIDE 10 MG/1
10 TABLET ORAL 2 TIMES DAILY
Status: DISCONTINUED | OUTPATIENT
Start: 2024-09-09 | End: 2024-09-12 | Stop reason: HOSPADM

## 2024-09-09 RX ORDER — ZINC GLUCONATE 50 MG
50 TABLET ORAL DAILY
Status: DISCONTINUED | OUTPATIENT
Start: 2024-09-09 | End: 2024-09-09 | Stop reason: CLARIF

## 2024-09-09 RX ADMIN — BUSPIRONE HYDROCHLORIDE 10 MG: 10 TABLET ORAL at 10:37

## 2024-09-09 RX ADMIN — SODIUM CHLORIDE, POTASSIUM CHLORIDE, SODIUM LACTATE AND CALCIUM CHLORIDE: 600; 310; 30; 20 INJECTION, SOLUTION INTRAVENOUS at 04:57

## 2024-09-09 RX ADMIN — GABAPENTIN 300 MG: 300 CAPSULE ORAL at 12:43

## 2024-09-09 RX ADMIN — RISPERIDONE 2 MG: 1 TABLET, FILM COATED ORAL at 12:43

## 2024-09-09 RX ADMIN — OXYCODONE HYDROCHLORIDE AND ACETAMINOPHEN 500 MG: 500 TABLET ORAL at 12:43

## 2024-09-09 RX ADMIN — SODIUM CHLORIDE 250 ML: 9 INJECTION, SOLUTION INTRAVENOUS at 12:27

## 2024-09-09 RX ADMIN — RISPERIDONE 2 MG: 1 TABLET, FILM COATED ORAL at 17:32

## 2024-09-09 RX ADMIN — LORAZEPAM 0.5 MG: 0.5 TABLET ORAL at 17:39

## 2024-09-09 RX ADMIN — ENOXAPARIN SODIUM 30 MG: 100 INJECTION SUBCUTANEOUS at 10:37

## 2024-09-09 RX ADMIN — DOCUSATE SODIUM 200 MG: 100 CAPSULE, LIQUID FILLED ORAL at 12:43

## 2024-09-09 RX ADMIN — RISPERIDONE 2 MG: 1 TABLET, FILM COATED ORAL at 10:37

## 2024-09-09 RX ADMIN — TRAZODONE HYDROCHLORIDE 50 MG: 50 TABLET ORAL at 20:19

## 2024-09-09 RX ADMIN — DIVALPROEX SODIUM 500 MG: 500 TABLET, DELAYED RELEASE ORAL at 20:19

## 2024-09-09 RX ADMIN — SODIUM CHLORIDE, PRESERVATIVE FREE 10 ML: 5 INJECTION INTRAVENOUS at 20:26

## 2024-09-09 RX ADMIN — BACLOFEN 10 MG: 10 TABLET ORAL at 10:37

## 2024-09-09 RX ADMIN — GABAPENTIN 300 MG: 300 CAPSULE ORAL at 10:37

## 2024-09-09 RX ADMIN — AMANTADINE HYDROCHLORIDE 100 MG: 100 CAPSULE ORAL at 12:50

## 2024-09-09 RX ADMIN — CEFTRIAXONE SODIUM 2000 MG: 2 INJECTION, POWDER, FOR SOLUTION INTRAMUSCULAR; INTRAVENOUS at 12:27

## 2024-09-09 RX ADMIN — BACLOFEN 10 MG: 10 TABLET ORAL at 17:39

## 2024-09-09 RX ADMIN — RISPERIDONE 2 MG: 1 TABLET, FILM COATED ORAL at 00:17

## 2024-09-09 RX ADMIN — OXYCODONE HYDROCHLORIDE 10 MG: 10 TABLET ORAL at 22:06

## 2024-09-09 RX ADMIN — DOCUSATE SODIUM 200 MG: 100 CAPSULE, LIQUID FILLED ORAL at 20:19

## 2024-09-09 RX ADMIN — OXYCODONE HYDROCHLORIDE 10 MG: 10 TABLET ORAL at 17:39

## 2024-09-09 RX ADMIN — BUSPIRONE HYDROCHLORIDE 10 MG: 10 TABLET ORAL at 12:43

## 2024-09-09 RX ADMIN — BUSPIRONE HYDROCHLORIDE 10 MG: 10 TABLET ORAL at 20:20

## 2024-09-09 RX ADMIN — RISPERIDONE 2 MG: 1 TABLET, FILM COATED ORAL at 20:19

## 2024-09-09 RX ADMIN — ENOXAPARIN SODIUM 30 MG: 100 INJECTION SUBCUTANEOUS at 20:25

## 2024-09-09 RX ADMIN — Medication 50 MG: at 12:43

## 2024-09-09 RX ADMIN — MEROPENEM 1000 MG: 1 INJECTION INTRAVENOUS at 23:36

## 2024-09-09 RX ADMIN — MEROPENEM 1000 MG: 1 INJECTION INTRAVENOUS at 15:18

## 2024-09-09 RX ADMIN — ACETAMINOPHEN 325MG 650 MG: 325 TABLET ORAL at 00:55

## 2024-09-09 RX ADMIN — PAROXETINE HYDROCHLORIDE 20 MG: 20 TABLET, FILM COATED ORAL at 12:44

## 2024-09-09 RX ADMIN — BUSPIRONE HYDROCHLORIDE 10 MG: 10 TABLET ORAL at 00:17

## 2024-09-09 RX ADMIN — OXYCODONE HYDROCHLORIDE 10 MG: 10 TABLET ORAL at 12:43

## 2024-09-09 RX ADMIN — GABAPENTIN 300 MG: 300 CAPSULE ORAL at 20:19

## 2024-09-09 RX ADMIN — AMANTADINE HYDROCHLORIDE 100 MG: 100 CAPSULE ORAL at 21:21

## 2024-09-09 ASSESSMENT — PAIN SCALES - GENERAL
PAINLEVEL_OUTOF10: 10
PAINLEVEL_OUTOF10: 10
PAINLEVEL_OUTOF10: 8
PAINLEVEL_OUTOF10: 0
PAINLEVEL_OUTOF10: 10

## 2024-09-09 ASSESSMENT — PAIN DESCRIPTION - ORIENTATION
ORIENTATION: LOWER
ORIENTATION: LEFT
ORIENTATION: LEFT

## 2024-09-09 ASSESSMENT — PAIN DESCRIPTION - LOCATION
LOCATION: BACK
LOCATION: LEG;ARM;HEAD
LOCATION: BACK
LOCATION: LEG

## 2024-09-09 ASSESSMENT — PAIN SCALES - WONG BAKER
WONGBAKER_NUMERICALRESPONSE: HURTS A LITTLE BIT
WONGBAKER_NUMERICALRESPONSE: NO HURT
WONGBAKER_NUMERICALRESPONSE: HURTS A LITTLE BIT

## 2024-09-09 ASSESSMENT — PAIN - FUNCTIONAL ASSESSMENT
PAIN_FUNCTIONAL_ASSESSMENT: PREVENTS OR INTERFERES SOME ACTIVE ACTIVITIES AND ADLS
PAIN_FUNCTIONAL_ASSESSMENT: PREVENTS OR INTERFERES SOME ACTIVE ACTIVITIES AND ADLS
PAIN_FUNCTIONAL_ASSESSMENT: PREVENTS OR INTERFERES WITH MANY ACTIVE NOT PASSIVE ACTIVITIES

## 2024-09-09 ASSESSMENT — PAIN DESCRIPTION - DESCRIPTORS
DESCRIPTORS: DISCOMFORT
DESCRIPTORS: ACHING
DESCRIPTORS: ACHING

## 2024-09-10 PROCEDURE — 2580000003 HC RX 258

## 2024-09-10 PROCEDURE — 6370000000 HC RX 637 (ALT 250 FOR IP)

## 2024-09-10 PROCEDURE — 2060000000 HC ICU INTERMEDIATE R&B

## 2024-09-10 PROCEDURE — 6360000002 HC RX W HCPCS: Performed by: INTERNAL MEDICINE

## 2024-09-10 PROCEDURE — 6360000002 HC RX W HCPCS

## 2024-09-10 PROCEDURE — 87086 URINE CULTURE/COLONY COUNT: CPT

## 2024-09-10 PROCEDURE — 6370000000 HC RX 637 (ALT 250 FOR IP): Performed by: INTERNAL MEDICINE

## 2024-09-10 PROCEDURE — 94760 N-INVAS EAR/PLS OXIMETRY 1: CPT

## 2024-09-10 RX ADMIN — MEROPENEM 1000 MG: 1 INJECTION INTRAVENOUS at 05:55

## 2024-09-10 RX ADMIN — FERROUS SULFATE TAB 325 MG (65 MG ELEMENTAL FE) 325 MG: 325 (65 FE) TAB at 09:42

## 2024-09-10 RX ADMIN — BUSPIRONE HYDROCHLORIDE 10 MG: 10 TABLET ORAL at 09:41

## 2024-09-10 RX ADMIN — ENOXAPARIN SODIUM 30 MG: 100 INJECTION SUBCUTANEOUS at 21:30

## 2024-09-10 RX ADMIN — GABAPENTIN 300 MG: 300 CAPSULE ORAL at 09:42

## 2024-09-10 RX ADMIN — MEROPENEM 1000 MG: 1 INJECTION INTRAVENOUS at 15:33

## 2024-09-10 RX ADMIN — GABAPENTIN 300 MG: 300 CAPSULE ORAL at 21:18

## 2024-09-10 RX ADMIN — RISPERIDONE 2 MG: 1 TABLET, FILM COATED ORAL at 09:44

## 2024-09-10 RX ADMIN — Medication 50 MG: at 09:42

## 2024-09-10 RX ADMIN — DIVALPROEX SODIUM 500 MG: 500 TABLET, DELAYED RELEASE ORAL at 21:18

## 2024-09-10 RX ADMIN — PAROXETINE HYDROCHLORIDE 20 MG: 20 TABLET, FILM COATED ORAL at 09:41

## 2024-09-10 RX ADMIN — OXYCODONE HYDROCHLORIDE AND ACETAMINOPHEN 500 MG: 500 TABLET ORAL at 09:42

## 2024-09-10 RX ADMIN — BUSPIRONE HYDROCHLORIDE 10 MG: 10 TABLET ORAL at 13:16

## 2024-09-10 RX ADMIN — ENOXAPARIN SODIUM 30 MG: 100 INJECTION SUBCUTANEOUS at 09:43

## 2024-09-10 RX ADMIN — MEROPENEM 1000 MG: 1 INJECTION INTRAVENOUS at 23:04

## 2024-09-10 RX ADMIN — GABAPENTIN 300 MG: 300 CAPSULE ORAL at 13:17

## 2024-09-10 RX ADMIN — DOCUSATE SODIUM 200 MG: 100 CAPSULE, LIQUID FILLED ORAL at 21:18

## 2024-09-10 RX ADMIN — AMANTADINE HYDROCHLORIDE 100 MG: 100 CAPSULE ORAL at 21:18

## 2024-09-10 RX ADMIN — RISPERIDONE 2 MG: 1 TABLET, FILM COATED ORAL at 21:18

## 2024-09-10 RX ADMIN — AMANTADINE HYDROCHLORIDE 100 MG: 100 CAPSULE ORAL at 09:42

## 2024-09-10 RX ADMIN — OXYCODONE HYDROCHLORIDE 10 MG: 10 TABLET ORAL at 09:41

## 2024-09-10 RX ADMIN — DOCUSATE SODIUM 200 MG: 100 CAPSULE, LIQUID FILLED ORAL at 09:42

## 2024-09-10 RX ADMIN — BUSPIRONE HYDROCHLORIDE 10 MG: 10 TABLET ORAL at 21:18

## 2024-09-10 RX ADMIN — RISPERIDONE 2 MG: 1 TABLET, FILM COATED ORAL at 13:16

## 2024-09-10 ASSESSMENT — PAIN DESCRIPTION - DESCRIPTORS: DESCRIPTORS: ACHING

## 2024-09-10 ASSESSMENT — PAIN DESCRIPTION - LOCATION: LOCATION: GENERALIZED

## 2024-09-10 ASSESSMENT — PAIN SCALES - GENERAL
PAINLEVEL_OUTOF10: 0
PAINLEVEL_OUTOF10: 8

## 2024-09-11 ENCOUNTER — HOSPITAL ENCOUNTER (INPATIENT)
Dept: VASCULAR LAB | Age: 34
Discharge: HOME | End: 2024-09-13
Payer: MEDICARE

## 2024-09-11 ENCOUNTER — APPOINTMENT (OUTPATIENT)
Dept: ULTRASOUND IMAGING | Age: 34
End: 2024-09-11
Payer: MEDICARE

## 2024-09-11 LAB
ANION GAP SERPL CALCULATED.3IONS-SCNC: 8 MMOL/L (ref 3–16)
BACTERIA UR CULT: NORMAL
BASOPHILS # BLD: 0.1 K/UL (ref 0–0.2)
BASOPHILS NFR BLD: 0.9 %
BUN SERPL-MCNC: 9 MG/DL (ref 7–20)
CALCIUM SERPL-MCNC: 8.8 MG/DL (ref 8.3–10.6)
CHLORIDE SERPL-SCNC: 107 MMOL/L (ref 99–110)
CO2 SERPL-SCNC: 24 MMOL/L (ref 21–32)
CREAT SERPL-MCNC: 0.8 MG/DL (ref 0.9–1.3)
DEPRECATED RDW RBC AUTO: 14.4 % (ref 12.4–15.4)
ECHO BSA: 2.35 M2
EOSINOPHIL # BLD: 0.2 K/UL (ref 0–0.6)
EOSINOPHIL NFR BLD: 3.7 %
GFR SERPLBLD CREATININE-BSD FMLA CKD-EPI: >90 ML/MIN/{1.73_M2}
GLUCOSE SERPL-MCNC: 90 MG/DL (ref 70–99)
HCT VFR BLD AUTO: 33.3 % (ref 40.5–52.5)
HGB BLD-MCNC: 11.5 G/DL (ref 13.5–17.5)
LYMPHOCYTES # BLD: 1.1 K/UL (ref 1–5.1)
LYMPHOCYTES NFR BLD: 20.1 %
MCH RBC QN AUTO: 28.4 PG (ref 26–34)
MCHC RBC AUTO-ENTMCNC: 34.5 G/DL (ref 31–36)
MCV RBC AUTO: 82.3 FL (ref 80–100)
MONOCYTES # BLD: 0.5 K/UL (ref 0–1.3)
MONOCYTES NFR BLD: 8.3 %
NEUTROPHILS # BLD: 3.7 K/UL (ref 1.7–7.7)
NEUTROPHILS NFR BLD: 67 %
PLATELET # BLD AUTO: 154 K/UL (ref 135–450)
PMV BLD AUTO: 9.9 FL (ref 5–10.5)
POTASSIUM SERPL-SCNC: 4.1 MMOL/L (ref 3.5–5.1)
RBC # BLD AUTO: 4.05 M/UL (ref 4.2–5.9)
SODIUM SERPL-SCNC: 139 MMOL/L (ref 136–145)
WBC # BLD AUTO: 5.5 K/UL (ref 4–11)

## 2024-09-11 PROCEDURE — 85025 COMPLETE CBC W/AUTO DIFF WBC: CPT

## 2024-09-11 PROCEDURE — 6360000002 HC RX W HCPCS: Performed by: INTERNAL MEDICINE

## 2024-09-11 PROCEDURE — 6370000000 HC RX 637 (ALT 250 FOR IP): Performed by: INTERNAL MEDICINE

## 2024-09-11 PROCEDURE — 6370000000 HC RX 637 (ALT 250 FOR IP)

## 2024-09-11 PROCEDURE — 93971 EXTREMITY STUDY: CPT

## 2024-09-11 PROCEDURE — 80048 BASIC METABOLIC PNL TOTAL CA: CPT

## 2024-09-11 PROCEDURE — 6360000002 HC RX W HCPCS

## 2024-09-11 PROCEDURE — 36415 COLL VENOUS BLD VENIPUNCTURE: CPT

## 2024-09-11 PROCEDURE — 2060000000 HC ICU INTERMEDIATE R&B

## 2024-09-11 PROCEDURE — 2580000003 HC RX 258

## 2024-09-11 PROCEDURE — 93971 EXTREMITY STUDY: CPT | Performed by: SURGERY

## 2024-09-11 PROCEDURE — 2580000003 HC RX 258: Performed by: INTERNAL MEDICINE

## 2024-09-11 PROCEDURE — 94760 N-INVAS EAR/PLS OXIMETRY 1: CPT

## 2024-09-11 RX ADMIN — RISPERIDONE 2 MG: 1 TABLET, FILM COATED ORAL at 09:13

## 2024-09-11 RX ADMIN — MEROPENEM 1000 MG: 1 INJECTION INTRAVENOUS at 05:31

## 2024-09-11 RX ADMIN — MEROPENEM 1000 MG: 1 INJECTION INTRAVENOUS at 23:27

## 2024-09-11 RX ADMIN — ENOXAPARIN SODIUM 30 MG: 100 INJECTION SUBCUTANEOUS at 09:15

## 2024-09-11 RX ADMIN — GABAPENTIN 300 MG: 300 CAPSULE ORAL at 13:30

## 2024-09-11 RX ADMIN — GABAPENTIN 300 MG: 300 CAPSULE ORAL at 09:15

## 2024-09-11 RX ADMIN — AMANTADINE HYDROCHLORIDE 100 MG: 100 CAPSULE ORAL at 09:18

## 2024-09-11 RX ADMIN — FERROUS SULFATE TAB 325 MG (65 MG ELEMENTAL FE) 325 MG: 325 (65 FE) TAB at 09:15

## 2024-09-11 RX ADMIN — RISPERIDONE 2 MG: 1 TABLET, FILM COATED ORAL at 18:52

## 2024-09-11 RX ADMIN — Medication 50 MG: at 09:15

## 2024-09-11 RX ADMIN — GABAPENTIN 300 MG: 300 CAPSULE ORAL at 21:23

## 2024-09-11 RX ADMIN — BUSPIRONE HYDROCHLORIDE 10 MG: 10 TABLET ORAL at 21:23

## 2024-09-11 RX ADMIN — BUSPIRONE HYDROCHLORIDE 10 MG: 10 TABLET ORAL at 13:30

## 2024-09-11 RX ADMIN — OXYCODONE HYDROCHLORIDE AND ACETAMINOPHEN 500 MG: 500 TABLET ORAL at 09:15

## 2024-09-11 RX ADMIN — DOCUSATE SODIUM 200 MG: 100 CAPSULE, LIQUID FILLED ORAL at 21:23

## 2024-09-11 RX ADMIN — BACLOFEN 10 MG: 10 TABLET ORAL at 10:25

## 2024-09-11 RX ADMIN — OXYCODONE HYDROCHLORIDE 10 MG: 10 TABLET ORAL at 13:30

## 2024-09-11 RX ADMIN — BUSPIRONE HYDROCHLORIDE 10 MG: 10 TABLET ORAL at 09:13

## 2024-09-11 RX ADMIN — OXYCODONE HYDROCHLORIDE 10 MG: 10 TABLET ORAL at 21:23

## 2024-09-11 RX ADMIN — OXYCODONE HYDROCHLORIDE 10 MG: 10 TABLET ORAL at 09:14

## 2024-09-11 RX ADMIN — RISPERIDONE 2 MG: 1 TABLET, FILM COATED ORAL at 13:30

## 2024-09-11 RX ADMIN — SODIUM CHLORIDE, PRESERVATIVE FREE 10 ML: 5 INJECTION INTRAVENOUS at 21:29

## 2024-09-11 RX ADMIN — AMANTADINE HYDROCHLORIDE 100 MG: 100 CAPSULE ORAL at 21:27

## 2024-09-11 RX ADMIN — RISPERIDONE 2 MG: 1 TABLET, FILM COATED ORAL at 21:23

## 2024-09-11 RX ADMIN — TRAZODONE HYDROCHLORIDE 50 MG: 50 TABLET ORAL at 21:23

## 2024-09-11 RX ADMIN — DIVALPROEX SODIUM 500 MG: 500 TABLET, DELAYED RELEASE ORAL at 21:23

## 2024-09-11 RX ADMIN — PAROXETINE HYDROCHLORIDE 20 MG: 20 TABLET, FILM COATED ORAL at 09:15

## 2024-09-11 RX ADMIN — DOCUSATE SODIUM 200 MG: 100 CAPSULE, LIQUID FILLED ORAL at 09:15

## 2024-09-11 RX ADMIN — ENOXAPARIN SODIUM 30 MG: 100 INJECTION SUBCUTANEOUS at 21:27

## 2024-09-11 ASSESSMENT — PAIN DESCRIPTION - ORIENTATION
ORIENTATION: LOWER
ORIENTATION: LOWER
ORIENTATION: RIGHT;LEFT;LOWER
ORIENTATION: LOWER

## 2024-09-11 ASSESSMENT — PAIN DESCRIPTION - LOCATION
LOCATION: LEG;ARM
LOCATION: BACK

## 2024-09-11 ASSESSMENT — PAIN SCALES - GENERAL
PAINLEVEL_OUTOF10: 10

## 2024-09-11 ASSESSMENT — PAIN DESCRIPTION - DESCRIPTORS
DESCRIPTORS: ACHING

## 2024-09-11 ASSESSMENT — PAIN - FUNCTIONAL ASSESSMENT: PAIN_FUNCTIONAL_ASSESSMENT: PREVENTS OR INTERFERES SOME ACTIVE ACTIVITIES AND ADLS

## 2024-09-12 VITALS
OXYGEN SATURATION: 99 % | SYSTOLIC BLOOD PRESSURE: 122 MMHG | HEART RATE: 90 BPM | HEIGHT: 75 IN | WEIGHT: 223.55 LBS | RESPIRATION RATE: 18 BRPM | TEMPERATURE: 98.6 F | BODY MASS INDEX: 27.8 KG/M2 | DIASTOLIC BLOOD PRESSURE: 90 MMHG

## 2024-09-12 LAB
BACTERIA BLD CULT ORG #2: NORMAL
BACTERIA BLD CULT: NORMAL

## 2024-09-12 PROCEDURE — 6370000000 HC RX 637 (ALT 250 FOR IP): Performed by: INTERNAL MEDICINE

## 2024-09-12 PROCEDURE — 6360000002 HC RX W HCPCS

## 2024-09-12 PROCEDURE — 6370000000 HC RX 637 (ALT 250 FOR IP)

## 2024-09-12 PROCEDURE — 6360000002 HC RX W HCPCS: Performed by: INTERNAL MEDICINE

## 2024-09-12 PROCEDURE — 94760 N-INVAS EAR/PLS OXIMETRY 1: CPT

## 2024-09-12 PROCEDURE — 2580000003 HC RX 258

## 2024-09-12 RX ORDER — MORPHINE SULFATE 2 MG/ML
2 INJECTION, SOLUTION INTRAMUSCULAR; INTRAVENOUS EVERY 6 HOURS PRN
Status: DISCONTINUED | OUTPATIENT
Start: 2024-09-12 | End: 2024-09-12 | Stop reason: HOSPADM

## 2024-09-12 RX ORDER — CIPROFLOXACIN 500 MG/1
500 TABLET, FILM COATED ORAL 2 TIMES DAILY
Qty: 14 TABLET | Refills: 0 | Status: SHIPPED | OUTPATIENT
Start: 2024-09-12 | End: 2024-09-19

## 2024-09-12 RX ORDER — ZINC SULFATE 50(220)MG
50 CAPSULE ORAL DAILY
COMMUNITY
Start: 2024-09-13

## 2024-09-12 RX ADMIN — Medication 50 MG: at 08:40

## 2024-09-12 RX ADMIN — OXYCODONE HYDROCHLORIDE 10 MG: 10 TABLET ORAL at 08:40

## 2024-09-12 RX ADMIN — DOCUSATE SODIUM 200 MG: 100 CAPSULE, LIQUID FILLED ORAL at 08:39

## 2024-09-12 RX ADMIN — Medication 9 MG: at 01:00

## 2024-09-12 RX ADMIN — MEROPENEM 1000 MG: 1 INJECTION INTRAVENOUS at 07:03

## 2024-09-12 RX ADMIN — BUSPIRONE HYDROCHLORIDE 10 MG: 10 TABLET ORAL at 13:58

## 2024-09-12 RX ADMIN — OXYCODONE HYDROCHLORIDE AND ACETAMINOPHEN 500 MG: 500 TABLET ORAL at 08:39

## 2024-09-12 RX ADMIN — LORAZEPAM 0.5 MG: 0.5 TABLET ORAL at 08:40

## 2024-09-12 RX ADMIN — BACLOFEN 10 MG: 10 TABLET ORAL at 08:39

## 2024-09-12 RX ADMIN — GABAPENTIN 300 MG: 300 CAPSULE ORAL at 08:39

## 2024-09-12 RX ADMIN — MORPHINE SULFATE 2 MG: 2 INJECTION, SOLUTION INTRAMUSCULAR; INTRAVENOUS at 12:12

## 2024-09-12 RX ADMIN — BUSPIRONE HYDROCHLORIDE 10 MG: 10 TABLET ORAL at 08:39

## 2024-09-12 RX ADMIN — HYDROXYZINE PAMOATE 25 MG: 25 CAPSULE ORAL at 12:12

## 2024-09-12 RX ADMIN — OXYCODONE HYDROCHLORIDE 10 MG: 10 TABLET ORAL at 01:07

## 2024-09-12 RX ADMIN — RISPERIDONE 2 MG: 1 TABLET, FILM COATED ORAL at 08:39

## 2024-09-12 RX ADMIN — AMANTADINE HYDROCHLORIDE 100 MG: 100 CAPSULE ORAL at 08:40

## 2024-09-12 RX ADMIN — PAROXETINE HYDROCHLORIDE 20 MG: 20 TABLET, FILM COATED ORAL at 08:39

## 2024-09-12 RX ADMIN — FERROUS SULFATE TAB 325 MG (65 MG ELEMENTAL FE) 325 MG: 325 (65 FE) TAB at 08:38

## 2024-09-12 RX ADMIN — LACTULOSE 20 G: 10 SOLUTION ORAL at 08:41

## 2024-09-12 RX ADMIN — ENOXAPARIN SODIUM 30 MG: 100 INJECTION SUBCUTANEOUS at 08:38

## 2024-09-12 RX ADMIN — GABAPENTIN 300 MG: 300 CAPSULE ORAL at 13:57

## 2024-09-12 RX ADMIN — RISPERIDONE 2 MG: 1 TABLET, FILM COATED ORAL at 13:57

## 2024-09-12 ASSESSMENT — PAIN SCALES - GENERAL
PAINLEVEL_OUTOF10: 10
PAINLEVEL_OUTOF10: 8

## 2024-09-12 ASSESSMENT — PAIN - FUNCTIONAL ASSESSMENT
PAIN_FUNCTIONAL_ASSESSMENT: PREVENTS OR INTERFERES SOME ACTIVE ACTIVITIES AND ADLS
PAIN_FUNCTIONAL_ASSESSMENT: ACTIVITIES ARE NOT PREVENTED

## 2024-09-12 ASSESSMENT — PAIN DESCRIPTION - DESCRIPTORS
DESCRIPTORS: ACHING
DESCRIPTORS: ACHING

## 2024-09-12 ASSESSMENT — PAIN DESCRIPTION - LOCATION
LOCATION: BACK
LOCATION: LEG;ARM

## 2024-09-12 ASSESSMENT — PAIN DESCRIPTION - ORIENTATION
ORIENTATION: LOWER
ORIENTATION: RIGHT;LEFT

## (undated) DEVICE — SEAL ENDO INSTR SELF SEAL UROLOGY

## (undated) DEVICE — TUBE ET 7.5MM NSL ORAL BASIC CUF INTMED MURPHY EYE RADPQ

## (undated) DEVICE — DRAINBAG,ANTI-REFLUX TOWER,L/F,2000ML,LL: Brand: MEDLINE

## (undated) DEVICE — SHEET,DRAPE,53X77,STERILE: Brand: MEDLINE

## (undated) DEVICE — E-Z CLEAN, NON-STICK, PTFE COATED, ELECTROSURGICAL BLADE ELECTRODE, MODIFIED EXTENDED INSULATION, 2.5 INCH (6.35 CM): Brand: MEGADYNE

## (undated) DEVICE — GUIDEWIRE ENDOSCP L150CM DIA0.035IN TIP 3CM PTFE NIT

## (undated) DEVICE — CATHETER URET 5FR L70CM OPN END SGL LUMN INJ HUB FLEXIMA

## (undated) DEVICE — CATHETER,FOLEY,100% SIL,COUDE,16FR 10ML: Brand: MEDLINE

## (undated) DEVICE — C-ARMOR C-ARM EQUIPMENT COVERS CLEAR STERILE UNIVERSAL FIT 12 PER CASE: Brand: C-ARMOR

## (undated) DEVICE — PAD,EYE,1-5/8X2 5/8,STERILE,LF,1/PK: Brand: MEDLINE

## (undated) DEVICE — SYRINGE 20ML LL S/C 50

## (undated) DEVICE — Z DUPLICATE USE 2392649 LARYNGOSCOPE VID TI SPECTRM LOPRO S4 GLIDESCOPE

## (undated) DEVICE — SUTURE VCRL + SZ 4 0 L18IN ABSRB WHT P 3 3 8 CIR REV CUT NDL VCP494G

## (undated) DEVICE — CATHETER,FOLEY,SILI-ELAST,LTX,16FR,10ML: Brand: MEDLINE

## (undated) DEVICE — 3M™ STERI-DRAPE™ INSTRUMENT POUCH 1018: Brand: STERI-DRAPE™

## (undated) DEVICE — BINDER ABD 2XL H12XL60 75IN UNISX STD E 4 PNL DISPOSABLE

## (undated) DEVICE — MASTISOL ADHESIVE LIQ 2/3ML

## (undated) DEVICE — SUTURE VCRL SZ 0 L18IN ABSRB UD L36MM CT-1 1/2 CIR J840D

## (undated) DEVICE — STERILE LATEX POWDER FREE SURGICAL GLOVES WITH HYDROGEL COATING: Brand: PROTEXIS

## (undated) DEVICE — 3M™ STERI-STRIP™ REINFORCED ADHESIVE SKIN CLOSURES, R1547, 1/2 IN X 4 IN (12 MM X 100 MM), 6 STRIPS/ENVELOPE: Brand: 3M™ STERI-STRIP™

## (undated) DEVICE — GOWN,PREVENTION PLUS,L,ST,24/CS: Brand: MEDLINE

## (undated) DEVICE — MAJOR CDS

## (undated) DEVICE — 3M™ IOBAN™ 2 ANTIMICROBIAL INCISE DRAPE 6650EZ: Brand: IOBAN™ 2

## (undated) DEVICE — SURGICAL PROCEDURE PACK CYTOSCOPY

## (undated) DEVICE — NON-WOVEN ADHESIVE WOUND DRESSING: Brand: PRIMAPORE ADHESIVE DRESSING 30*10CM

## (undated) DEVICE — TOWEL,OR,DSP,ST,BLUE,DLX,4/PK,20PK/CS: Brand: MEDLINE

## (undated) DEVICE — PAD,NON-ADHERENT,3X8,STERILE,LF,1/PK: Brand: MEDLINE

## (undated) DEVICE — Device

## (undated) DEVICE — SUTURE PERMA-HAND 0 L18IN NONABSORBABLE BLK MO-7 L22MM 1/2 C041D

## (undated) DEVICE — BAG BND W36XL36IN TRNSPAR POLY GEN PURP W E BND CLSR TIDI

## (undated) DEVICE — BAG DRNGE COMB PK

## (undated) DEVICE — SOLUTION IRRIG 3000ML 0.9% SOD CHL USP UROMATIC PLAS CONT

## (undated) DEVICE — SUTURE VCRL SZ 2-0 L18IN ABSRB UD CT-1 L36MM 1/2 CIR J839D

## (undated) DEVICE — LARYNGOSCOPE BLDE MAC HNDL M SZ 35 ST CURAPLEX CURAVIEW LED

## (undated) DEVICE — GLOVE SURG SZ 75 CRM LTX FREE POLYISOPRENE POLYMER BEAD ANTI

## (undated) DEVICE — UNDERGLOVE SURG SZ 8 FNGR THK0.21MIL GRN LTX BEAD CUF

## (undated) DEVICE — Z INACTIVE USE 2660664 SOLUTION IRRIG 3000ML 0.9% SOD CHL USP UROMATIC PLAS CONT

## (undated) DEVICE — PACK,UNIVERSAL,NO GOWNS: Brand: MEDLINE